# Patient Record
Sex: FEMALE | Race: WHITE | NOT HISPANIC OR LATINO | Employment: FULL TIME | URBAN - METROPOLITAN AREA
[De-identification: names, ages, dates, MRNs, and addresses within clinical notes are randomized per-mention and may not be internally consistent; named-entity substitution may affect disease eponyms.]

---

## 2017-08-07 ENCOUNTER — OFFICE VISIT (OUTPATIENT)
Dept: INTERNAL MEDICINE | Facility: CLINIC | Age: 42
End: 2017-08-07
Payer: COMMERCIAL

## 2017-08-07 ENCOUNTER — TELEPHONE (OUTPATIENT)
Dept: INTERNAL MEDICINE | Facility: CLINIC | Age: 42
End: 2017-08-07

## 2017-08-07 VITALS
WEIGHT: 128.06 LBS | HEIGHT: 63 IN | BODY MASS INDEX: 22.69 KG/M2 | SYSTOLIC BLOOD PRESSURE: 128 MMHG | DIASTOLIC BLOOD PRESSURE: 70 MMHG | HEART RATE: 106 BPM | OXYGEN SATURATION: 99 %

## 2017-08-07 DIAGNOSIS — Z82.49 FAMILY HISTORY OF CARDIAC DISORDER: Primary | ICD-10-CM

## 2017-08-07 DIAGNOSIS — F41.9 ANXIETY: ICD-10-CM

## 2017-08-07 PROCEDURE — 3008F BODY MASS INDEX DOCD: CPT | Mod: S$GLB,,, | Performed by: INTERNAL MEDICINE

## 2017-08-07 PROCEDURE — 99213 OFFICE O/P EST LOW 20 MIN: CPT | Mod: S$GLB,,, | Performed by: INTERNAL MEDICINE

## 2017-08-07 PROCEDURE — 99999 PR PBB SHADOW E&M-NEW PATIENT-LVL III: CPT | Mod: PBBFAC,,, | Performed by: INTERNAL MEDICINE

## 2017-08-07 RX ORDER — CLONAZEPAM 0.5 MG/1
0.5 TABLET ORAL
COMMUNITY
Start: 2017-08-02 | End: 2018-01-27

## 2017-08-07 RX ORDER — BUPROPION HYDROCHLORIDE 150 MG/1
TABLET ORAL
COMMUNITY
Start: 2017-08-02 | End: 2017-08-16

## 2017-08-07 RX ORDER — DOXYCYCLINE 100 MG/1
100 CAPSULE ORAL EVERY 12 HOURS
Refills: 1 | COMMUNITY
Start: 2017-06-25 | End: 2017-08-24

## 2017-08-07 RX ORDER — ERGOCALCIFEROL 1.25 MG/1
50000 CAPSULE ORAL WEEKLY
COMMUNITY
Start: 2017-08-04 | End: 2018-01-10 | Stop reason: SDUPTHER

## 2017-08-07 RX ORDER — HYDROCORTISONE 25 MG/G
CREAM TOPICAL
COMMUNITY
Start: 2017-07-20 | End: 2017-08-24

## 2017-08-16 ENCOUNTER — OFFICE VISIT (OUTPATIENT)
Dept: INTERNAL MEDICINE | Facility: CLINIC | Age: 42
End: 2017-08-16
Payer: COMMERCIAL

## 2017-08-16 VITALS
WEIGHT: 133.63 LBS | HEIGHT: 63 IN | SYSTOLIC BLOOD PRESSURE: 108 MMHG | OXYGEN SATURATION: 99 % | DIASTOLIC BLOOD PRESSURE: 70 MMHG | HEART RATE: 89 BPM | BODY MASS INDEX: 23.68 KG/M2

## 2017-08-16 DIAGNOSIS — E55.9 VITAMIN D DEFICIENCY: ICD-10-CM

## 2017-08-16 DIAGNOSIS — F17.200 SMOKER: ICD-10-CM

## 2017-08-16 DIAGNOSIS — E78.1 HYPERTRIGLYCERIDEMIA: ICD-10-CM

## 2017-08-16 DIAGNOSIS — R42 DIZZINESS: Primary | ICD-10-CM

## 2017-08-16 DIAGNOSIS — F41.9 ANXIETY: ICD-10-CM

## 2017-08-16 PROCEDURE — 3008F BODY MASS INDEX DOCD: CPT | Mod: S$GLB,,, | Performed by: NURSE PRACTITIONER

## 2017-08-16 PROCEDURE — 99204 OFFICE O/P NEW MOD 45 MIN: CPT | Mod: S$GLB,,, | Performed by: NURSE PRACTITIONER

## 2017-08-16 PROCEDURE — 99999 PR PBB SHADOW E&M-EST. PATIENT-LVL IV: CPT | Mod: PBBFAC,,, | Performed by: NURSE PRACTITIONER

## 2017-08-16 NOTE — PROGRESS NOTES
INTERNAL MEDICINE INITIAL VISIT NOTE      CHIEF COMPLAINT     Chief Complaint   Patient presents with    Annual Exam    Establish Care       HPI     Juanita Salas is a 42 y.o.  female with History reviewed. No pertinent past medical history. presents to establish care and with multiple complaints.     H/o blood in urine- Followed by Dr. Bahena at [facility]. Refer by Gyn. Recurrent UTIs with only s/s - dizziness. 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 Treating with doxycyline and will have repeat cultures at completion.   Treating boyfriend as well.     Dizziness- Still with occasional dizziness. Denies off balance and rotational dizziness. Associated with pre-syncope and sweating.   1 episode of low blood sugar.   Echo 2017 - [facility]   ECG - normal   Both done by Dr. Ulloa at [facility]     Anxiety - was attributing dizziness to anxiety. Treated with klonopin with relief. Also started on celexa which she could not tolerate 2/2 weight loss  - 6lbs in 1.5 months. Also felt zombie-like.   Then started on Wellbutrin - could not tolerate 2/2 elevated BPs.     Vitamin D def- Taking vit d 50,000 units weekly last check 29 8/10/2017.     Hyperlipidemia- trigs 161  HDL 57 8/2017        Past Medical History:  History reviewed. No pertinent past medical history.    Past Surgical History:  History reviewed. No pertinent surgical history.    Allergies:  Review of patient's allergies indicates:  No Known Allergies    Home Medications:  Prior to Admission medications    Medication Sig Start Date End Date Taking? Authorizing Provider   buPROPion (WELLBUTRIN XL) 150 MG TB24 tablet  8/2/17   Historical Provider, MD   clonazePAM (KLONOPIN) 0.5 MG tablet Take 0.5 mg by mouth as needed. 8/2/17   Historical Provider, MD   doxycycline (VIBRAMYCIN) 100 MG Cap Take 100 mg by mouth every 12 (twelve) hours. 6/25/17   Historical Provider, MD   PROCTOSOL HC 2.5 % rectal cream  7/20/17   Historical Provider, MD    TRINESSA LO 0.18/0.215/0.25 mg-25 mcg tablet TK 1 T PO QD 8/1/17   Historical Provider, MD   VITAMIN D2 50,000 unit capsule Take 50,000 Units by mouth once a week. 8/4/17   Historical Provider, MD       Family History:  Family History   Problem Relation Age of Onset    Cancer Mother 47     pancreatic cancer     Diabetes Mother     Multiple sclerosis Sister     Hypertension Maternal Aunt     Hyperlipidemia Maternal Aunt     Hyperlipidemia Maternal Uncle     Hypertension Maternal Uncle     Hyperlipidemia Paternal Aunt     Hypertension Paternal Aunt     Hyperlipidemia Paternal Uncle     Hypertension Paternal Uncle     Stroke Maternal Grandmother     Heart disease Maternal Grandfather     Hyperlipidemia Maternal Grandfather     Hypertension Maternal Grandfather        Social History:  Social History   Substance Use Topics    Smoking status: Current Every Day Smoker     Packs/day: 0.50     Years: 2.00    Smokeless tobacco: Never Used    Alcohol use No       Review of Systems:  Review of Systems   Constitutional: Positive for diaphoresis, fatigue and unexpected weight change. Negative for chills and fever.   HENT: Negative for congestion, postnasal drip, sinus pressure, sneezing and sore throat.    Eyes: Negative for visual disturbance.   Respiratory: Negative for cough, shortness of breath and wheezing.    Cardiovascular: Positive for palpitations (with anxiety ). Negative for chest pain.   Gastrointestinal: Negative for abdominal pain, constipation, diarrhea, nausea and vomiting.   Endocrine: Negative for cold intolerance, heat intolerance, polydipsia, polyphagia and polyuria.   Genitourinary: Negative for dysuria and urgency.   Musculoskeletal: Negative for arthralgias and myalgias.   Skin: Negative for rash.   Neurological: Positive for dizziness and light-headedness. Negative for headaches.   Psychiatric/Behavioral: Positive for decreased concentration. Negative for confusion, dysphoric mood,  "self-injury, sleep disturbance and suicidal ideas. The patient is nervous/anxious.        Health Maintainence:   Health Maintenance       Date Due Completion Date    TETANUS VACCINE 06/14/1993 ---    Pneumococcal PPSV23 (Medium Risk) (1) 06/14/1993 ---    Pap Smear with HPV Cotest 06/14/1996 ---    Mammogram 06/14/2015 ---    Influenza Vaccine 08/01/2017 ---            PHYSICAL EXAM     /70 (BP Location: Right arm, Patient Position: Sitting, BP Method: Large (Manual))   Pulse 89   Ht 5' 3" (1.6 m)   Wt 60.6 kg (133 lb 9.6 oz)   LMP 07/24/2017   SpO2 99%   BMI 23.67 kg/m²   Body mass index is 23.67 kg/m².    Physical Exam   Constitutional: She is oriented to person, place, and time. She appears well-developed and well-nourished.   HENT:   Head: Normocephalic.   Right Ear: External ear normal.   Left Ear: External ear normal.   Nose: Nose normal.   Mouth/Throat: Oropharynx is clear and moist. No oropharyngeal exudate.   Eyes: Pupils are equal, round, and reactive to light.   Neck: Neck supple. No JVD present. No tracheal deviation present. No thyromegaly present.   Cardiovascular: Normal rate, regular rhythm, normal heart sounds and intact distal pulses.  Exam reveals no gallop and no friction rub.    No murmur heard.  Pulmonary/Chest: Effort normal and breath sounds normal. No respiratory distress. She has no wheezes. She has no rales.   Abdominal: Soft. Bowel sounds are normal. She exhibits no distension. There is no tenderness.   Musculoskeletal: Normal range of motion. She exhibits no edema or tenderness.   Lymphadenopathy:     She has no cervical adenopathy.   Neurological: She is alert and oriented to person, place, and time.   Skin: Skin is warm and dry. No rash noted.   Psychiatric: She has a normal mood and affect. Her behavior is normal.   Vitals reviewed.        LABS     No results found for: LABA1C, HGBA1C  CMP  Sodium   Date Value Ref Range Status   02/17/2006 143 136 - 145 mMol/l Final "     Potassium   Date Value Ref Range Status   02/17/2006 3.5 3.3 - 5.3 mMol/l Final     Chloride   Date Value Ref Range Status   02/17/2006 102 95 - 110 mMol/l Final     CO2   Date Value Ref Range Status   02/17/2006 28 23.0 - 29.0 mEq/L Final     Glucose   Date Value Ref Range Status   02/17/2006 90 70 - 110 mg/dl Final     BUN, Bld   Date Value Ref Range Status   02/17/2006 9 5 - 23 mg/dl Final     Creatinine   Date Value Ref Range Status   02/17/2006 0.7 0.5 - 1.4 mg/dl Final     Calcium   Date Value Ref Range Status   02/17/2006 8.8 8.7 - 10.5 mg/dl Final     Total Protein   Date Value Ref Range Status   02/17/2006 7.1 6.0 - 8.4 gm/dl Final     Albumin   Date Value Ref Range Status   02/17/2006 4.6 3.5 - 5.2 g/dl Final     Total Bilirubin   Date Value Ref Range Status   02/17/2006 0.4 0.1 - 1.0 mg/dl Final     Alkaline Phosphatase   Date Value Ref Range Status   02/17/2006 55 45 - 130 U/L Final     AST   Date Value Ref Range Status   02/17/2006 17 0 - 31 U/L Final     ALT   Date Value Ref Range Status   02/17/2006 14 0 - 31 U/L Final     Lab Results   Component Value Date    WBC 6.80 02/17/2006    HGB 12.5 02/17/2006    HCT 38.6 02/17/2006    MCV 91.3 02/17/2006     02/17/2006     Lab Results   Component Value Date    CHOL 177 02/17/2006     Lab Results   Component Value Date    HDL 48.0 02/17/2006     Lab Results   Component Value Date    LDLCALC 103.2 02/17/2006     Lab Results   Component Value Date    TRIG 129 02/17/2006     Lab Results   Component Value Date    CHOLHDL 27.1 02/17/2006     Lab Results   Component Value Date    TSH 1.8 02/17/2006       ASSESSMENT/PLAN     Juanita Salas is a 42 y.o. female wit  History reviewed. No pertinent past medical history.    Dizziness- Will request echo and ecg from Dr. Boo. Physical exam normal. BPs normal. Will refer to cardiology for opinion on work up. Likely r/t anxiety.   -     Ambulatory Referral to Cardiology    Anxiety- did not tolerate celexa and  wellbutrin. Will cont clonazepam sparingly. If cardiology work up negative will try another SSRI for management.     Hypertriglyceridemia- discussed diet and lifestyle modifications.     Vitamin D deficiency- cont 50,000 unit supplement. will recheck vit d in 3 months     Smoker- will refer to smoking cessation.     Follow up with PCP in 3 months     Erika CARL, ARJUN, FNP-c   Department of Internal Medicine - [facility]  7:33 AM

## 2017-08-18 ENCOUNTER — TELEPHONE (OUTPATIENT)
Dept: CARDIOLOGY | Facility: CLINIC | Age: 42
End: 2017-08-18

## 2017-08-18 DIAGNOSIS — R42 DIZZINESS: Primary | ICD-10-CM

## 2017-08-24 ENCOUNTER — OFFICE VISIT (OUTPATIENT)
Dept: CARDIOLOGY | Facility: CLINIC | Age: 42
End: 2017-08-24
Payer: COMMERCIAL

## 2017-08-24 VITALS
HEIGHT: 63 IN | DIASTOLIC BLOOD PRESSURE: 77 MMHG | BODY MASS INDEX: 23.32 KG/M2 | WEIGHT: 131.63 LBS | HEART RATE: 93 BPM | SYSTOLIC BLOOD PRESSURE: 129 MMHG

## 2017-08-24 DIAGNOSIS — Z72.0 TOBACCO ABUSE: ICD-10-CM

## 2017-08-24 DIAGNOSIS — R42 DIZZY SPELLS: Primary | ICD-10-CM

## 2017-08-24 DIAGNOSIS — F17.200 TOBACCO DEPENDENCE SYNDROME: ICD-10-CM

## 2017-08-24 DIAGNOSIS — R42 DIZZINESS: ICD-10-CM

## 2017-08-24 DIAGNOSIS — F41.9 ANXIETY: ICD-10-CM

## 2017-08-24 PROCEDURE — 3008F BODY MASS INDEX DOCD: CPT | Mod: S$GLB,,, | Performed by: INTERNAL MEDICINE

## 2017-08-24 PROCEDURE — 93000 ELECTROCARDIOGRAM COMPLETE: CPT | Mod: S$GLB,,, | Performed by: INTERNAL MEDICINE

## 2017-08-24 PROCEDURE — 99204 OFFICE O/P NEW MOD 45 MIN: CPT | Mod: 25,S$GLB,, | Performed by: INTERNAL MEDICINE

## 2017-08-24 PROCEDURE — 99999 PR PBB SHADOW E&M-EST. PATIENT-LVL III: CPT | Mod: PBBFAC,,, | Performed by: INTERNAL MEDICINE

## 2017-08-24 NOTE — LETTER
August 24, 2017      Erika Nielson, NP  [address]  [address]  [facility] [address]           [facility] - Cardiology  [address]  [place] [address]  Phone: [phone]          Patient: Juanita Salas   MR Number: [id number]   YOB: 1975   Date of Visit: 8/24/2017       Dear Erika Nielson:    Thank you for referring Juanita Salas to me for evaluation. Attached you will find relevant portions of my assessment and plan of care.    If you have questions, please do not hesitate to call me. I look forward to following Juanita Salas along with you.    Sincerely,    Darrel Gray MD    Enclosure  CC:  No Recipients    If you would like to receive this communication electronically, please contact [email or web address] or [phone] to request more information on [employer] access.    For providers and/or their staff who would like to refer a patient to [facility], please contact us through our one-stop-shop provider referral line, [facility], at [phone].    If you feel you have received this communication in error or would no longer like to receive these types of communications, please e-mail [email or web address]

## 2017-08-24 NOTE — PROGRESS NOTES
Chart has been dictated using voice recognition software.  It is not been reviewed carefully for any transcriptional errors due to this technology.   Subjective:   Patient ID:  Juanita Salas is a 42 y.o. female who presents for evaluation of Dizziness and Palpitations      HPI: Patient with family history of heart disease has been having episodes of dizziness.  Usually occurs with anxiety attacks.  Has sudden episodes of lightheadedness lasting various times. Not associated with palpitations, will be accompanied by nausea. No syncope.   Patient denies any dyspnea at rest or on exertion, orthopnea, PND, or edema.  Patient denies any chest discomfort on exertion or at rest. Patient denies any palpitations or syncope.     Cardiac risk factors: hyperlipidemia, positive family history, tobacco use    No past medical history on file.    No past surgical history on file.    Social History   Substance Use Topics    Smoking status: Current Every Day Smoker     Packs/day: 0.50     Years: 2.00    Smokeless tobacco: Never Used    Alcohol use No       Outpatient Medications Prior to Visit   Medication Sig Dispense Refill    clonazePAM (KLONOPIN) 0.5 MG tablet Take 0.5 mg by mouth as needed.      TRINESSA LO 0.18/0.215/0.25 mg-25 mcg tablet TK 1 T PO QD  2    VITAMIN D2 50,000 unit capsule Take 50,000 Units by mouth once a week.      doxycycline (VIBRAMYCIN) 100 MG Cap Take 100 mg by mouth every 12 (twelve) hours.  1    PROCTOSOL HC 2.5 % rectal cream        No facility-administered medications prior to visit.        Review of patient's allergies indicates:  No Known Allergies    Review of Systems   Constitution: Positive for weight loss (8 since the beginning of the year (not on purpose) - most when she was taking Celexa). Negative for weight gain.   HENT: Negative for nosebleeds.    Eyes: Negative for vision loss in left eye and vision loss in right eye.   Cardiovascular: Negative for claudication.        As above  "  Respiratory: Negative for hemoptysis, shortness of breath, snoring, sputum production and wheezing.    Endocrine: Negative for polydipsia and polyuria.   Hematologic/Lymphatic: Does not bruise/bleed easily.   Musculoskeletal: Negative for myalgias.   Gastrointestinal: Positive for nausea (with Wellbutrin). Negative for change in bowel habit, hematemesis, hematochezia, melena and vomiting.   Genitourinary: Negative for hematuria.   Neurological: Negative for focal weakness and numbness.     Objective:   Physical Exam   Constitutional: She is oriented to person, place, and time. She appears well-developed and well-nourished.   /77   Pulse 93   Ht 5' 3" (1.6 m)   Wt 59.7 kg (131 lb 9.8 oz)   LMP 07/24/2017   BMI 23.31 kg/m²   Repeat /72 left arm sitting     Neck: Neck supple. No JVD present. Carotid bruit is not present.   Cardiovascular: Normal rate, regular rhythm, normal heart sounds and intact distal pulses.  Exam reveals no gallop and no friction rub.    No murmur heard.  Pulmonary/Chest: Effort normal and breath sounds normal. She has no wheezes. She has no rales.   Abdominal: Soft. Bowel sounds are normal. She exhibits no abdominal bruit. There is no hepatomegaly. There is no tenderness.   Musculoskeletal: She exhibits no edema.   Neurological: She is alert and oriented to person, place, and time. She has normal strength.   Skin: No cyanosis. Nails show no clubbing.       Lab Results   Component Value Date    WBC 6.80 02/17/2006    HGB 12.5 02/17/2006    HCT 38.6 02/17/2006    MCV 91.3 02/17/2006     02/17/2006       Lab Results   Component Value Date     02/17/2006    K 3.5 02/17/2006    BUN 9 02/17/2006    CREATININE 0.7 02/17/2006    GLU 90 02/17/2006    CHOL 177 02/17/2006    HDL 48.0 02/17/2006    LDLCALC 103.2 02/17/2006    TRIG 129 02/17/2006    CHOLHDL 27.1 02/17/2006    HGB 12.5 02/17/2006    HCT 38.6 02/17/2006     02/17/2006     10 year ASCVD risk 2.4%    ECG " "showed sinus rhythm and was a normal ECG.   Assessment:     1. Dizzy spells    2. Anxiety    3. Tobacco abuse    4. Tobacco dependence syndrome      The patient is having nondescript spells where she has "dizziness" which appears to be somewhat of a lightheadedness.  They're not necessarily associated with any other symptoms.  They're clearly not necessarily associated with palpitations and is not clear the patient actually has palpitations.  Patient has no other cardiac symptoms.  Her physical exam is relatively benign.  She states that she has mitral valve prolapse on echo at another institution, but no click or murmur heard on physical examination.    As likely the patient's symptoms are all due to anxiety.  It seems as if the patient is not controlling her anxiety well by her current means and, therefore, she will be referred for psychological evaluation.  At this time, further cardiac workup is unlikely to reveal any pathology and, therefore, is not recommended.  Discussed with patient.  No further testing or additional treatment at this time.   Plan:     Juanita was seen today for dizziness and palpitations.    Diagnoses and all orders for this visit:    Dizzy spells    Anxiety  -     [70599] AK TOBACCO USE CESSATION INTERMEDIATE 3-10 MIN    Tobacco abuse  -     [73885] AK TOBACCO USE CESSATION INTERMEDIATE 3-10 MIN  -     Ambulatory consult to Psychology  -     [84412] AK TOBACCO USE CESSATION INTERMEDIATE 3-10 MIN    Tobacco dependence syndrome            Tobacco Use/Cessation:  I assessed Juanita Salas and discussed smoking cessation with her for 3-10 minutes. She  History   Smoking Status    Current Every Day Smoker    Packs/day: 0.50    Years: 2.00   Smokeless Tobacco    Never Used       Tobacco Use/Cessation:  I assessed Juanita Salas and discussed smoking cessation with her for 3-10 minutes. She  History   Smoking Status    Current Every Day Smoker    Packs/day: 0.50    Years: 2.00   Smokeless " Tobacco    Never Used

## 2017-08-24 NOTE — LETTER
August 24, 2017        Erika Nielson, NP  [address]  [address]  [facility] [address]             [facility] - Cardiology  [address]  [place] [address]  Phone: [phone]   Patient: Juanita Salas   MR Number: [id number]   YOB: 1975   Date of Visit: 8/24/2017       Dear Dr. Nielson:    Thank you for referring Juanita Salas to me for evaluation. Attached you will find relevant portions of my assessment and plan of care.    If you have questions, please do not hesitate to call me. I look forward to following Juanita Salas along with you.    Sincerely,      Darrel Gray MD            CC  No Recipients    Enclosure

## 2017-08-31 ENCOUNTER — CLINICAL SUPPORT (OUTPATIENT)
Dept: SMOKING CESSATION | Facility: CLINIC | Age: 42
End: 2017-08-31
Payer: COMMERCIAL

## 2017-08-31 VITALS
HEART RATE: 88 BPM | WEIGHT: 131.63 LBS | DIASTOLIC BLOOD PRESSURE: 83 MMHG | SYSTOLIC BLOOD PRESSURE: 127 MMHG | HEIGHT: 63 IN | BODY MASS INDEX: 23.32 KG/M2

## 2017-08-31 DIAGNOSIS — F17.210 NICOTINE DEPENDENCE, CIGARETTES, UNCOMPLICATED: Primary | ICD-10-CM

## 2017-08-31 PROCEDURE — 99404 PREV MED CNSL INDIV APPRX 60: CPT | Mod: S$GLB,,,

## 2017-09-01 RX ORDER — IBUPROFEN 200 MG
1 TABLET ORAL DAILY
Qty: 7 PATCH | Refills: 0 | Status: SHIPPED | OUTPATIENT
Start: 2017-09-01 | End: 2017-09-14 | Stop reason: SDUPTHER

## 2017-09-01 RX ORDER — DIPHENHYDRAMINE HCL 25 MG
4 CAPSULE ORAL
Qty: 220 EACH | Refills: 0 | Status: SHIPPED | OUTPATIENT
Start: 2017-09-01 | End: 2017-11-21 | Stop reason: ALTCHOICE

## 2017-09-01 NOTE — PROGRESS NOTES
8/31/17     See Smoking Cessation Smart Form    Additional Interventions:  · Recommended patient participate in Smoking Cessation Group .  · Discussed triggers and planning for quit date.  · Given patient education handouts from [employer] Tool Kit #3  · Educated patient about and gave patient education handouts from  [employer] Drug Information on:   Chantix, Wellbutrin, NRT  · Provided phone number to reach [facility] CTTS (Certified Tobacco Treatment Specialist) for future assistance and numbers to 24/7 Quit lines.

## 2017-09-12 ENCOUNTER — CLINICAL SUPPORT (OUTPATIENT)
Dept: SMOKING CESSATION | Facility: CLINIC | Age: 42
End: 2017-09-12
Payer: COMMERCIAL

## 2017-09-12 DIAGNOSIS — F17.210 NICOTINE DEPENDENCE, CIGARETTES, UNCOMPLICATED: Primary | ICD-10-CM

## 2017-09-12 PROCEDURE — 90853 GROUP PSYCHOTHERAPY: CPT | Mod: S$GLB,,,

## 2017-09-13 NOTE — PROGRESS NOTES
Smoking Cessation Group Session #1    Site:  [facility]  Date:  9/12/17  Clinical Status of Patient: Outpatient   Length of Service and Code: 90 minutes - 12683   Number in Attendance: 5  Group Activities/Focus of Group:  orientation, client introductions, completion of TCRS (Tobacco Cessation Rating Scale) learned addiction model, cues/triggers, personal reasons for quitting, medications, goals, quit date preparation.  Target symptoms:  withdrawal and medication side effects             The following were rated moderate (3) to severe (4) on TCRS:       Moderate 3: none     Severe 4:   none  Patient's Response to Intervention: Active participation, self-disclosure, supportive of group peers.  Progress Toward Goals and Other Mental Status Changes:   No change in the number of cigarettes smoked per day yet.  Reported she just got the patches and gum from the pharmacy today so she hasnt started using them yet.  Shared smoking history with the group, reasons for wanting to quit, and strong motivation to achieve this goal.  Diagnosis: F17.210  Plan: Group therapy, individual support/cessation counseling and medication monitoring by CTTS. Medication management by providers.  Return to Clinic: 1 week  Planned Quit Date:  To Be Determined

## 2017-09-14 ENCOUNTER — TELEPHONE (OUTPATIENT)
Dept: SMOKING CESSATION | Facility: CLINIC | Age: 42
End: 2017-09-14

## 2017-09-14 DIAGNOSIS — F17.210 NICOTINE DEPENDENCE, CIGARETTES, UNCOMPLICATED: ICD-10-CM

## 2017-09-14 RX ORDER — IBUPROFEN 200 MG
1 TABLET ORAL DAILY
Qty: 14 PATCH | Refills: 0 | Status: SHIPPED | OUTPATIENT
Start: 2017-09-14 | End: 2017-11-21 | Stop reason: ALTCHOICE

## 2017-09-21 ENCOUNTER — NURSE TRIAGE (OUTPATIENT)
Dept: ADMINISTRATIVE | Facility: CLINIC | Age: 42
End: 2017-09-21

## 2017-09-21 NOTE — TELEPHONE ENCOUNTER
Reason for Disposition   Severe headache, states 'worst headache' of life     Patient states she is experiencing severe left sided headache for year how has been increased in severity within last two past two days which now include stiffness in her neck.    Protocols used: ST HEADACHE-A-[place]

## 2017-09-22 ENCOUNTER — OFFICE VISIT (OUTPATIENT)
Dept: NEUROLOGY | Facility: CLINIC | Age: 42
End: 2017-09-22
Payer: COMMERCIAL

## 2017-09-22 VITALS
WEIGHT: 130.75 LBS | SYSTOLIC BLOOD PRESSURE: 128 MMHG | HEART RATE: 88 BPM | DIASTOLIC BLOOD PRESSURE: 93 MMHG | BODY MASS INDEX: 23.17 KG/M2 | HEIGHT: 63 IN

## 2017-09-22 DIAGNOSIS — R51.9 GENERALIZED HEADACHES: Primary | ICD-10-CM

## 2017-09-22 DIAGNOSIS — G43.009 MIGRAINE WITHOUT AURA AND WITHOUT STATUS MIGRAINOSUS, NOT INTRACTABLE: ICD-10-CM

## 2017-09-22 PROCEDURE — 99203 OFFICE O/P NEW LOW 30 MIN: CPT | Mod: S$GLB,,, | Performed by: NEUROMUSCULOSKELETAL MEDICINE & OMM

## 2017-09-22 PROCEDURE — 99999 PR PBB SHADOW E&M-EST. PATIENT-LVL III: CPT | Mod: PBBFAC,,, | Performed by: NEUROMUSCULOSKELETAL MEDICINE & OMM

## 2017-09-22 NOTE — PROGRESS NOTES
This note was dictated with Dragon Natural Speaking a word recognition program. There are occasionally word recognition errors which are missed on review.  Juanita Salas  1975  Review of patient's allergies indicates:  No Known Allergies  [unfilled]    No past medical history on file.  Social History     Social History    Marital status:      Spouse name: N/A    Number of children: N/A    Years of education: N/A     Occupational History    Not on file.     Social History Main Topics    Smoking status: Current Every Day Smoker     Packs/day: 0.50     Years: 2.00    Smokeless tobacco: Never Used    Alcohol use No    Drug use: No    Sexual activity: Not on file     Other Topics Concern    Not on file     Social History Narrative     with son (23) and daugther (14)     Family History   Problem Relation Age of Onset    Cancer Mother 47     pancreatic cancer     Diabetes Mother     Multiple sclerosis Sister     Hypertension Maternal Aunt     Hyperlipidemia Maternal Aunt     Hyperlipidemia Maternal Uncle     Hypertension Maternal Uncle     Hyperlipidemia Paternal Aunt     Hypertension Paternal Aunt     Hyperlipidemia Paternal Uncle     Hypertension Paternal Uncle     Stroke Maternal Grandmother     Heart disease Maternal Grandfather     Hyperlipidemia Maternal Grandfather     Hypertension Maternal Grandfather        Review of systems:  Constitutional-negative  Eyes-negative  ENT, mouth-negative  Cardiovascular-negative  Respiratory-negative  GI-negative  - negative  Musculoskeletal-negative  Skin-negative  Neurologic-negative  Psychiatric-negative  Endocrine-negative  Hematology/lymph nodes-negative  Allergies/immunology-negative  Juanita Salas  1975  Review of patient's allergies indicates:  No Known Allergies  [unfilled]    No past medical history on file.  Social History     Social History    Marital status:      Spouse name: N/A    Number of  children: N/A    Years of education: N/A     Occupational History    Not on file.     Social History Main Topics    Smoking status: Current Every Day Smoker     Packs/day: 0.50     Years: 2.00    Smokeless tobacco: Never Used    Alcohol use No    Drug use: No    Sexual activity: Not on file     Other Topics Concern    Not on file     Social History Narrative     with son (23) and daugther (14)     Family History   Problem Relation Age of Onset    Cancer Mother 47     pancreatic cancer     Diabetes Mother     Multiple sclerosis Sister     Hypertension Maternal Aunt     Hyperlipidemia Maternal Aunt     Hyperlipidemia Maternal Uncle     Hypertension Maternal Uncle     Hyperlipidemia Paternal Aunt     Hypertension Paternal Aunt     Hyperlipidemia Paternal Uncle     Hypertension Paternal Uncle     Stroke Maternal Grandmother     Heart disease Maternal Grandfather     Hyperlipidemia Maternal Grandfather     Hypertension Maternal Grandfather        Review of systems:  Constitutional-negative  Eyes-negative  ENT, mouth-negative  Cardiovascular-negative  Respiratory-negative  GI-negative  - negative  Musculoskeletal-negative  Skin-negative  Neurologic-negative  Psychiatric-negative  Endocrine-negative  Hematology/lymph nodes-negative  Allergies/immunology-negative  Gen. Appearance: Well-developed with no obvious deformities  Carotid arteries symmetrical pulses  Peripheral vascular shows symmetrical pulses with no obvious edema or tenderness  Social History : Patient works in [employer].  She is quite concerned over the fact that she has a sister who is disabled with multiple sclerosis.  Present history:   This a 42-year-old white female presents with a history of headaches for several years.  Patient describes headaches occurring every few weeks sometimes lasting for several days.  She is now on her menstrual cycle with a 3 day headache.  Headaches typically come around her cycle.  She also  complains of dizziness described as a lightheaded feeling off and on over the last year.  Headache is described as a left aching pain radiating to the back of the head associated with nausea and photophobia.  Headache can last all day and can be 10 out of 10 intensity.  She has been having night sweats.  She has been on birth control pills.  She is typically taken Tylenol with no help however recently tried Goody's powder that seemed to be much more effective.  She has a prescription for Klonopin 0.5 mg of which she takes a quarter tablet for anxiety.  The previous doctors have told her that her headaches are anxiety related however says it's the other way around anxiety is due to the headaches and the dizziness and not knowing what is causing it.  She has not had an MRI of the brain and is quite concerned over the fact that her sister has MS and she could have it.  Patient was previously prescribed Celexa however she stopped this due to weight loss.  Neurological Exam:  Mental status-alert and oriented to person, place, and time; attention span and concentration is good. Fund of knowledge-patient is aware of current events and able to give detailed history of the current problem.recent and remote memory seems intact. Language function is normal with no evidence of aphasia  Cranial nerves:Visual acuity to hand chart -normal; visual fields to confrontation normal;pupils were equal and reactive to light ;no evidence of ptosis ;  funduscopic examination was normal with sharp disc margins. external ocular movements were full with no nystagmus. Facial sensation to pinprick : normal ; corneal reflexes intact; Facial muscles were symmetrical. Hearing is unimpaired symmetrical finger rub; Tongue movements - normal ; palate movements - normal ;Swallowing unimpaired. Shoulder shrug was intact with good strength Speech was normal  Motor examination: Upper : normal                                      Lower extremities -  Normal;muscle tone was normal ;                  Right-handed  Sensory examination:   Upper; normal pinprick and soft touch ;   Lower extremities - normal and symmetrical.   Vibration sense: 15-20 seconds @ toes  Deep tendon reflexes: upper extremities :1-2+ symmetrical ;     lower extremities KJ- 1-2 +; AJ - 1-2+ Both plantar responses were flexor  Cerebellar examination upper: Normal finger to nose and rapid alternating movements  Gait: Steady with no ataxia;      heel and toe walk normal  Romberg test: negative       Tandem gait: Normal    Involuntary movements: Negative  TMJ - no tenderness  Cervical examination: Full range of motion with no pain Cervical tenderness :negative  Lumbar examination: Low back tenderness-negative                  Sciatic notchtenderness-negative            Straight leg raising : negative    Impression: Headaches-probable hormonal migraines; dizziness rule out demyelinating disease    Recommendations/Plan : MRI brain with and without contrast; continue Goody's powder for now when necessary headache.  Continue Klonopin 0.5 mg quarter tablet for anxiety.  We discussed possibility that she could be starting menopause.  Follow-up one month.

## 2017-09-26 ENCOUNTER — PATIENT MESSAGE (OUTPATIENT)
Dept: SMOKING CESSATION | Facility: CLINIC | Age: 42
End: 2017-09-26

## 2017-09-27 ENCOUNTER — TELEPHONE (OUTPATIENT)
Dept: SMOKING CESSATION | Facility: CLINIC | Age: 42
End: 2017-09-27

## 2017-09-27 ENCOUNTER — OFFICE VISIT (OUTPATIENT)
Dept: INTERNAL MEDICINE | Facility: CLINIC | Age: 42
End: 2017-09-27
Payer: COMMERCIAL

## 2017-09-27 ENCOUNTER — HOSPITAL ENCOUNTER (OUTPATIENT)
Dept: RADIOLOGY | Facility: HOSPITAL | Age: 42
Discharge: HOME | End: 2017-09-27
Attending: NEUROMUSCULOSKELETAL MEDICINE & OMM
Payer: COMMERCIAL

## 2017-09-27 VITALS
WEIGHT: 134.25 LBS | BODY MASS INDEX: 23.79 KG/M2 | TEMPERATURE: 98 F | DIASTOLIC BLOOD PRESSURE: 76 MMHG | HEIGHT: 63 IN | OXYGEN SATURATION: 100 % | SYSTOLIC BLOOD PRESSURE: 128 MMHG | HEART RATE: 98 BPM

## 2017-09-27 DIAGNOSIS — R51.9 GENERALIZED HEADACHES: ICD-10-CM

## 2017-09-27 DIAGNOSIS — R53.81 MALAISE: Primary | ICD-10-CM

## 2017-09-27 LAB
BILIRUB SERPL-MCNC: ABNORMAL MG/DL
BLOOD URINE, POC: ABNORMAL
COLOR, POC UA: YELLOW
GLUCOSE UR QL STRIP: NORMAL
KETONES UR QL STRIP: NEGATIVE
LEUKOCYTE ESTERASE URINE, POC: NEGATIVE
NITRITE, POC UA: NEGATIVE
PH, POC UA: 7
PROTEIN, POC: NEGATIVE
SPECIFIC GRAVITY, POC UA: 1
UROBILINOGEN, POC UA: NORMAL

## 2017-09-27 PROCEDURE — 99213 OFFICE O/P EST LOW 20 MIN: CPT | Mod: 25,S$GLB,, | Performed by: NURSE PRACTITIONER

## 2017-09-27 PROCEDURE — 70553 MRI BRAIN STEM W/O & W/DYE: CPT | Mod: 26,,, | Performed by: RADIOLOGY

## 2017-09-27 PROCEDURE — 25500020 PHARM REV CODE 255: Performed by: NEUROMUSCULOSKELETAL MEDICINE & OMM

## 2017-09-27 PROCEDURE — 81002 URINALYSIS NONAUTO W/O SCOPE: CPT | Mod: S$GLB,,, | Performed by: NURSE PRACTITIONER

## 2017-09-27 PROCEDURE — 99999 PR PBB SHADOW E&M-EST. PATIENT-LVL IV: CPT | Mod: PBBFAC,,, | Performed by: NURSE PRACTITIONER

## 2017-09-27 PROCEDURE — 87086 URINE CULTURE/COLONY COUNT: CPT

## 2017-09-27 PROCEDURE — 3008F BODY MASS INDEX DOCD: CPT | Mod: S$GLB,,, | Performed by: NURSE PRACTITIONER

## 2017-09-27 PROCEDURE — A9585 GADOBUTROL INJECTION: HCPCS | Performed by: NEUROMUSCULOSKELETAL MEDICINE & OMM

## 2017-09-27 PROCEDURE — 70553 MRI BRAIN STEM W/O & W/DYE: CPT | Mod: TC

## 2017-09-27 RX ORDER — GADOBUTROL 604.72 MG/ML
6 INJECTION INTRAVENOUS
Status: COMPLETED | OUTPATIENT
Start: 2017-09-27 | End: 2017-09-27

## 2017-09-27 RX ADMIN — GADOBUTROL 6 ML: 604.72 INJECTION INTRAVENOUS at 08:09

## 2017-09-27 NOTE — TELEPHONE ENCOUNTER
9/27/17     8:35 a.m.    Returned patient's call.  She needed to change the days she was coming to Group from Tuesday to Wednesday and will start this on 10/4/17.  She also reported she is not having any problems with the nicotine patches.

## 2017-09-27 NOTE — PROGRESS NOTES
Subjective:       Patient ID: Juanita Salas is a 42 y.o. female.    Chief Complaint: Urinary Tract Infection    Ms Salas presents today for possible UTI.  She had her first UTI last July and since that time she has gotten them repeatedly. She was diagnosed with ureaplasma urealyticum and treated with doxycycline, which she just finished recently.  She felt well until the past few days, when she began having headaches and felt generally bad.  She has no dysuria, but she has not had dysuria with prior infections either. She reports that she took an Azo UTI test and it showed + leukocytes.      Urinary Tract Infection    This is a recurrent problem. The current episode started yesterday. The problem has been unchanged. The patient is experiencing no pain. There has been no fever. She is sexually active. There is no history of pyelonephritis. Associated symptoms include chills and flank pain. Pertinent negatives include no behavior changes, discharge, frequency, hematuria, hesitancy, nausea, possible pregnancy, sweats, urgency, vomiting, weight loss, bubble bath use, constipation, rash or withholding. She has tried nothing for the symptoms.     Review of Systems   Constitutional: Positive for chills and fatigue. Negative for weight loss.   HENT: Negative for facial swelling.    Eyes: Negative for visual disturbance.   Respiratory: Negative for shortness of breath.    Cardiovascular: Negative for chest pain.   Gastrointestinal: Negative for constipation, nausea and vomiting.   Genitourinary: Positive for flank pain. Negative for frequency, hematuria, hesitancy and urgency.   Musculoskeletal: Negative for joint swelling.   Skin: Negative for rash.   Neurological: Positive for headaches.   Psychiatric/Behavioral: Negative for confusion.       Objective:      Physical Exam   Constitutional: She appears well-developed and well-nourished.   Cardiovascular: Normal rate, regular rhythm and normal heart sounds.     Pulmonary/Chest: Effort normal and breath sounds normal. No respiratory distress.   Abdominal: Soft. Bowel sounds are normal. There is no tenderness. There is CVA tenderness. There is no rebound and no guarding.   Skin: Skin is warm and dry. She is not diaphoretic.   Psychiatric:   Possibly hypomanic.    Nursing note and vitals reviewed.      Assessment:       1. Malaise        Plan:   1. Malaise  - POCT urine dipstick without microscope  - Urine culture  - CBC auto differential; Future      Pt has been given instructions populated from [employer] database and has verbalized understanding of the after visit summary and information contained wherein.    Follow up with a primary care provider. May go to ER for acute shortness of breath, lightheadedness, fever, or any other emergent complaints or changes in condition.

## 2017-09-29 ENCOUNTER — PATIENT MESSAGE (OUTPATIENT)
Dept: INTERNAL MEDICINE | Facility: CLINIC | Age: 42
End: 2017-09-29

## 2017-09-29 DIAGNOSIS — Z13.6 SCREENING FOR HEART DISEASE: ICD-10-CM

## 2017-09-29 DIAGNOSIS — R42 DIZZY SPELLS: Primary | ICD-10-CM

## 2017-09-29 LAB
BACTERIA UR CULT: NORMAL
BACTERIA UR CULT: NORMAL

## 2017-10-03 DIAGNOSIS — R42 DIZZY SPELLS: ICD-10-CM

## 2017-10-03 DIAGNOSIS — E55.9 VITAMIN D DEFICIENCY: Primary | ICD-10-CM

## 2017-10-04 ENCOUNTER — LAB VISIT (OUTPATIENT)
Dept: LAB | Facility: HOSPITAL | Age: 42
End: 2017-10-04
Payer: COMMERCIAL

## 2017-10-04 DIAGNOSIS — Z13.6 SCREENING FOR HEART DISEASE: ICD-10-CM

## 2017-10-04 DIAGNOSIS — E55.9 VITAMIN D DEFICIENCY: ICD-10-CM

## 2017-10-04 DIAGNOSIS — R42 DIZZY SPELLS: ICD-10-CM

## 2017-10-04 LAB
25(OH)D3+25(OH)D2 SERPL-MCNC: 28 NG/ML
ALBUMIN SERPL BCP-MCNC: 3.7 G/DL
ALP SERPL-CCNC: 43 U/L
ALT SERPL W/O P-5'-P-CCNC: 29 U/L
ANION GAP SERPL CALC-SCNC: 12 MMOL/L
AST SERPL-CCNC: 19 U/L
BASOPHILS # BLD AUTO: 0.03 K/UL
BASOPHILS NFR BLD: 0.4 %
BILIRUB SERPL-MCNC: 0.4 MG/DL
BUN SERPL-MCNC: 14 MG/DL
CALCIUM SERPL-MCNC: 9 MG/DL
CHLORIDE SERPL-SCNC: 108 MMOL/L
CHOLEST SERPL-MCNC: 212 MG/DL
CHOLEST/HDLC SERPL: 4.1 {RATIO}
CO2 SERPL-SCNC: 22 MMOL/L
CREAT SERPL-MCNC: 0.7 MG/DL
DIFFERENTIAL METHOD: NORMAL
EOSINOPHIL # BLD AUTO: 0.3 K/UL
EOSINOPHIL NFR BLD: 3.1 %
ERYTHROCYTE [DISTWIDTH] IN BLOOD BY AUTOMATED COUNT: 12.6 %
EST. GFR  (AFRICAN AMERICAN): >60 ML/MIN/1.73 M^2
EST. GFR  (NON AFRICAN AMERICAN): >60 ML/MIN/1.73 M^2
FERRITIN SERPL-MCNC: 18 NG/ML
GLUCOSE SERPL-MCNC: 97 MG/DL
HCT VFR BLD AUTO: 38.6 %
HDLC SERPL-MCNC: 52 MG/DL
HDLC SERPL: 24.5 %
HGB BLD-MCNC: 12.8 G/DL
IRON SERPL-MCNC: 129 UG/DL
LDLC SERPL CALC-MCNC: 129 MG/DL
LYMPHOCYTES # BLD AUTO: 2 K/UL
LYMPHOCYTES NFR BLD: 24.3 %
MCH RBC QN AUTO: 30.7 PG
MCHC RBC AUTO-ENTMCNC: 33.2 G/DL
MCV RBC AUTO: 93 FL
MONOCYTES # BLD AUTO: 0.7 K/UL
MONOCYTES NFR BLD: 8 %
NEUTROPHILS # BLD AUTO: 5.3 K/UL
NEUTROPHILS NFR BLD: 64.1 %
NONHDLC SERPL-MCNC: 160 MG/DL
PLATELET # BLD AUTO: 262 K/UL
PMV BLD AUTO: 10.7 FL
POTASSIUM SERPL-SCNC: 4.1 MMOL/L
PROT SERPL-MCNC: 7.1 G/DL
RBC # BLD AUTO: 4.17 M/UL
SATURATED IRON: 37 %
SODIUM SERPL-SCNC: 142 MMOL/L
TOTAL IRON BINDING CAPACITY: 352 UG/DL
TRANSFERRIN SERPL-MCNC: 238 MG/DL
TRIGL SERPL-MCNC: 155 MG/DL
WBC # BLD AUTO: 8.34 K/UL

## 2017-10-04 PROCEDURE — 85025 COMPLETE CBC W/AUTO DIFF WBC: CPT

## 2017-10-04 PROCEDURE — 82607 VITAMIN B-12: CPT

## 2017-10-04 PROCEDURE — 83540 ASSAY OF IRON: CPT

## 2017-10-04 PROCEDURE — 80061 LIPID PANEL: CPT

## 2017-10-04 PROCEDURE — 83921 ORGANIC ACID SINGLE QUANT: CPT

## 2017-10-04 PROCEDURE — 82728 ASSAY OF FERRITIN: CPT

## 2017-10-04 PROCEDURE — 80053 COMPREHEN METABOLIC PANEL: CPT

## 2017-10-04 PROCEDURE — 82306 VITAMIN D 25 HYDROXY: CPT

## 2017-10-05 ENCOUNTER — PATIENT MESSAGE (OUTPATIENT)
Dept: INTERNAL MEDICINE | Facility: CLINIC | Age: 42
End: 2017-10-05

## 2017-10-05 ENCOUNTER — PATIENT MESSAGE (OUTPATIENT)
Dept: NEUROLOGY | Facility: CLINIC | Age: 42
End: 2017-10-05

## 2017-10-06 LAB — VIT B12 SERPL-MCNC: 216 NG/L

## 2017-10-09 ENCOUNTER — CLINICAL SUPPORT (OUTPATIENT)
Dept: SMOKING CESSATION | Facility: CLINIC | Age: 42
End: 2017-10-09
Payer: COMMERCIAL

## 2017-10-09 DIAGNOSIS — F17.210 NICOTINE DEPENDENCE, CIGARETTES, UNCOMPLICATED: Primary | ICD-10-CM

## 2017-10-09 PROCEDURE — 99407 BEHAV CHNG SMOKING > 10 MIN: CPT | Mod: S$GLB,,,

## 2017-10-10 ENCOUNTER — TELEPHONE (OUTPATIENT)
Dept: DERMATOLOGY | Facility: CLINIC | Age: 42
End: 2017-10-10

## 2017-10-10 LAB — METHYLMALONATE SERPL-SCNC: 0.12 NMOL/ML

## 2017-10-10 NOTE — TELEPHONE ENCOUNTER
I called the patient and left a message for her to call me back in order for me to help her to schedule an appointment.

## 2017-10-10 NOTE — TELEPHONE ENCOUNTER
----- Message from Hope Busch sent at 10/10/2017 11:26 AM CDT -----  Contact: pt at [phone]  [place],  Pt has hx of skin cancer and has a susp spot on her right side of her chest and her face/nose.  Pt is very fair and wants a skin cancer screening.  Pt has been approved for financial assistance. Through [facility] until October 27 and really needs an appt before that  me.  The sooner the better. Please call pt with an appt.  Pt will go to [facility]

## 2017-10-12 ENCOUNTER — TELEPHONE (OUTPATIENT)
Dept: INTERNAL MEDICINE | Facility: CLINIC | Age: 42
End: 2017-10-12

## 2017-10-12 ENCOUNTER — TELEPHONE (OUTPATIENT)
Dept: DERMATOLOGY | Facility: CLINIC | Age: 42
End: 2017-10-12

## 2017-10-12 NOTE — TELEPHONE ENCOUNTER
----- Message from Erika Nielson NP sent at 10/12/2017 11:40 AM CDT -----  Please let pt know her Vitamin B12 was normal

## 2017-10-12 NOTE — TELEPHONE ENCOUNTER
Spoke with pt scheduled her sk ck appt.      ----- Message from Trista Trevino MA sent at 10/11/2017  4:02 PM CDT -----  [facility] Staff  Caller: Unspecified (Today, 10:05 AM)         Patient called to cancel appt for today but needs to schedule for another appt before her  expires on the 27th. Call her at [phone].

## 2017-10-18 ENCOUNTER — CLINICAL SUPPORT (OUTPATIENT)
Dept: SMOKING CESSATION | Facility: CLINIC | Age: 42
End: 2017-10-18
Payer: COMMERCIAL

## 2017-10-18 DIAGNOSIS — F17.210 NICOTINE DEPENDENCE, CIGARETTES, UNCOMPLICATED: Primary | ICD-10-CM

## 2017-10-18 PROCEDURE — 99407 BEHAV CHNG SMOKING > 10 MIN: CPT | Mod: S$GLB,,,

## 2017-10-20 ENCOUNTER — OFFICE VISIT (OUTPATIENT)
Dept: INTERNAL MEDICINE | Facility: CLINIC | Age: 42
End: 2017-10-20
Payer: COMMERCIAL

## 2017-10-20 ENCOUNTER — OFFICE VISIT (OUTPATIENT)
Dept: DERMATOLOGY | Facility: CLINIC | Age: 42
End: 2017-10-20
Payer: COMMERCIAL

## 2017-10-20 VITALS
BODY MASS INDEX: 22.81 KG/M2 | HEIGHT: 63 IN | OXYGEN SATURATION: 98 % | SYSTOLIC BLOOD PRESSURE: 118 MMHG | DIASTOLIC BLOOD PRESSURE: 84 MMHG | WEIGHT: 128.75 LBS | HEART RATE: 93 BPM

## 2017-10-20 DIAGNOSIS — F41.9 ANXIETY: Primary | ICD-10-CM

## 2017-10-20 DIAGNOSIS — D18.00 ANGIOMA: ICD-10-CM

## 2017-10-20 DIAGNOSIS — L82.1 SEBORRHEIC KERATOSES: ICD-10-CM

## 2017-10-20 DIAGNOSIS — Z85.828 PERSONAL HISTORY OF MALIGNANT NEOPLASM OF SKIN: ICD-10-CM

## 2017-10-20 DIAGNOSIS — L81.4 LENTIGINES: ICD-10-CM

## 2017-10-20 DIAGNOSIS — R42 DIZZY SPELLS: ICD-10-CM

## 2017-10-20 DIAGNOSIS — D22.9 MULTIPLE BENIGN NEVI: Primary | ICD-10-CM

## 2017-10-20 DIAGNOSIS — L73.8 SEBACEOUS HYPERPLASIA: ICD-10-CM

## 2017-10-20 DIAGNOSIS — Z72.0 TOBACCO ABUSE: ICD-10-CM

## 2017-10-20 PROCEDURE — 99213 OFFICE O/P EST LOW 20 MIN: CPT | Mod: S$GLB,,, | Performed by: NURSE PRACTITIONER

## 2017-10-20 PROCEDURE — 99999 PR PBB SHADOW E&M-EST. PATIENT-LVL III: CPT | Mod: PBBFAC,,, | Performed by: NURSE PRACTITIONER

## 2017-10-20 PROCEDURE — 99999 PR PBB SHADOW E&M-EST. PATIENT-LVL II: CPT | Mod: PBBFAC,,, | Performed by: DERMATOLOGY

## 2017-10-20 PROCEDURE — 99203 OFFICE O/P NEW LOW 30 MIN: CPT | Mod: S$GLB,,, | Performed by: DERMATOLOGY

## 2017-10-20 NOTE — PROGRESS NOTES
"INTERNAL MEDICINE PROGRESS/URGENT CARE NOTE    CHIEF COMPLAINT     Chief Complaint   Patient presents with    Anxiety     heart palpations couple weeks        HPI     Juanita Salas is a 42 y.o. female who presents for an urgent visit today.    Pt with c/o possible panic attacks. States "I dont feel myself". episodes of heart palpations, dizziness, nausea, belching, and feels like she needs to get out (of the room or restaurant). Epsodes started 1 year ago.   Denies recent stressors.   +appetite changes, weight loss   +anhedonia  Denies hopelessness, sadness. Feels happy   +fatigue  +trouble concentrating   +hypersomnia       Palpitations/dizziness- seen by Dr. Gray. Does not think this cardiac related.      Headaches- seen by Dr. Pacheco Headaches typically come around her cycle.  She also complains of dizziness described as a lightheaded feeling off and on over the last year.  Headache is described as a left aching pain radiating to the back of the head associated with nausea and photophobia. MRI - normal 9/22/2017    The 10-year CVD risk score (D'Agostino, et al., 2008) is: 4.5%    Values used to calculate the score:      Age: 42 years      Sex: Female      Diabetic: No      Tobacco smoker: Yes      Systolic Blood Pressure: 118 mmHg      Is BP treated: No      HDL Cholesterol: 52 mg/dL      Total Cholesterol: 212 mg/dL      Past Medical History:  History reviewed. No pertinent past medical history.    Home Medications:  Prior to Admission medications    Medication Sig Start Date End Date Taking? Authorizing Provider   clonazePAM (KLONOPIN) 0.5 MG tablet Take 0.5 mg by mouth as needed. 8/2/17  Yes Historical Provider, MD   nicotine (NICODERM CQ) 21 mg/24 hr Place 1 patch onto the skin once daily. (Generic preferred. Member of [employer]) 9/14/17  Yes Teresa Ball NP   nicotine polacrilex (NICORETTE) 4 MG Gum Take 1 each (4 mg total) by mouth as needed (Maximum 15 pieces/day.). (Generic " "preferred. Member of [employer]) 9/1/17  Yes Nubia Comer MD   DYLON MUNSON 0.18/0.215/0.25 mg-25 mcg tablet TK 1 T PO QD 8/1/17  Yes Historical Provider, MD   VITAMIN D2 50,000 unit capsule Take 50,000 Units by mouth once a week. 8/4/17  Yes Historical Provider, MD       Review of Systems:  Review of Systems   Constitutional: Positive for activity change and unexpected weight change.   HENT: Negative for hearing loss, rhinorrhea and trouble swallowing.    Eyes: Negative for discharge and visual disturbance.   Respiratory: Negative for chest tightness, shortness of breath and wheezing.    Cardiovascular: Positive for palpitations. Negative for chest pain.   Gastrointestinal: Negative for abdominal pain, blood in stool, constipation, diarrhea and vomiting.        +belching    Endocrine: Negative for polydipsia and polyuria.   Genitourinary: Negative for difficulty urinating, dysuria, hematuria and menstrual problem.   Musculoskeletal: Negative for arthralgias, joint swelling and neck pain.   Neurological: Positive for headaches. Negative for weakness.   Psychiatric/Behavioral: Negative for confusion and dysphoric mood. The patient is nervous/anxious.        Health Maintainence:   Immunizations:  Health Maintenance       Date Due Completion Date    TETANUS VACCINE 06/14/1993 ---    Pneumococcal PPSV23 (Medium Risk) (1) 06/14/1993 ---    Pap Smear with HPV Cotest 06/14/1996 ---    Mammogram 06/14/2015 ---    Influenza Vaccine 08/01/2017 ---           PHYSICAL EXAM     /84 (BP Location: Left arm, Patient Position: Sitting, BP Method: Large (Manual))   Pulse 93   Ht 5' 3" (1.6 m)   Wt 58.4 kg (128 lb 12 oz)   LMP 10/16/2017 (Exact Date)   SpO2 98%   BMI 22.81 kg/m²     Physical Exam   Constitutional: She is oriented to person, place, and time. She appears well-developed and well-nourished.   HENT:   Head: Normocephalic.   Right Ear: External ear normal.   Left Ear: External ear normal. "   Nose: Nose normal.   Mouth/Throat: Oropharynx is clear and moist. No oropharyngeal exudate.   Eyes: Pupils are equal, round, and reactive to light.   Neck: Neck supple. No JVD present. No tracheal deviation present. No thyromegaly present.   Cardiovascular: Normal rate, regular rhythm, normal heart sounds and intact distal pulses.  Exam reveals no gallop and no friction rub.    No murmur heard.  Pulmonary/Chest: Effort normal and breath sounds normal. No respiratory distress. She has no wheezes. She has no rales.   Abdominal: Soft. Bowel sounds are normal. She exhibits no distension. There is no tenderness.   Musculoskeletal: Normal range of motion. She exhibits no edema or tenderness.   Lymphadenopathy:     She has no cervical adenopathy.   Neurological: She is alert and oriented to person, place, and time.   Skin: Skin is warm and dry. No rash noted.   Psychiatric: She has a normal mood and affect. Her behavior is normal.   Vitals reviewed.      LABS     No results found for: LABA1C, HGBA1C  CMP  Sodium   Date Value Ref Range Status   10/04/2017 142 136 - 145 mmol/L Final     Potassium   Date Value Ref Range Status   10/04/2017 4.1 3.5 - 5.1 mmol/L Final     Chloride   Date Value Ref Range Status   10/04/2017 108 95 - 110 mmol/L Final     CO2   Date Value Ref Range Status   10/04/2017 22 (L) 23 - 29 mmol/L Final     Glucose   Date Value Ref Range Status   10/04/2017 97 70 - 110 mg/dL Final     BUN, Bld   Date Value Ref Range Status   10/04/2017 14 6 - 20 mg/dL Final     Creatinine   Date Value Ref Range Status   10/04/2017 0.7 0.5 - 1.4 mg/dL Final     Calcium   Date Value Ref Range Status   10/04/2017 9.0 8.7 - 10.5 mg/dL Final     Total Protein   Date Value Ref Range Status   10/04/2017 7.1 6.0 - 8.4 g/dL Final     Albumin   Date Value Ref Range Status   10/04/2017 3.7 3.5 - 5.2 g/dL Final     Total Bilirubin   Date Value Ref Range Status   10/04/2017 0.4 0.1 - 1.0 mg/dL Final     Comment:     For infants and  newborns, interpretation of results should be based  on gestational age, weight and in agreement with clinical  observations.  Premature Infant recommended reference ranges:  Up to 24 hours.............<8.0 mg/dL  Up to 48 hours............<12.0 mg/dL  3-5 days..................<15.0 mg/dL  6-29 days.................<15.0 mg/dL       Alkaline Phosphatase   Date Value Ref Range Status   10/04/2017 43 (L) 55 - 135 U/L Final     AST   Date Value Ref Range Status   10/04/2017 19 10 - 40 U/L Final     ALT   Date Value Ref Range Status   10/04/2017 29 10 - 44 U/L Final     Anion Gap   Date Value Ref Range Status   10/04/2017 12 8 - 16 mmol/L Final     eGFR if    Date Value Ref Range Status   10/04/2017 >60 >60 mL/min/1.73 m^2 Final     eGFR if non    Date Value Ref Range Status   10/04/2017 >60 >60 mL/min/1.73 m^2 Final     Comment:     Calculation used to obtain the estimated glomerular filtration  rate (eGFR) is the CKD-EPI equation. Since race is unknown   in our information system, the eGFR values for   -American and Non--American patients are given   for each creatinine result.       Lab Results   Component Value Date    WBC 8.34 10/04/2017    HGB 12.8 10/04/2017    HCT 38.6 10/04/2017    MCV 93 10/04/2017     10/04/2017     Lab Results   Component Value Date    CHOL 212 (H) 10/04/2017    CHOL 177 02/17/2006     Lab Results   Component Value Date    HDL 52 10/04/2017    HDL 48.0 02/17/2006     Lab Results   Component Value Date    LDLCALC 129.0 10/04/2017    LDLCALC 103.2 02/17/2006     Lab Results   Component Value Date    TRIG 155 (H) 10/04/2017    TRIG 129 02/17/2006     Lab Results   Component Value Date    CHOLHDL 24.5 10/04/2017    CHOLHDL 27.1 02/17/2006     Lab Results   Component Value Date    TSH 1.8 02/17/2006       ASSESSMENT/PLAN     Juanita Salas is a 42 y.o. female with  History reviewed. No pertinent past medical history.    Anxiety- panic attacks  and anxiety likely cause of s/s. May use 1/2 klonopin as needed for anxiety attacks. Refuses to start SSRI. Will refer to psychology for cog or behavioral therapy. If not responding will start pharmacotherapy   -     Ambulatory Referral to Psychology    Dizzy spells- likely component of panic attacks     Tobacco abuse- cont smoking cessation         Follow up as needed     Patient education provided from [facility]. Patient was counseled on when and how to seek emergent care.       Erika CARL, ARJUN, FNP-c   Department of Internal Medicine - [facility]  4:21 PM

## 2017-10-20 NOTE — LETTER
October 21, 2017      Erika Nielson, NP  [address]  [place] [address]           [facility]  [address]  [place] [address]  Phone: [phone]  Fax: [phone]          Patient: Juanita Salas   MR Number: [id number]   YOB: 1975   Date of Visit: 10/20/2017       Dear Erika Nielson:    Thank you for referring Juanita Salas to me for evaluation. Attached you will find relevant portions of my assessment and plan of care.    If you have questions, please do not hesitate to call me. I look forward to following Juanita Salas along with you.    Sincerely,    Brielle Olivo MD    Enclosure  CC:  No Recipients    If you would like to receive this communication electronically, please contact [email or web address] or [phone] to request more information on [employer] Link access.    For providers and/or their staff who would like to refer a patient to [facility], please contact us through our one-stop-shop provider referral line, [facility], at [phone].    If you feel you have received this communication in error or would no longer like to receive these types of communications, please e-mail [email or web address]

## 2017-10-20 NOTE — PROGRESS NOTES
"  Subjective:       Patient ID:  Juanita Salas is a 42 y.o. female who presents for   Chief Complaint   Patient presents with    Skin Check     ubse     HPI  41 yo F presents for UBSE.  She noticed a rough spot on her chest x 2 months, described as sensitive, enlarging, no treatments.  She states that she had a skin cancer on her face that was treated by "burniing it".  She is unsure which type of skin cancer    + personal and family h/o skin cancer    History reviewed. No pertinent past medical history.    Review of Systems   Skin: Positive for activity-related sunscreen use. Negative for daily sunscreen use and recent sunburn.   Hematologic/Lymphatic: Does not bruise/bleed easily.        Objective:    Physical Exam   Constitutional: She appears well-developed and well-nourished. No distress.   Neurological: She is alert and oriented to person, place, and time. She is not disoriented.   Psychiatric: She has a normal mood and affect.   Skin:   Areas Examined (abnormalities noted in diagram):   Scalp / Hair Palpated and Inspected  Head / Face Inspection Performed  Neck Inspection Performed  Chest / Axilla Inspection Performed  Abdomen Inspection Performed  Back Inspection Performed  RUE Inspected  LUE Inspection Performed  Nails and Digits Inspection Performed                       Diagram Legend     Erythematous scaling macule/papule c/w actinic keratosis       Vascular papule c/w angioma      Pigmented verrucoid papule/plaque c/w seborrheic keratosis      Yellow umbilicated papule c/w sebaceous hyperplasia      Irregularly shaped tan macule c/w lentigo     1-2 mm smooth white papules consistent with Milia      Movable subcutaneous cyst with punctum c/w epidermal inclusion cyst      Subcutaneous movable cyst c/w pilar cyst      Firm pink to brown papule c/w dermatofibroma      Pedunculated fleshy papule(s) c/w skin tag(s)      Evenly pigmented macule c/w junctional nevus     Mildly variegated pigmented, slightly " irregular-bordered macule c/w mildly atypical nevus      Flesh colored to evenly pigmented papule c/w intradermal nevus       Pink pearly papule/plaque c/w basal cell carcinoma      Erythematous hyperkeratotic cursted plaque c/w SCC      Surgical scar with no sign of skin cancer recurrence      Open and closed comedones      Inflammatory papules and pustules      Verrucoid papule consistent consistent with wart     Erythematous eczematous patches and plaques     Dystrophic onycholytic nail with subungual debris c/w onychomycosis     Umbilicated papule    Erythematous-base heme-crusted tan verrucoid plaque consistent with inflamed seborrheic keratosis     Erythematous Silvery Scaling Plaque c/w Psoriasis     See annotation      Assessment / Plan:        Benign nevi  Reassurance that her nevus appears benign with regular pigment pattern on dermoscopy    Lentigines  These are benign hyperpigmented sun induced lesions. Daily sun protection will reduce the number of new lesions.     Angioma  This is a benign vascular lesion. Reassurance given. No treatment required.     Seborrheic keratoses  These are benign inherited growths without a malignant potential. Reassurance given to patient. No treatment is necessary.     Sebaceous hyperplasia  This is likely a common condition representing benign enlargement of the sebaceous lobule. It typically occurs in adulthood. Encouraged pt to notify us if it enlarges or bleeds    Personal history of malignant neoplasm of skin  Area(s) of previous NMSC evaluated with no signs of recurrence.  Upper body skin examination performed today including at least 6 points as noted in physical examination. No lesions suspicious for malignancy noted.             Return in about 1 year (around 10/20/2018).

## 2017-10-28 ENCOUNTER — OFFICE VISIT (OUTPATIENT)
Dept: INTERNAL MEDICINE | Facility: CLINIC | Age: 42
End: 2017-10-28
Payer: COMMERCIAL

## 2017-10-28 ENCOUNTER — HOSPITAL ENCOUNTER (OUTPATIENT)
Dept: RADIOLOGY | Facility: HOSPITAL | Age: 42
Discharge: HOME | End: 2017-10-28
Attending: INTERNAL MEDICINE
Payer: COMMERCIAL

## 2017-10-28 VITALS
DIASTOLIC BLOOD PRESSURE: 74 MMHG | RESPIRATION RATE: 16 BRPM | HEIGHT: 63 IN | WEIGHT: 129.44 LBS | BODY MASS INDEX: 22.93 KG/M2 | OXYGEN SATURATION: 98 % | SYSTOLIC BLOOD PRESSURE: 126 MMHG | HEART RATE: 88 BPM

## 2017-10-28 DIAGNOSIS — R31.9 HEMATURIA, UNSPECIFIED TYPE: ICD-10-CM

## 2017-10-28 DIAGNOSIS — K21.9 GASTROESOPHAGEAL REFLUX DISEASE, ESOPHAGITIS PRESENCE NOT SPECIFIED: Primary | ICD-10-CM

## 2017-10-28 LAB
BACTERIA #/AREA URNS AUTO: NORMAL /HPF
BILIRUB SERPL-MCNC: NORMAL MG/DL
BILIRUB UR QL STRIP: NEGATIVE
BLOOD URINE, POC: 250
CLARITY UR REFRACT.AUTO: CLEAR
COLOR UR AUTO: YELLOW
COLOR, POC UA: YELLOW
GLUCOSE UR QL STRIP: NEGATIVE
GLUCOSE UR QL STRIP: NORMAL
HGB UR QL STRIP: ABNORMAL
KETONES UR QL STRIP: NEGATIVE
KETONES UR QL STRIP: NORMAL
LEUKOCYTE ESTERASE UR QL STRIP: NEGATIVE
LEUKOCYTE ESTERASE URINE, POC: NORMAL
MICROSCOPIC COMMENT: NORMAL
NITRITE UR QL STRIP: NEGATIVE
NITRITE, POC UA: NORMAL
PH UR STRIP: 6 [PH] (ref 5–8)
PH, POC UA: 6
PROT UR QL STRIP: NEGATIVE
PROTEIN, POC: NORMAL
RBC #/AREA URNS AUTO: 2 /HPF (ref 0–4)
SP GR UR STRIP: 1.01 (ref 1–1.03)
SPECIFIC GRAVITY, POC UA: 1
SQUAMOUS #/AREA URNS AUTO: 0 /HPF
URN SPEC COLLECT METH UR: ABNORMAL
UROBILINOGEN UR STRIP-ACNC: NEGATIVE EU/DL
UROBILINOGEN, POC UA: 1
WBC #/AREA URNS AUTO: 0 /HPF (ref 0–5)

## 2017-10-28 PROCEDURE — 74176 CT ABD & PELVIS W/O CONTRAST: CPT | Mod: TC

## 2017-10-28 PROCEDURE — 99999 PR PBB SHADOW E&M-EST. PATIENT-LVL III: CPT | Mod: PBBFAC,,, | Performed by: INTERNAL MEDICINE

## 2017-10-28 PROCEDURE — 74176 CT ABD & PELVIS W/O CONTRAST: CPT | Mod: 26,,, | Performed by: RADIOLOGY

## 2017-10-28 PROCEDURE — 81002 URINALYSIS NONAUTO W/O SCOPE: CPT | Mod: S$GLB,,, | Performed by: INTERNAL MEDICINE

## 2017-10-28 PROCEDURE — 99214 OFFICE O/P EST MOD 30 MIN: CPT | Mod: 25,S$GLB,, | Performed by: INTERNAL MEDICINE

## 2017-10-28 PROCEDURE — 87086 URINE CULTURE/COLONY COUNT: CPT

## 2017-10-28 PROCEDURE — 81001 URINALYSIS AUTO W/SCOPE: CPT

## 2017-10-28 RX ORDER — ESOMEPRAZOLE MAGNESIUM 40 MG/1
40 CAPSULE, DELAYED RELEASE ORAL
Qty: 30 CAPSULE | Refills: 2 | Status: SHIPPED | OUTPATIENT
Start: 2017-10-28 | End: 2018-01-03

## 2017-10-28 NOTE — PROGRESS NOTES
Subjective:       Patient ID: Juanita Salas is a 42 y.o. female.    Chief Complaint: Gastroesophageal Reflux (Sat) and Urinary Tract Infection    HPI     42-year-old female here for evaluation of indigestion.  She has pain in her left upper pain.  The pain started about 1-2 weeks ago.  It will resolve for a little while.  It hurts all over her abdomen, then she belches and improves her symptoms.  Rolaids help when she takes them.  She has had blood in her urine and was referred to urology and was treated, which resolved the issue.  No regular exercise.  She is always on the go.  No stairs in her house.  Does not garden.  [place]  [place] issues with CP [place].    Review of Systems    Objective:      Physical Exam   Constitutional: She is oriented to person, place, and time. She appears well-developed and well-nourished.   HENT:   Head: Normocephalic and atraumatic.   Mouth/Throat: No oropharyngeal exudate.   Eyes: EOM are normal. Pupils are equal, round, and reactive to light. Right eye exhibits no discharge. Left eye exhibits no discharge. No scleral icterus.   Neck: Normal range of motion. Neck supple. No tracheal deviation present. No thyromegaly present.   Cardiovascular: Normal rate, regular rhythm and normal heart sounds.  Exam reveals no gallop and no friction rub.    No murmur heard.  Pulmonary/Chest: Effort normal and breath sounds normal. No respiratory distress. She has no wheezes. She has no rales. She exhibits no tenderness.   Abdominal: Soft. Bowel sounds are normal. She exhibits no distension and no mass. There is no tenderness. There is no rebound and no guarding.   Musculoskeletal: Normal range of motion. She exhibits no edema or tenderness.   Neurological: She is alert and oriented to person, place, and time.   Skin: Skin is warm and dry. No rash noted. No erythema. No pallor.   Psychiatric: She has a normal mood and affect. Her behavior is normal.    Vitals reviewed.      Assessment:       1. Gastroesophageal reflux disease, esophagitis presence not specified    2. Hematuria, unspecified type        Plan:       1.  Nexium 40 mrem daily.  2.  Check POCT urine, or analysis, urine culture.  Refer to urology.  Check CT renal stone study.

## 2017-10-29 LAB
BACTERIA UR CULT: NORMAL
BACTERIA UR CULT: NORMAL

## 2017-10-30 ENCOUNTER — PATIENT MESSAGE (OUTPATIENT)
Dept: INTERNAL MEDICINE | Facility: CLINIC | Age: 42
End: 2017-10-30

## 2017-11-01 ENCOUNTER — HOSPITAL ENCOUNTER (OUTPATIENT)
Dept: CARDIOLOGY | Facility: CLINIC | Age: 42
Discharge: HOME | End: 2017-11-01
Payer: COMMERCIAL

## 2017-11-01 ENCOUNTER — OFFICE VISIT (OUTPATIENT)
Dept: UROLOGY | Facility: CLINIC | Age: 42
End: 2017-11-01
Payer: COMMERCIAL

## 2017-11-01 ENCOUNTER — OFFICE VISIT (OUTPATIENT)
Dept: CARDIOLOGY | Facility: CLINIC | Age: 42
End: 2017-11-01
Payer: COMMERCIAL

## 2017-11-01 VITALS
HEART RATE: 102 BPM | BODY MASS INDEX: 22.54 KG/M2 | DIASTOLIC BLOOD PRESSURE: 77 MMHG | WEIGHT: 127.19 LBS | HEIGHT: 63 IN | SYSTOLIC BLOOD PRESSURE: 150 MMHG

## 2017-11-01 VITALS
BODY MASS INDEX: 22.46 KG/M2 | DIASTOLIC BLOOD PRESSURE: 85 MMHG | WEIGHT: 126.75 LBS | SYSTOLIC BLOOD PRESSURE: 132 MMHG | HEIGHT: 63 IN | HEART RATE: 111 BPM

## 2017-11-01 DIAGNOSIS — R42 DIZZINESS: ICD-10-CM

## 2017-11-01 DIAGNOSIS — R00.2 PALPITATION: Primary | ICD-10-CM

## 2017-11-01 DIAGNOSIS — F41.9 ANXIETY: ICD-10-CM

## 2017-11-01 DIAGNOSIS — R31.29 HEMATURIA, MICROSCOPIC: Primary | ICD-10-CM

## 2017-11-01 DIAGNOSIS — R00.2 PALPITATION: ICD-10-CM

## 2017-11-01 DIAGNOSIS — R00.2 PALPITATIONS: ICD-10-CM

## 2017-11-01 DIAGNOSIS — R00.2 PALPITATIONS: Primary | ICD-10-CM

## 2017-11-01 LAB
BACTERIA #/AREA URNS AUTO: ABNORMAL /HPF
MICROSCOPIC COMMENT: ABNORMAL
RBC #/AREA URNS AUTO: 1 /HPF (ref 0–4)
SQUAMOUS #/AREA URNS AUTO: 4 /HPF
WBC #/AREA URNS AUTO: 0 /HPF (ref 0–5)

## 2017-11-01 PROCEDURE — 93306 TTE W/DOPPLER COMPLETE: CPT | Mod: S$GLB,,, | Performed by: INTERNAL MEDICINE

## 2017-11-01 PROCEDURE — 99999 PR PBB SHADOW E&M-EST. PATIENT-LVL IV: CPT | Mod: PBBFAC,,,

## 2017-11-01 PROCEDURE — 81001 URINALYSIS AUTO W/SCOPE: CPT

## 2017-11-01 PROCEDURE — 93000 ELECTROCARDIOGRAM COMPLETE: CPT | Mod: S$GLB,,, | Performed by: INTERNAL MEDICINE

## 2017-11-01 PROCEDURE — 99203 OFFICE O/P NEW LOW 30 MIN: CPT | Mod: S$GLB,,, | Performed by: UROLOGY

## 2017-11-01 PROCEDURE — 88112 CYTOPATH CELL ENHANCE TECH: CPT | Performed by: PATHOLOGY

## 2017-11-01 PROCEDURE — 99999 PR PBB SHADOW E&M-EST. PATIENT-LVL IV: CPT | Mod: PBBFAC,,, | Performed by: UROLOGY

## 2017-11-01 PROCEDURE — 88112 CYTOPATH CELL ENHANCE TECH: CPT | Mod: 26,,, | Performed by: PATHOLOGY

## 2017-11-01 PROCEDURE — 99214 OFFICE O/P EST MOD 30 MIN: CPT | Mod: S$GLB,,, | Performed by: INTERNAL MEDICINE

## 2017-11-01 NOTE — PATIENT INSTRUCTIONS
Please keep blood pressure diary at home. Take daily first thing in the morning with both feet on the ground.     Please keep a log of your episodes of palpitations, including when they occur, where they occur, what you were doing, how long they last and what the feel like.

## 2017-11-01 NOTE — PROGRESS NOTES
Subjective:    Patient ID:  Juanita Salas is a 42 y.o. female who presents for evaluation of Palpitations; Gastroesophageal Reflux; Tachycardia; and Dizziness      Ms. Salas is a 42 year old with a PMHx of migraine HA, anxiety, vitamin D def, GERD who presents today as a follow up for palpitations. She was seen in August by [facility] and at the time was found to be in NSR and her dizzy spells were thought to be secondary to anxiety. She notes that ever since she had a UTI back in July 2016 she noticed that she has not felt herself since. She was treated with Cipro and then found to have recurrence with Ureaplasma. She has had a renal stone study and is following up with Dr. Duncan for hematuria with cystoscopy and CT A/P with contrast in January. She has been dealing with episodic dizziness since her first dx of UTI last year. The episodes of dizziness occur in all locations and during all activities. The episodes are inconsistent in duration, she feels as though she may faint but has never fainted. The episodes are associated with anxiety, tunnel vision and it makes her feel like her heart is racing. She denies dyspnea at rest or on exertion, no PND, no orthopnea. Denies chest discomfort at rest, exertion or during episodes. She endorses increasing fatigue and tiredness, she endorses avolition, decreased appetite, decreased concentration and anhedonia. Denies depression. She denies any family history of psychiatric disorders including bipolar, depression, anxiety, schizophrenia. She was previously on celexa which caused 8 lb weight loss, was on for 3 months and didn't note any difference in her symptoms. She was also on Wellbutrin which caused her to have severe HA, she was on it for 1 month. She has never seen a psychiatrist but was referred to see a psychologist. She has a  family history with maternal grandfather with CABG, ICD and MI in his 30's and maternal grandmother with CVA and HTN, her parents  do/did not have any cardiac issues. Siblings do not have any arrhythmias or cardiac dysfunction. She has received care from multiple institutions and has a recent TSH from [facility] which was 1.34.         Review of Systems   Constitution: Positive for night sweats (2x week for 1-2 years) and weight loss (pt states she was 148lbs 1/2016). Negative for chills and fever.   HENT: Negative for nosebleeds and sore throat.    Eyes: Negative for visual disturbance.   Cardiovascular: Positive for irregular heartbeat (feels heart skipping beat occasionally (once/wk)) and palpitations (in association with belching). Negative for chest pain, claudication, dyspnea on exertion, leg swelling, orthopnea, paroxysmal nocturnal dyspnea and syncope.   Respiratory: Negative for shortness of breath, sleep disturbances due to breathing and snoring.    Endocrine: Negative for cold intolerance and heat intolerance.   Hematologic/Lymphatic: Negative for bleeding problem.   Gastrointestinal: Positive for abdominal pain. Negative for bloating, constipation, diarrhea, heartburn, hematochezia, melena, nausea and vomiting.        Patient describes excessive belching     Genitourinary: Negative for dysuria, frequency and hematuria.   Neurological: Positive for dizziness and light-headedness. Negative for headaches and seizures.   Psychiatric/Behavioral: Negative for substance abuse. The patient is nervous/anxious. The patient does not have insomnia.         Decreased motivation   Increased fatigue          Objective:    Physical Exam   Constitutional: She is oriented to person, place, and time. She appears well-developed and well-nourished. No distress.   HENT:   Head: Normocephalic and atraumatic.   Right Ear: External ear normal.   Left Ear: External ear normal.   Eyes: Conjunctivae and EOM are normal. Pupils are equal, round, and reactive to light.   Neck: No thyromegaly present.   Cardiovascular: Normal rate, regular rhythm, S1 normal, S2  normal, normal heart sounds and intact distal pulses.    No murmur heard.  Pulses:       Radial pulses are 2+ on the right side, and 2+ on the left side.        Dorsalis pedis pulses are 2+ on the right side, and 2+ on the left side.   Loud split S2   Pulmonary/Chest: Effort normal and breath sounds normal.   Abdominal: Soft. There is tenderness in the epigastric area and left upper quadrant.   Musculoskeletal: Normal range of motion.   Neurological: She is alert and oriented to person, place, and time. She has normal reflexes.   Skin: Skin is warm and dry.   Psychiatric: Her mood appears anxious. Her affect is labile. Her speech is rapid and/or pressured.   Nursing note and vitals reviewed.        Assessment:       1. Palpitations    2. Dizziness    3. Anxiety         Plan:           Palpitations  -     Holter monitor - 48 hour; Future  -     2D Echo w/ Color Flow Doppler; Future    Dizziness  -     Holter monitor - 48 hour; Future  -     2D Echo w/ Color Flow Doppler; Future    Anxiety  -     Ambulatory Referral to Psychiatry    Patient examined and case reviewed with Dr. Ramirez and Dr. Romero PGY-6.     William Trujillo M.D. PGY-1  [facility]  11:07 AM  11/1/2017

## 2017-11-01 NOTE — LETTER
November 1, 2017      Federico Ulloa MD  [address] [place] [address]           [facility] 4th Floor  [address]  [facility] [address]  Phone: [phone]          Patient: Juanita Salas   MR Number: [id number]   YOB: 1975   Date of Visit: 11/1/2017       Dear Dr. Federico Ulloa:    Thank you for referring Juanita Salas to me for evaluation. Attached you will find relevant portions of my assessment and plan of care.    If you have questions, please do not hesitate to call me. I look forward to following Juanita Salas along with you.    Sincerely,    Geraldo Duncan Jr., MD    Enclosure  CC:  No Recipients    If you would like to receive this communication electronically, please contact [email or web address] or [phone] to request more information on [employer] Link access.    For providers and/or their staff who would like to refer a patient to [facility], please contact us through our one-stop-shop provider referral line, Hansel Aleman, at [phone].    If you feel you have received this communication in error or would no longer like to receive these types of communications, please e-mail [email or web address]

## 2017-11-01 NOTE — PROGRESS NOTES
Subjective:       Patient ID: Juanita Salas is a 42 y.o. female.    Chief Complaint: new patient. (c/o hematuria . pt state she have had blood in urine in the pass she vist with dr. lucio @ [facility].)    HPI microscopic hematuria.  She had 2 red cells per high power field which is not significant.  She does have this diffuse abdominal discomfort without nausea and vomiting.  She has CT scan without contrast demonstrating a right adnexal cyst and she has a history of a mycoplasma UTI.  Her urine pH is 5 without evidence of infection and I'm going to get a repeat formal urinalysis on her along with the urine culture and sensitivity.  She scheduled to see GYN in the next couple of weeks.  The symptoms have been going on for several months and she is somewhat anxious understandably.  No flank pain or evidence of kidney stones    History reviewed. No pertinent past medical history.    Past Surgical History:   Procedure Laterality Date     SECTION      DILATION AND CURETTAGE OF UTERUS         Family History   Problem Relation Age of Onset    Cancer Mother 47     pancreatic cancer     Diabetes Mother     Multiple sclerosis Sister     Hypertension Maternal Aunt     Hyperlipidemia Maternal Aunt     Hyperlipidemia Maternal Uncle     Hypertension Maternal Uncle     Hyperlipidemia Paternal Aunt     Hypertension Paternal Aunt     Hyperlipidemia Paternal Uncle     Hypertension Paternal Uncle     Stroke Maternal Grandmother     Psoriasis Maternal Grandmother     Heart disease Maternal Grandfather     Hyperlipidemia Maternal Grandfather     Hypertension Maternal Grandfather        Social History     Social History    Marital status:      Spouse name: N/A    Number of children: N/A    Years of education: N/A     Occupational History    Not on file.     Social History Main Topics    Smoking status: Current Every Day Smoker     Packs/day: 0.50     Years: 2.00    Smokeless tobacco: Never  Used    Alcohol use No    Drug use: No    Sexual activity: Not on file     Other Topics Concern    Not on file     Social History Narrative     with son (23) and daugther (14)       Allergies:  Patient has no known allergies.    Medications:    Current Outpatient Prescriptions:     clonazePAM (KLONOPIN) 0.5 MG tablet, Take 0.5 mg by mouth as needed., Disp: , Rfl:     esomeprazole (NEXIUM) 40 MG capsule, Take 1 capsule (40 mg total) by mouth before breakfast., Disp: 30 capsule, Rfl: 2    nicotine (NICODERM CQ) 21 mg/24 hr, Place 1 patch onto the skin once daily. (Generic preferred. Member of [facility] Smoking Cessation [employer]), Disp: 14 patch, Rfl: 0    nicotine polacrilex (NICORETTE) 4 MG Gum, Take 1 each (4 mg total) by mouth as needed (Maximum 15 pieces/day.). (Generic preferred. Member of [facility] Smoking Cessation [employer]), Disp: 220 each, Rfl: 0    TRINESSA LO 0.18/0.215/0.25 mg-25 mcg tablet, TK 1 T PO QD, Disp: , Rfl: 2    VITAMIN D2 50,000 unit capsule, Take 50,000 Units by mouth once a week., Disp: , Rfl:     Review of Systems   Constitutional: Negative.    HENT: Negative.    Eyes: Negative.    Respiratory: Negative.    Gastrointestinal: Negative.         Abdominal discomfort   Endocrine: Negative.    Genitourinary: Negative.    Musculoskeletal: Negative.    Allergic/Immunologic: Negative.    Neurological: Negative.    Hematological: Negative.    Psychiatric/Behavioral: Negative.        Objective:      Physical Exam   Constitutional: She appears well-developed.   HENT:   Head: Normocephalic.   Cardiovascular: Normal rate.    Pulmonary/Chest: Effort normal.   Abdominal: Soft.   Musculoskeletal: Normal range of motion.   Neurological: She is alert.   Skin: Skin is warm.         Assessment:       1. Hematuria, microscopic        Plan:       Juanita was seen today for new patient..    Diagnoses and all orders for this visit:    Hematuria, microscopic  -     Urinalysis Microscopic  -     Cytology, urine  -      Urine culture; Future  -     Cystoscopy; Future  -     CT Abdomen Pelvis With Contrast; Future

## 2017-11-02 ENCOUNTER — PATIENT MESSAGE (OUTPATIENT)
Dept: UROLOGY | Facility: CLINIC | Age: 42
End: 2017-11-02

## 2017-11-02 LAB
DIASTOLIC DYSFUNCTION: NO
ESTIMATED PA SYSTOLIC PRESSURE: 22.18
MITRAL VALVE MOBILITY: NORMAL
MITRAL VALVE REGURGITATION: NORMAL
RETIRED EF AND QEF - SEE NOTES: 65 (ref 55–65)
TRICUSPID VALVE REGURGITATION: NORMAL

## 2017-11-02 NOTE — PROGRESS NOTES
plz let pt. Know that echo normal. There is no evidence of mitral valve prolapse. There is trivial  leak across the mitral valve that is within the normal limits.

## 2017-11-03 ENCOUNTER — TELEPHONE (OUTPATIENT)
Dept: CARDIOLOGY | Facility: CLINIC | Age: 42
End: 2017-11-03

## 2017-11-03 NOTE — TELEPHONE ENCOUNTER
----- Message from Mary Ramirez MD sent at 11/2/2017  2:51 PM CDT -----  plz let pt. Know that echo normal. There is no evidence of mitral valve prolapse. There is trivial  leak across the mitral valve that is within the normal limits.

## 2017-11-04 ENCOUNTER — HOSPITAL ENCOUNTER (OUTPATIENT)
Dept: RADIOLOGY | Facility: HOSPITAL | Age: 42
Discharge: HOME | End: 2017-11-04
Attending: UROLOGY
Payer: COMMERCIAL

## 2017-11-04 DIAGNOSIS — R31.29 HEMATURIA, MICROSCOPIC: ICD-10-CM

## 2017-11-04 PROCEDURE — 74177 CT ABD & PELVIS W/CONTRAST: CPT | Mod: 26,,, | Performed by: RADIOLOGY

## 2017-11-04 PROCEDURE — 74177 CT ABD & PELVIS W/CONTRAST: CPT | Mod: TC

## 2017-11-04 PROCEDURE — 25500020 PHARM REV CODE 255: Performed by: UROLOGY

## 2017-11-04 RX ADMIN — IOHEXOL 15 ML: 350 INJECTION, SOLUTION INTRAVENOUS at 01:11

## 2017-11-04 RX ADMIN — IOHEXOL 75 ML: 350 INJECTION, SOLUTION INTRAVENOUS at 02:11

## 2017-11-04 RX ADMIN — IOHEXOL 15 ML: 350 INJECTION, SOLUTION INTRAVENOUS at 12:11

## 2017-11-06 ENCOUNTER — PATIENT MESSAGE (OUTPATIENT)
Dept: UROLOGY | Facility: CLINIC | Age: 42
End: 2017-11-06

## 2017-11-06 ENCOUNTER — PATIENT MESSAGE (OUTPATIENT)
Dept: INTERNAL MEDICINE | Facility: CLINIC | Age: 42
End: 2017-11-06

## 2017-11-07 ENCOUNTER — PATIENT MESSAGE (OUTPATIENT)
Dept: INTERNAL MEDICINE | Facility: CLINIC | Age: 42
End: 2017-11-07

## 2017-11-07 ENCOUNTER — PATIENT MESSAGE (OUTPATIENT)
Dept: UROLOGY | Facility: CLINIC | Age: 42
End: 2017-11-07

## 2017-11-07 DIAGNOSIS — R42 DIZZY SPELLS: Primary | ICD-10-CM

## 2017-11-07 DIAGNOSIS — R16.0 HEPATOMEGALY: ICD-10-CM

## 2017-11-08 ENCOUNTER — PATIENT MESSAGE (OUTPATIENT)
Dept: INTERNAL MEDICINE | Facility: CLINIC | Age: 42
End: 2017-11-08

## 2017-11-08 ENCOUNTER — LAB VISIT (OUTPATIENT)
Dept: LAB | Facility: HOSPITAL | Age: 42
End: 2017-11-08
Payer: COMMERCIAL

## 2017-11-08 DIAGNOSIS — R42 DIZZY SPELLS: ICD-10-CM

## 2017-11-08 LAB
BACTERIA #/AREA URNS AUTO: NORMAL /HPF
BILIRUB UR QL STRIP: NEGATIVE
CLARITY UR REFRACT.AUTO: ABNORMAL
COLOR UR AUTO: YELLOW
GLUCOSE UR QL STRIP: NEGATIVE
HGB UR QL STRIP: ABNORMAL
KETONES UR QL STRIP: NEGATIVE
LEUKOCYTE ESTERASE UR QL STRIP: NEGATIVE
MICROSCOPIC COMMENT: NORMAL
NITRITE UR QL STRIP: NEGATIVE
PH UR STRIP: 6 [PH] (ref 5–8)
PROT UR QL STRIP: NEGATIVE
RBC #/AREA URNS AUTO: 4 /HPF (ref 0–4)
SP GR UR STRIP: 1.02 (ref 1–1.03)
SQUAMOUS #/AREA URNS AUTO: 4 /HPF
URN SPEC COLLECT METH UR: ABNORMAL
UROBILINOGEN UR STRIP-ACNC: NEGATIVE EU/DL
WBC #/AREA URNS AUTO: 1 /HPF (ref 0–5)

## 2017-11-08 PROCEDURE — 81001 URINALYSIS AUTO W/SCOPE: CPT

## 2017-11-08 PROCEDURE — 87086 URINE CULTURE/COLONY COUNT: CPT

## 2017-11-09 ENCOUNTER — CLINICAL SUPPORT (OUTPATIENT)
Dept: SMOKING CESSATION | Facility: CLINIC | Age: 42
End: 2017-11-09
Payer: COMMERCIAL

## 2017-11-09 DIAGNOSIS — F17.210 NICOTINE DEPENDENCE, CIGARETTES, UNCOMPLICATED: Primary | ICD-10-CM

## 2017-11-09 LAB — BACTERIA UR CULT: NO GROWTH

## 2017-11-09 PROCEDURE — 99407 BEHAV CHNG SMOKING > 10 MIN: CPT | Mod: S$GLB,,,

## 2017-11-10 ENCOUNTER — CLINICAL SUPPORT (OUTPATIENT)
Dept: CARDIOLOGY | Facility: CLINIC | Age: 42
End: 2017-11-10
Payer: COMMERCIAL

## 2017-11-10 ENCOUNTER — LAB VISIT (OUTPATIENT)
Dept: LAB | Facility: HOSPITAL | Age: 42
End: 2017-11-10
Attending: NURSE PRACTITIONER
Payer: COMMERCIAL

## 2017-11-10 ENCOUNTER — OFFICE VISIT (OUTPATIENT)
Dept: GASTROENTEROLOGY | Facility: CLINIC | Age: 42
End: 2017-11-10
Payer: COMMERCIAL

## 2017-11-10 VITALS
DIASTOLIC BLOOD PRESSURE: 88 MMHG | HEART RATE: 120 BPM | SYSTOLIC BLOOD PRESSURE: 148 MMHG | BODY MASS INDEX: 22.66 KG/M2 | HEIGHT: 63 IN | WEIGHT: 127.88 LBS

## 2017-11-10 DIAGNOSIS — R10.12 LUQ ABDOMINAL PAIN: ICD-10-CM

## 2017-11-10 DIAGNOSIS — R14.2 BELCHING: Primary | ICD-10-CM

## 2017-11-10 DIAGNOSIS — R00.2 PALPITATIONS: ICD-10-CM

## 2017-11-10 DIAGNOSIS — R42 DIZZINESS: ICD-10-CM

## 2017-11-10 DIAGNOSIS — R14.2 BELCHING: ICD-10-CM

## 2017-11-10 PROCEDURE — 36415 COLL VENOUS BLD VENIPUNCTURE: CPT | Mod: PO

## 2017-11-10 PROCEDURE — 93224 XTRNL ECG REC UP TO 48 HRS: CPT | Mod: S$GLB,,, | Performed by: INTERNAL MEDICINE

## 2017-11-10 PROCEDURE — 86677 HELICOBACTER PYLORI ANTIBODY: CPT

## 2017-11-10 PROCEDURE — 99214 OFFICE O/P EST MOD 30 MIN: CPT | Mod: S$GLB,,, | Performed by: NURSE PRACTITIONER

## 2017-11-10 PROCEDURE — 99999 PR PBB SHADOW E&M-EST. PATIENT-LVL III: CPT | Mod: PBBFAC,,, | Performed by: NURSE PRACTITIONER
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
## 2017-11-10 NOTE — PATIENT INSTRUCTIONS
Gas and Bloating  Although the mention of intestinal gas problems, such as belching, flatulence, bloating and .gas pains. often elicits some degree of amusement, all of us have gas in our  intestinal tract and must expel it in some way. Some individuals are very sensitive to the effects of gas collections in the stomach and intestinal tract and may develop  significant discomfort. If such complaints are troublesome and persistent and do not respond to simple measures, such as change in diet, a visit to your doctor could be helpful.  Where does the gas that we belch  or burp come from?  The gas brought back by belching comes entirely from  swallowed air. We all swallow some air when eating food and  drinking liquids. Most of the gas mixes with the stomach  content and either enters into the small intestine or is  belched back. The air that enters the small intestine is either  absorbed or it may continue through to the large intestine  and is then passed rectally. Individuals may swallow more air  (and thus increase stomach gas) if they have a post-nasal  drip, chew gum, have poorly fitting dentures, suck on hard  candies or smoke tobacco. Drinking carbonated beverages  (soda or beer) or eating rapidly can also increase stomach  gas.  What causes repetitive belching or burping?  Some people have episodes of repeated belching. Since  belched gas comes from swallowed air, these individuals are  usually unaware that they caused the problem by swallowing  air into the esophagus and bringing it back rapidly. Often,  the habit can be broken if the person is made aware of the air  swallowing behavior.  What foods cause increased flatus  passage?  The food we choose to eat can influence the amount of  gas passed rectally. Although most of our food intake is  absorbed in the small intestine, some foods, such as  cauliflower, broccoli, cabbage, baked beans, and bran are  incompletely digested. They are then  broken down by bacteria in the large intestine, causing the formation of gas. A high roughage diet is important to promote bowel  regularity, but excessive roughage or fiber may lead to  bloating and increased flatulence. When increasing the  amount of fiber in your diet, do so gradually, allowing your  intestinal tract time to adjust. Milk sugar (lactose) requires an intestinal enzyme  (lactase) for digestion. When individuals lack this enzyme  the lactose in milk and other dairy products enters the large  intestine where the lactose is broken down by bacteria,  producing gas. Although milk is an excellent source of  protein and calcium, many adults experience abdominal  bloating, gas and diarrhea after consuming milk sugar.  Persons from [place] are often extremely intolerant  to the smallest quantity of dairy products.  Everyone passes some rectal gas, although the volume  of gas is different each day. Much of the flatus comes from  the nitrogen found in the air one swallows. The remainder  of the flatus volume is the result of carbohydrates which are  not absorbed in the small intestine and are broken down by  bacteria in the large intestine. Therefore, the amount of  flatus represents a combination of swallowed air and poorly  absorbed carbohydrates. The unpleasant order of flatus is  due to other gases, such as hydrogen sulfide, which is  produced by the bacteria.  How can the volume of flatus be reduced?  In addition to the gas-forming foods cited above, some  diet chewing gum and hard candies use sorbitol or fructose  as sweeteners. These sugars can lead to excess gas  production and should be avoided. Elimination of dairy  products or the use of lactase-added milk can be helpful for  those with lactase deficiency.  Where do I feel gas pains?  Individuals with irritable bowel problems (crampy pain  and/or bowel irregularity) are often sensitive to excess  intestinal gas. A common symptom is  generalized  abdominal cramping, sometimes relieved by passing flatus.  If the gas accumulates in the right upper abdomen, the pain  may radiate up intconfused with gallbladder disease. If the gas accumulates in  the left upper abdomen, the pain may radiate into the left side  of the chest and could mimic heart disease. If gas  accumulates in the stomach, the upper abdominal pressure  pain could radiate up to the lower chest and raise concern  about a heart disorder.  Is there treatment for gas pains?  Your physician may wish to obtain tests to be confident  that recurrent .gas pains. are not the result of some other  disorder. If the tests are normal, a diet designed to reduce  both air swallowing and the ingestion of gas forming foods  would be helpful. Anti-spasmodic medications may relieve  crampy discomfort, but these can have side effects on the  eyes, plus bladder and bowel function.  What causes abdominal distension (bloating)?  Many individuals complain of abdominal distension  which increases during the day and is most uncomfortable  after the evening meal. Often distension is believed to be  caused by the build-up of intestinal gas; however, there are  other considerations. The tone of the rectus muscles (the  muscles which support the abdominal wall and run along the  length of the abdomen on either side of the navel) may be  decreased due to the stretching of the abdominal wall in  women who have had one or more pregnancies. If these  muscles have become thinned, the abdomen may distend as  food (and gas) moves through the intestine. This is most  noticed after the evening meal. This explanation for  distension (bloating) is most likely if the uncomfortable  feeling is absent when the individual is lying down (you don.t  need the rectus muscle for a .flat. abdomen when lying  down) but is apparent when standing or sitting erect. There  is no effective medical therapy for this type of abdominal  bloating  but exercise directed toward strengthening the  abdominal muscles may be helpful, particularly in younger  women.  When should individuals with gaseous symptoms consult a  Physician?  Individuals with a long history of occasional  gaseousness and abdominal discomfort need not seek  medical attention. A change in the location of abdominal  pain, significant increase in the frequency or severity of  symptoms, or onset of new symptoms in individuals over the  age of 40 are some of the reasons to see your doctor.o the right lower chest and could be   What over-the-counter drugs provide relief for gaseous  symptoms?  Despite the many commercials and advertiseme  medications which reduce gas pains and bloating, very few  have any proven scientific value. Simethicone, a common  additive to antacid preparations, shows some benefits when  being tested in a lab, but many individuals feel little relief.  Several scientific studies have found some benefit from  activated charcoal preparations in gassy or flatulent  individuals, but other studies have failed to show symptom  Improvement.  10 Steps to Decrease Symptoms of Intestinal Gas  1. Develop a regular routine of diet, exercise, and rest.  2. Correct bad habits:  Chew food thoroughly  Eat slowly and leisurely in a quiet atmosphere  Avoid washing solids down with a beverage  Avoid gulping and sipping liquids  Avoid drinking out of small mouthed bottles or straws  Avoid drinking from water fountains  Avoid carbonated beverages.sodas and beer  Eliminate pipe, cigar, and cigarette smoking  Avoid gum chewing and sucking hard candy  Check dentures for proper fit  Attempt to be aware of and avoid deep sighing  3. Do not attempt to induce belching.  4. Do not overload the stomach at any one meal.  5. Avoid gaseous vegetables: navy beans, cabbage, brussel  sprouts, cauliflower, broccoli, turnips, cucumbers, radishes,  onions, melons, and excess raw fruit and vegetables.  6. Avoid  "foods with air whipped into them.souffles, sponge cake,  milk shakes.  7. Avoid long-term or frequent intermittent use of medications  intended for relief of cold symptoms. cough, nasal congestion,  post nasal discharge.  8. Avoid tight fitting garments, girdles, and belts.  9. Do not lie down or sit in a slumped position immediately after  eating.  10. Take a leisurely stroll after meals.         Probiotics      For IBS and bloating, we typically recommend Align, VSL#3, or [employer], which can typically be found without a prescription at the pharmacy. Give this at least  a month to work, but can take up to 3 months to repopulate your intestines, so be patient!     VSL#3 is a potent probiotic which can be found in pill form (try [employer] for best price, or VSL3.com). $52 for a 2 month supply. The sachets are for ulcerative colitis and require a prescription.    If you go on the internet, a reputable/powerful probiotic appears to be Super Shield from [employer] at: http://www.bluerockholistics.[employer]/product/pross.asp  You can also choose PB 8 which can be found at [employer] or online.    For diarrhea from travel or antibiotics, we typically recommend Culturelle or Florastor (especially for diarrhea from C diff infection).         General information:  Pick one that has at least seven strains, and five billion CFU (colony forming units).    Products containing probiotics have flooded the market in recent years. As more people seek natural or non-drug ways to maintain their health, manufacturers have responded by offering probiotics in everything from yogurt to chocolate and granola bars to powders and capsules.    Although probiotics have been around for generations - think of the "live active cultures"in several brands of yogurt - the sheer number of products with probiotics now available may overwhelm even the most conscientious of shoppers. In some respects, the industry has grown faster than the " research and scientists and doctors are calling for more studies to help determine which probiotics are beneficial and which might be a waste of money.    What Are Probiotics?  Probiotics are living microscopic organisms, or micro-organisms, that scientific research has shown to benefit your health. Most often they are bacteria, but they may also be other organisms such as yeasts. In some cases they are similar, or the same, as the good bacteria already in your body, particularly those in your gut.    The most common probiotic bacteria come from two groups, Lactobacillus or Bifidobacterium, although it is important to remember that there are many other types of bacteria that are also classified as probiotics. Each group of bacteria has different species and each species has different strains. This is important to remember because different strains have different benefits for different parts of your body. For example, Lactobacillus casei Shirota has been shown to support the immune system and to help food move through the gut, but Lactobacillus bulgaricus may help relieve symptoms of lactose intolerance, a condition in which people cannot digest the lactose found in most milk and dairy products. In general, not all probiotics are the same, and they dont all work the same way.    Scientists are still sorting out exactly how probiotics work. They may:       Boost your immune system by producing antibodies for certain viruses.  Produce substances that prevent infection.  Prevent harmful bacteria from attaching to the gut wall and growing there.  Send signals to your cells to strengthen the mucus in your intestine and help it act as a barrier against infection.  Inhibit or destroy toxins released by certain bad bacteria that can make you sick.  Produce B vitamins necessary for metabolizing the food you eat, warding off anemia caused by deficiencies in B-6 and B-12, and maintaining healthy skin and a healthy nervous  system.      Common Uses  Probiotics are most often used to promote digestive health. Because there are different kinds of probiotics, it is important to find the right one for the specific health benefit you seek. Researchers are still studying which probiotic should be used for which health or disease state. Nevertheless, probiotics have been shown to help regulate the movement of food through the intestine. They also may help treat digestive disease, something of much interest to gastroenterologists. Some of the most common uses for probiotics include the treatment of the following:    Irritable Bowel Syndrome    Irritable bowel syndrome (IBS) is a disorder of movement in the gut. People who have IBS may have diarrhea, constipation or alternating bouts of both. IBS is not caused by injury or illness. Often the only way doctors can diagnose it is to rule out other conditions through testing.    Probiotics, particularly Bifidobacterium infantis, Sacchromyces boulardii, Lactobacillus plantarum and combination probiotics may help regulate how often people with IBS have bowel movements. Probiotics may also help relieve bloating from gas. Bifidobacterium infantis 52157, Lactobacillus plantarum 299V or Bifidobacterium bifidum [id number] have been shown to help regulate bowel movements and relieve bloating, pain and gas.    Inflammatory Bowel Disease    Though some of the symptoms are the same, inflammatory bowel disease (IBD) is different from IBS because in IBD, the intestines become inflamed. Unlike IBS, IBD is a disorder of the immune system. Symptoms include abdominal cramps, pain, diarrhea, weight loss and blood in your stools. In Crohns disease, ulcers may develop anywhere in your intestine including both the large and small bowels. In ulcerative colitis, inflammation only involves the large intestine. Bouts of inflammation may come and go, but in some cases, prescription medication is needed to keep inflammation in  check.        Some studies suggest that probiotics may help decrease inflammation and delay the next bout of disease. Ulcerative colitis seems to respond better to probiotics than Crohns disease does. So far, E. coli Nissle, and a mixture of several strains of Lactobacillus, Bifidobacterium and Streptococcus may be most beneficial. Research is continuing to determine which probiotics are best to treat IBD.    Infectious Diarrhea    Infectious diarrhea is caused by bacteria, viruses or parasites. There is evidence that probiotics such as Lactobacillus rhamnosus and Lactobacillus casei may be particularly helpful in treating diarrhea caused by rotavirus, which often affects babies and small children. Several strains of Lactobacillus and a strain of the yeast Saccharomyces boulardii may help treat and shorten the course of infectious diarrhea.    Antibiotic-Related Diarrhea  Take Lactobacillus rhamnosus GG and/or Saccharomyces boulardii (Culturelle and/or Florastor) six hours after each dose of antibiotics. Increase the dose to 10 billion CFUs per day and continue for one to two weeks after you stop taking the antibiotic.    Sometimes taking an antibiotic can cause infectious diarrhea by reducing the number of good microorganisms in your gut. Then bacteria that normally do not give you any trouble can grow out of control. One such bacterium is Clostridium difficile, which is a major cause of diarrhea in hospitalized patients and people in long-term care facilities like nursing homes. The trouble with Clostridium difficile is that it tends to come back, but there is evidence that taking probiotics such as Saccharomyces boulardii may help prevent this. There is also evidence that taking probiotics when you first start taking an antibiotic may help prevent antibiotic-related diarrhea in the first place.    Travelers Diarrhea    Its possible to get infectious diarrhea when you travel and your body is exposed to new,  normally harmless bacteria (travelers diarrhea). Most studies show that probiotics are not very effective in preventing or treating travelers diarrhea in adults. Taking Saccharomyces boulardii weeks before your trip may help prevent travelers diarrhea, which usually comes from ingesting food or water thats been contaminated with bacteria.    Other Uses    Other potential uses for probiotics include maintaining a healthy mouth, preventing and treating certain skin conditions like eczema, promoting health in the urinary tract and vagina, and preventing allergies (especially in children). There is not as much research about these uses as there is about the benefits of probiotics for your digestive system, and studies have had mixed results. If you have eczema ?Lactobacillus rhamnosus  and Lactobacillus fermentum VRI-003 PCC have been shown to help treat those itchy, scaly skin rashes -- especially in children.If you have a cold ?Some research suggests that Bifidobacterium animalis lactis Bi-07 and Lactobacillus acidophilus NCFM can help reduce the duration and severity of the common cold and flu by enhancing the bodys production of antibodies. If you have a vaginal infection ?Lactobacillus acidophilus, Lactobacillus rhamnosus GR-1 and Lactobacillus reuteri RC-14 have been shown to help prevent and clear up bacterial vaginosis and urinary tract infections in some individuals. Researchers point to Lactobacillus reuteri RC-14 and Lactobacillus rhamnosus GR-1 as the most effective stains to protect against yeast infections as theyre especially adept at colonizing the vaginal environment and fighting off unwelcome bacteria and fungi. If you have bad breath, gingivitis or periodontitis ?A probiotic lozenge or mouthwash might be your best bet. Lactobacillus reuteri LR-1 or LR-2 promote oral health by binding to teeth and gums, preventing plaque formation in the mouth. Research has demonstrated the ability of  Weissella belenaria to freshen breath by inhibiting the production of sulfur compounds in the mouth.    Are Probiotics Safe?  It is generally thought that most probiotics are safe, although it is not yet known if they are safe for people with severely impaired immune systems. They may be taken by people without a diagnosed digestive problem. Their safety is evident since they have a long history of use in dairy foods like yogurt, cheese and milk.    However, you should talk to your doctor before adding these or any other probiotics to your diet. Probiotics might not be appropriate for seniors. Some probiotics may interfere with or interact with medications. Your doctor will be able to help you determine if probiotics are right for you based on your medical history.    Research about the use of probiotics in children has grown in recent years. Although studies have shown that probiotics may help to treat infectious diarrhea in babies and small children, researchers are unsure whether probiotics are particularly helpful for children with Crohns disease or other types of inflammatory bowel disease. Ask your childs pediatrician about probiotics before giving them to your child.    The exception here is breastfeeding. Breast milk stimulates the growth of normal gut organisms that are important for a babys digestive health and developing immune system. That is one reason why doctors strongly encourage mothers to breastfeed their babies.    Overall, scientists agree that more research is necessary before they can make blanket statements about the safety of probiotics in general or about individual groups and strains. Future studies will show whether probiotics can be used to treat diseases, are safe to use for a long time, and if it is possible to take too many probiotics or mix them in inappropriate ways. These studies will also guide us as to which probiotics to use for different disorders.    Keep in mind that  probiotics are considered dietary supplements and are not FDA-regulated like drugs. They are not standardized, meaning they are made in different ways by different companies and have different additives. How well a probiotic works may differ from brand to brand and even from batch to batch within the same brand. Probiotics also vary tremendously in their cost, and cost does not necessarily reflect higher quality.    Side effects may vary, too. The most common are gas and bloating. These are usually mild and temporary. More serious side effects include allergic reactions, either to the probiotics themselves or to other ingredients in the food or supplement.    Choosing a Probiotic  Probiotics are available in yogurt and other dairy products, chocolate and granola bars, juices, powders, and capsules. You can purchase them at your local supermarket or health food store as well as on the Internet. Here are some tips to help you choose.    Check the label. The more information there is on the label, the better. Ideally, the label will tell you the probiotics group, species and strain, and how many of the microorganisms will still be alive on the use-by date. Although some products guarantee how many organisms were present at the time it was manufactured, often it is less clear how many organisms are present when these products are actually consumed.    Call the . Unfortunately, many labels dont say exactly which strain is in the product; many list only the group and the species, such as Lactobacillus acidophilus or Bifidobacterium lactis. If youre planning to take a probiotic for a specific condition, call the company and find out exactly which strains its products contain and what research they have done to support their health claims. You may be able to find this information on their Web site, as well.    Beware of the Internet. If you order products from the Internet, make sure you know the company from  which you are ordering. There are plenty of scammers out there who are willing to send you fake products labeled as probiotics. At best, the ingredients could be harmless, like garlic powder. At worst, they could be laced with powerful herbs, prescription medications or illegal drugs. Some companies may simply take your money and disappear.    Stick to well-established companies and companies you know. The longer a company has been around, the more likely its products have been tested and studied repeatedly and the bigger the reputation the company has to protect. Some manufacturers that have been making products with probiotics for a while are [employer], [employer], [employer], [employer], [employer], [employer], [employer], and [employer].    Storage    One final note: Remember to store your probiotic according to package instructions and make sure the product has a sell-by or expiration date. Probiotics are living organisms. Even if they are dried and dormant, like in a powder or capsule, they must be stored properly or they will die. Some require refrigeration whereas others do not. They also have a shelf-life, so make sure you use them before the expiration date on the package.

## 2017-11-13 LAB — H PYLORI IGG SERPL QL IA: NEGATIVE

## 2017-11-14 ENCOUNTER — TELEPHONE (OUTPATIENT)
Dept: CARDIOLOGY | Facility: CLINIC | Age: 42
End: 2017-11-14

## 2017-11-14 RX ORDER — METOPROLOL SUCCINATE 50 MG/1
50 TABLET, EXTENDED RELEASE ORAL DAILY
Qty: 45 TABLET | Refills: 3 | Status: SHIPPED | OUTPATIENT
Start: 2017-11-14 | End: 2017-11-21 | Stop reason: SDUPTHER

## 2017-11-14 NOTE — TELEPHONE ENCOUNTER
----- Message from Carolina Russell MA sent at 11/14/2017  9:33 AM CST -----  Contact: patient called  Patient would like to speak with you. Please call.  ----- Message -----  From: Mariel Dorantes MA  Sent: 11/14/2017   8:29 AM  To: [facility] Staff    Please call the patient at [phone] she would like  To talk to you about her test results. Thank you.

## 2017-11-16 ENCOUNTER — OFFICE VISIT (OUTPATIENT)
Dept: PSYCHIATRY | Facility: CLINIC | Age: 42
End: 2017-11-16
Payer: COMMERCIAL

## 2017-11-16 DIAGNOSIS — F32.A DEPRESSION, UNSPECIFIED DEPRESSION TYPE: ICD-10-CM

## 2017-11-16 DIAGNOSIS — F41.0 PANIC DISORDER: ICD-10-CM

## 2017-11-16 DIAGNOSIS — F41.1 GAD (GENERALIZED ANXIETY DISORDER): Primary | ICD-10-CM

## 2017-11-16 PROCEDURE — 90791 PSYCH DIAGNOSTIC EVALUATION: CPT | Mod: S$GLB,,, | Performed by: SOCIAL WORKER

## 2017-11-16 PROCEDURE — 99999 PR PBB SHADOW E&M-EST. PATIENT-LVL II: CPT | Mod: PBBFAC,,, | Performed by: SOCIAL WORKER
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
## 2017-11-17 ENCOUNTER — HOSPITAL ENCOUNTER (EMERGENCY)
Facility: HOSPITAL | Age: 42
Discharge: HOME | End: 2017-11-17
Attending: EMERGENCY MEDICINE
Payer: COMMERCIAL

## 2017-11-17 ENCOUNTER — TELEPHONE (OUTPATIENT)
Dept: CARDIOLOGY | Facility: CLINIC | Age: 42
End: 2017-11-17

## 2017-11-17 VITALS
DIASTOLIC BLOOD PRESSURE: 74 MMHG | HEIGHT: 63 IN | SYSTOLIC BLOOD PRESSURE: 115 MMHG | BODY MASS INDEX: 22.15 KG/M2 | OXYGEN SATURATION: 98 % | TEMPERATURE: 98 F | HEART RATE: 82 BPM | WEIGHT: 125 LBS | RESPIRATION RATE: 16 BRPM

## 2017-11-17 DIAGNOSIS — F41.9 ANXIETY: ICD-10-CM

## 2017-11-17 DIAGNOSIS — R00.2 PALPITATIONS: Primary | ICD-10-CM

## 2017-11-17 DIAGNOSIS — F17.200 TOBACCO USE DISORDER: ICD-10-CM

## 2017-11-17 LAB
BUN SERPL-MCNC: 10 MG/DL (ref 6–30)
CHLORIDE SERPL-SCNC: 101 MMOL/L (ref 95–110)
CREAT SERPL-MCNC: 0.6 MG/DL (ref 0.5–1.4)
GLUCOSE SERPL-MCNC: 100 MG/DL (ref 70–110)
HCT VFR BLD CALC: 41 %PCV (ref 36–54)
POC IONIZED CALCIUM: 1.1 MMOL/L (ref 1.06–1.42)
POC TCO2 (MEASURED): 23 MMOL/L (ref 23–29)
POTASSIUM BLD-SCNC: 3.1 MMOL/L (ref 3.5–5.1)
SAMPLE: ABNORMAL
SODIUM BLD-SCNC: 140 MMOL/L (ref 136–145)
TSH SERPL DL<=0.005 MIU/L-ACNC: 0.68 UIU/ML

## 2017-11-17 PROCEDURE — 25000003 PHARM REV CODE 250: Performed by: EMERGENCY MEDICINE

## 2017-11-17 PROCEDURE — 93010 ELECTROCARDIOGRAM REPORT: CPT | Mod: ,,, | Performed by: INTERNAL MEDICINE

## 2017-11-17 PROCEDURE — 84443 ASSAY THYROID STIM HORMONE: CPT

## 2017-11-17 PROCEDURE — 99284 EMERGENCY DEPT VISIT MOD MDM: CPT | Mod: 25

## 2017-11-17 PROCEDURE — 93005 ELECTROCARDIOGRAM TRACING: CPT

## 2017-11-17 PROCEDURE — 99284 EMERGENCY DEPT VISIT MOD MDM: CPT | Mod: ,,, | Performed by: EMERGENCY MEDICINE

## 2017-11-17 RX ORDER — METOPROLOL TARTRATE 25 MG/1
25 TABLET, FILM COATED ORAL
Status: COMPLETED | OUTPATIENT
Start: 2017-11-17 | End: 2017-11-17

## 2017-11-17 RX ADMIN — POTASSIUM BICARBONATE 50 MEQ: 25 TABLET, EFFERVESCENT ORAL at 07:11

## 2017-11-17 RX ADMIN — METOPROLOL TARTRATE 25 MG: 25 TABLET ORAL at 07:11

## 2017-11-17 NOTE — ED TRIAGE NOTES
Juanita Salas, a 42 y.o. female presents to the ED c/o increased heart rate and palpitations x few days.       Chief Complaint   Patient presents with    Palpitations     Pt states she has felt like her heart was racing today.       Review of patient's allergies indicates:  No Known Allergies  Past Medical History:   Diagnosis Date    Adjustment disorder     Anxiety     Depression     Fatigue     Headache     Hx of psychiatric care     Hypertension     Low vitamin D level     Panic disorder     Psychiatric problem      LOC: Patient name and date of birth verified. The patient is awake, alert and aware of environment with an appropriate affect, the patient is oriented x 3 and speaking appropriately.   APPEARANCE: Patient resting comfortably, patient is clean and well groomed, patient's clothing is properly fastened.  SKIN: The skin is warm and dry, color consistent with ethnicity, patient has normal skin turgor and moist mucus membranes, skin intact, no breakdown or bruising noted.  MUSCULOSKELETAL: Patient moving all extremities well, no obvious swelling or deformities noted.   RESPIRATORY: Respirations are spontaneous, patient has a normal effort and rate, no accessory muscle use noted. No SOB reported at this time.   CARDIAC: Patient has a normal rate and rhythm, no periphreal edema noted, capillary refill < 3 seconds. No CP reported at this time.   ABDOMEN: Soft and non tender to palpation, no distention noted. Bowel sounds present in all four quadrants. No abdominal pain at this time. Denies NVD   NEUROLOGIC: Eyes open spontaneously, behavior appropriate to situation, follows commands, facial expression symmetrical, bilateral hand grasp equal and even, purposeful motor response noted, normal sensation in all extremities when touched with a finger.

## 2017-11-17 NOTE — TELEPHONE ENCOUNTER
----- Message from Mariel Dorantes MA sent at 11/17/2017  1:26 PM CST -----  Contact: patient called  Ronna please call the patient at [phone] she need to talk to you about her heart rate palpitations last visit was on 11-1-207 . She said after taking her med Celexa she started felling bad. She would like to see Dr Ramirez. Thank you.

## 2017-11-17 NOTE — PROVIDER PROGRESS NOTES - EMERGENCY DEPT.
Encounter Date: 11/17/2017    ED Physician Progress Notes         EKG - STEMI Decision  Initial Reading: No STEMI present.  Response: No Action Needed.

## 2017-11-17 NOTE — TELEPHONE ENCOUNTER
Spoke with the pt - says that her HR is 138 - advised her to go to the ED for evaluation and she verbalized understanding.

## 2017-11-18 NOTE — ED NOTES
"Pt resting on stretcher, respirations even and unlabored, no distress noted. Reports "I feel fine now", no complaints. HR regular rate and rhythm, BP WDL. Pt and family updated on POC, will continue to monitor.   "

## 2017-11-18 NOTE — ED PROVIDER NOTES
Encounter Date: 2017    SCRIBE #1 NOTE: I, Ella Saavedra, am scribing for, and in the presence of, Dr. Duarte.       History     Chief Complaint   Patient presents with    Palpitations     Pt states she has felt like her heart was racing today.       Time seen by provider: 6:11 PM    This is a 42 y.o. female with a history of HTN, anxiety, adjustment disorder, panic disorder, and psychiatric care who presents with complaint of intermittent tachycardia for 2 days but which is at baseline currently. The pt indicates that her Cardiologist indicates that she should come to the ER. She indicates that she believes the trigger might be related to anxiety. She began taking Celexa for 2 days. She indicates associated decreased appetite, dry mouth, dehydration, and fatigue. She reports smoking tobacco but notes that she has stopped drinking EtOH.      The history is provided by the patient.     Review of patient's allergies indicates:  No Known Allergies  Past Medical History:   Diagnosis Date    Adjustment disorder     Anxiety     Depression     Fatigue     Headache     Hx of psychiatric care     Hypertension     Low vitamin D level     Panic disorder     Psychiatric problem      Past Surgical History:   Procedure Laterality Date     SECTION      DILATION AND CURETTAGE OF UTERUS       Family History   Problem Relation Age of Onset    Cancer Mother 47     pancreatic cancer     Diabetes Mother     Multiple sclerosis Sister     Hypertension Maternal Aunt     Hyperlipidemia Maternal Aunt     Hyperlipidemia Maternal Uncle     Hypertension Maternal Uncle     Hyperlipidemia Paternal Aunt     Hypertension Paternal Aunt     Hyperlipidemia Paternal Uncle     Hypertension Paternal Uncle     Stroke Maternal Grandmother     Psoriasis Maternal Grandmother     Heart disease Maternal Grandfather     Hyperlipidemia Maternal Grandfather     Hypertension Maternal Grandfather     Colon cancer Neg Hx      Esophageal cancer Neg Hx     Stomach cancer Neg Hx     Rectal cancer Neg Hx     Ulcerative colitis Neg Hx     Irritable bowel syndrome Neg Hx     Crohn's disease Neg Hx     Celiac disease Neg Hx      Social History   Substance Use Topics    Smoking status: Current Every Day Smoker     Packs/day: 0.50     Years: 2.00    Smokeless tobacco: Never Used    Alcohol use No     Review of Systems   Constitutional: Positive for appetite change (Decreased) and fatigue.        Dehydration.   HENT:        Dry mouth.   Eyes: Negative for visual disturbance.   Respiratory: Negative for shortness of breath.    Cardiovascular:        Intermittent tachycardia.   Gastrointestinal: Negative for abdominal pain.   Genitourinary: Negative for dysuria.   Musculoskeletal: Negative for back pain.   Skin: Negative for pallor.   Neurological: Negative for headaches.       Physical Exam     Initial Vitals [11/17/17 1708]   BP Pulse Resp Temp SpO2   (!) 156/81 (!) 135 20 98.4 °F (36.9 °C) 98 %      MAP       106         Physical Exam    Nursing note and vitals reviewed.  Constitutional: She appears well-developed and well-nourished. She is not diaphoretic. No distress.   HENT:   Head: Normocephalic and atraumatic.   Cardiovascular: Normal rate, regular rhythm, normal heart sounds and intact distal pulses.   Pulmonary/Chest: Breath sounds normal. No respiratory distress. She has no wheezes. She has no rales.   Abdominal: Soft. She exhibits no distension. There is no tenderness. There is no rebound.   Musculoskeletal: Normal range of motion.   Neurological: She is alert and oriented to person, place, and time. She has normal strength. No cranial nerve deficit or sensory deficit.   Skin: Skin is warm and dry.         ED Course   Procedures  Labs Reviewed   ISTAT PROCEDURE - Abnormal; Notable for the following:        Result Value    POC Potassium 3.1 (*)     All other components within normal limits   TSH     EKG Readings:  (Independently Interpreted)   Sinus tachycardia at 124 BPM. Non-specific T-wave changes.           Medical Decision Making:   History:   Old Medical Records: I decided to obtain old medical records.  Initial Assessment:   42 y.o. female who presents with anxiety and palpitations. States that she has seen Cardiologist and has had an extensive workup. She was told that anxiety is likely the cause ofo her symptoms. She was placed on a beta blocker which she did not take today. She has been taking Celexa for 2 days for anxiety symptoms and Klonopin as needed long-term. Right now her HR is 88 and she feels much better. Exam is benign. No need for any major workup in the ED. Normal chemistry last month. Will send chemistry and thyroid for confirmation and refer to PCP for follow up. I recommend that she takes all medications as prescribed.    8:36 PM  Thyroid test is normal.  Independently Interpreted Test(s):   I have ordered and independently interpreted EKG Reading(s) - see prior notes  Clinical Tests:   Lab Tests: Ordered and Reviewed  Medical Tests: Ordered and Reviewed            Scribe Attestation:   Scribe #1: I performed the above scribed service and the documentation accurately describes the services I performed. I attest to the accuracy of the note.            ED Course      Clinical Impression:   The primary encounter diagnosis was Palpitations. A diagnosis of Anxiety was also pertinent to this visit.    Disposition:   Disposition: Discharged  Condition: Stable                        Agustin Duarte MD  11/18/17 0138

## 2017-11-20 ENCOUNTER — HOSPITAL ENCOUNTER (EMERGENCY)
Facility: HOSPITAL | Age: 42
Discharge: HOME | End: 2017-11-20
Attending: EMERGENCY MEDICINE
Payer: COMMERCIAL

## 2017-11-20 VITALS
DIASTOLIC BLOOD PRESSURE: 76 MMHG | RESPIRATION RATE: 16 BRPM | SYSTOLIC BLOOD PRESSURE: 144 MMHG | OXYGEN SATURATION: 100 % | TEMPERATURE: 98 F | BODY MASS INDEX: 22.15 KG/M2 | HEIGHT: 63 IN | HEART RATE: 108 BPM | WEIGHT: 125 LBS

## 2017-11-20 DIAGNOSIS — R19.7 DIARRHEA, UNSPECIFIED TYPE: Primary | ICD-10-CM

## 2017-11-20 DIAGNOSIS — E86.0 DEHYDRATION: ICD-10-CM

## 2017-11-20 LAB
ALBUMIN SERPL BCP-MCNC: 3.8 G/DL
ALP SERPL-CCNC: 44 U/L
ALT SERPL W/O P-5'-P-CCNC: 9 U/L
ANION GAP SERPL CALC-SCNC: 9 MMOL/L
AST SERPL-CCNC: 13 U/L
BASOPHILS # BLD AUTO: 0.04 K/UL
BASOPHILS NFR BLD: 0.4 %
BILIRUB SERPL-MCNC: 0.4 MG/DL
BUN SERPL-MCNC: 8 MG/DL
CALCIUM SERPL-MCNC: 8.9 MG/DL
CHLORIDE SERPL-SCNC: 105 MMOL/L
CO2 SERPL-SCNC: 25 MMOL/L
CREAT SERPL-MCNC: 0.6 MG/DL
DIFFERENTIAL METHOD: ABNORMAL
EOSINOPHIL # BLD AUTO: 0 K/UL
EOSINOPHIL NFR BLD: 0.3 %
ERYTHROCYTE [DISTWIDTH] IN BLOOD BY AUTOMATED COUNT: 11.9 %
EST. GFR  (AFRICAN AMERICAN): >60 ML/MIN/1.73 M^2
EST. GFR  (NON AFRICAN AMERICAN): >60 ML/MIN/1.73 M^2
GLUCOSE SERPL-MCNC: 112 MG/DL
HCT VFR BLD AUTO: 37.1 %
HGB BLD-MCNC: 12.6 G/DL
IMM GRANULOCYTES # BLD AUTO: 0.02 K/UL
IMM GRANULOCYTES NFR BLD AUTO: 0.2 %
LYMPHOCYTES # BLD AUTO: 1.1 K/UL
LYMPHOCYTES NFR BLD: 11.6 %
MCH RBC QN AUTO: 30.2 PG
MCHC RBC AUTO-ENTMCNC: 34 G/DL
MCV RBC AUTO: 89 FL
MONOCYTES # BLD AUTO: 0.5 K/UL
MONOCYTES NFR BLD: 5.1 %
NEUTROPHILS # BLD AUTO: 7.5 K/UL
NEUTROPHILS NFR BLD: 82.4 %
NRBC BLD-RTO: 0 /100 WBC
PLATELET # BLD AUTO: 265 K/UL
PMV BLD AUTO: 10.4 FL
POTASSIUM SERPL-SCNC: 3.5 MMOL/L
PROT SERPL-MCNC: 7.1 G/DL
RBC # BLD AUTO: 4.17 M/UL
SODIUM SERPL-SCNC: 139 MMOL/L
TSH SERPL DL<=0.005 MIU/L-ACNC: 0.84 UIU/ML
WBC # BLD AUTO: 9.06 K/UL

## 2017-11-20 PROCEDURE — 25000003 PHARM REV CODE 250: Performed by: EMERGENCY MEDICINE

## 2017-11-20 PROCEDURE — 80053 COMPREHEN METABOLIC PANEL: CPT

## 2017-11-20 PROCEDURE — 85025 COMPLETE CBC W/AUTO DIFF WBC: CPT

## 2017-11-20 PROCEDURE — 84443 ASSAY THYROID STIM HORMONE: CPT

## 2017-11-20 PROCEDURE — 99283 EMERGENCY DEPT VISIT LOW MDM: CPT | Mod: 25

## 2017-11-20 PROCEDURE — 96360 HYDRATION IV INFUSION INIT: CPT

## 2017-11-20 PROCEDURE — 99283 EMERGENCY DEPT VISIT LOW MDM: CPT | Mod: ,,, | Performed by: EMERGENCY MEDICINE

## 2017-11-20 RX ADMIN — SODIUM CHLORIDE 1000 ML: 0.9 INJECTION, SOLUTION INTRAVENOUS at 08:11

## 2017-11-20 NOTE — ED PROVIDER NOTES
"Encounter Date: 2017    SCRIBE #1 NOTE: I, Qi Maldonado, am scribing for, and in the presence of, Dr. Denis.       History     Chief Complaint   Patient presents with    Dehydration     Pt states she feels dehydrated.  States "everytime I eat I have diarrhea"  Seen on Friday for tachycardia.       Time patient was seen by the provider: 7:45 AM    The patient is a 42 y.o. female with hx of:depression, HTN, anxiety, and panic disorder that presents to the ED for evaluation of dehydration, vomiting and diarrhea. Reports appetite change and nausea. Denies abdominal pain. Patient states she was here on , was given a beta blocker and was placed on a heart monitor because she was having irregular heart beats. Patient reports she quit smoking Friday(). No further concerns or complaints at this time.      The history is provided by the patient and medical records.     Review of patient's allergies indicates:  No Known Allergies  Past Medical History:   Diagnosis Date    Adjustment disorder     Anxiety     Depression     Fatigue     Headache     Hx of psychiatric care     Hypertension     Low vitamin D level     Panic disorder     Psychiatric problem      Past Surgical History:   Procedure Laterality Date     SECTION      DILATION AND CURETTAGE OF UTERUS       Family History   Problem Relation Age of Onset    Cancer Mother 47     pancreatic cancer     Diabetes Mother     Multiple sclerosis Sister     Hypertension Maternal Aunt     Hyperlipidemia Maternal Aunt     Hyperlipidemia Maternal Uncle     Hypertension Maternal Uncle     Hyperlipidemia Paternal Aunt     Hypertension Paternal Aunt     Hyperlipidemia Paternal Uncle     Hypertension Paternal Uncle     Stroke Maternal Grandmother     Psoriasis Maternal Grandmother     Heart disease Maternal Grandfather     Hyperlipidemia Maternal Grandfather     Hypertension Maternal Grandfather     Colon cancer Neg Hx     Esophageal " cancer Neg Hx     Stomach cancer Neg Hx     Rectal cancer Neg Hx     Ulcerative colitis Neg Hx     Irritable bowel syndrome Neg Hx     Crohn's disease Neg Hx     Celiac disease Neg Hx      Social History   Substance Use Topics    Smoking status: Current Every Day Smoker     Packs/day: 0.50     Years: 2.00    Smokeless tobacco: Never Used    Alcohol use No     Review of Systems   Constitutional: Positive for appetite change. Negative for fever.        (+) Dehydration   HENT: Negative for sore throat.    Respiratory: Negative for shortness of breath.    Cardiovascular: Negative for chest pain.   Gastrointestinal: Positive for diarrhea, nausea and vomiting. Negative for abdominal pain.   Genitourinary: Negative for flank pain.   Musculoskeletal: Negative for neck pain.   Skin: Negative for rash.   Neurological: Negative for headaches.   Psychiatric/Behavioral: Negative for confusion.       Physical Exam     Initial Vitals [11/20/17 0700]   BP Pulse Resp Temp SpO2   (!) 144/76 108 16 98.2 °F (36.8 °C) 100 %      MAP       98.67         Physical Exam    Nursing note and vitals reviewed.  Constitutional: She appears well-developed and well-nourished. She is not diaphoretic.   HENT:   Head: Normocephalic and atraumatic.   Nose: Nose normal.   Neck: Normal range of motion.   Cardiovascular: Normal rate and regular rhythm. Exam reveals no gallop and no friction rub.    No murmur heard.  Pulmonary/Chest: Breath sounds normal. No respiratory distress. She has no wheezes. She has no rhonchi. She has no rales.   Abdominal: Soft. She exhibits no distension. There is no tenderness. There is no rebound and no guarding.   Musculoskeletal: Normal range of motion. She exhibits no edema or tenderness.   Neurological: She is alert and oriented to person, place, and time. She has normal strength. No cranial nerve deficit.   Skin: Skin is warm and dry. No rash noted.         ED Course   Procedures  Labs Reviewed   CBC W/ AUTO  DIFFERENTIAL - Abnormal; Notable for the following:        Result Value    Gran% 82.4 (*)     Lymph% 11.6 (*)     All other components within normal limits   COMPREHENSIVE METABOLIC PANEL - Abnormal; Notable for the following:     Glucose 112 (*)     Alkaline Phosphatase 44 (*)     ALT 9 (*)     All other components within normal limits   TSH             Medical Decision Making:   History:   Old Medical Records: I decided to obtain old medical records.  Initial Assessment:   Patient with several days of not eating well and diarrhea. Will check electrolytes and hydrate. Will likely discharge home.   Differential Diagnosis:   Anxiety, dehydration, electrolyte abnormality, diarrhea.  Clinical Tests:   Lab Tests: Ordered and Reviewed  ED Management:  Patient's workup is unremarkable. Will discharge home and have her follow-up with her PCP.             Scribe Attestation:   Scribe #1: I performed the above scribed service and the documentation accurately describes the services I performed. I attest to the accuracy of the note.    I, Dr. Salvatore Denis III, personally performed the services described in this documentation. All medical record entries made by the scribe were at my direction and in my presence.  I have reviewed the chart and agree that the record reflects my personal performance and is accurate and complete. Salvatore Denis III, MD.  2:16 PM 11/20/2017          ED Course      Clinical Impression:   The primary encounter diagnosis was Diarrhea, unspecified type. A diagnosis of Dehydration was also pertinent to this visit.    Disposition:   Disposition: Discharged  Condition: Stable                        Salvatore Denis III, MD  11/20/17 7860

## 2017-11-20 NOTE — ED TRIAGE NOTES
Presents to ER nauseated and having diarrhea.  States that she has no energy and was not able to go to work today because of feeling weak.    GENERAL: The patient is well-developed and well-nourished in no apparent distress. Alert and oriented x4.                                                HEENT: Head is normocephalic and atraumatic. Extraocular muscles are intact. Pupils are equal, round, and reactive to light and accommodation. Nares appeared normal. Mouth is well hydrated and without lesions. Mucous membranes are moist. Posterior pharynx clear of any exudate or lesions.    NECK: Supple. No carotid bruits. No lymphadenopathy or thyromegaly.    LUNGS: Clear to auscultation.    HEART: Regular rate and rhythm without murmur.     ABDOMEN: Soft, nontender, and nondistended. Positive bowel sounds. No hepatosplenomegaly was noted.     EXTREMITIES: Without any cyanosis, clubbing, rash, lesions or edema.     NEUROLOGIC: Cranial nerves II through XII are grossly intact.     PSYCHIATRIC: Flat affect, but denies suicidal or homicidal ideations.    SKIN: No ulceration or induration present.

## 2017-11-21 ENCOUNTER — OFFICE VISIT (OUTPATIENT)
Dept: CARDIOLOGY | Facility: CLINIC | Age: 42
End: 2017-11-21
Payer: COMMERCIAL

## 2017-11-21 VITALS
HEIGHT: 63 IN | OXYGEN SATURATION: 99 % | WEIGHT: 124.75 LBS | BODY MASS INDEX: 22.11 KG/M2 | HEART RATE: 118 BPM | SYSTOLIC BLOOD PRESSURE: 139 MMHG | DIASTOLIC BLOOD PRESSURE: 83 MMHG

## 2017-11-21 DIAGNOSIS — R00.2 HEART PALPITATIONS: Primary | ICD-10-CM

## 2017-11-21 DIAGNOSIS — Z72.0 TOBACCO ABUSE: ICD-10-CM

## 2017-11-21 PROCEDURE — 99213 OFFICE O/P EST LOW 20 MIN: CPT | Mod: S$GLB,,, | Performed by: NURSE PRACTITIONER

## 2017-11-21 PROCEDURE — 99999 PR PBB SHADOW E&M-EST. PATIENT-LVL IV: CPT | Mod: PBBFAC,,, | Performed by: NURSE PRACTITIONER

## 2017-11-21 RX ORDER — METOPROLOL SUCCINATE 25 MG/1
25 TABLET, EXTENDED RELEASE ORAL DAILY
Qty: 90 TABLET | Refills: 3 | Status: SHIPPED | OUTPATIENT
Start: 2017-11-21 | End: 2017-12-15

## 2017-11-21 NOTE — PROGRESS NOTES
Ms. Salas was last seen in [facility] 8/24/2017..      Subjective:   Patient ID:  Juanita Salas is a 42 y.o. female who presents for follow-up of Palpitations (ER fu 11/17/17)  .   HPI:    Ms. Salas is a 43yo with palpitations, anxiety/panic disorder, and current smoking here for hospital follow up. She presented to the ED on 11/17/2017 with palpitations. Sinus tachycardia seen on EKG. Yesterday (11/21/2017) she was seen at the ED for diarrhea and dehydration. Ms. Salas denies chest pain with exertion or at rest, SOB, KAMARA, dizziness, syncope, leg edema, claudication, PND, or orthopnea. She has been worried about starting an exercise program because of her heart rate.   She is prescribed metoprolol 25mg daily but has not taken it because she was worried about it dropping her BP. BPs are 120s at home but elevated in clinic. Recheck 123/70. Creatinine is 0.6. K is 3.5. Hepatic transaminases are within normal limits. LDL is 129. ASCVD risk is 0.7%.  Holter monitor earlier this month showed no arrhythmias.     Recent Cardiac Tests:    EKG (11/17/2017):  Sinus tachycardia  Right atrial enlargement  Nonspecific ST and T wave abnormality  Abnormal ECG  When compared with ECG of 01-NOV-2017 09:51,  Inverted T waves have replaced nonspecific T wave abnormality in Inferior  leads  Nonspecific T wave abnormality now evident in Anterior-lateral leads    2D Echo (11/1/2017):  CONCLUSIONS     1 - Normal left ventricular systolic function (EF 60-65%).     2 - Normal right ventricular systolic function .     3 - Normal left ventricular diastolic function.     48 Hour Holter Monitor (11/10/2017):  1. Sinus rhythm with heart rates varying between 48 and 147 bpm with an average of 79 bpm.   VENTRICULAR ARRHYTHMIAS  1. There were very rare PVCs recorded totalling 4 and averaging less than 1 per hour.   2. There were no episodes of ventricular tachycardia.  SUPRA VENTRICULAR ARRHYTHMIAS  1. There were very rare PACs recorded  totalling 19 and averaging less than 1 per hour.   2. There were no episodes of sustained supraventricular tachycardia.  SINUS NODE FUNCTION  1. For the 1st 24 hour period, HRs averaging 85 bpm during waking hours and 65 bpm during sleep (11:30 PM - 8:15 AM).  2. For the 2nd 24 hour period, HRs averaging 82 bpm during waking hours and 60 bpm during sleep (10:30 PM - 6:00 AM).  3. There was no evidence of high grade SA rah block.   AV CONDUCTION  1. There was no evidence of high grade AV block.   DIARY  1. The diary was properly completed and there were no symptoms reported .     Current Outpatient Prescriptions   Medication Sig    clonazePAM (KLONOPIN) 0.5 MG tablet Take 0.5 mg by mouth as needed.    esomeprazole (NEXIUM) 40 MG capsule Take 1 capsule (40 mg total) by mouth before breakfast.    metoprolol succinate (TOPROL-XL) 50 MG 24 hr tablet Take 1 tablet (50 mg total) by mouth once daily. Take 0.5 pill in the evening    TRINESSA LO 0.18/0.215/0.25 mg-25 mcg tablet TK 1 T PO QD    VITAMIN D2 50,000 unit capsule Take 50,000 Units by mouth once a week.     No current facility-administered medications for this visit.        Review of Systems   Constitution: Positive for malaise/fatigue.   Eyes: Negative for blurred vision.   Cardiovascular: Positive for palpitations. Negative for chest pain, claudication, dyspnea on exertion, irregular heartbeat, leg swelling, orthopnea, paroxysmal nocturnal dyspnea and syncope.   Respiratory: Negative for shortness of breath.    Hematologic/Lymphatic: Negative for bleeding problem.   Skin: Negative for rash.   Musculoskeletal: Negative for myalgias.   Gastrointestinal: Negative for abdominal pain, constipation, diarrhea and nausea.   Genitourinary: Negative for hematuria.   Neurological: Negative for headaches, loss of balance and numbness.   Psychiatric/Behavioral: Negative for altered mental status.   Allergic/Immunologic: Negative for persistent infections.     Objective:  "    Right Arm BP - Sittin/80 (17 1322)  Left Arm BP - Sittin/60 (17 1413)    /83 (BP Location: Left arm, Patient Position: Sitting, BP Method: Large (Automatic))   Pulse (!) 118   Ht 5' 3" (1.6 m)   Wt 56.6 kg (124 lb 12.5 oz)   LMP 2017   SpO2 99%   BMI 22.10 kg/m²     Physical Exam   Constitutional: She is oriented to person, place, and time. She appears well-developed and well-nourished.   HENT:   Head: Normocephalic.   Nose: Nose normal.   Eyes: Pupils are equal, round, and reactive to light.   Neck: No JVD present. Carotid bruit is not present.   Cardiovascular: Regular rhythm, S1 normal and S2 normal.  Tachycardia present.  Exam reveals no gallop.    No murmur heard.  Pulses:       Carotid pulses are 2+ on the right side, and 2+ on the left side.       Radial pulses are 2+ on the right side, and 2+ on the left side.        Dorsalis pedis pulses are 2+ on the right side, and 2+ on the left side.   Pulmonary/Chest: Breath sounds normal. No respiratory distress.   Abdominal: Soft. Bowel sounds are normal. She exhibits no distension. There is no tenderness.   Musculoskeletal: Normal range of motion. She exhibits no edema.   Neurological: She is alert and oriented to person, place, and time.   Skin: Skin is warm and dry. No erythema.   Psychiatric: She has a normal mood and affect. Her speech is normal and behavior is normal.   Nursing note and vitals reviewed.    Lab Results   Component Value Date     2017    K 3.5 2017     2017    CO2 25 2017    BUN 8 2017    CREATININE 0.6 2017     (H) 2017    AST 13 2017    ALT 9 (L) 2017    ALBUMIN 3.8 2017    PROT 7.1 2017    BILITOT 0.4 2017    WBC 9.06 2017    HGB 12.6 2017    HCT 37.1 2017    HCT 41 2017    MCV 89 2017     2017    TSH 0.838 2017    CHOL 212 (H) 10/04/2017    HDL 52 10/04/2017 "    LDLCALC 129.0 10/04/2017    TRIG 155 (H) 10/04/2017          Test(s) Reviewed  I have reviewed the following in detail:  [] Stress test   [] Angiography   [] Echocardiogram   [x] Labs   [] Other:         Assessment:         1. Heart palpitations. Sinus tachycardia. Patient has not been taking metoprolol. Will start patient on 25mg. Encouraged to work on anxiety treatment. Encouraged to exercise regularly.      2. Tobacco abuse. Patient quit smoking on Friday.      Plan:     Juanita was seen today for palpitations.    Diagnoses and all orders for this visit:    Heart palpitations  -     metoprolol succinate (TOPROL-XL) 25 MG 24 hr tablet; Take 1 tablet (25 mg total) by mouth once daily.    Tobacco abuse      Start taking metoprolol 25mg daily (can cut 50mg in half).  Patient has been encouraged to exercise a minimum of 30 minutes daily, 5 times per week.       Return if symptoms worsen or fail to improve.    ------------------------------------------------------------------    ARJUN To, NP-C  Consult Cardiology

## 2017-11-21 NOTE — PATIENT INSTRUCTIONS
Start taking metoprolol 25mg daily (can cut 50mg in half).  Patient has been encouraged to exercise a minimum of 30 minutes daily, 5 times per week.

## 2017-11-22 ENCOUNTER — CLINICAL SUPPORT (OUTPATIENT)
Dept: SMOKING CESSATION | Facility: CLINIC | Age: 42
End: 2017-11-22
Payer: COMMERCIAL

## 2017-11-22 DIAGNOSIS — F17.210 NICOTINE DEPENDENCE, CIGARETTES, UNCOMPLICATED: Primary | ICD-10-CM

## 2017-11-22 PROCEDURE — 99407 BEHAV CHNG SMOKING > 10 MIN: CPT | Mod: S$GLB,,,

## 2017-11-24 ENCOUNTER — OFFICE VISIT (OUTPATIENT)
Dept: PSYCHIATRY | Facility: CLINIC | Age: 42
End: 2017-11-24
Payer: COMMERCIAL

## 2017-11-24 ENCOUNTER — OFFICE VISIT (OUTPATIENT)
Dept: INTERNAL MEDICINE | Facility: CLINIC | Age: 42
End: 2017-11-24
Payer: COMMERCIAL

## 2017-11-24 VITALS
WEIGHT: 126.13 LBS | OXYGEN SATURATION: 99 % | DIASTOLIC BLOOD PRESSURE: 78 MMHG | SYSTOLIC BLOOD PRESSURE: 126 MMHG | BODY MASS INDEX: 22.35 KG/M2 | HEIGHT: 63 IN | TEMPERATURE: 98 F | HEART RATE: 87 BPM

## 2017-11-24 DIAGNOSIS — F41.0 PANIC DISORDER: Primary | ICD-10-CM

## 2017-11-24 DIAGNOSIS — K13.79 MOUTH SORES: ICD-10-CM

## 2017-11-24 DIAGNOSIS — F41.1 GAD (GENERALIZED ANXIETY DISORDER): Primary | ICD-10-CM

## 2017-11-24 DIAGNOSIS — F41.9 ANXIETY: ICD-10-CM

## 2017-11-24 DIAGNOSIS — R00.2 HEART PALPITATIONS: ICD-10-CM

## 2017-11-24 DIAGNOSIS — K11.7 XEROSTOMIA: ICD-10-CM

## 2017-11-24 DIAGNOSIS — Z72.0 TOBACCO ABUSE: ICD-10-CM

## 2017-11-24 DIAGNOSIS — F41.0 PANIC DISORDER: ICD-10-CM

## 2017-11-24 PROCEDURE — 99999 PR PBB SHADOW E&M-EST. PATIENT-LVL IV: CPT | Mod: PBBFAC,,, | Performed by: NURSE PRACTITIONER

## 2017-11-24 PROCEDURE — 99214 OFFICE O/P EST MOD 30 MIN: CPT | Mod: S$GLB,,, | Performed by: NURSE PRACTITIONER

## 2017-11-24 PROCEDURE — 90832 PSYTX W PT 30 MINUTES: CPT | Mod: S$GLB,,, | Performed by: SOCIAL WORKER

## 2017-11-24 RX ORDER — CHLORHEXIDINE GLUCONATE ORAL RINSE 1.2 MG/ML
15 SOLUTION DENTAL 2 TIMES DAILY
Qty: 118 ML | Refills: 1 | Status: SHIPPED | OUTPATIENT
Start: 2017-11-24 | End: 2017-12-08

## 2017-11-24 NOTE — PATIENT INSTRUCTIONS
Biotene Oral Balance gel    Start mouthwash twice daily, brush tongue.   May use biotene as needed     Continue metoprolol 25mg daily.     Follow up with counselor.

## 2017-11-24 NOTE — PROGRESS NOTES
Individual Psychotherapy (PhD/LCSW)    11/24/2017    Site:  [facility]         Therapeutic Intervention: Met with patient.  Outpatient - Insight oriented psychotherapy 30 min - CPT code 47692    Chief complaint/reason for encounter: depression, anxiety, sleep, appetite, behavior, cognition, somatic and interpersonal     Interval history and content of current session: discussed how to calm down, how to relax, panic, anxiety, worry, fears, and staying in the moment, and sleep concerns, and cardiac maybe concerns, eating and taking better care of herself, has stopped smoking and is more positive and working hard on her problems and being more successful at calming down, walking, family support, and being more positive in her thinking and her behavior all good and in the directions. Encouraged continued taking better care of herself and not smoking and eating and get better sleep and relax every day.    Treatment plan:  · Target symptoms: depression, recurrent depression, anxiety , mood swings, mood disorder, adjustment, work stress  · Why chosen therapy is appropriate versus another modality: relevant to diagnosis, patient responds to this modality, evidence based practice  · Outcome monitoring methods: self-report, observation  · Therapeutic intervention type: insight oriented psychotherapy, behavior modifying psychotherapy, supportive psychotherapy    Risk parameters:  Patient reports no suicidal ideation  Patient reports no homicidal ideation  Patient reports no self-injurious behavior  Patient reports no violent behavior    Verbal deficits: None    Patient's response to intervention:  The patient's response to intervention is accepting, motivated.    Progress toward goals and other mental status changes:  The patient's progress toward goals is limited.    Diagnosis:     ICD-10-CM ICD-9-CM   1. LUPE (generalized anxiety disorder) F41.1 300.02   2. Panic disorder F41.0 300.01       Plan:  individual  psychotherapy    Return to clinic: 2 weeks    Length of Service (minutes): 30

## 2017-11-24 NOTE — PROGRESS NOTES
INTERNAL MEDICINE PROGRESS NOTE    CHIEF COMPLAINT     Chief Complaint   Patient presents with    Follow-up       HPI     Juanita Salas is a 42 y.o. female anxiety, depression, fatigue, migraines and heart palpitations  who presents for a follow up visit today.    Was seen twice in the ED.   11/17/2017- with anxiety and palpitations. Sinus tach in ED. Was not taking metoprolol at directed by Cardiology   Quit smoking 11/17/2017 11/20/2017- returned to the ED with diarrhea, vomiting  and dehydration.     Sen by cardiology 11/21/2017 for f/u visit - was instructed to take metoprolol as directed and start exercise.   EKG (11/17/2017):  Sinus tachycardia  Right atrial enlargement  Nonspecific ST and T wave abnormality  Abnormal ECG  When compared with ECG of 01-NOV-2017 09:51,  Inverted T waves have replaced nonspecific T wave abnormality in Inferior  leads  Nonspecific T wave abnormality now evident in Anterior-lateral leads     2D Echo (11/1/2017):  CONCLUSIONS     1 - Normal left ventricular systolic function (EF 60-65%).     2 - Normal right ventricular systolic function .     3 - Normal left ventricular diastolic function.      48 Hour Holter Monitor (11/10/2017):  1. Sinus rhythm with heart rates varying between 48 and 147 bpm with an average of 79 bpm.   VENTRICULAR ARRHYTHMIAS  1. There were very rare PVCs recorded totalling 4 and averaging less than 1 per hour.   2. There were no episodes of ventricular tachycardia.  SUPRA VENTRICULAR ARRHYTHMIAS  1. There were very rare PACs recorded totalling 19 and averaging less than 1 per hour.   2. There were no episodes of sustained supraventricular tachycardia.  SINUS NODE FUNCTION  1. For the 1st 24 hour period, HRs averaging 85 bpm during waking hours and 65 bpm during sleep (11:30 PM - 8:15 AM).  2. For the 2nd 24 hour period, HRs averaging 82 bpm during waking hours and 60 bpm during sleep (10:30 PM - 6:00 AM).  3. There was no evidence of high grade SA rah  block.   AV CONDUCTION  1. There was no evidence of high grade AV block.   DIARY  1. The diary was properly completed and there were no symptoms reported .        Today here for ED follow up     Anxiety- not taking celexa. Took x 1 day but stopped 2/2 going to ED for tachycardia. Was concerned about that causing tachycardia. Is taking klonopin rarely. Seeing counseling.     Heart palpitations- metoprolol x 2 days. concerned about it lowering her BP and pulse too much. Stopped smoking 1 weeks ago. States it is difficult to sleep and cope but happy that she has quit.     White spots on tongue- Started 1 week ago, painful at onset but less painful now. Mouth dry.             Past Medical History:  Past Medical History:   Diagnosis Date    Adjustment disorder     Anxiety     Depression     Fatigue     Headache     Hx of psychiatric care     Hypertension     Low vitamin D level     Panic disorder     Psychiatric problem        Home Medications:  Prior to Admission medications    Medication Sig Start Date End Date Taking? Authorizing Provider   clonazePAM (KLONOPIN) 0.5 MG tablet Take 0.5 mg by mouth as needed. 8/2/17  Yes Historical Provider, MD   esomeprazole (NEXIUM) 40 MG capsule Take 1 capsule (40 mg total) by mouth before breakfast. 10/28/17 10/28/18 Yes Federico Ulloa MD   metoprolol succinate (TOPROL-XL) 25 MG 24 hr tablet Take 1 tablet (25 mg total) by mouth once daily. 11/21/17 11/21/18 Yes DIMA To 0.18/0.215/0.25 mg-25 mcg tablet TK 1 T PO QD 8/1/17  Yes Historical Provider, MD   VITAMIN D2 50,000 unit capsule Take 50,000 Units by mouth once a week. 8/4/17  Yes Historical Provider, MD       Review of Systems:  Review of Systems   Constitutional: Negative for chills, fatigue, fever and unexpected weight change.   HENT: Positive for mouth sores (see HPI ). Negative for congestion, postnasal drip, rhinorrhea, sinus pain, sneezing, sore throat, tinnitus, trouble swallowing and  "voice change.    Eyes: Negative for visual disturbance.   Respiratory: Negative for cough, shortness of breath and wheezing.    Cardiovascular: Positive for palpitations. Negative for chest pain.   Gastrointestinal: Negative for abdominal pain, constipation, diarrhea, nausea and vomiting.   Neurological: Negative for dizziness, light-headedness, numbness and headaches.   Psychiatric/Behavioral: The patient is nervous/anxious.        Health Maintainence:   Immunizations:  Health Maintenance       Date Due Completion Date    TETANUS VACCINE 06/14/1993 ---    Pap Smear with HPV Cotest 06/14/1996 ---    Mammogram 06/14/2015 ---    Influenza Vaccine 08/01/2017 ---           PHYSICAL EXAM     /78 (BP Location: Left arm, Patient Position: Sitting, BP Method: Large (Manual))   Pulse 87   Temp 97.7 °F (36.5 °C) (Oral)   Ht 5' 3" (1.6 m)   Wt 57.2 kg (126 lb 1.7 oz)   LMP 11/17/2017   SpO2 99%   BMI 22.34 kg/m²     Physical Exam   Constitutional: She is oriented to person, place, and time. She appears well-developed and well-nourished.   HENT:   Head: Normocephalic.   Right Ear: External ear normal.   Left Ear: External ear normal.   Nose: Nose normal.   Mouth/Throat: Oropharynx is clear and moist. Mucous membranes are dry. Oral lesions present. No oropharyngeal exudate, posterior oropharyngeal edema, posterior oropharyngeal erythema or tonsillar abscesses. No tonsillar exudate.       Eyes: Pupils are equal, round, and reactive to light.   Neck: Neck supple. No JVD present. No tracheal deviation present. No thyromegaly present.   Cardiovascular: Normal rate, regular rhythm, normal heart sounds and intact distal pulses.  Exam reveals no gallop and no friction rub.    No murmur heard.  Pulmonary/Chest: Effort normal and breath sounds normal. No respiratory distress. She has no wheezes. She has no rales.   Abdominal: Soft. Bowel sounds are normal. She exhibits no distension. There is no tenderness. "   Musculoskeletal: Normal range of motion. She exhibits no edema or tenderness.   Lymphadenopathy:     She has no cervical adenopathy.   Neurological: She is alert and oriented to person, place, and time.   Skin: Skin is warm and dry. No rash noted.   Psychiatric: She has a normal mood and affect. Her behavior is normal.   Vitals reviewed.      LABS     No results found for: LABA1C, HGBA1C  CMP  Sodium   Date Value Ref Range Status   11/20/2017 139 136 - 145 mmol/L Final     Potassium   Date Value Ref Range Status   11/20/2017 3.5 3.5 - 5.1 mmol/L Final     Chloride   Date Value Ref Range Status   11/20/2017 105 95 - 110 mmol/L Final     CO2   Date Value Ref Range Status   11/20/2017 25 23 - 29 mmol/L Final     Glucose   Date Value Ref Range Status   11/20/2017 112 (H) 70 - 110 mg/dL Final     BUN, Bld   Date Value Ref Range Status   11/20/2017 8 6 - 20 mg/dL Final     Creatinine   Date Value Ref Range Status   11/20/2017 0.6 0.5 - 1.4 mg/dL Final     Calcium   Date Value Ref Range Status   11/20/2017 8.9 8.7 - 10.5 mg/dL Final     Total Protein   Date Value Ref Range Status   11/20/2017 7.1 6.0 - 8.4 g/dL Final     Albumin   Date Value Ref Range Status   11/20/2017 3.8 3.5 - 5.2 g/dL Final     Total Bilirubin   Date Value Ref Range Status   11/20/2017 0.4 0.1 - 1.0 mg/dL Final     Comment:     For infants and newborns, interpretation of results should be based  on gestational age, weight and in agreement with clinical  observations.  Premature Infant recommended reference ranges:  Up to 24 hours.............<8.0 mg/dL  Up to 48 hours............<12.0 mg/dL  3-5 days..................<15.0 mg/dL  6-29 days.................<15.0 mg/dL       Alkaline Phosphatase   Date Value Ref Range Status   11/20/2017 44 (L) 55 - 135 U/L Final     AST   Date Value Ref Range Status   11/20/2017 13 10 - 40 U/L Final     ALT   Date Value Ref Range Status   11/20/2017 9 (L) 10 - 44 U/L Final     Anion Gap   Date Value Ref Range Status    11/20/2017 9 8 - 16 mmol/L Final     eGFR if    Date Value Ref Range Status   11/20/2017 >60.0 >60 mL/min/1.73 m^2 Final     eGFR if non    Date Value Ref Range Status   11/20/2017 >60.0 >60 mL/min/1.73 m^2 Final     Comment:     Calculation used to obtain the estimated glomerular filtration  rate (eGFR) is the CKD-EPI equation.        Lab Results   Component Value Date    WBC 9.06 11/20/2017    HGB 12.6 11/20/2017    HCT 37.1 11/20/2017    MCV 89 11/20/2017     11/20/2017     Lab Results   Component Value Date    CHOL 212 (H) 10/04/2017    CHOL 177 02/17/2006     Lab Results   Component Value Date    HDL 52 10/04/2017    HDL 48.0 02/17/2006     Lab Results   Component Value Date    LDLCALC 129.0 10/04/2017    LDLCALC 103.2 02/17/2006     Lab Results   Component Value Date    TRIG 155 (H) 10/04/2017    TRIG 129 02/17/2006     Lab Results   Component Value Date    CHOLHDL 24.5 10/04/2017    CHOLHDL 27.1 02/17/2006     Lab Results   Component Value Date    TSH 0.838 11/20/2017       ASSESSMENT/PLAN     Juanita Salas is a 42 y.o. female with  Past Medical History:   Diagnosis Date    Adjustment disorder     Anxiety     Depression     Fatigue     Headache     Hx of psychiatric care     Hypertension     Low vitamin D level     Panic disorder     Psychiatric problem      Panic disorder/Anxiety- will cont klonopin as needed sparingly. F/u with counseling. Has apt with psychiatry in January. Cont metoprolol to decrease tachycardia with anxiety.    Tobacco abuse- cont smoking cessation.     Heart palpitations- advised to continue metoprolol as directed. Reviewed cardiology notes and diagnostics. Thyroid studies normal.     Xerostomia- likley from smoking cessation and new meds. Will start bid chlorhexidine and biotene and tongue scrapings. F/u wit 1 month, will refer to dentist if still present   -     chlorhexidine (PERIDEX) 0.12 % solution; Use as directed 15 mLs in the  mouth or throat 2 (two) times daily.  Dispense: 118 mL; Refill: 1    Mouth sores  -     chlorhexidine (PERIDEX) 0.12 % solution; Use as directed 15 mLs in the mouth or throat 2 (two) times daily.  Dispense: 118 mL; Refill: 1          Follow up with PCP in 1 month     Patient education provided from [facility]. Patient was counseled on when and how to seek emergent care.       Erika CARL, ARJUN, FNP-c   Department of Internal Medicine - [facility]  7:31 AM

## 2017-11-27 ENCOUNTER — PATIENT MESSAGE (OUTPATIENT)
Dept: CARDIOLOGY | Facility: CLINIC | Age: 42
End: 2017-11-27

## 2017-11-27 ENCOUNTER — TELEPHONE (OUTPATIENT)
Dept: CARDIOLOGY | Facility: CLINIC | Age: 42
End: 2017-11-27

## 2017-12-05 ENCOUNTER — OFFICE VISIT (OUTPATIENT)
Dept: INTERNAL MEDICINE | Facility: CLINIC | Age: 42
End: 2017-12-05
Payer: COMMERCIAL

## 2017-12-05 ENCOUNTER — HOSPITAL ENCOUNTER (OUTPATIENT)
Dept: RADIOLOGY | Facility: HOSPITAL | Age: 42
Discharge: HOME | End: 2017-12-05
Attending: NURSE PRACTITIONER
Payer: COMMERCIAL

## 2017-12-05 VITALS
WEIGHT: 125.88 LBS | HEIGHT: 63 IN | SYSTOLIC BLOOD PRESSURE: 126 MMHG | HEART RATE: 84 BPM | OXYGEN SATURATION: 98 % | TEMPERATURE: 98 F | DIASTOLIC BLOOD PRESSURE: 80 MMHG | BODY MASS INDEX: 22.3 KG/M2

## 2017-12-05 DIAGNOSIS — R61 NIGHT SWEATS: ICD-10-CM

## 2017-12-05 DIAGNOSIS — R63.4 WEIGHT LOSS: ICD-10-CM

## 2017-12-05 DIAGNOSIS — R61 NIGHT SWEATS: Primary | ICD-10-CM

## 2017-12-05 DIAGNOSIS — R53.83 FATIGUE, UNSPECIFIED TYPE: ICD-10-CM

## 2017-12-05 PROCEDURE — 99999 PR PBB SHADOW E&M-EST. PATIENT-LVL III: CPT | Mod: PBBFAC,,, | Performed by: NURSE PRACTITIONER

## 2017-12-05 PROCEDURE — 71020 XR CHEST PA AND LATERAL: CPT | Mod: TC

## 2017-12-05 PROCEDURE — 71020 XR CHEST PA AND LATERAL: CPT | Mod: 26,,, | Performed by: RADIOLOGY

## 2017-12-05 PROCEDURE — 99213 OFFICE O/P EST LOW 20 MIN: CPT | Mod: S$GLB,,, | Performed by: NURSE PRACTITIONER

## 2017-12-06 NOTE — PROGRESS NOTES
"Subjective:       Patient ID: Juanita Salas is a 42 y.o. female.    Chief Complaint: Night Sweats and Fatigue    HPI:  41 yo female that presents to clinic with complaint of fever and night sweats x 1 year.    Reports that she has also been having fatigue and will sometimes get a little short of breathe.  States that she quit smoking recently.    States that she will constantly "sweat through her sheets every night."  States that she is being seen by psychiatry for underlying depression and anxiety disorder.  States that she feels that she has also lot weight over the past few months.    States that she was but on metoprolol for her elevated heart rate and blood pressure but she stopped taking that two weeks ago.  States that she feels since she stopped smoking her blood pressure and heart rate have gotten better that she "doesn't need to take that medicine."    Reports that she talked with her mother who, who has had similar symptoms in the past and states that she may have "pheochromocytoma or Von hippel lindau disease."    Patient reports that she still has menstrual cycles and also wonders if these symptoms are related "to menopause starting."    Review of Systems   Constitutional: Positive for fatigue and unexpected weight change. Negative for appetite change and fever.   Respiratory: Positive for shortness of breath. Negative for apnea, cough, chest tightness and wheezing.    Cardiovascular: Negative for chest pain, palpitations and leg swelling.   Gastrointestinal: Negative for abdominal distention, abdominal pain, constipation, diarrhea, nausea and vomiting.   Endocrine: Negative for cold intolerance and heat intolerance.   Genitourinary: Negative for difficulty urinating, dysuria, frequency, hematuria and urgency.   Musculoskeletal: Negative for arthralgias, back pain, joint swelling, neck pain and neck stiffness.   Neurological: Positive for dizziness and headaches. Negative for weakness, light-headedness " and numbness.   Psychiatric/Behavioral: The patient is nervous/anxious.        Objective:      Physical Exam   Constitutional: She is oriented to person, place, and time. She appears well-developed and well-nourished. No distress.   HENT:   Head: Normocephalic and atraumatic.   Neck: Normal range of motion. Neck supple. No thyromegaly present.   Cardiovascular: Normal rate, regular rhythm, normal heart sounds and intact distal pulses.    No murmur heard.  Pulmonary/Chest: Effort normal and breath sounds normal. No respiratory distress. She has no wheezes.   Abdominal: Soft. Bowel sounds are normal. She exhibits no distension and no mass. There is no tenderness.   Lymphadenopathy:     She has no cervical adenopathy.   Neurological: She is alert and oriented to person, place, and time. No sensory deficit.   Skin: Skin is warm and dry. No erythema.   Psychiatric: Her behavior is normal.       Assessment:       1. Night sweats    2. Weight loss    3. Fatigue, unspecified type        Plan:         1. Night sweats and weight loss  -Patient would like to rule out disorders of pheochromocytoma and TB.   -Patient has had 5-9lbs weight loss since September.  -Dior CT scans of abdomen and pelvis were normal.  -Given type and duration of symptoms, I am ordering a chest x-ray.  -I will also order blood work to be drawn (quantiferon gold tb, hiv 1-2ab and plasma free metanephrines).    2. Fatigue, unspecified type   -Patient has no anemia and no signs of thyroid disorder.  -Recommended continued follow up with psychiatry.

## 2017-12-12 ENCOUNTER — OFFICE VISIT (OUTPATIENT)
Dept: INTERNAL MEDICINE | Facility: CLINIC | Age: 42
End: 2017-12-12
Payer: COMMERCIAL

## 2017-12-12 ENCOUNTER — LAB VISIT (OUTPATIENT)
Dept: LAB | Facility: HOSPITAL | Age: 42
End: 2017-12-12
Attending: NURSE PRACTITIONER
Payer: COMMERCIAL

## 2017-12-12 VITALS
HEIGHT: 63 IN | HEART RATE: 90 BPM | WEIGHT: 125 LBS | BODY MASS INDEX: 22.15 KG/M2 | OXYGEN SATURATION: 98 % | SYSTOLIC BLOOD PRESSURE: 130 MMHG | DIASTOLIC BLOOD PRESSURE: 84 MMHG | TEMPERATURE: 97 F

## 2017-12-12 DIAGNOSIS — R61 NIGHT SWEATS: Primary | ICD-10-CM

## 2017-12-12 DIAGNOSIS — R61 NIGHT SWEATS: ICD-10-CM

## 2017-12-12 DIAGNOSIS — R53.82 CHRONIC FATIGUE: ICD-10-CM

## 2017-12-12 LAB
ALBUMIN SERPL BCP-MCNC: 3.8 G/DL
ALP SERPL-CCNC: 44 U/L
ALT SERPL W/O P-5'-P-CCNC: 9 U/L
ANION GAP SERPL CALC-SCNC: 8 MMOL/L
AST SERPL-CCNC: 13 U/L
BILIRUB SERPL-MCNC: 0.3 MG/DL
BUN SERPL-MCNC: 12 MG/DL
CALCIUM SERPL-MCNC: 9.1 MG/DL
CHLORIDE SERPL-SCNC: 105 MMOL/L
CO2 SERPL-SCNC: 26 MMOL/L
CORTIS SERPL-MCNC: 8.3 UG/DL
CREAT SERPL-MCNC: 0.7 MG/DL
EST. GFR  (AFRICAN AMERICAN): >60 ML/MIN/1.73 M^2
EST. GFR  (NON AFRICAN AMERICAN): >60 ML/MIN/1.73 M^2
GLUCOSE SERPL-MCNC: 90 MG/DL
POTASSIUM SERPL-SCNC: 3.7 MMOL/L
PROT SERPL-MCNC: 7.1 G/DL
SODIUM SERPL-SCNC: 139 MMOL/L

## 2017-12-12 PROCEDURE — 84244 ASSAY OF RENIN: CPT

## 2017-12-12 PROCEDURE — 80053 COMPREHEN METABOLIC PANEL: CPT

## 2017-12-12 PROCEDURE — 82088 ASSAY OF ALDOSTERONE: CPT

## 2017-12-12 PROCEDURE — 99213 OFFICE O/P EST LOW 20 MIN: CPT | Mod: S$GLB,,, | Performed by: NURSE PRACTITIONER

## 2017-12-12 PROCEDURE — 36415 COLL VENOUS BLD VENIPUNCTURE: CPT

## 2017-12-12 PROCEDURE — 82533 TOTAL CORTISOL: CPT

## 2017-12-12 PROCEDURE — 99999 PR PBB SHADOW E&M-EST. PATIENT-LVL III: CPT | Mod: PBBFAC,,, | Performed by: NURSE PRACTITIONER

## 2017-12-12 RX ORDER — CHLORHEXIDINE GLUCONATE ORAL RINSE 1.2 MG/ML
15 SOLUTION DENTAL 2 TIMES DAILY
COMMUNITY
Start: 2017-12-09 | End: 2018-01-27

## 2017-12-13 NOTE — PROGRESS NOTES
"Subjective:       Patient ID: Juanita Salas is a 42 y.o. female.    Chief Complaint: Follow-up    HPI:  41 yo female that presents to clinic for fatigue and night sweats.    Was seen last week with same symptoms but states no improvement.    Former smoker who has stopped smoking "cold turkey" x 1 month.    States that she is still sweating in her sleep and "still feels tired" through out the day.  States she feels like she gets enough sleep and denies waking up at night.  States that she is drinking mostly water through out day and is not drinking any caffeine or alcohol.    Review of Systems   Constitutional: Positive for fatigue. Negative for activity change, appetite change, fever and unexpected weight change.   Respiratory: Negative for apnea, cough, chest tightness, shortness of breath and wheezing.    Cardiovascular: Negative for chest pain, palpitations and leg swelling.   Gastrointestinal: Negative for abdominal distention, constipation, diarrhea, nausea and vomiting.   Endocrine: Negative for cold intolerance and heat intolerance.   Genitourinary: Negative for difficulty urinating, dysuria, frequency, hematuria and urgency.   Musculoskeletal: Negative for arthralgias, joint swelling, neck pain and neck stiffness.   Skin: Negative for color change, rash and wound.   Neurological: Negative for dizziness, weakness, light-headedness and headaches.   Psychiatric/Behavioral: Negative for behavioral problems. The patient is nervous/anxious.        Objective:      Physical Exam   Constitutional: She is oriented to person, place, and time. She appears well-developed and well-nourished. No distress.   HENT:   Head: Normocephalic and atraumatic.   Eyes: Conjunctivae and EOM are normal. Pupils are equal, round, and reactive to light.   Neck: Normal range of motion. Neck supple. No tracheal deviation present. No thyromegaly present.   Cardiovascular: Normal rate, regular rhythm, normal heart sounds and intact distal " pulses.    No murmur heard.  Pulmonary/Chest: Effort normal and breath sounds normal. No respiratory distress. She has no wheezes. She has no rales.   Lymphadenopathy:     She has no cervical adenopathy.   Neurological: She is alert and oriented to person, place, and time. No sensory deficit.   Skin: Skin is dry. No erythema.   Psychiatric: Her behavior is normal. Her mood appears anxious.       Assessment:       1. Night sweats    2. Chronic fatigue        Plan:     1. Night sweats   -Work up so far has been negative.  -Will recheck a cmp and also check renin, aldosterone and cortisol levels.  -Patient is scheduled to follow up with obgyn in a month.  Could be related to beginning of menopause.    2. Chronic fatigue   -Work up so far has been negative.  -Will recheck a cmp and also check renin, aldosterone and cortisol levels.  -No anemia or thyroid disorder has been detected.  -Normal sleep hygiene habits.  -Keep scheduled follow up with cardiology on 12/15/17.

## 2017-12-14 LAB — ALDOST SERPL-MCNC: 5.5 NG/DL

## 2017-12-15 ENCOUNTER — OFFICE VISIT (OUTPATIENT)
Dept: CARDIOLOGY | Facility: CLINIC | Age: 42
End: 2017-12-15
Payer: COMMERCIAL

## 2017-12-15 VITALS
OXYGEN SATURATION: 98 % | HEART RATE: 95 BPM | HEIGHT: 63 IN | WEIGHT: 123.44 LBS | SYSTOLIC BLOOD PRESSURE: 144 MMHG | BODY MASS INDEX: 21.87 KG/M2 | DIASTOLIC BLOOD PRESSURE: 82 MMHG

## 2017-12-15 DIAGNOSIS — Z72.0 TOBACCO ABUSE: ICD-10-CM

## 2017-12-15 DIAGNOSIS — R00.2 HEART PALPITATIONS: Primary | ICD-10-CM

## 2017-12-15 LAB — RENIN PLAS-CCNC: 2.2 NG/ML/H

## 2017-12-15 PROCEDURE — 99999 PR PBB SHADOW E&M-EST. PATIENT-LVL III: CPT | Mod: PBBFAC,,, | Performed by: NURSE PRACTITIONER

## 2017-12-15 PROCEDURE — 99214 OFFICE O/P EST MOD 30 MIN: CPT | Mod: S$GLB,,, | Performed by: NURSE PRACTITIONER

## 2017-12-15 RX ORDER — METOPROLOL SUCCINATE 25 MG/1
25 TABLET, EXTENDED RELEASE ORAL DAILY
Qty: 30 TABLET | Refills: 11 | Status: SHIPPED | OUTPATIENT
Start: 2017-12-15 | End: 2018-01-03

## 2017-12-15 NOTE — PATIENT INSTRUCTIONS
Try metoprolol tartrate 25mg for palpitations.  Do not take blood pressure every day. Check once a week.  Exercise daily. Patient has been encouraged to exercise a minimum of 30 minutes daily, 5 times per week.

## 2017-12-15 NOTE — PROGRESS NOTES
Ms. Salas is a patient of Dr. Olmstead and was last seen in [facility] 11/21/2017.      Subjective:   Patient ID:  Juanita Salas is a 42 y.o. female who presents for follow-up of No chief complaint on file.  .   HPI:     Ms. Salas is a 41yo with palpitations, anxiety/panic disorder, and former smoker (quit one month ago) here for follow up. She has obtained a blood pressure monitor and is concerned that her pulse has still been above 100s some days. She has been very concerned about her metoprolol lowering her blood pressure so she has not been taking it. She has not been exercising because she has been worried about her heart rate. Ms. Salas denies chest pain with exertion or at rest, SOB, KAMARA, dizziness, syncope, leg edema, claudication, PND, or orthopnea. She remains fatigued with night sweats. TSH normal. She is not anemic. Her cortisol, cortisone, renin, aldosterone, metanephrines have all been normal. ASCVD risk is 0.7%.    Recent Cardiac Tests:    EKG (11/17/2017):  Sinus tachycardia  Right atrial enlargement  Nonspecific ST and T wave abnormality  Abnormal ECG  When compared with ECG of 01-NOV-2017 09:51,  Inverted T waves have replaced nonspecific T wave abnormality in Inferior  leads  Nonspecific T wave abnormality now evident in Anterior-lateral leads    2D Echo (11/1/2017):  CONCLUSIONS     1 - Normal left ventricular systolic function (EF 60-65%).     2 - Normal right ventricular systolic function .     3 - Normal left ventricular diastolic function.     48 Hour Holter Monitor (11/10/2017):  1. Sinus rhythm with heart rates varying between 48 and 147 bpm with an average of 79 bpm.   VENTRICULAR ARRHYTHMIAS  1. There were very rare PVCs recorded totalling 4 and averaging less than 1 per hour.   2. There were no episodes of ventricular tachycardia.  SUPRA VENTRICULAR ARRHYTHMIAS  1. There were very rare PACs recorded totalling 19 and averaging less than 1 per hour.   2. There were no episodes of  sustained supraventricular tachycardia.  SINUS NODE FUNCTION  1. For the 1st 24 hour period, HRs averaging 85 bpm during waking hours and 65 bpm during sleep (11:30 PM - 8:15 AM).  2. For the 2nd 24 hour period, HRs averaging 82 bpm during waking hours and 60 bpm during sleep (10:30 PM - 6:00 AM).  3. There was no evidence of high grade SA rah block.   AV CONDUCTION  1. There was no evidence of high grade AV block.   DIARY  1. The diary was properly completed and there were no symptoms reported .     Current Outpatient Prescriptions   Medication Sig    chlorhexidine (PERIDEX) 0.12 % solution     clonazePAM (KLONOPIN) 0.5 MG tablet Take 0.5 mg by mouth as needed.    esomeprazole (NEXIUM) 40 MG capsule Take 1 capsule (40 mg total) by mouth before breakfast.    TRINESSA LO 0.18/0.215/0.25 mg-25 mcg tablet TK 1 T PO QD    VITAMIN D2 50,000 unit capsule Take 50,000 Units by mouth once a week.     No current facility-administered medications for this visit.      Review of Systems   Constitution: Negative for malaise/fatigue.   Eyes: Negative for blurred vision.   Cardiovascular: Positive for palpitations. Negative for chest pain, claudication, dyspnea on exertion, irregular heartbeat, leg swelling, orthopnea, paroxysmal nocturnal dyspnea and syncope.   Respiratory: Negative for shortness of breath.    Hematologic/Lymphatic: Negative for bleeding problem.   Skin: Negative for rash.   Musculoskeletal: Negative for myalgias.   Gastrointestinal: Negative for abdominal pain, constipation, diarrhea and nausea.   Genitourinary: Negative for hematuria.   Neurological: Negative for headaches, loss of balance and numbness.        Night sweats   Psychiatric/Behavioral: Negative for altered mental status.   Allergic/Immunologic: Negative for persistent infections.     Objective:     Right Arm BP - Sittin/77 (12/15/17 1549)  Left Arm BP - Sittin/82 (12/15/17 1549)    BP (!) 144/82 (BP Location: Left arm, Patient  "Position: Sitting, BP Method: Large (Automatic))   Pulse 95   Ht 5' 3" (1.6 m)   Wt 56 kg (123 lb 7.3 oz)   LMP 11/17/2017   SpO2 98%   BMI 21.87 kg/m²     Physical Exam   Constitutional: She is oriented to person, place, and time. She appears well-developed and well-nourished.   HENT:   Head: Normocephalic.   Nose: Nose normal.   Eyes: Pupils are equal, round, and reactive to light.   Neck: No JVD present. Carotid bruit is not present.   Cardiovascular: Normal rate, regular rhythm, S1 normal and S2 normal.  Exam reveals no gallop.    No murmur heard.  Pulses:       Carotid pulses are 2+ on the right side, and 2+ on the left side.       Radial pulses are 2+ on the right side, and 2+ on the left side.        Dorsalis pedis pulses are 2+ on the right side, and 2+ on the left side.   Pulmonary/Chest: Breath sounds normal. No respiratory distress.   Abdominal: Soft. Bowel sounds are normal. She exhibits no distension. There is no tenderness.   Musculoskeletal: Normal range of motion. She exhibits no edema.   Neurological: She is alert and oriented to person, place, and time.   Skin: Skin is warm and dry. No erythema.   Psychiatric: She has a normal mood and affect. Her speech is normal and behavior is normal.   Nursing note and vitals reviewed.    Lab Results   Component Value Date     12/12/2017    K 3.7 12/12/2017     12/12/2017    CO2 26 12/12/2017    BUN 12 12/12/2017    CREATININE 0.7 12/12/2017    GLU 90 12/12/2017    AST 13 12/12/2017    ALT 9 (L) 12/12/2017    ALBUMIN 3.8 12/12/2017    PROT 7.1 12/12/2017    BILITOT 0.3 12/12/2017    WBC 9.06 11/20/2017    HGB 12.6 11/20/2017    HCT 37.1 11/20/2017    HCT 41 11/17/2017    MCV 89 11/20/2017     11/20/2017    TSH 0.838 11/20/2017    CHOL 212 (H) 10/04/2017    HDL 52 10/04/2017    LDLCALC 129.0 10/04/2017    TRIG 155 (H) 10/04/2017          Test(s) Reviewed  I have reviewed the following in detail:  [] Stress test   [] Angiography   [] " Echocardiogram   [x] Labs   [] Other:         Assessment:         1. Heart palpitations. Patient has some sinus tachycardiac which has been concerning for her. Her cortisol, cortisone, renin, aldosterone, metanephrines have all been normal. Echo normal. Holter shows no arrhythmias. Encouraged patient to exercise regularly and take metoprolol as needed. Reassurance provided.     2. Tobacco abuse. Patient has quit smoking for one month.      Plan:     Diagnoses and all orders for this visit:    Heart palpitations  -     metoprolol succinate (TOPROL-XL) 25 MG 24 hr tablet; Take 1 tablet (25 mg total) by mouth once daily.    Tobacco abuse      Try metoprolol tartrate 25mg for palpitations.  Do not take blood pressure every day. Check once a week.  Exercise daily. Patient has been encouraged to exercise a minimum of 30 minutes daily, 5 times per week.     Return if symptoms worsen or fail to improve.    ------------------------------------------------------------------    ARJUN To, NP-C  Consult Cardiology

## 2017-12-18 ENCOUNTER — OFFICE VISIT (OUTPATIENT)
Dept: INTERNAL MEDICINE | Facility: CLINIC | Age: 42
End: 2017-12-18
Payer: COMMERCIAL

## 2017-12-18 VITALS
BODY MASS INDEX: 22.15 KG/M2 | HEIGHT: 63 IN | WEIGHT: 125 LBS | SYSTOLIC BLOOD PRESSURE: 120 MMHG | HEART RATE: 77 BPM | TEMPERATURE: 98 F | OXYGEN SATURATION: 97 % | DIASTOLIC BLOOD PRESSURE: 72 MMHG

## 2017-12-18 DIAGNOSIS — L29.9 ITCHING: Primary | ICD-10-CM

## 2017-12-18 PROCEDURE — 99999 PR PBB SHADOW E&M-EST. PATIENT-LVL IV: CPT | Mod: PBBFAC,,, | Performed by: NURSE PRACTITIONER

## 2017-12-18 PROCEDURE — 99213 OFFICE O/P EST LOW 20 MIN: CPT | Mod: S$GLB,,, | Performed by: NURSE PRACTITIONER

## 2017-12-18 RX ORDER — HYDROXYZINE PAMOATE 25 MG/1
25 CAPSULE ORAL
Qty: 30 CAPSULE | Refills: 0 | Status: SHIPPED | OUTPATIENT
Start: 2017-12-18 | End: 2018-01-03

## 2017-12-19 ENCOUNTER — TELEPHONE (OUTPATIENT)
Dept: INTERNAL MEDICINE | Facility: CLINIC | Age: 42
End: 2017-12-19

## 2017-12-19 NOTE — TELEPHONE ENCOUNTER
----- Message from Lissette Engle sent at 12/19/2017  3:47 PM CST -----  Contact: Patient [phone]  Patient states that she now has a rash and wants to know what to do.    Please call and advise.    Thank You

## 2017-12-19 NOTE — PROGRESS NOTES
Subjective:       Patient ID: Juanita Salas is a 42 y.o. female.    Chief Complaint: Itching    Ms Salas presents today for itching.  She has been itchy for the past 5 days. She has no new skin, cleaning, or beauty products, no new medications and no new foods in her diet.       Review of Systems   Constitutional: Positive for fatigue. Negative for fever.   HENT: Negative for facial swelling.    Eyes: Negative for visual disturbance.   Respiratory: Negative for shortness of breath.    Cardiovascular: Negative for chest pain.   Gastrointestinal: Negative for diarrhea, nausea and vomiting.   Genitourinary: Negative for dysuria.   Musculoskeletal: Negative for gait problem.   Skin: Negative for rash.   Neurological: Negative for headaches.   Psychiatric/Behavioral: Negative for confusion.       Objective:      Physical Exam   Constitutional: She is oriented to person, place, and time. She appears well-developed and well-nourished. No distress.   HENT:   Head: Atraumatic.   Eyes: No scleral icterus.   Neck: Normal range of motion. Neck supple.   Cardiovascular: Normal rate, regular rhythm and normal heart sounds.    Pulmonary/Chest: Effort normal and breath sounds normal. No respiratory distress. She has no wheezes. She has no rales.   Lymphadenopathy:     She has no cervical adenopathy.   Neurological: She is alert and oriented to person, place, and time.   Skin: Skin is warm and dry. No rash noted. She is not diaphoretic. No erythema. No pallor.   Psychiatric: Her mood appears anxious.   Nursing note and vitals reviewed.      Assessment:       1. Itching        Plan:   1. Itching  - hydrOXYzine pamoate (VISTARIL) 25 MG Cap; Take 1 capsule (25 mg total) by mouth every 6 to 8 hours as needed.  Dispense: 30 capsule; Refill: 0      Pt has been given instructions populated from [employer] database and has verbalized understanding of the after visit summary and information contained wherein.    Follow up with a primary care  provider. May go to ER for acute shortness of breath, lightheadedness, fever, or any other emergent complaints or changes in condition.

## 2017-12-21 ENCOUNTER — OFFICE VISIT (OUTPATIENT)
Dept: INTERNAL MEDICINE | Facility: CLINIC | Age: 42
End: 2017-12-21
Payer: COMMERCIAL

## 2017-12-21 ENCOUNTER — CLINICAL SUPPORT (OUTPATIENT)
Dept: SMOKING CESSATION | Facility: CLINIC | Age: 42
End: 2017-12-21
Payer: COMMERCIAL

## 2017-12-21 VITALS
DIASTOLIC BLOOD PRESSURE: 62 MMHG | OXYGEN SATURATION: 99 % | BODY MASS INDEX: 21.95 KG/M2 | HEART RATE: 87 BPM | TEMPERATURE: 97 F | SYSTOLIC BLOOD PRESSURE: 118 MMHG | WEIGHT: 123.88 LBS

## 2017-12-21 DIAGNOSIS — R21 RASH: Primary | ICD-10-CM

## 2017-12-21 DIAGNOSIS — F17.210 NICOTINE DEPENDENCE, CIGARETTES, UNCOMPLICATED: Primary | ICD-10-CM

## 2017-12-21 PROCEDURE — 99999 PR PBB SHADOW E&M-EST. PATIENT-LVL III: CPT | Mod: PBBFAC,,, | Performed by: NURSE PRACTITIONER

## 2017-12-21 PROCEDURE — 99406 BEHAV CHNG SMOKING 3-10 MIN: CPT | Mod: S$GLB,,,

## 2017-12-21 PROCEDURE — 99213 OFFICE O/P EST LOW 20 MIN: CPT | Mod: S$GLB,,, | Performed by: NURSE PRACTITIONER

## 2017-12-21 RX ORDER — NYSTATIN AND TRIAMCINOLONE ACETONIDE 100000; 1 [USP'U]/G; MG/G
CREAM TOPICAL 2 TIMES DAILY
Qty: 15 G | Refills: 1 | Status: SHIPPED | OUTPATIENT
Start: 2017-12-21 | End: 2018-01-27

## 2017-12-22 NOTE — PROGRESS NOTES
Subjective:       Patient ID: Juanita Salas is a 42 y.o. female.    Chief Complaint: Rash    HPI:  41 yo female that presents to clinic today for rash to buttocks x 2 days.    Was seen on Monday 12/18/17 for itching but did not have rash then.  Was prescribed vistaril for itching and gold bond body lotion.  States that she hasn't been using the lotion or the vistaril but has been using benadryl at night to help with itching.  States some relief with benadryl.    She has no new skin, cleaning, or beauty products, no and no new foods in her diet.  States that no one else in the family is having problems with itching or rash.    States that she is still having night sweats.  Is showering and using unscented soaps.      Review of Systems   Constitutional: Positive for diaphoresis and fatigue. Negative for activity change, fever and unexpected weight change.   Respiratory: Negative for apnea, cough, chest tightness, shortness of breath and wheezing.    Cardiovascular: Negative for chest pain, palpitations and leg swelling.   Gastrointestinal: Negative for abdominal pain, constipation, diarrhea, nausea and vomiting.   Musculoskeletal: Negative for arthralgias.   Skin: Positive for rash (admits rash to buttocks). Negative for wound.   Neurological: Negative for weakness, light-headedness and headaches.   Psychiatric/Behavioral: The patient is nervous/anxious.        Objective:      Physical Exam   Constitutional: She appears well-developed and well-nourished. No distress.   HENT:   Head: Normocephalic and atraumatic.   Cardiovascular: Normal rate, regular rhythm, normal heart sounds and intact distal pulses.    No murmur heard.  Pulmonary/Chest: Effort normal and breath sounds normal. No respiratory distress.   Skin: Skin is warm and dry. Rash noted. Rash is maculopapular.        Erythematous maculopapular rash to gluteal folds.  No pustules or drainage noted       Assessment:       1. Rash        Plan:         1. Rash    -Maybe some type of fungal rash vs contact dermatitis.  -Will prescribe nystatin-triamcinolone cream bid x 10 days to see if this helps resolve rash.  -Encouraged to continue bathing with unscented soaps and lotions.  -Can take benadryl as needed for itching.  -Encouraged to call office if rash spreads or gets worse.

## 2017-12-27 ENCOUNTER — PATIENT MESSAGE (OUTPATIENT)
Dept: INTERNAL MEDICINE | Facility: CLINIC | Age: 42
End: 2017-12-27

## 2017-12-27 ENCOUNTER — TELEPHONE (OUTPATIENT)
Dept: ENDOCRINOLOGY | Facility: CLINIC | Age: 42
End: 2017-12-27

## 2017-12-27 NOTE — TELEPHONE ENCOUNTER
----- Message from Cori Pozo sent at 12/4/2017 12:18 PM CST -----  Contact: self  Pt is calling in regards to scheduling an appt. Pt has been having night sweats, fast heart rate, anxiety//believe she may have pheochromocytoma..it runs in her family. The first available appt is 02/26/18. Per pt would like a sooner appt.    Pt can be reached at [phone].    Thank you

## 2017-12-28 ENCOUNTER — PATIENT MESSAGE (OUTPATIENT)
Dept: INTERNAL MEDICINE | Facility: CLINIC | Age: 42
End: 2017-12-28

## 2017-12-28 DIAGNOSIS — E78.1 HYPERTRIGLYCERIDEMIA: Primary | ICD-10-CM

## 2017-12-28 DIAGNOSIS — E55.9 VITAMIN D DEFICIENCY: Primary | ICD-10-CM

## 2018-01-03 ENCOUNTER — HOSPITAL ENCOUNTER (OUTPATIENT)
Dept: UROLOGY | Facility: HOSPITAL | Age: 43
Discharge: HOME | End: 2018-01-03
Attending: UROLOGY
Payer: COMMERCIAL

## 2018-01-03 VITALS
SYSTOLIC BLOOD PRESSURE: 140 MMHG | HEIGHT: 63 IN | HEART RATE: 82 BPM | TEMPERATURE: 98 F | DIASTOLIC BLOOD PRESSURE: 83 MMHG | BODY MASS INDEX: 22.66 KG/M2 | WEIGHT: 127.88 LBS | RESPIRATION RATE: 18 BRPM

## 2018-01-03 DIAGNOSIS — R31.29 HEMATURIA, MICROSCOPIC: ICD-10-CM

## 2018-01-03 PROCEDURE — 52000 CYSTOURETHROSCOPY: CPT | Mod: ,,, | Performed by: UROLOGY

## 2018-01-03 PROCEDURE — 52000 CYSTOURETHROSCOPY: CPT

## 2018-01-03 RX ORDER — LIDOCAINE HYDROCHLORIDE 20 MG/ML
JELLY TOPICAL
Status: COMPLETED | OUTPATIENT
Start: 2018-01-03 | End: 2018-01-03

## 2018-01-03 RX ADMIN — LIDOCAINE HYDROCHLORIDE: 20 JELLY TOPICAL at 12:01

## 2018-01-03 NOTE — PATIENT INSTRUCTIONS
What to Expect After a Cystoscopy  For the next 24-48 hours, you may feel a mild burning when you urinate. This burning is normal and expected. Drink plenty of water to dilute the urine to help relieve the burning sensation. You may also see a small amount of blood in your urine after the procedure.    Unless you are already taking antibiotics, you may be given an antibiotic after the test to prevent infection.    Signs and Symptoms to Report  Call the [facility] at [phone] if you develop any of the following:  · Fever of 101 degrees or higher  · Chills or persistent bleeding  · Inability to urinate

## 2018-01-04 NOTE — OP NOTE
DATE OF PROCEDURE:  01/03/2018.    PREOPERATIVE DIAGNOSIS:  Microscopic hematuria.    POSTOPERATIVE DIAGNOSIS:  Microscopic hematuria.    OPERATION PERFORMED:  Flexible cystoscopy.    PROCEDURE IN DETAIL:  The patient had microscopic hematuria.  Upper tracts were   normal by CT scan with and without contrast.  The patient was prepped and draped   in dorsal lithotomy position.  Xylocaine jelly was instilled per urethra.  The   urethra was normal and no diverticula were noted.  Bladder neck was normal.    Both ureteral orifices were normal in size, shape, and position with clear   efflux.  There were no stones, tumors, foreign bodies, or active infections   noted.    IMPRESSION:  Normal cystoscopy.    RECOMMENDATIONS:  Return to clinic in six months for followup for hematuria.      MADHAVI  dd: 01/03/2018 12:36:47 (CST)  td: 01/03/2018 18:00:50 (CST)  Doc ID   #[id number]  Job ID #[id number]    CC:

## 2018-01-08 ENCOUNTER — TELEPHONE (OUTPATIENT)
Dept: CARDIOLOGY | Facility: CLINIC | Age: 43
End: 2018-01-08

## 2018-01-08 ENCOUNTER — PATIENT MESSAGE (OUTPATIENT)
Dept: CARDIOLOGY | Facility: CLINIC | Age: 43
End: 2018-01-08

## 2018-01-08 NOTE — TELEPHONE ENCOUNTER
Delmy Wong NP,          LOV:12/15/17.I noticed that the pt had sent you an e-mail msg and you asked her if she was taking Metoprolol.The pt called so I did ask her and she said she was not taking the Metoprolol b/c she said most days her b/p has been 110-120's/70-80's and her heart rate has been b/w 70-80's.She was afraid to take it.She said the palpitations came on her suddenly while cleaning he house.Please advise.Thanks,Salina

## 2018-01-08 NOTE — TELEPHONE ENCOUNTER
----- Message from Kayley Prieto sent at 1/8/2018  2:59 PM CST -----  Contact: PT  Pls call pt at [phone].  She has been having Palpitations and wants to be seen by someone at a late appt, around 5pm.  She is a pt of Delmy Wong and last saw her on 12/15/17. Pls advise pt as to what she should do.    Thank you

## 2018-01-09 ENCOUNTER — OFFICE VISIT (OUTPATIENT)
Dept: INTERNAL MEDICINE | Facility: CLINIC | Age: 43
End: 2018-01-09
Payer: COMMERCIAL

## 2018-01-09 VITALS
WEIGHT: 126.13 LBS | HEART RATE: 89 BPM | DIASTOLIC BLOOD PRESSURE: 78 MMHG | TEMPERATURE: 98 F | HEIGHT: 63 IN | SYSTOLIC BLOOD PRESSURE: 130 MMHG | BODY MASS INDEX: 22.35 KG/M2 | OXYGEN SATURATION: 99 %

## 2018-01-09 DIAGNOSIS — R53.83 FATIGUE, UNSPECIFIED TYPE: ICD-10-CM

## 2018-01-09 DIAGNOSIS — R61 NIGHT SWEATS: Primary | ICD-10-CM

## 2018-01-09 DIAGNOSIS — E55.9 VITAMIN D DEFICIENCY: ICD-10-CM

## 2018-01-09 DIAGNOSIS — F41.9 ANXIETY: ICD-10-CM

## 2018-01-09 DIAGNOSIS — G43.009 MIGRAINE WITHOUT AURA AND WITHOUT STATUS MIGRAINOSUS, NOT INTRACTABLE: ICD-10-CM

## 2018-01-09 PROCEDURE — 99213 OFFICE O/P EST LOW 20 MIN: CPT | Mod: S$GLB,,, | Performed by: NURSE PRACTITIONER

## 2018-01-09 PROCEDURE — 99999 PR PBB SHADOW E&M-EST. PATIENT-LVL IV: CPT | Mod: PBBFAC,,, | Performed by: NURSE PRACTITIONER

## 2018-01-09 RX ORDER — METOPROLOL SUCCINATE 25 MG/1
TABLET, EXTENDED RELEASE ORAL
Qty: 30 TABLET | Refills: 11
Start: 2018-01-09 | End: 2018-01-27

## 2018-01-09 NOTE — PROGRESS NOTES
INTERNAL MEDICINE PROGRESS/URGENT CARE NOTE    CHIEF COMPLAINT     Chief Complaint   Patient presents with    Night Sweats     x 5months    Fatigue     off and on x1 year       HPI     Juanita Salas is a 42 y.o. female with anxiety, panic disorder, migraines, tachycardia, fatigue who presents for an urgent visit today.  Here with c/o night sweats x 5 months and fatigue one and off x 1 year. Attributes to hormones.     Night sweats- wakes up with sweats at night and is not sleeping restfully. No hot flashes.   Was seen by [facility] for this. metanephrine normal. HIV normal. Needs TB gold.   FSH 24.3 and LH 13.3 and estradiol 34 from gyn   Takes ocp.   Has apt with new gyn Dr. Flores on Monday 1/15/2018    Fatigue- trying to exercise, using fitbit.     Migraines- headache are more frequent and worse during menstrual cycle.     Palpitations- not taking regularly. Reports less palpitations. Uses metoprolol as needed.     Quit smoking.     Past Medical History:  Past Medical History:   Diagnosis Date    Adjustment disorder     Anxiety     Depression     Fatigue     Headache     Hx of psychiatric care     Hypertension     Low vitamin D level     Panic disorder     Psychiatric problem        Home Medications:  Prior to Admission medications    Medication Sig Start Date End Date Taking? Authorizing Provider   chlorhexidine (PERIDEX) 0.12 % solution  12/9/17   Historical Provider, MD   clonazePAM (KLONOPIN) 0.5 MG tablet Take 0.5 mg by mouth as needed. 8/2/17   Historical Provider, MD   metoprolol succinate (TOPROL-XL) 25 MG 24 hr tablet Take 1 tablet daily by mouth as needed for palpitations. 1/9/18   Delmy Wong NP   nystatin-triamcinolone (MYCOLOG II) cream Apply topically 2 (two) times daily. 12/21/17 12/31/17  Calderon Siddiqi NP   TRINESSA LO 0.18/0.215/0.25 mg-25 mcg tablet TK 1 T PO QD 8/1/17   Historical Provider, MD   VITAMIN D2 50,000 unit capsule Take 50,000 Units by mouth once a week. 8/4/17  "  Historical Provider, MD       Review of Systems:  Review of Systems   Constitutional: Positive for activity change and fatigue. Negative for unexpected weight change.   HENT: Negative for hearing loss, rhinorrhea and trouble swallowing.    Eyes: Negative for discharge and visual disturbance.   Respiratory: Negative for chest tightness and wheezing.    Cardiovascular: Positive for palpitations. Negative for chest pain.   Gastrointestinal: Negative for blood in stool, constipation, diarrhea and vomiting.   Endocrine: Negative for polydipsia and polyuria.   Genitourinary: Positive for menstrual problem. Negative for difficulty urinating, dysuria and hematuria.   Musculoskeletal: Negative for arthralgias, joint swelling and neck pain.   Neurological: Positive for weakness and headaches. Negative for dizziness and light-headedness.   Psychiatric/Behavioral: Negative for confusion and dysphoric mood.       Health Maintainence:   Immunizations:  Health Maintenance       Date Due Completion Date    TETANUS VACCINE 06/14/1993 ---    Pap Smear with HPV Cotest 06/14/1996 ---    Mammogram 06/14/2015 ---    Influenza Vaccine 08/01/2017 ---           PHYSICAL EXAM     /78 (BP Location: Right arm, Patient Position: Sitting, BP Method: Large (Manual))   Pulse 89   Temp 98.2 °F (36.8 °C) (Oral)   Ht 5' 3" (1.6 m)   Wt 57.2 kg (126 lb 1.7 oz)   LMP 01/09/2018 (Exact Date)   SpO2 99%   BMI 22.34 kg/m²     Physical Exam   Constitutional: She is oriented to person, place, and time. She appears well-developed and well-nourished.   HENT:   Head: Normocephalic and atraumatic.   Eyes: Pupils are equal, round, and reactive to light.   Cardiovascular: Normal rate and regular rhythm.    Pulmonary/Chest: Effort normal.   Neurological: She is alert and oriented to person, place, and time.   Psychiatric: She has a normal mood and affect.       LABS     No results found for: LABA1C, HGBA1C  CMP  Sodium   Date Value Ref Range Status "   12/12/2017 139 136 - 145 mmol/L Final     Potassium   Date Value Ref Range Status   12/12/2017 3.7 3.5 - 5.1 mmol/L Final     Chloride   Date Value Ref Range Status   12/12/2017 105 95 - 110 mmol/L Final     CO2   Date Value Ref Range Status   12/12/2017 26 23 - 29 mmol/L Final     Glucose   Date Value Ref Range Status   12/12/2017 90 70 - 110 mg/dL Final     BUN, Bld   Date Value Ref Range Status   12/12/2017 12 6 - 20 mg/dL Final     Creatinine   Date Value Ref Range Status   12/12/2017 0.7 0.5 - 1.4 mg/dL Final     Calcium   Date Value Ref Range Status   12/12/2017 9.1 8.7 - 10.5 mg/dL Final     Total Protein   Date Value Ref Range Status   12/12/2017 7.1 6.0 - 8.4 g/dL Final     Albumin   Date Value Ref Range Status   12/12/2017 3.8 3.5 - 5.2 g/dL Final     Total Bilirubin   Date Value Ref Range Status   12/12/2017 0.3 0.1 - 1.0 mg/dL Final     Comment:     For infants and newborns, interpretation of results should be based  on gestational age, weight and in agreement with clinical  observations.  Premature Infant recommended reference ranges:  Up to 24 hours.............<8.0 mg/dL  Up to 48 hours............<12.0 mg/dL  3-5 days..................<15.0 mg/dL  6-29 days.................<15.0 mg/dL       Alkaline Phosphatase   Date Value Ref Range Status   12/12/2017 44 (L) 55 - 135 U/L Final     AST   Date Value Ref Range Status   12/12/2017 13 10 - 40 U/L Final     ALT   Date Value Ref Range Status   12/12/2017 9 (L) 10 - 44 U/L Final     Anion Gap   Date Value Ref Range Status   12/12/2017 8 8 - 16 mmol/L Final     eGFR if    Date Value Ref Range Status   12/12/2017 >60.0 >60 mL/min/1.73 m^2 Final     eGFR if non    Date Value Ref Range Status   12/12/2017 >60.0 >60 mL/min/1.73 m^2 Final     Comment:     Calculation used to obtain the estimated glomerular filtration  rate (eGFR) is the CKD-EPI equation.        Lab Results   Component Value Date    WBC 9.06 11/20/2017    HGB  12.6 11/20/2017    HCT 37.1 11/20/2017    MCV 89 11/20/2017     11/20/2017     Lab Results   Component Value Date    CHOL 212 (H) 10/04/2017    CHOL 177 02/17/2006     Lab Results   Component Value Date    HDL 52 10/04/2017    HDL 48.0 02/17/2006     Lab Results   Component Value Date    LDLCALC 129.0 10/04/2017    LDLCALC 103.2 02/17/2006     Lab Results   Component Value Date    TRIG 155 (H) 10/04/2017    TRIG 129 02/17/2006     Lab Results   Component Value Date    CHOLHDL 24.5 10/04/2017    CHOLHDL 27.1 02/17/2006     Lab Results   Component Value Date    TSH 0.838 11/20/2017       ASSESSMENT/PLAN     Juanita Salas is a 42 y.o. female with  Past Medical History:   Diagnosis Date    Adjustment disorder     Anxiety     Depression     Fatigue     Headache     Hx of psychiatric care     Hypertension     Low vitamin D level     Panic disorder     Psychiatric problem      Night sweats- will repeat cbc and cmp. Tb gold ordered. Will f/u with gyn for possible menopausal correlation. Although initial hormone testing stable.   -     CBC auto differential; Future; Expected date: 01/09/2018  -     Basic metabolic panel; Future; Expected date: 01/09/2018    Fatigue, unspecified type- ?part of perimenopausal syndrome.   -     CBC auto differential; Future; Expected date: 01/09/2018  -     Basic metabolic panel; Future; Expected date: 01/09/2018    Anxiety- poorly controlled.     Migraine without aura and without status migrainosus, not intractable- poorly controlled. May use nsaids or fioricet as needed     Vitamin D deficiency- will recheck vit d level           Follow up as needed and for annaul exam     Patient education provided from [facility]. Patient was counseled on when and how to seek emergent care.       Erika CARL, APRN, FNP-c   Department of Internal Medicine - [facility]  4:05 PM

## 2018-01-09 NOTE — TELEPHONE ENCOUNTER
Pt instructed as ordered by Delmy Wong NP.She said she had gone to the Pre-op appt and told them she was taking the metoprolol so they removed it from her list.She asked that we place it back on her med card.Pt reports understanding of these instructions.

## 2018-01-10 ENCOUNTER — PATIENT MESSAGE (OUTPATIENT)
Dept: INTERNAL MEDICINE | Facility: CLINIC | Age: 43
End: 2018-01-10

## 2018-01-10 ENCOUNTER — LAB VISIT (OUTPATIENT)
Dept: LAB | Facility: HOSPITAL | Age: 43
End: 2018-01-10
Payer: COMMERCIAL

## 2018-01-10 DIAGNOSIS — R61 NIGHT SWEATS: ICD-10-CM

## 2018-01-10 PROCEDURE — 86480 TB TEST CELL IMMUN MEASURE: CPT

## 2018-01-10 PROCEDURE — 36415 COLL VENOUS BLD VENIPUNCTURE: CPT

## 2018-01-10 RX ORDER — ERGOCALCIFEROL 1.25 MG/1
50000 CAPSULE ORAL WEEKLY
Qty: 8 CAPSULE | Refills: 0 | Status: SHIPPED | OUTPATIENT
Start: 2018-01-10 | End: 2018-05-07 | Stop reason: ALTCHOICE

## 2018-01-11 ENCOUNTER — PATIENT MESSAGE (OUTPATIENT)
Dept: INTERNAL MEDICINE | Facility: CLINIC | Age: 43
End: 2018-01-11

## 2018-01-15 ENCOUNTER — OFFICE VISIT (OUTPATIENT)
Dept: OBSTETRICS AND GYNECOLOGY | Facility: CLINIC | Age: 43
End: 2018-01-15
Attending: OBSTETRICS & GYNECOLOGY
Payer: COMMERCIAL

## 2018-01-15 VITALS
DIASTOLIC BLOOD PRESSURE: 80 MMHG | WEIGHT: 124.56 LBS | SYSTOLIC BLOOD PRESSURE: 102 MMHG | BODY MASS INDEX: 22.07 KG/M2 | HEIGHT: 63 IN

## 2018-01-15 DIAGNOSIS — Z30.41 ORAL CONTRACEPTIVE USE: ICD-10-CM

## 2018-01-15 DIAGNOSIS — N95.1 PERIMENOPAUSAL: ICD-10-CM

## 2018-01-15 DIAGNOSIS — N95.1 MENOPAUSAL SYMPTOMS: Primary | ICD-10-CM

## 2018-01-15 LAB
MITOGEN NIL: 8.16 IU/ML
NIL: 0.02 IU/ML
TB ANTIGEN NIL: 0.03 IU/ML
TB ANTIGEN: 0.05 IU/ML
TB GOLD: NEGATIVE

## 2018-01-15 PROCEDURE — 99999 PR PBB SHADOW E&M-EST. PATIENT-LVL III: CPT | Mod: PBBFAC,,, | Performed by: OBSTETRICS & GYNECOLOGY

## 2018-01-15 PROCEDURE — 99203 OFFICE O/P NEW LOW 30 MIN: CPT | Mod: S$GLB,,, | Performed by: OBSTETRICS & GYNECOLOGY

## 2018-01-18 ENCOUNTER — OFFICE VISIT (OUTPATIENT)
Dept: FAMILY MEDICINE | Facility: CLINIC | Age: 43
End: 2018-01-18
Payer: COMMERCIAL

## 2018-01-18 ENCOUNTER — TELEPHONE (OUTPATIENT)
Dept: FAMILY MEDICINE | Facility: CLINIC | Age: 43
End: 2018-01-18

## 2018-01-18 VITALS
DIASTOLIC BLOOD PRESSURE: 82 MMHG | HEART RATE: 97 BPM | SYSTOLIC BLOOD PRESSURE: 114 MMHG | OXYGEN SATURATION: 99 % | TEMPERATURE: 98 F | HEIGHT: 63 IN | BODY MASS INDEX: 22.25 KG/M2 | WEIGHT: 125.56 LBS

## 2018-01-18 DIAGNOSIS — S86.811A STRAIN OF CALF MUSCLE, RIGHT, INITIAL ENCOUNTER: Primary | ICD-10-CM

## 2018-01-18 DIAGNOSIS — M79.661 RIGHT CALF PAIN: Primary | ICD-10-CM

## 2018-01-18 DIAGNOSIS — E65 FAT PAD: ICD-10-CM

## 2018-01-18 PROCEDURE — 99999 PR PBB SHADOW E&M-EST. PATIENT-LVL III: CPT | Mod: PBBFAC,,, | Performed by: FAMILY MEDICINE

## 2018-01-18 PROCEDURE — 99214 OFFICE O/P EST MOD 30 MIN: CPT | Mod: S$GLB,,, | Performed by: FAMILY MEDICINE

## 2018-01-18 RX ORDER — NAPROXEN 500 MG/1
500 TABLET ORAL 2 TIMES DAILY WITH MEALS
Qty: 30 TABLET | Refills: 0 | Status: SHIPPED | OUTPATIENT
Start: 2018-01-18 | End: 2018-01-27

## 2018-01-18 RX ORDER — ALPRAZOLAM 0.5 MG/1
0.5 TABLET ORAL 2 TIMES DAILY PRN
COMMUNITY
Start: 2018-01-15 | End: 2018-01-30 | Stop reason: ALTCHOICE

## 2018-01-18 RX ORDER — FLUOXETINE HYDROCHLORIDE 20 MG/1
CAPSULE ORAL
COMMUNITY
Start: 2018-01-16 | End: 2018-01-27

## 2018-01-19 ENCOUNTER — HOSPITAL ENCOUNTER (OUTPATIENT)
Dept: RADIOLOGY | Facility: HOSPITAL | Age: 43
Discharge: HOME | End: 2018-01-19
Attending: FAMILY MEDICINE
Payer: COMMERCIAL

## 2018-01-19 ENCOUNTER — PATIENT MESSAGE (OUTPATIENT)
Dept: FAMILY MEDICINE | Facility: CLINIC | Age: 43
End: 2018-01-19

## 2018-01-19 DIAGNOSIS — M79.661 RIGHT CALF PAIN: ICD-10-CM

## 2018-01-19 PROCEDURE — 93971 EXTREMITY STUDY: CPT | Mod: 26,,, | Performed by: RADIOLOGY

## 2018-01-19 PROCEDURE — 93971 EXTREMITY STUDY: CPT | Mod: TC

## 2018-01-19 NOTE — TELEPHONE ENCOUNTER
Given h/o smoking and OCP use, will obtain RLE US to r/o DVT - advised pt that she will get a call for scheduling.  Patient voices understanding and appreciates the call. Patient has no other questions at this time.      Right calf pain  -     US Lower Extremity Veins Right; Future; Expected date: 01/18/2018

## 2018-01-19 NOTE — PROGRESS NOTES
Office Visit    Patient Name: Juanita Salas    : 1975  MRN: [id number]      Assessment/Plan:  Juanita Salas is a 42 y.o. female who presents today for :    Strain of calf muscle, right, initial encounter  -     naproxen (NAPROSYN) 500 MG tablet; Take 1 tablet (500 mg total) by mouth 2 (two) times daily with meals.  Dispense: 30 tablet; Refill: 0    -activity modification d/w patient: avoid prolonged standing, avoid heavy lifting and provocative movements, advised icing and elevation  -advised patient to wear comfortable tennis shoes and avoid flat shoes given her high arch  -ankle and calf stretching and strengthening exercises demonstrated in clinic and handouts given to patient  -RTC in 4-6 weeks if not better, or sooner if pain worsens.    -patient advised to use OTC Tylenol (325mg tablets) once every 4-6 hours as needed for pain - avoid regular use of NSAIDs due to potential GI/BP side effects (may take a short-course and then stop).     Fat pad  -reassurance  -monitor      Follow-up for worsening Sx. Urgent care/ED precautions provided.     This note was created by combination of typed  and Dragon dictation.  Transcription errors may be present.  If there are any questions, please contact me.        ----------------------------------------------------------------------------------------------------------------------      HPI:  Juanita is a 42 y.o. female who presents today for:    foot bump        This patient has multiple medical diagnoses as noted below.    This patient is new to me   Patient is here today for foot bump  that she has had for the past week over the middle of the top of her right foot. Unsure if she had bumped her foot somewhere - slightly bluish in color. She also noticed mild right calf pain associated with walking and flex/extending her right ankle, better at rest - no calf swelling or redness. She wears a flat shoe to work daily. Denies recent trauma to area.      Additional  ROS    No F/C/wt changes/fatigue  No dysphagia/sore throat/rhinorrhea  No CP/SOB/palpitations/swelling  No cough/wheezing/SOB  No nausea/vomiting/abd pain  No rashes  No weakness/HA/tingling/numbness      Patient Active Problem List   Diagnosis    Vitamin D deficiency    Anxiety    Tobacco abuse    Dizzy spells    Migraine without aura and without status migrainosus, not intractable    Hepatomegaly    LUPE (generalized anxiety disorder)    Panic disorder    Depression    Heart palpitations       Current Medications  Medications reviewed and updated.       Current Outpatient Prescriptions:     ALPRAZolam (XANAX) 0.5 MG tablet, , Disp: , Rfl:     chlorhexidine (PERIDEX) 0.12 % solution, , Disp: , Rfl:     FLUoxetine (PROZAC) 20 MG capsule, , Disp: , Rfl:     metoprolol succinate (TOPROL-XL) 25 MG 24 hr tablet, Take 1 tablet daily by mouth as needed for palpitations., Disp: 30 tablet, Rfl: 11    TRINESSA LO 0.18/0.215/0.25 mg-25 mcg tablet, TK 1 T PO QD, Disp: , Rfl: 2    VITAMIN D2 50,000 unit capsule, Take 1 capsule (50,000 Units total) by mouth once a week., Disp: 8 capsule, Rfl: 0    clonazePAM (KLONOPIN) 0.5 MG tablet, Take 0.5 mg by mouth as needed., Disp: , Rfl:     naproxen (NAPROSYN) 500 MG tablet, Take 1 tablet (500 mg total) by mouth 2 (two) times daily with meals., Disp: 30 tablet, Rfl: 0    nystatin-triamcinolone (MYCOLOG II) cream, Apply topically 2 (two) times daily., Disp: 15 g, Rfl: 1    Past Surgical History:   Procedure Laterality Date     SECTION      DILATION AND CURETTAGE OF UTERUS         Family History   Problem Relation Age of Onset    Cancer Mother 47     pancreatic cancer     Diabetes Mother     Multiple sclerosis Sister     Hypertension Maternal Aunt     Hyperlipidemia Maternal Aunt     Hyperlipidemia Maternal Uncle     Hypertension Maternal Uncle     Hyperlipidemia Paternal Aunt     Hypertension Paternal Aunt     Hyperlipidemia Paternal Uncle      "Hypertension Paternal Uncle     Stroke Maternal Grandmother     Psoriasis Maternal Grandmother     Heart disease Maternal Grandfather     Hyperlipidemia Maternal Grandfather     Hypertension Maternal Grandfather     Colon cancer Neg Hx     Esophageal cancer Neg Hx     Stomach cancer Neg Hx     Rectal cancer Neg Hx     Ulcerative colitis Neg Hx     Irritable bowel syndrome Neg Hx     Crohn's disease Neg Hx     Celiac disease Neg Hx        Social History     Social History    Marital status:      Spouse name: N/A    Number of children: N/A    Years of education: N/A     Occupational History    Not on file.     Social History Main Topics    Smoking status: Former Smoker     Packs/day: 0.50     Years: 2.00     Quit date: 11/17/2017    Smokeless tobacco: Never Used    Alcohol use No    Drug use: No    Sexual activity: Yes     Partners: Male     Other Topics Concern    Not on file     Social History Narrative     with son (23) and daugther (14)           Allergies   Review of patient's allergies indicates:  No Known Allergies          Review of Systems  See HPI      Physical Exam  /82   Pulse 97   Temp 97.7 °F (36.5 °C) (Oral)   Ht 5' 3" (1.6 m)   Wt 56.9 kg (125 lb 8.8 oz)   LMP 01/09/2018 (Exact Date)   SpO2 99%   BMI 22.24 kg/m²     GEN: NAD, well developed, pleasant, well nourished  HEENT: NCAT, PERRLA, EOMI, sclera clear, anicteric, O/P clear, MMM with no lesions  NECK: normal, supple with midline trachea, no LAD, no thyromegaly  LUNGS: CTAB, no w/r/r, no increased work of breathing   HEART: RRR, normal S1 and S2, no m/r/g, no edema  ABD: s/nt/nd, NABS  SKIN: normal turgor, no rashes  PSYCH: AOx3, appropriate mood and affect  MSK: warm/well perfused, normal ROM in all 4 extremities, no c/c/e.  R FOOT: Ankle with no TTP/swelling, has good ROM. Has a small 1cm soft non-TTP are over the mid-dorsal aspect of foot with soft consistency - no swelling/redness/TTP.  Pt has " a high arch on plantar exam. Right calf with no palpable chord, no swelling/erythema. Increased pain with plantar flexion, NEG Sugey's sign.    .

## 2018-01-22 ENCOUNTER — TELEPHONE (OUTPATIENT)
Dept: CARDIOLOGY | Facility: CLINIC | Age: 43
End: 2018-01-22

## 2018-01-22 NOTE — TELEPHONE ENCOUNTER
----- Message from Mariel Dorantes MA sent at 1/22/2018 12:55 PM CST -----  Contact: patient called  Azul please call the patient at [phone]  she need to talk to you about her palpitations.  Last visit was on 12/15/2017 Delmy Wong. Thank you.  
Delmy Wong NP,        LOV:12/15/17.The pt said that she has not had as many palpitations but said she has felt anxious.She was started on Prozac and said that she noticed that her b/p got as high as 150/89.She took it 10 times and said that it did come down eventually.She wanted to know if you thought that the Prozac could be elevating her b/p.She also wanted to ask if she needed to have any test to check her heart.Please advise.Thanks,Salina   
Pt instructed as ordered by Delmy Wong NP.Pt reports understanding of these instructions.She will continue to monitor her b/p, max 3 times daily and record,if consistently >140 she will notify the office.  
Attending

## 2018-01-23 ENCOUNTER — HOSPITAL ENCOUNTER (EMERGENCY)
Facility: HOSPITAL | Age: 43
Discharge: HOME | End: 2018-01-23
Attending: EMERGENCY MEDICINE
Payer: COMMERCIAL

## 2018-01-23 ENCOUNTER — TELEPHONE (OUTPATIENT)
Dept: CARDIOLOGY | Facility: CLINIC | Age: 43
End: 2018-01-23

## 2018-01-23 VITALS
HEIGHT: 63 IN | SYSTOLIC BLOOD PRESSURE: 135 MMHG | HEART RATE: 88 BPM | RESPIRATION RATE: 16 BRPM | DIASTOLIC BLOOD PRESSURE: 72 MMHG | BODY MASS INDEX: 21.64 KG/M2 | OXYGEN SATURATION: 97 % | TEMPERATURE: 99 F | WEIGHT: 122.13 LBS

## 2018-01-23 DIAGNOSIS — R53.83 FATIGUE, UNSPECIFIED TYPE: ICD-10-CM

## 2018-01-23 DIAGNOSIS — R11.0 NAUSEA: Primary | ICD-10-CM

## 2018-01-23 DIAGNOSIS — R94.31 PROLONGED QT INTERVAL: Primary | ICD-10-CM

## 2018-01-23 LAB
ALBUMIN SERPL BCP-MCNC: 3.8 G/DL
ALP SERPL-CCNC: 56 U/L
ALT SERPL W/O P-5'-P-CCNC: 19 U/L
ANION GAP SERPL CALC-SCNC: 9 MMOL/L
AST SERPL-CCNC: 17 U/L
B-HCG UR QL: NEGATIVE
BACTERIA #/AREA URNS AUTO: NORMAL /HPF
BASOPHILS # BLD AUTO: 0.02 K/UL
BASOPHILS NFR BLD: 0.2 %
BILIRUB SERPL-MCNC: 0.4 MG/DL
BILIRUB UR QL STRIP: NEGATIVE
BUN SERPL-MCNC: 10 MG/DL
CALCIUM SERPL-MCNC: 9.2 MG/DL
CHLORIDE SERPL-SCNC: 104 MMOL/L
CLARITY UR REFRACT.AUTO: CLEAR
CO2 SERPL-SCNC: 27 MMOL/L
COLOR UR AUTO: ABNORMAL
CREAT SERPL-MCNC: 0.6 MG/DL
CTP QC/QA: YES
DIFFERENTIAL METHOD: ABNORMAL
EOSINOPHIL # BLD AUTO: 0 K/UL
EOSINOPHIL NFR BLD: 0.4 %
ERYTHROCYTE [DISTWIDTH] IN BLOOD BY AUTOMATED COUNT: 11.7 %
EST. GFR  (AFRICAN AMERICAN): >60 ML/MIN/1.73 M^2
EST. GFR  (NON AFRICAN AMERICAN): >60 ML/MIN/1.73 M^2
GLUCOSE SERPL-MCNC: 105 MG/DL
GLUCOSE UR QL STRIP: NEGATIVE
HCT VFR BLD AUTO: 37.5 %
HGB BLD-MCNC: 12.8 G/DL
HGB UR QL STRIP: ABNORMAL
IMM GRANULOCYTES # BLD AUTO: 0.03 K/UL
IMM GRANULOCYTES NFR BLD AUTO: 0.3 %
KETONES UR QL STRIP: ABNORMAL
LEUKOCYTE ESTERASE UR QL STRIP: NEGATIVE
LYMPHOCYTES # BLD AUTO: 1 K/UL
LYMPHOCYTES NFR BLD: 9.5 %
MCH RBC QN AUTO: 30.9 PG
MCHC RBC AUTO-ENTMCNC: 34.1 G/DL
MCV RBC AUTO: 91 FL
MICROSCOPIC COMMENT: NORMAL
MONOCYTES # BLD AUTO: 0.4 K/UL
MONOCYTES NFR BLD: 3.8 %
NEUTROPHILS # BLD AUTO: 8.9 K/UL
NEUTROPHILS NFR BLD: 85.8 %
NITRITE UR QL STRIP: NEGATIVE
NRBC BLD-RTO: 0 /100 WBC
PH UR STRIP: 7 [PH] (ref 5–8)
PLATELET # BLD AUTO: 266 K/UL
PMV BLD AUTO: 10.6 FL
POTASSIUM SERPL-SCNC: 3.5 MMOL/L
PROT SERPL-MCNC: 7.4 G/DL
PROT UR QL STRIP: NEGATIVE
RBC # BLD AUTO: 4.14 M/UL
RBC #/AREA URNS AUTO: 1 /HPF (ref 0–4)
SODIUM SERPL-SCNC: 140 MMOL/L
SP GR UR STRIP: 1 (ref 1–1.03)
SQUAMOUS #/AREA URNS AUTO: 1 /HPF
URN SPEC COLLECT METH UR: ABNORMAL
UROBILINOGEN UR STRIP-ACNC: NEGATIVE EU/DL
WBC # BLD AUTO: 10.32 K/UL
WBC #/AREA URNS AUTO: 1 /HPF (ref 0–5)

## 2018-01-23 PROCEDURE — 96374 THER/PROPH/DIAG INJ IV PUSH: CPT

## 2018-01-23 PROCEDURE — 81001 URINALYSIS AUTO W/SCOPE: CPT

## 2018-01-23 PROCEDURE — 99284 EMERGENCY DEPT VISIT MOD MDM: CPT | Mod: ,,, | Performed by: PHYSICIAN ASSISTANT

## 2018-01-23 PROCEDURE — 80053 COMPREHEN METABOLIC PANEL: CPT

## 2018-01-23 PROCEDURE — 81025 URINE PREGNANCY TEST: CPT | Performed by: PHYSICIAN ASSISTANT

## 2018-01-23 PROCEDURE — 99284 EMERGENCY DEPT VISIT MOD MDM: CPT | Mod: 25

## 2018-01-23 PROCEDURE — 63600175 PHARM REV CODE 636 W HCPCS: Performed by: PHYSICIAN ASSISTANT

## 2018-01-23 PROCEDURE — 96361 HYDRATE IV INFUSION ADD-ON: CPT

## 2018-01-23 PROCEDURE — 93005 ELECTROCARDIOGRAM TRACING: CPT

## 2018-01-23 PROCEDURE — 93010 ELECTROCARDIOGRAM REPORT: CPT | Mod: ,,, | Performed by: INTERNAL MEDICINE

## 2018-01-23 PROCEDURE — 25000003 PHARM REV CODE 250: Performed by: PHYSICIAN ASSISTANT

## 2018-01-23 PROCEDURE — 85025 COMPLETE CBC W/AUTO DIFF WBC: CPT

## 2018-01-23 RX ORDER — ONDANSETRON 2 MG/ML
4 INJECTION INTRAMUSCULAR; INTRAVENOUS
Status: COMPLETED | OUTPATIENT
Start: 2018-01-23 | End: 2018-01-23

## 2018-01-23 RX ORDER — ONDANSETRON 4 MG/1
4 TABLET, FILM COATED ORAL EVERY 8 HOURS PRN
Qty: 12 TABLET | Refills: 0 | Status: SHIPPED | OUTPATIENT
Start: 2018-01-23 | End: 2018-02-21

## 2018-01-23 RX ADMIN — ONDANSETRON 4 MG: 2 INJECTION INTRAMUSCULAR; INTRAVENOUS at 10:01

## 2018-01-23 RX ADMIN — SODIUM CHLORIDE 1000 ML: 0.9 INJECTION, SOLUTION INTRAVENOUS at 10:01

## 2018-01-23 NOTE — TELEPHONE ENCOUNTER
----- Message from Jennifer Pastrana sent at 1/23/2018  3:19 PM CST -----  Contact: pt [phone]  Pt says she spoke to you on yesterday and she would like for you to call her. She states that she went to ER this morning.  LOV 12/15/17 Tyrone Moreno

## 2018-01-23 NOTE — TELEPHONE ENCOUNTER
Pt said that she did schedule an appt after Her visit to the ER this morning.She said that they told her that she had a prolonged QT and so she schedule an appt w/Marvin [facility].Added an EKG b4 the visit b/c the ED recommended she have a repeat EKG.Pt agreed to come in at 1:30 PM.

## 2018-01-23 NOTE — ED TRIAGE NOTES
Presents to ER with nausea without vomiting for 1 year.  Reports that she has been seen at her MD office and here for the same multiple times.    Pt identifiers checked and correct  LOC: The patient is awake, alert, aware of environment with an appropriate affect. Oriented x3, speaking appropriately  APPEARANCE: Pt resting comfortably, in no acute distress, pt is clean and well groomed, clothing properly fastened  SKIN: Skin warm, dry and intact, normal skin turgor, moist mucus membranes  RESPIRATORY: Airway is open and patent, respirations are spontaneous, even and unlabored, normal effort and rate  MUSCULOSKELETAL: No obvious deformities.

## 2018-01-23 NOTE — ED PROVIDER NOTES
Encounter Date: 1/23/2018    SCRIBE #1 NOTE: I, Calderon Ocasio, am scribing for, and in the presence of,  Vaishali Wing. I have scribed the entire note.   SCRIBE #2 NOTE: I, Ester Lockett, am scribing for, and in the presence of,  Dr. Ferrer. I have scribed the following portions of the note - the APC attestation.     History     Chief Complaint   Patient presents with    Nausea     nausea, not eating due to nausea, blood pressure was high yest, seeing drs for a year with same thing     Time patient was seen by the provider: 9:36 AM    The patient is a 42 y.o. female with PMHx of HTN, heart palpitations, and anxiety who presents to the ED with a complaint of overall weakness with recent nausea of initial onset yesterday. Pt reports that she has intermittent appetite changes  for the past year and she describes these changes as eating a lot at times, then rarely at others. Associated symptoms include: night sweats, low energy, dehydration, and chronic weight loss over the past year. Denies any overall pain. She reports palpitations last night associated with blood pressure of 150/80s.  This pressure improved after taking 1/2 of her xanax.  Currently, taking xanax as needed for her anxiety and has quit smoking in November of last year. She was prescribed prozac by her PCP last week and is scheduled to see psychiatry on January 30th for a follow up appointment.    Patient reports that she has been worked up with multiple labs and imaging for these symptoms, but does not know where to go from here.   She denies chest pain, SOB, vomiting, abdominal pain, diarrhea, fever, chills, or additional complaints at this time.           The history is provided by the patient, the spouse and medical records.     Review of patient's allergies indicates:  No Known Allergies  Past Medical History:   Diagnosis Date    Adjustment disorder     Anxiety     Depression     Fatigue     Headache     Hx of psychiatric care      Hypertension     Low vitamin D level     Panic disorder     Psychiatric problem      Past Surgical History:   Procedure Laterality Date     SECTION      DILATION AND CURETTAGE OF UTERUS         Social History   Substance Use Topics    Smoking status: Former Smoker     Packs/day: 0.50     Years: 2.00     Quit date: 2017    Smokeless tobacco: Never Used    Alcohol use No     Review of Systems   Constitutional: Positive for appetite change and fatigue. Negative for fever.        Mild dehydration. Night sweats. Chronic weight loss.    HENT: Negative for sore throat.    Respiratory: Negative for shortness of breath.    Cardiovascular: Negative for chest pain.   Gastrointestinal: Positive for nausea. Negative for abdominal pain and vomiting.   Genitourinary: Negative for dysuria.   Musculoskeletal: Negative for back pain.   Skin: Negative for rash.   Neurological: Positive for weakness (generalized).   Hematological: Does not bruise/bleed easily.   Psychiatric/Behavioral: Negative for confusion, hallucinations, self-injury and suicidal ideas. The patient is nervous/anxious.        Physical Exam     Initial Vitals [18 0755]   BP Pulse Resp Temp SpO2   (!) 152/71 99 18 97.7 °F (36.5 °C) 100 %      MAP       98         Physical Exam    Nursing note and vitals reviewed.  Constitutional: She appears well-developed and well-nourished. She is not diaphoretic. No distress.   HENT:   Head: Normocephalic and atraumatic.   Mouth/Throat: Oropharynx is clear and moist.   Neck: Normal range of motion. Neck supple. No JVD present.   Cardiovascular: Normal rate, regular rhythm, normal heart sounds and intact distal pulses.   Pulmonary/Chest: Breath sounds normal. No respiratory distress. She has no wheezes. She has no rhonchi. She has no rales.   Abdominal: Soft. She exhibits no distension. There is no tenderness.   Musculoskeletal: Normal range of motion. She exhibits no edema.   Lymphadenopathy:     She has  no cervical adenopathy.   Neurological: She is alert and oriented to person, place, and time. She has normal strength. No cranial nerve deficit or sensory deficit.   Skin: Skin is warm and dry.   Psychiatric: Her speech is normal and behavior is normal. Judgment and thought content normal. Her mood appears anxious. Her affect is not inappropriate. Cognition and memory are normal. She expresses no homicidal and no suicidal ideation.         ED Course   Procedures  Labs Reviewed   CBC W/ AUTO DIFFERENTIAL - Abnormal; Notable for the following:        Result Value    Gran # 8.9 (*)     Gran% 85.8 (*)     Lymph% 9.5 (*)     Mono% 3.8 (*)     All other components within normal limits   URINALYSIS, REFLEX TO URINE CULTURE - Abnormal; Notable for the following:     Ketones, UA Trace (*)     Occult Blood UA 2+ (*)     All other components within normal limits    Narrative:     Preferred Collection Type->Urine, Clean Catch   COMPREHENSIVE METABOLIC PANEL   URINALYSIS MICROSCOPIC    Narrative:     Preferred Collection Type->Urine, Clean Catch   POCT URINE PREGNANCY             Medical Decision Making:   History:   Old Medical Records: I decided to obtain old medical records.  Clinical Tests:   Lab Tests: Ordered and Reviewed  Medical Tests: Ordered and Reviewed       APC / Resident Notes:   Patient presents to the ER with chief complaint of generalized weakness, fatigue, decreased appetite ongoing for 1 year.  She reports increased nausea for the last 2 days and has not taken anything for her symptoms.  I reviewed the patient's chart.  She has been evaluated by psychiatry, GI, internal medicine, urology (due to hematuria), neurology, and cardiology (due to SVT and cardiac family history).  She had a normal MRI of the brain, CT of the abdomen, chest x-ray, Holter monitor, stress echo, within the last year.  She also had multiple normal lab evaluations including HIV, TB, TSH.  She has had dehydration, hypokalemia, and SVT in  the past.  I will order labs, EKG,  hydrate, treat with antiemetics, and reassess.  Patient's main concern today is that she might be dehydrated and also not sure who to follow up with other than her psychiatry appointment in 1 week.  I've explained to the patient that her primary care physician has performed a very extensive workup and she should continue to follow-up with her primary care physician for this ongoing issue.  She will still see psych in one week.  If palpitations continue, she may consider repeat evaluation by cards or repeat Holter monitor.  She will continue metoprolol as needed.  I'll prescribe Zofran as needed for nausea. Patient EKG shows NSR with no evidence of acute ischemia. She has prolonged QT to 484.  She recently started prozac so I have advised that she have a repeat EKG and discuss whether to continue this medication with her PCP and psychiatrist during follow up visit in 1 week.    Patient labs are largely WNL.  She has ketones in urine consistent with mild dehydration, but has normal electrolytes and BUN/Cr.  She is stable for discharge with plan for continued outpatient evaluation.  I will prescribe zofran prn nausea.  She is given ER return precautions.   I discussed the care of this patient with my supervising MD.       I, Vaishali Wing, personally performed the services described in this documentation. All medical record entries made by the scribe were at my direction and in my presence.  I have reviewed the chart and agree that the record reflects my personal performance and is accurate and complete. Vaishali Wing, APC.  4:16 PM 01/23/2018         Scribe Attestation:   Scribe #1: I performed the above scribed service and the documentation accurately describes the services I performed. I attest to the accuracy of the note.  Scribe #2: I performed the above scribed service and the documentation accurately describes the services I performed. I attest to the accuracy of the  note.    Attending Attestation:     Physician Attestation Statement for NP/PA:   I discussed this assessment and plan of this patient with the NP/PA, but I did not personally examine the patient. The face to face encounter was performed by the NP/PA.    Other NP/PA Attestation Additions:    History of Present Illness: Palpitations                    ED Course      Clinical Impression:   The primary encounter diagnosis was Nausea. A diagnosis of Fatigue, unspecified type was also pertinent to this visit.    Disposition:   Disposition: Discharged  Condition: Stable                        NANDINI Hutchinson  01/23/18 0836

## 2018-01-24 ENCOUNTER — OFFICE VISIT (OUTPATIENT)
Dept: CARDIOLOGY | Facility: CLINIC | Age: 43
End: 2018-01-24
Payer: COMMERCIAL

## 2018-01-24 ENCOUNTER — HOSPITAL ENCOUNTER (OUTPATIENT)
Dept: CARDIOLOGY | Facility: CLINIC | Age: 43
Discharge: HOME | End: 2018-01-24
Payer: COMMERCIAL

## 2018-01-24 VITALS
SYSTOLIC BLOOD PRESSURE: 149 MMHG | BODY MASS INDEX: 21.68 KG/M2 | DIASTOLIC BLOOD PRESSURE: 86 MMHG | HEIGHT: 63 IN | HEART RATE: 97 BPM | WEIGHT: 122.38 LBS

## 2018-01-24 DIAGNOSIS — F41.1 GAD (GENERALIZED ANXIETY DISORDER): ICD-10-CM

## 2018-01-24 DIAGNOSIS — R00.2 HEART PALPITATIONS: ICD-10-CM

## 2018-01-24 DIAGNOSIS — R94.31 ABNORMAL EKG: Primary | ICD-10-CM

## 2018-01-24 DIAGNOSIS — R94.31 PROLONGED QT INTERVAL: ICD-10-CM

## 2018-01-24 PROCEDURE — 93000 ELECTROCARDIOGRAM COMPLETE: CPT | Mod: S$GLB,,, | Performed by: INTERNAL MEDICINE

## 2018-01-24 PROCEDURE — 99214 OFFICE O/P EST MOD 30 MIN: CPT | Mod: S$GLB,,, | Performed by: NURSE PRACTITIONER

## 2018-01-24 PROCEDURE — 99999 PR PBB SHADOW E&M-EST. PATIENT-LVL III: CPT | Mod: PBBFAC,,, | Performed by: NURSE PRACTITIONER

## 2018-01-24 NOTE — PROGRESS NOTES
Ms. Salas as last seen in [facility] 12/15/2017.      Subjective:   Patient ID:  Juanita Salas is a 42 y.o. female who presents for follow-up of Palpitations and Nausea (ER fu 01/23/18)  .   HPI:     Ms. Salas is a 41yo with palpitations, anxiety/panic disorder, and former smoker (quit one month ago) here for follow up. She says she started prozac last week and says she has been experiencing heart racing and elevated blood pressures. She went to the ED yesterday and was found to have a QTc of 484. Ms. Salas denies chest pain with exertion or at rest, SOB, KAMARA, dizziness, syncope, leg edema, claudication, PND, or orthopnea. She has still not been taking her metoprolol.    She has not been exercising because she has been worried about her heart rate. Ms. Salas denies chest pain with exertion or at rest, SOB, KAMARA, dizziness, syncope, leg edema, claudication, PND, or orthopnea. She remains fatigued with night sweats. TSH normal. She is not anemic. Her cortisol, cortisone, renin, aldosterone, metanephrines have all been normal. ASCVD risk is 0.7%. She has an appt with psychiatry on 1/30/.2018.    Recent Cardiac Tests:    2D Echo (11/1/2017):  CONCLUSIONS     1 - Normal left ventricular systolic function (EF 60-65%).     2 - Normal right ventricular systolic function .     3 - Normal left ventricular diastolic function.     48 Hour Holter Monitor (11/10/2017):  1. Sinus rhythm with heart rates varying between 48 and 147 bpm with an average of 79 bpm.   VENTRICULAR ARRHYTHMIAS  1. There were very rare PVCs recorded totalling 4 and averaging less than 1 per hour.   2. There were no episodes of ventricular tachycardia.  SUPRA VENTRICULAR ARRHYTHMIAS  1. There were very rare PACs recorded totalling 19 and averaging less than 1 per hour.   2. There were no episodes of sustained supraventricular tachycardia.  SINUS NODE FUNCTION  1. For the 1st 24 hour period, HRs averaging 85 bpm during waking hours and 65 bpm during  sleep (11:30 PM - 8:15 AM).  2. For the 2nd 24 hour period, HRs averaging 82 bpm during waking hours and 60 bpm during sleep (10:30 PM - 6:00 AM).  3. There was no evidence of high grade SA rah block.   AV CONDUCTION  1. There was no evidence of high grade AV block.   DIARY  1. The diary was properly completed and there were no symptoms reported .     Current Outpatient Prescriptions   Medication Sig    ALPRAZolam (XANAX) 0.5 MG tablet 0.5 mg 2 (two) times daily as needed.     chlorhexidine (PERIDEX) 0.12 % solution Use as directed 15 mLs in the mouth or throat 2 (two) times daily.     clonazePAM (KLONOPIN) 0.5 MG tablet Take 0.5 mg by mouth as needed.    FLUoxetine (PROZAC) 20 MG capsule     metoprolol succinate (TOPROL-XL) 25 MG 24 hr tablet Take 1 tablet daily by mouth as needed for palpitations.    naproxen (NAPROSYN) 500 MG tablet Take 1 tablet (500 mg total) by mouth 2 (two) times daily with meals.    nystatin-triamcinolone (MYCOLOG II) cream Apply topically 2 (two) times daily.    ondansetron (ZOFRAN) 4 MG tablet Take 1 tablet (4 mg total) by mouth every 8 (eight) hours as needed.    TRINESSA LO 0.18/0.215/0.25 mg-25 mcg tablet TK 1 T PO QD    VITAMIN D2 50,000 unit capsule Take 1 capsule (50,000 Units total) by mouth once a week.     No current facility-administered medications for this visit.      Review of Systems   Constitution: Positive for decreased appetite. Negative for malaise/fatigue.   Eyes: Negative for blurred vision.   Cardiovascular: Positive for palpitations. Negative for chest pain, claudication, dyspnea on exertion, irregular heartbeat, leg swelling, orthopnea, paroxysmal nocturnal dyspnea and syncope.   Respiratory: Negative for shortness of breath.    Hematologic/Lymphatic: Negative for bleeding problem.   Skin: Negative for rash.   Musculoskeletal: Negative for myalgias.   Gastrointestinal: Negative for abdominal pain, constipation, diarrhea and nausea.   Genitourinary:  "Negative for hematuria.   Neurological: Negative for headaches, loss of balance and numbness.   Psychiatric/Behavioral: Negative for altered mental status. The patient is nervous/anxious.    Allergic/Immunologic: Negative for persistent infections.     Objective:     Right Arm BP - Sittin/81 (18 1332)  Left Arm BP - Sittin/86 (18 1332)    BP (!) 149/86 (BP Location: Left arm, Patient Position: Sitting, BP Method: Large (Automatic))   Pulse 97   Ht 5' 3" (1.6 m)   Wt 55.5 kg (122 lb 5.7 oz)   LMP 2018 (Exact Date)   BMI 21.67 kg/m²     Physical Exam   Constitutional: She is oriented to person, place, and time. She appears well-developed and well-nourished.   HENT:   Head: Normocephalic.   Nose: Nose normal.   Eyes: Pupils are equal, round, and reactive to light.   Neck: No JVD present. Carotid bruit is not present.   Cardiovascular: Normal rate, regular rhythm, S1 normal and S2 normal.  Exam reveals no gallop.    No murmur heard.  Pulses:       Carotid pulses are 2+ on the right side, and 2+ on the left side.       Radial pulses are 2+ on the right side, and 2+ on the left side.        Dorsalis pedis pulses are 2+ on the right side, and 2+ on the left side.   Pulmonary/Chest: Breath sounds normal. No respiratory distress.   Abdominal: Soft. Bowel sounds are normal. She exhibits no distension. There is no tenderness.   Musculoskeletal: Normal range of motion. She exhibits no edema.   Neurological: She is alert and oriented to person, place, and time.   Skin: Skin is warm and dry. No erythema.   Psychiatric: Her speech is normal and behavior is normal. Her mood appears anxious.   Nursing note and vitals reviewed.    Lab Results   Component Value Date     2018    K 3.5 2018     2018    CO2 27 2018    BUN 10 2018    CREATININE 0.6 2018     2018    AST 17 2018    ALT 19 2018    ALBUMIN 3.8 2018    PROT 7.4 " 01/23/2018    BILITOT 0.4 01/23/2018    WBC 10.32 01/23/2018    HGB 12.8 01/23/2018    HCT 37.5 01/23/2018    HCT 41 11/17/2017    MCV 91 01/23/2018     01/23/2018    TSH 0.838 11/20/2017    CHOL 217 (H) 01/10/2018    HDL 52 01/10/2018    LDLCALC 136.0 01/10/2018    TRIG 145 01/10/2018          Test(s) Reviewed  I have reviewed the following in detail:  [] Stress test   [] Angiography   [] Echocardiogram   [x] Labs   [x] Other:  EKG     Assessment:         1. Abnormal EKG. QT interval normal in clinic. Patient advised that she may continue all her medications, unless prozac. Patient elects to wait until she sees psychiatry on Tuesday.     2. Heart palpitations. Patient advised to try metoprolol.      3. LUPE (generalized anxiety disorder). Patient says she has an appt with her psychiatrist coming up (Jan 30).     Plan:     Juanita was seen today for palpitations and nausea.    Diagnoses and all orders for this visit:    Abnormal EKG    Heart palpitations    LUPE (generalized anxiety disorder)      Continue current medications.  Get regular exercise and sleep.  Continue follow up with psychiatry.    Follow-up if symptoms worsen or fail to improve.    ------------------------------------------------------------------    ARJUN To, NP-C  Consult Cardiology

## 2018-01-24 NOTE — PATIENT INSTRUCTIONS
Continue current medications.  Get regular exercise and sleep.  Continue follow up with psychiatry.

## 2018-01-25 ENCOUNTER — PATIENT MESSAGE (OUTPATIENT)
Dept: INTERNAL MEDICINE | Facility: CLINIC | Age: 43
End: 2018-01-25

## 2018-01-25 ENCOUNTER — PATIENT MESSAGE (OUTPATIENT)
Dept: CARDIOLOGY | Facility: CLINIC | Age: 43
End: 2018-01-25

## 2018-01-25 NOTE — TELEPHONE ENCOUNTER
Spoke with pt, she is not taking metoprolol. Advised to take metoprolol per ED and cardiology. Reassured on cardiac status. Encouraged to increase protein and carbs.     Has apt with psych on January 30.

## 2018-01-27 ENCOUNTER — OFFICE VISIT (OUTPATIENT)
Dept: INTERNAL MEDICINE | Facility: CLINIC | Age: 43
End: 2018-01-27
Payer: COMMERCIAL

## 2018-01-27 ENCOUNTER — TELEPHONE (OUTPATIENT)
Dept: INTERNAL MEDICINE | Facility: CLINIC | Age: 43
End: 2018-01-27

## 2018-01-27 DIAGNOSIS — R00.2 HEART PALPITATIONS: ICD-10-CM

## 2018-01-27 DIAGNOSIS — E55.9 VITAMIN D DEFICIENCY: ICD-10-CM

## 2018-01-27 DIAGNOSIS — K14.1 GEOGRAPHIC TONGUE: ICD-10-CM

## 2018-01-27 DIAGNOSIS — F41.1 GAD (GENERALIZED ANXIETY DISORDER): ICD-10-CM

## 2018-01-27 DIAGNOSIS — R94.31 ABNORMAL EKG: ICD-10-CM

## 2018-01-27 DIAGNOSIS — R53.83 FATIGUE, UNSPECIFIED TYPE: Primary | ICD-10-CM

## 2018-01-27 PROBLEM — Z72.0 TOBACCO ABUSE: Status: RESOLVED | Noted: 2017-08-24 | Resolved: 2018-01-27

## 2018-01-27 PROBLEM — F41.9 ANXIETY: Status: RESOLVED | Noted: 2017-08-16 | Resolved: 2018-01-27

## 2018-01-27 PROBLEM — R42 DIZZY SPELLS: Status: RESOLVED | Noted: 2017-08-24 | Resolved: 2018-01-27

## 2018-01-27 PROCEDURE — 99215 OFFICE O/P EST HI 40 MIN: CPT | Mod: S$GLB,,, | Performed by: FAMILY MEDICINE

## 2018-01-27 PROCEDURE — 99999 PR PBB SHADOW E&M-EST. PATIENT-LVL III: CPT | Mod: PBBFAC,,, | Performed by: FAMILY MEDICINE

## 2018-01-27 NOTE — PROGRESS NOTES
Subjective:   Patient ID: Juanita Salas is a 42 y.o. female.    Chief Complaint: Follow-up (ER follow up); Dehydration; and Fatigue      HPI  43 yo female here with complaints of fatigue, anxiety, palpitations, weight loss and night sweats. She quit smoking in Nov. She denies a cough. She recently had a normal quant gold test with [facility]. She has recently had an echocardiogram, 24 hour Holter monitor, and numerous other tests. He sitting bp is 150/90 +/- on multiple checks today by me and nurse. This bp value is fairly consistent with other checks in the chart. However, she recently brought her home monitor in with her cardiologist and her home monitor was accurate. She has been prescribed metoprolol succinate 25mg daily but she is not taking it. She was prescribed prozac but had slight QT prolongation and will not take another ssri. She was prescribed 2 bzd's but doesn't want to take it.     Patient queried and denies any further complaints.      ALLERGIES AND MEDICATIONS: updated and reviewed.  Review of patient's allergies indicates:  No Known Allergies    Current Outpatient Prescriptions:     ALPRAZolam (XANAX) 0.5 MG tablet, 0.5 mg 2 (two) times daily as needed. , Disp: , Rfl:     TRINESSA LO 0.18/0.215/0.25 mg-25 mcg tablet, TK 1 T PO QD, Disp: , Rfl: 2    VITAMIN D2 50,000 unit capsule, Take 1 capsule (50,000 Units total) by mouth once a week., Disp: 8 capsule, Rfl: 0    ondansetron (ZOFRAN) 4 MG tablet, Take 1 tablet (4 mg total) by mouth every 8 (eight) hours as needed., Disp: 12 tablet, Rfl: 0    Review of Systems   Constitutional: Positive for activity change and unexpected weight change.   HENT: Negative for hearing loss, rhinorrhea and trouble swallowing.    Eyes: Negative for discharge and visual disturbance.   Respiratory: Negative for chest tightness and wheezing.    Cardiovascular: Positive for palpitations. Negative for chest pain.   Gastrointestinal: Positive for diarrhea. Negative for blood in  "stool, constipation and vomiting.   Endocrine: Negative for polydipsia and polyuria.   Genitourinary: Negative for difficulty urinating, dysuria, enuresis, flank pain, hematuria and menstrual problem.   Musculoskeletal: Negative for arthralgias, joint swelling and neck pain.   Neurological: Positive for weakness. Negative for headaches.   Hematological: Negative for adenopathy. Does not bruise/bleed easily.   Psychiatric/Behavioral: Negative for confusion and dysphoric mood.       Objective:     Vitals:    01/27/18 0947   BP: 126/86   Pulse: 84   Resp: 18   Temp: 98 °F (36.7 °C)   TempSrc: Oral   SpO2: 99%   Weight: 55.8 kg (123 lb 0.3 oz)   Height: 5' 3" (1.6 m)   PainSc: 0-No pain     Body mass index is 21.79 kg/m².    Physical Exam   Constitutional: She is oriented to person, place, and time. She appears well-developed and well-nourished. She is cooperative. She does not have a sickly appearance. No distress.   HENT:   Head: Normocephalic and atraumatic.   Right Ear: Hearing, tympanic membrane, external ear and ear canal normal. No tenderness.   Left Ear: Hearing, tympanic membrane, external ear and ear canal normal. No tenderness.   Nose: Nose normal.   Mouth/Throat: Oropharynx is clear and moist. Normal dentition. No oropharyngeal exudate, posterior oropharyngeal edema or posterior oropharyngeal erythema.   Eyes: Conjunctivae and lids are normal. Right eye exhibits no discharge. Left eye exhibits no discharge. Right conjunctiva is not injected. Left conjunctiva is not injected. No scleral icterus. Right eye exhibits normal extraocular motion. Left eye exhibits normal extraocular motion.   Neck: Normal range of motion. Neck supple. No JVD present. Carotid bruit is not present. No tracheal deviation and no edema present. No thyromegaly present.   Cardiovascular: Normal rate, regular rhythm, normal heart sounds and normal pulses.  Exam reveals no friction rub.    No murmur heard.  Pulmonary/Chest: Effort normal " and breath sounds normal. No accessory muscle usage. No respiratory distress. She has no wheezes. She has no rhonchi. She has no rales.   Musculoskeletal: She exhibits no edema.   Lymphadenopathy:        Head (right side): No submandibular adenopathy present.        Head (left side): No submandibular adenopathy present.     She has no cervical adenopathy.   Neurological: She is alert and oriented to person, place, and time.   Skin: Skin is warm and dry. She is not diaphoretic.   Psychiatric: Her speech is normal and behavior is normal. Thought content normal. Her mood appears not anxious. Her affect is not angry, not labile and not inappropriate. She does not exhibit a depressed mood.       Assessment and Plan:   Juanita was seen today for follow-up, dehydration and fatigue.    Diagnoses and all orders for this visit:    Fatigue, unspecified type  Discussed repeating tsh and adding free t4. She changed her mind about doing this today about 10 times. We decided to do it next week bc it is not urgent.     Geographic tongue  -     Ambulatory consult to ENT    Heart palpitations    Abnormal EKG    Vitamin D deficiency    LUPE (generalized anxiety disorder)    Fu with her pcp.    Time spent in the evaluation and management of this patient exceeded 60min and greater than 50% of this time was in face-to-face education regarding diagnoses, medications, plan, and follow-up.      No Follow-up on file.    THIS NOTE WILL BE SHARED WITH THE PATIENT.

## 2018-01-27 NOTE — TELEPHONE ENCOUNTER
Tried making a lab appt downstairs for pt. Called labs & they stated that they did not have any more appts for today.  Informed Karan & was told to document

## 2018-01-29 ENCOUNTER — PATIENT MESSAGE (OUTPATIENT)
Dept: INTERNAL MEDICINE | Facility: CLINIC | Age: 43
End: 2018-01-29

## 2018-01-29 ENCOUNTER — LAB VISIT (OUTPATIENT)
Dept: LAB | Facility: HOSPITAL | Age: 43
End: 2018-01-29
Attending: FAMILY MEDICINE
Payer: COMMERCIAL

## 2018-01-29 VITALS
HEIGHT: 63 IN | SYSTOLIC BLOOD PRESSURE: 126 MMHG | OXYGEN SATURATION: 99 % | DIASTOLIC BLOOD PRESSURE: 86 MMHG | BODY MASS INDEX: 21.79 KG/M2 | HEART RATE: 84 BPM | TEMPERATURE: 98 F | WEIGHT: 123 LBS | RESPIRATION RATE: 18 BRPM

## 2018-01-29 DIAGNOSIS — R00.2 HEART PALPITATIONS: ICD-10-CM

## 2018-01-29 DIAGNOSIS — F41.1 GAD (GENERALIZED ANXIETY DISORDER): ICD-10-CM

## 2018-01-29 DIAGNOSIS — R53.83 FATIGUE, UNSPECIFIED TYPE: ICD-10-CM

## 2018-01-29 LAB
T4 FREE SERPL-MCNC: 1.09 NG/DL
TSH SERPL DL<=0.005 MIU/L-ACNC: 0.86 UIU/ML

## 2018-01-29 PROCEDURE — 36415 COLL VENOUS BLD VENIPUNCTURE: CPT

## 2018-01-29 PROCEDURE — 84443 ASSAY THYROID STIM HORMONE: CPT

## 2018-01-29 PROCEDURE — 84439 ASSAY OF FREE THYROXINE: CPT

## 2018-01-30 ENCOUNTER — OFFICE VISIT (OUTPATIENT)
Dept: PSYCHIATRY | Facility: CLINIC | Age: 43
End: 2018-01-30
Payer: COMMERCIAL

## 2018-01-30 ENCOUNTER — OFFICE VISIT (OUTPATIENT)
Dept: INTERNAL MEDICINE | Facility: CLINIC | Age: 43
End: 2018-01-30
Payer: COMMERCIAL

## 2018-01-30 VITALS
BODY MASS INDEX: 21.86 KG/M2 | WEIGHT: 123.38 LBS | DIASTOLIC BLOOD PRESSURE: 83 MMHG | SYSTOLIC BLOOD PRESSURE: 130 MMHG | HEART RATE: 104 BPM

## 2018-01-30 VITALS
BODY MASS INDEX: 21.64 KG/M2 | WEIGHT: 122.13 LBS | HEART RATE: 83 BPM | DIASTOLIC BLOOD PRESSURE: 82 MMHG | SYSTOLIC BLOOD PRESSURE: 121 MMHG | TEMPERATURE: 98 F | RESPIRATION RATE: 14 BRPM

## 2018-01-30 DIAGNOSIS — F40.01 PANIC DISORDER WITH AGORAPHOBIA: Chronic | ICD-10-CM

## 2018-01-30 DIAGNOSIS — R94.31 ABNORMAL EKG: Chronic | ICD-10-CM

## 2018-01-30 DIAGNOSIS — E55.9 VITAMIN D DEFICIENCY: Chronic | ICD-10-CM

## 2018-01-30 DIAGNOSIS — F40.01 PANIC DISORDER WITH AGORAPHOBIA: Primary | ICD-10-CM

## 2018-01-30 DIAGNOSIS — R00.2 HEART PALPITATIONS: Chronic | ICD-10-CM

## 2018-01-30 DIAGNOSIS — B37.0 THRUSH: ICD-10-CM

## 2018-01-30 DIAGNOSIS — F41.1 GAD (GENERALIZED ANXIETY DISORDER): Primary | Chronic | ICD-10-CM

## 2018-01-30 DIAGNOSIS — F32.A DEPRESSION, UNSPECIFIED DEPRESSION TYPE: ICD-10-CM

## 2018-01-30 DIAGNOSIS — F41.1 GAD (GENERALIZED ANXIETY DISORDER): ICD-10-CM

## 2018-01-30 PROCEDURE — 99999 PR PBB SHADOW E&M-EST. PATIENT-LVL II: CPT | Mod: PBBFAC,,, | Performed by: PSYCHIATRY & NEUROLOGY

## 2018-01-30 PROCEDURE — 99204 OFFICE O/P NEW MOD 45 MIN: CPT | Mod: S$GLB,,, | Performed by: PSYCHIATRY & NEUROLOGY

## 2018-01-30 PROCEDURE — 3008F BODY MASS INDEX DOCD: CPT | Mod: S$GLB,,, | Performed by: FAMILY MEDICINE

## 2018-01-30 PROCEDURE — 3008F BODY MASS INDEX DOCD: CPT | Mod: CPTII,S$GLB,, | Performed by: PSYCHIATRY & NEUROLOGY

## 2018-01-30 PROCEDURE — 99214 OFFICE O/P EST MOD 30 MIN: CPT | Mod: S$GLB,,, | Performed by: FAMILY MEDICINE

## 2018-01-30 PROCEDURE — 99999 PR PBB SHADOW E&M-EST. PATIENT-LVL III: CPT | Mod: PBBFAC,,, | Performed by: FAMILY MEDICINE

## 2018-01-30 RX ORDER — SERTRALINE HYDROCHLORIDE 50 MG/1
50 TABLET, FILM COATED ORAL DAILY
Qty: 30 TABLET | Refills: 2 | Status: SHIPPED | OUTPATIENT
Start: 2018-01-30 | End: 2018-03-26 | Stop reason: SDUPTHER

## 2018-01-30 RX ORDER — FLUCONAZOLE 150 MG/1
150 TABLET ORAL DAILY
Qty: 2 TABLET | Refills: 0 | Status: SHIPPED | OUTPATIENT
Start: 2018-01-30 | End: 2018-01-31

## 2018-01-30 RX ORDER — METOPROLOL SUCCINATE 25 MG/1
12.5 TABLET, EXTENDED RELEASE ORAL DAILY
COMMUNITY
End: 2018-01-30

## 2018-01-30 RX ORDER — NYSTATIN 100000 [USP'U]/ML
4 SUSPENSION ORAL 4 TIMES DAILY
Qty: 160 ML | Refills: 0 | Status: SHIPPED | OUTPATIENT
Start: 2018-01-30 | End: 2018-02-09

## 2018-01-30 RX ORDER — CLONAZEPAM 0.5 MG/1
0.5 TABLET ORAL DAILY
Qty: 30 TABLET | Refills: 1 | Status: SHIPPED | OUTPATIENT
Start: 2018-01-30 | End: 2018-12-04
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
## 2018-01-30 NOTE — PROGRESS NOTES
Subjective:   Patient ID: Juanita Salas is a 42 y.o. female.    Chief Complaint: Establish Care      HPI  43 yo female here to [facility]. Has louis and palpitations. She just saw psychiatrist for first time today and is very pleased but wants my opinion of meds and plan. She is asymptomatic today other than her baseline anxiety level.     Patient queried and denies any further complaints.    ALLERGIES AND MEDICATIONS: updated and reviewed.  Review of patient's allergies indicates:  No Known Allergies    Current Outpatient Prescriptions:     clonazePAM (KLONOPIN) 0.5 MG tablet, Take 1 tablet (0.5 mg total) by mouth once daily., Disp: 30 tablet, Rfl: 1    fluconazole (DIFLUCAN) 150 MG Tab, Take 1 tablet (150 mg total) by mouth once daily., Disp: 2 tablet, Rfl: 0    nystatin (MYCOSTATIN) 100,000 unit/mL suspension, Take 4 mLs (400,000 Units total) by mouth 4 (four) times daily., Disp: 160 mL, Rfl: 0    ondansetron (ZOFRAN) 4 MG tablet, Take 1 tablet (4 mg total) by mouth every 8 (eight) hours as needed., Disp: 12 tablet, Rfl: 0    sertraline (ZOLOFT) 50 MG tablet, Take 1 tablet (50 mg total) by mouth once daily., Disp: 30 tablet, Rfl: 2    TRINESSA LO 0.18/0.215/0.25 mg-25 mcg tablet, TK 1 T PO QD, Disp: , Rfl: 2    VITAMIN D2 50,000 unit capsule, Take 1 capsule (50,000 Units total) by mouth once a week., Disp: 8 capsule, Rfl: 0    Review of Systems   Constitutional: Negative for activity change, appetite change, chills, diaphoresis, fatigue, fever and unexpected weight change.   HENT: Negative for congestion, ear discharge, ear pain, facial swelling, hearing loss, nosebleeds, postnasal drip, rhinorrhea, sinus pressure, sneezing, sore throat, tinnitus, trouble swallowing and voice change.    Eyes: Negative for photophobia, pain, discharge, redness, itching and visual disturbance.   Respiratory: Negative for cough, chest tightness, shortness of breath and wheezing.    Cardiovascular: Negative for chest pain,  palpitations and leg swelling.   Gastrointestinal: Negative for abdominal distention, abdominal pain, anal bleeding, blood in stool, constipation, diarrhea, nausea, rectal pain and vomiting.   Endocrine: Negative for cold intolerance, heat intolerance, polydipsia, polyphagia and polyuria.   Genitourinary: Negative for difficulty urinating, dysuria and flank pain.   Musculoskeletal: Negative for arthralgias, back pain, joint swelling, myalgias and neck pain.   Skin: Negative for rash.   Neurological: Negative for dizziness, tremors, seizures, syncope, speech difficulty, weakness, light-headedness, numbness and headaches.   Psychiatric/Behavioral: Negative for behavioral problems, confusion, decreased concentration, dysphoric mood, sleep disturbance and suicidal ideas. The patient is nervous/anxious. The patient is not hyperactive.        Objective:     Vitals:    01/30/18 1105   BP: 121/82   Pulse: 83   Resp: 14   Temp: 98.2 °F (36.8 °C)   TempSrc: Oral   Weight: 55.4 kg (122 lb 2.2 oz)   PainSc: 0-No pain     Body mass index is 21.64 kg/m².    Physical Exam   Constitutional: She is oriented to person, place, and time. She appears well-developed and well-nourished. She is cooperative. She does not have a sickly appearance. No distress.   HENT:   Head: Normocephalic and atraumatic.   Right Ear: Hearing, tympanic membrane, external ear and ear canal normal. No tenderness.   Left Ear: Hearing, tympanic membrane, external ear and ear canal normal. No tenderness.   Nose: Nose normal.   Mouth/Throat: Oropharynx is clear and moist. Normal dentition. No oropharyngeal exudate, posterior oropharyngeal edema or posterior oropharyngeal erythema.   Eyes: Conjunctivae and lids are normal. Right eye exhibits no discharge. Left eye exhibits no discharge. Right conjunctiva is not injected. Left conjunctiva is not injected. No scleral icterus. Right eye exhibits normal extraocular motion. Left eye exhibits normal extraocular motion.    Neck: Normal range of motion. Neck supple. No JVD present. Carotid bruit is not present. No tracheal deviation and no edema present. No thyromegaly present.   Cardiovascular: Normal rate, regular rhythm, normal heart sounds and normal pulses.  Exam reveals no friction rub.    No murmur heard.  Pulmonary/Chest: Effort normal and breath sounds normal. No accessory muscle usage. No respiratory distress. She has no wheezes. She has no rhonchi. She has no rales.   Musculoskeletal: She exhibits no edema.   Lymphadenopathy:        Head (right side): No submandibular adenopathy present.        Head (left side): No submandibular adenopathy present.     She has no cervical adenopathy.   Neurological: She is alert and oriented to person, place, and time.   Skin: Skin is warm and dry. She is not diaphoretic.   Psychiatric: Her speech is normal and behavior is normal. Thought content normal. Her mood appears not anxious. Her affect is not angry, not labile and not inappropriate. She does not exhibit a depressed mood.       Assessment and Plan:   Juanita was seen today for establish care.    Diagnoses and all orders for this visit:    LUPE (generalized anxiety disorder)    Panic disorder with agoraphobia    Vitamin D deficiency    Abnormal EKG    Heart palpitations    Thrush    Other orders  -     nystatin (MYCOSTATIN) 100,000 unit/mL suspension; Take 4 mLs (400,000 Units total) by mouth 4 (four) times daily.  -     fluconazole (DIFLUCAN) 150 MG Tab; Take 1 tablet (150 mg total) by mouth once daily.    rtc 2-3 wks to repeat bp.     Follow-up in about 2 weeks (around 2/13/2018).    THIS NOTE WILL BE SHARED WITH THE PATIENT.

## 2018-01-31 ENCOUNTER — OFFICE VISIT (OUTPATIENT)
Dept: CARDIOLOGY | Facility: CLINIC | Age: 43
End: 2018-01-31
Payer: COMMERCIAL

## 2018-01-31 VITALS
HEIGHT: 63 IN | OXYGEN SATURATION: 97 % | DIASTOLIC BLOOD PRESSURE: 77 MMHG | HEART RATE: 83 BPM | BODY MASS INDEX: 21.71 KG/M2 | WEIGHT: 122.56 LBS | SYSTOLIC BLOOD PRESSURE: 133 MMHG

## 2018-01-31 DIAGNOSIS — F41.1 GAD (GENERALIZED ANXIETY DISORDER): Chronic | ICD-10-CM

## 2018-01-31 DIAGNOSIS — R00.2 HEART PALPITATIONS: Primary | Chronic | ICD-10-CM

## 2018-01-31 PROBLEM — R94.31 ABNORMAL EKG: Chronic | Status: RESOLVED | Noted: 2018-01-24 | Resolved: 2018-01-31

## 2018-01-31 PROCEDURE — 99999 PR PBB SHADOW E&M-EST. PATIENT-LVL IV: CPT | Mod: PBBFAC,,, | Performed by: NURSE PRACTITIONER

## 2018-01-31 PROCEDURE — 99213 OFFICE O/P EST LOW 20 MIN: CPT | Mod: S$GLB,,, | Performed by: NURSE PRACTITIONER

## 2018-01-31 PROCEDURE — 3008F BODY MASS INDEX DOCD: CPT | Mod: S$GLB,,, | Performed by: NURSE PRACTITIONER

## 2018-01-31 NOTE — PROGRESS NOTES
Ms. Salas was last seen in [facility] 1/24/2018.      Subjective:   Patient ID:  Juanita Salas is a 42 y.o. female who presents for follow-up of Hypertension and Atrial Flutter  .   HPI:    Ms. Salas is a 43yo with palpitations, anxiety/panic disorder, and former smoker here for follow up. She saw her psychiatrist yesterday and was prescribed zoloft and she is worried about how the medication will affect her heart. Ms. Salas denies chest pain with exertion or at rest, SOB, KAMARA, dizziness, syncope, leg edema, claudication, PND, or orthopnea. She has still not been taking her metoprolol. She is worried about starting exercise because her HR increases and she experiences some SOB with exercise.  TSH normal. She is not anemic. Her cortisol, cortisone, renin, aldosterone, metanephrines have all been normal. ASCVD risk is 0.7%.    Recent Cardiac Tests:    2D Echo (11/1/2017):  CONCLUSIONS     1 - Normal left ventricular systolic function (EF 60-65%).     2 - Normal right ventricular systolic function .     3 - Normal left ventricular diastolic function.     48 Hour Holter Monitor (11/10/2017):  1. Sinus rhythm with heart rates varying between 48 and 147 bpm with an average of 79 bpm.   VENTRICULAR ARRHYTHMIAS  1. There were very rare PVCs recorded totalling 4 and averaging less than 1 per hour.   2. There were no episodes of ventricular tachycardia.  SUPRA VENTRICULAR ARRHYTHMIAS  1. There were very rare PACs recorded totalling 19 and averaging less than 1 per hour.   2. There were no episodes of sustained supraventricular tachycardia.  SINUS NODE FUNCTION  1. For the 1st 24 hour period, HRs averaging 85 bpm during waking hours and 65 bpm during sleep (11:30 PM - 8:15 AM).  2. For the 2nd 24 hour period, HRs averaging 82 bpm during waking hours and 60 bpm during sleep (10:30 PM - 6:00 AM).  3. There was no evidence of high grade SA rah block.   AV CONDUCTION  1. There was no evidence of high grade AV block.  "  DIARY  1. The diary was properly completed and there were no symptoms reported .     Current Outpatient Prescriptions   Medication Sig    clonazePAM (KLONOPIN) 0.5 MG tablet Take 1 tablet (0.5 mg total) by mouth once daily.    fluconazole (DIFLUCAN) 150 MG Tab Take 1 tablet (150 mg total) by mouth once daily.    nystatin (MYCOSTATIN) 100,000 unit/mL suspension Take 4 mLs (400,000 Units total) by mouth 4 (four) times daily.    ondansetron (ZOFRAN) 4 MG tablet Take 1 tablet (4 mg total) by mouth every 8 (eight) hours as needed.    sertraline (ZOLOFT) 50 MG tablet Take 1 tablet (50 mg total) by mouth once daily.    TRINESSA LO 0.18/0.215/0.25 mg-25 mcg tablet TK 1 T PO QD    VITAMIN D2 50,000 unit capsule Take 1 capsule (50,000 Units total) by mouth once a week.     No current facility-administered medications for this visit.      Review of Systems   Constitution: Negative for malaise/fatigue.   Eyes: Negative for blurred vision.   Cardiovascular: Negative for chest pain, claudication, dyspnea on exertion, irregular heartbeat, leg swelling, orthopnea, palpitations, paroxysmal nocturnal dyspnea and syncope.   Respiratory: Negative for shortness of breath.    Hematologic/Lymphatic: Negative for bleeding problem.   Skin: Negative for rash.   Musculoskeletal: Negative for myalgias.   Gastrointestinal: Negative for abdominal pain, constipation, diarrhea and nausea.   Genitourinary: Negative for hematuria.   Neurological: Negative for headaches, loss of balance and numbness.   Psychiatric/Behavioral: Negative for altered mental status. The patient is nervous/anxious.    Allergic/Immunologic: Negative for persistent infections.     Objective:     Right Arm BP - Sittin/73 (18 1536)  Left Arm BP - Sittin/77 (18 1536)    /77 (BP Location: Left arm, Patient Position: Sitting, BP Method: Medium (Automatic))   Pulse 83   Ht 5' 3" (1.6 m)   Wt 55.6 kg (122 lb 9.2 oz)   LMP 2018 (Exact " Date)   SpO2 97%   BMI 21.71 kg/m²     Physical Exam   Constitutional: She is oriented to person, place, and time. She appears well-developed and well-nourished.   HENT:   Head: Normocephalic.   Nose: Nose normal.   Eyes: Pupils are equal, round, and reactive to light.   Neck: No JVD present. Carotid bruit is not present.   Cardiovascular: Normal rate, regular rhythm, S1 normal and S2 normal.  Exam reveals no gallop.    No murmur heard.  Pulses:       Carotid pulses are 2+ on the right side, and 2+ on the left side.       Radial pulses are 2+ on the right side, and 2+ on the left side.        Dorsalis pedis pulses are 2+ on the right side, and 2+ on the left side.   Pulmonary/Chest: Breath sounds normal. No respiratory distress.   Abdominal: Soft. Bowel sounds are normal. She exhibits no distension. There is no tenderness.   Musculoskeletal: Normal range of motion. She exhibits no edema.   Neurological: She is alert and oriented to person, place, and time.   Skin: Skin is warm and dry. No erythema.   Psychiatric: She has a normal mood and affect. Her speech is normal and behavior is normal.   Nursing note and vitals reviewed.        Lab Results   Component Value Date     01/23/2018    K 3.5 01/23/2018     01/23/2018    CO2 27 01/23/2018    BUN 10 01/23/2018    CREATININE 0.6 01/23/2018     01/23/2018    AST 17 01/23/2018    ALT 19 01/23/2018    ALBUMIN 3.8 01/23/2018    PROT 7.4 01/23/2018    BILITOT 0.4 01/23/2018    WBC 10.32 01/23/2018    HGB 12.8 01/23/2018    HCT 37.5 01/23/2018    HCT 41 11/17/2017    MCV 91 01/23/2018     01/23/2018    TSH 0.858 01/29/2018    CHOL 217 (H) 01/10/2018    HDL 52 01/10/2018    LDLCALC 136.0 01/10/2018    TRIG 145 01/10/2018              Test(s) Reviewed  I have reviewed the following in detail:  [] Stress test   [] Angiography   [] Echocardiogram   [x] Labs   [x] Other:  EKG       Assessment:         1. Heart palpitations. Holter negative. Likely  anxiety related. Patient encouraged to continue zoloft. Patient has been encouraged to exercise a minimum of 30 minutes daily, 5 times per week.          2. LUPE (generalized anxiety disorder). Reassurance provided.      Plan:     Juanita was seen today for hypertension and atrial flutter.    Diagnoses and all orders for this visit:    Heart palpitations    LUPE (generalized anxiety disorder)      Continue zoloft.   Patient has been encouraged to exercise a minimum of 30 minutes daily, 5 times per week.     Follow-up if symptoms worsen or fail to improve.    ------------------------------------------------------------------    ARJUN To, NP-C  Consult Cardiology

## 2018-01-31 NOTE — PATIENT INSTRUCTIONS
Continue zoloft.   Patient has been encouraged to exercise a minimum of 30 minutes daily, 5 times per week.

## 2018-02-05 ENCOUNTER — PATIENT MESSAGE (OUTPATIENT)
Dept: PSYCHIATRY | Facility: CLINIC | Age: 43
End: 2018-02-05

## 2018-02-06 ENCOUNTER — PATIENT MESSAGE (OUTPATIENT)
Dept: PSYCHIATRY | Facility: CLINIC | Age: 43
End: 2018-02-06

## 2018-02-07 ENCOUNTER — PATIENT MESSAGE (OUTPATIENT)
Dept: PSYCHIATRY | Facility: CLINIC | Age: 43
End: 2018-02-07

## 2018-02-16 ENCOUNTER — OFFICE VISIT (OUTPATIENT)
Dept: INTERNAL MEDICINE | Facility: CLINIC | Age: 43
End: 2018-02-16
Payer: COMMERCIAL

## 2018-02-16 VITALS
SYSTOLIC BLOOD PRESSURE: 163 MMHG | HEART RATE: 123 BPM | OXYGEN SATURATION: 97 % | WEIGHT: 120.56 LBS | TEMPERATURE: 98 F | BODY MASS INDEX: 21.36 KG/M2 | DIASTOLIC BLOOD PRESSURE: 94 MMHG | HEIGHT: 63 IN

## 2018-02-16 DIAGNOSIS — R00.0 TACHYCARDIA: Primary | ICD-10-CM

## 2018-02-16 PROCEDURE — 93005 ELECTROCARDIOGRAM TRACING: CPT | Mod: S$GLB,,, | Performed by: FAMILY MEDICINE

## 2018-02-16 PROCEDURE — 93010 ELECTROCARDIOGRAM REPORT: CPT | Mod: S$GLB,,, | Performed by: INTERNAL MEDICINE

## 2018-02-16 PROCEDURE — 3008F BODY MASS INDEX DOCD: CPT | Mod: S$GLB,,, | Performed by: FAMILY MEDICINE

## 2018-02-16 PROCEDURE — 99214 OFFICE O/P EST MOD 30 MIN: CPT | Mod: S$GLB,,, | Performed by: FAMILY MEDICINE

## 2018-02-16 PROCEDURE — 99999 PR PBB SHADOW E&M-EST. PATIENT-LVL III: CPT | Mod: PBBFAC,,, | Performed by: FAMILY MEDICINE

## 2018-02-17 NOTE — PROGRESS NOTES
Subjective:   Patient ID: Juanita Salas is a 42 y.o. female.    Chief Complaint: Night Sweats and Blood Pressure Check (elevated BP)      HPI  41 yo w reported ho svt here with elev bp, palpitations and tachycardia. Denies dizziness, lightheadedness, fevers, chills, chest pain, dyspnea, headache, cough or nausea. She has louis. She had a smoothie w green tea this afternoon. Otherwise, she usually has little caffeine.      Patient queried and denies any further complaints.      ALLERGIES AND MEDICATIONS: updated and reviewed.  Review of patient's allergies indicates:  No Known Allergies    Current Outpatient Prescriptions:     clonazePAM (KLONOPIN) 0.5 MG tablet, Take 1 tablet (0.5 mg total) by mouth once daily., Disp: 30 tablet, Rfl: 1    sertraline (ZOLOFT) 50 MG tablet, Take 1 tablet (50 mg total) by mouth once daily., Disp: 30 tablet, Rfl: 2    TRINESSA LO 0.18/0.215/0.25 mg-25 mcg tablet, TK 1 T PO QD, Disp: , Rfl: 2    VITAMIN D2 50,000 unit capsule, Take 1 capsule (50,000 Units total) by mouth once a week., Disp: 8 capsule, Rfl: 0    ondansetron (ZOFRAN) 4 MG tablet, Take 1 tablet (4 mg total) by mouth every 8 (eight) hours as needed., Disp: 12 tablet, Rfl: 0    Review of Systems   Constitutional: Positive for activity change. Negative for unexpected weight change.   HENT: Negative for hearing loss, rhinorrhea and trouble swallowing.    Eyes: Negative for discharge and visual disturbance.   Respiratory: Negative for chest tightness and wheezing.    Cardiovascular: Negative for chest pain and palpitations.   Gastrointestinal: Negative for blood in stool, constipation, diarrhea and vomiting.   Endocrine: Negative for polydipsia and polyuria.   Genitourinary: Negative for difficulty urinating, dysuria, hematuria and menstrual problem.   Musculoskeletal: Negative for arthralgias, joint swelling and neck pain.   Neurological: Negative for weakness and headaches.   Psychiatric/Behavioral: Negative for confusion  "and dysphoric mood.       Objective:     Vitals:    02/16/18 1613   BP: (!) 163/94   Pulse: (!) 123   Temp: 98.3 °F (36.8 °C)   TempSrc: Oral   SpO2: 97%   Weight: 54.7 kg (120 lb 9.5 oz)   Height: 5' 3" (1.6 m)   PainSc: 0-No pain     Body mass index is 21.36 kg/m².    Physical Exam   Constitutional: She is oriented to person, place, and time. She appears well-developed and well-nourished. She is cooperative. She does not have a sickly appearance. No distress.   HENT:   Head: Normocephalic and atraumatic.   Right Ear: Hearing, tympanic membrane, external ear and ear canal normal. No tenderness.   Left Ear: Hearing, tympanic membrane, external ear and ear canal normal. No tenderness.   Nose: Nose normal.   Mouth/Throat: Oropharynx is clear and moist. Normal dentition. No oropharyngeal exudate, posterior oropharyngeal edema or posterior oropharyngeal erythema.   Eyes: Conjunctivae and lids are normal. Right eye exhibits no discharge. Left eye exhibits no discharge. Right conjunctiva is not injected. Left conjunctiva is not injected. No scleral icterus. Right eye exhibits normal extraocular motion. Left eye exhibits normal extraocular motion.   Neck: Normal range of motion. Neck supple. No JVD present. Carotid bruit is not present. No tracheal deviation and no edema present. No thyromegaly present.   Cardiovascular: Normal rate, regular rhythm, normal heart sounds and normal pulses.  Exam reveals no friction rub.    No murmur heard.  Pulmonary/Chest: Effort normal and breath sounds normal. No accessory muscle usage. No respiratory distress. She has no wheezes. She has no rhonchi. She has no rales.   Musculoskeletal: She exhibits no edema.   Lymphadenopathy:        Head (right side): No submandibular adenopathy present.        Head (left side): No submandibular adenopathy present.     She has no cervical adenopathy.   Neurological: She is alert and oriented to person, place, and time.   Skin: Skin is warm and dry. She " is not diaphoretic.   Psychiatric: Her speech is normal and behavior is normal. Thought content normal. Her mood appears not anxious. Her affect is not angry, not labile and not inappropriate. She does not exhibit a depressed mood.       Assessment and Plan:   Juanita was seen today for night sweats and blood pressure check.    Diagnoses and all orders for this visit:    Tachycardia  -     SCHEDULED EKG 12-LEAD (to [place]); Future      Sinus tachycardia. Not symptomatic now. Declines ER referral. Declines beta blocker. Encouraged no caffeine and if symptoms worsen to go to ED. Ok to stop zoloft since she wants to for fear of prol qt.    Time spent in the evaluation and management of this patient exceeded 45min and greater than 50% of this time was in face-to-face education regarding diagnoses, medications, plan, and follow-up.    Follow-up in about 1 week (around 2/23/2018).    THIS NOTE WILL BE SHARED WITH THE PATIENT.

## 2018-02-20 PROBLEM — Z87.891 FORMER SMOKER: Status: ACTIVE | Noted: 2018-02-20

## 2018-02-21 ENCOUNTER — OFFICE VISIT (OUTPATIENT)
Dept: CARDIOLOGY | Facility: CLINIC | Age: 43
End: 2018-02-21
Payer: COMMERCIAL

## 2018-02-21 ENCOUNTER — PATIENT MESSAGE (OUTPATIENT)
Dept: INTERNAL MEDICINE | Facility: CLINIC | Age: 43
End: 2018-02-21

## 2018-02-21 VITALS
HEIGHT: 63 IN | WEIGHT: 121.25 LBS | DIASTOLIC BLOOD PRESSURE: 71 MMHG | SYSTOLIC BLOOD PRESSURE: 131 MMHG | BODY MASS INDEX: 21.48 KG/M2 | HEART RATE: 80 BPM

## 2018-02-21 DIAGNOSIS — F32.89 OTHER DEPRESSION: ICD-10-CM

## 2018-02-21 DIAGNOSIS — E78.00 HYPERCHOLESTEROLEMIA: ICD-10-CM

## 2018-02-21 DIAGNOSIS — E87.6 HYPOKALEMIA: ICD-10-CM

## 2018-02-21 DIAGNOSIS — R00.2 HEART PALPITATIONS: Primary | Chronic | ICD-10-CM

## 2018-02-21 DIAGNOSIS — F41.1 GAD (GENERALIZED ANXIETY DISORDER): Chronic | ICD-10-CM

## 2018-02-21 DIAGNOSIS — Z87.891 FORMER SMOKER: ICD-10-CM

## 2018-02-21 DIAGNOSIS — R00.0 SINUS TACHYCARDIA: ICD-10-CM

## 2018-02-21 DIAGNOSIS — F40.01 PANIC DISORDER WITH AGORAPHOBIA: Chronic | ICD-10-CM

## 2018-02-21 DIAGNOSIS — E55.9 VITAMIN D DEFICIENCY: Chronic | ICD-10-CM

## 2018-02-21 PROCEDURE — 3008F BODY MASS INDEX DOCD: CPT | Mod: S$GLB,,, | Performed by: INTERNAL MEDICINE

## 2018-02-21 PROCEDURE — 99214 OFFICE O/P EST MOD 30 MIN: CPT | Mod: S$GLB,,, | Performed by: INTERNAL MEDICINE

## 2018-02-21 PROCEDURE — 99999 PR PBB SHADOW E&M-EST. PATIENT-LVL III: CPT | Mod: PBBFAC,,, | Performed by: INTERNAL MEDICINE

## 2018-02-21 RX ORDER — POTASSIUM CHLORIDE 750 MG/1
10 TABLET, EXTENDED RELEASE ORAL 2 TIMES DAILY
Qty: 180 TABLET | Refills: 3 | Status: SHIPPED | OUTPATIENT
Start: 2018-02-21 | End: 2018-04-18

## 2018-02-21 NOTE — PROGRESS NOTES
Subjective:   Patient ID:  Juanita Salas is a 42 y.o. female who presents for evaluation of palpitations    HPI:She has seen several of my partners and NP recently and is coming for elevated HR and BP.  The patient has no chest pain,  TIA,  syncope or pre-syncope.Patient does not exercise.Some SOB with exercise. Recently she drank smoothie that she thinks may have had caffeine ir Ginseng and heart rate increased. She is concerned about reading of her ECG, although I explained that ECG is slightly abnormal in November too when echo is normal        Review of Systems   Constitution: Negative for chills, decreased appetite, diaphoresis, fever, weakness, malaise/fatigue, night sweats, weight gain and weight loss.   HENT: Negative for congestion, hoarse voice, nosebleeds, sore throat and tinnitus.    Eyes: Negative for blurred vision, double vision, vision loss in left eye, vision loss in right eye, visual disturbance and visual halos.   Cardiovascular: Positive for dyspnea on exertion, irregular heartbeat and palpitations. Negative for chest pain, claudication, cyanosis, leg swelling, near-syncope, orthopnea, paroxysmal nocturnal dyspnea and syncope.   Respiratory: Positive for shortness of breath. Negative for cough, hemoptysis, sleep disturbances due to breathing, snoring, sputum production and wheezing.    Endocrine: Negative for cold intolerance, heat intolerance, polydipsia, polyphagia and polyuria.   Hematologic/Lymphatic: Negative for adenopathy and bleeding problem. Does not bruise/bleed easily.   Skin: Negative for color change, dry skin, flushing, itching, nail changes, poor wound healing, rash, skin cancer, suspicious lesions and unusual hair distribution.   Musculoskeletal: Negative for arthritis, back pain, falls, gout, joint pain, joint swelling, muscle cramps, muscle weakness, myalgias and stiffness.   Gastrointestinal: Negative for abdominal pain, anorexia, change in bowel habit, constipation, diarrhea,  "dysphagia, heartburn, hematemesis, hematochezia, melena and vomiting.   Genitourinary: Negative for decreased libido, dysuria, hematuria, hesitancy and urgency.   Neurological: Negative for excessive daytime sleepiness, dizziness, focal weakness, headaches, light-headedness, loss of balance, numbness, paresthesias, seizures, sensory change, tremors and vertigo.   Psychiatric/Behavioral: Negative for altered mental status, depression, hallucinations, memory loss, substance abuse and suicidal ideas. The patient does not have insomnia and is not nervous/anxious.    Allergic/Immunologic: Negative for environmental allergies and hives.       Objective: /71   Pulse 80   Ht 5' 3" (1.6 m)   Wt 55 kg (121 lb 4.1 oz)   LMP 02/02/2018   BMI 21.48 kg/m²      Physical Exam   Constitutional: She is oriented to person, place, and time. She appears well-developed and well-nourished.   HENT:   Head: Normocephalic.   Eyes: EOM are normal. Pupils are equal, round, and reactive to light.   Neck: Normal range of motion. Normal carotid pulses, no hepatojugular reflux and no JVD present. Carotid bruit is not present. No thyromegaly present.   Cardiovascular: Normal rate, regular rhythm, normal heart sounds and intact distal pulses.  Exam reveals no gallop and no friction rub.    No murmur heard.  Pulmonary/Chest: Effort normal and breath sounds normal. No tachypnea. No respiratory distress. She has no wheezes. She has no rales. She exhibits no tenderness.   Abdominal: Soft. Bowel sounds are normal. She exhibits no distension and no mass. There is no tenderness. There is no rebound and no guarding.   Musculoskeletal: Normal range of motion. She exhibits no edema or tenderness.   Lymphadenopathy:     She has no cervical adenopathy.   Neurological: She is alert and oriented to person, place, and time. No cranial nerve deficit. Coordination normal.   Skin: Skin is warm. No rash noted. No erythema.   Psychiatric: She has a normal " mood and affect. Her behavior is normal. Judgment and thought content normal.       Assessment:     1. Heart palpitations    2. LUPE (generalized anxiety disorder)    3. Other depression    4. Panic disorder with agoraphobia    5. Vitamin D deficiency    6. Former smoker    7. Hypercholesterolemia    8. Sinus tachycardia    9. Hypokalemia        Plan:   Discussed diet , achieving and maintaining ideal body weight, and exercise.   We reviewed meds in detail.  Reassured-discussed goals , options and plan.  Discussed low dose beta blockers as possibility for future if elevated heart rates bother her  Continued smoking cessation, regular exercise, keep caffeine/ginseng low, and hydrate; 10 meq of KCL 2 per day and eat bananas and OJ    Juanita was seen today for palpitations.    Diagnoses and all orders for this visit:    Heart palpitations  -     potassium chloride (KLOR-CON) 10 MEQ TbSR; Take 1 tablet (10 mEq total) by mouth 2 (two) times daily.  -     Cardiac event monitor; Future; Expected date: 02/22/2018    LUPE (generalized anxiety disorder)  -     Cardiac event monitor; Future; Expected date: 02/22/2018    Other depression    Panic disorder with agoraphobia  -     Cardiac event monitor; Future; Expected date: 02/22/2018    Vitamin D deficiency    Former smoker    Hypercholesterolemia    Sinus tachycardia  -     potassium chloride (KLOR-CON) 10 MEQ TbSR; Take 1 tablet (10 mEq total) by mouth 2 (two) times daily.  -     Cardiac event monitor; Future; Expected date: 02/22/2018    Hypokalemia  -     potassium chloride (KLOR-CON) 10 MEQ TbSR; Take 1 tablet (10 mEq total) by mouth 2 (two) times daily.  -     Cardiac event monitor; Future; Expected date: 02/22/2018            Follow-up if symptoms worsen or fail to improve, for [facility].

## 2018-02-22 ENCOUNTER — TELEPHONE (OUTPATIENT)
Dept: INTERNAL MEDICINE | Facility: CLINIC | Age: 43
End: 2018-02-22

## 2018-02-22 ENCOUNTER — TELEPHONE (OUTPATIENT)
Dept: ELECTROPHYSIOLOGY | Facility: CLINIC | Age: 43
End: 2018-02-22

## 2018-02-22 NOTE — PATIENT INSTRUCTIONS
Discussed diet , achieving and maintaining ideal body weight, and exercise.   We reviewed meds in detail.  Reassured-discussed goals , options and plan.  Discussed low dose beta blockers as possibility for future if elevated heart rates bother her  Continued smoking cessation, regular exercise, keep caffeine/ginseng low, and hydrate; 10 meq of KCL 2 per day and eat bananas and OJ

## 2018-02-22 NOTE — TELEPHONE ENCOUNTER
----- Message from Lety Mccormick sent at 2/22/2018 10:11 AM CST -----  Contact: Pt  Pt is requesting a NP visit with Dr Mcrae pt really wants to see this provider     Pt says Dr Morgan in cardiology referred her over     Pt can be reached at [phone]    Thanks

## 2018-02-22 NOTE — TELEPHONE ENCOUNTER
Pt currently has two upcoming appointments to establish care with two different providers. Dr Gaston and Dr Way.

## 2018-02-22 NOTE — TELEPHONE ENCOUNTER
Returned the patient's call on this afternoon and informed her she can come in on Tuesday morning (2/27/18) after her 8:00 appointment.  Patient stated she would be able to be here before 9:00 am.  Patient verbalized understanding.

## 2018-02-22 NOTE — TELEPHONE ENCOUNTER
----- Message from Jamaica Small sent at 2/22/2018 12:42 PM CST -----  Contact: pt   Pt called to see if there was earlier time in the day around 9:00am to receive her monitor.  She said if there is none she is ok with it being mailed.  She can be reached @ [phone].  Thanks!

## 2018-02-26 ENCOUNTER — TELEPHONE (OUTPATIENT)
Dept: OBSTETRICS AND GYNECOLOGY | Facility: CLINIC | Age: 43
End: 2018-02-26

## 2018-02-26 ENCOUNTER — TELEPHONE (OUTPATIENT)
Dept: INTERNAL MEDICINE | Facility: CLINIC | Age: 43
End: 2018-02-26

## 2018-02-26 NOTE — TELEPHONE ENCOUNTER
Called and spoke to pt. She informed me that the end of her placebo pills was going to be around 3/9/18, so she would like to schedule her lab appt for then. Made her lab appt for 3/9 at [facility]. Pt verbalized understanding and no further questions.

## 2018-02-26 NOTE — TELEPHONE ENCOUNTER
----- Message from Delmy Novak sent at 2/26/2018 11:20 AM CST -----  Contact: pt  X_  1st Request  _  2nd Request  _  3rd Request    Who:LINDEN CAPPS [[id number]]    Why: Patient states she would like to speak with the staff in regards to getting lab orders to check her hormone levels.... Please contact to further discuss and advise     What Number to Call Back: [phone]    When to Expect a call back: (Before the end of the day)   -- if call after 3:00 call back will be tomorrow.

## 2018-02-26 NOTE — TELEPHONE ENCOUNTER
----- Message from Nga Rosa sent at 2/26/2018 10:31 AM CST -----  Contact: patient [phone]  Pt said that she was referred to you by  and she wants to speak with you. She left another message last week.

## 2018-02-27 ENCOUNTER — OFFICE VISIT (OUTPATIENT)
Dept: PSYCHIATRY | Facility: CLINIC | Age: 43
End: 2018-02-27
Payer: COMMERCIAL

## 2018-02-27 ENCOUNTER — CLINICAL SUPPORT (OUTPATIENT)
Dept: ELECTROPHYSIOLOGY | Facility: CLINIC | Age: 43
End: 2018-02-27
Attending: INTERNAL MEDICINE
Payer: COMMERCIAL

## 2018-02-27 VITALS
HEART RATE: 77 BPM | SYSTOLIC BLOOD PRESSURE: 117 MMHG | DIASTOLIC BLOOD PRESSURE: 68 MMHG | BODY MASS INDEX: 21.62 KG/M2 | WEIGHT: 122 LBS

## 2018-02-27 DIAGNOSIS — F41.1 GAD (GENERALIZED ANXIETY DISORDER): Chronic | ICD-10-CM

## 2018-02-27 DIAGNOSIS — F40.01 PANIC DISORDER WITH AGORAPHOBIA: Chronic | ICD-10-CM

## 2018-02-27 DIAGNOSIS — E87.6 HYPOKALEMIA: ICD-10-CM

## 2018-02-27 DIAGNOSIS — R00.2 HEART PALPITATIONS: Chronic | ICD-10-CM

## 2018-02-27 DIAGNOSIS — R00.0 SINUS TACHYCARDIA: ICD-10-CM

## 2018-02-27 DIAGNOSIS — F41.1 GAD (GENERALIZED ANXIETY DISORDER): Primary | Chronic | ICD-10-CM

## 2018-02-27 PROCEDURE — 3008F BODY MASS INDEX DOCD: CPT | Mod: CPTII,S$GLB,, | Performed by: PSYCHIATRY & NEUROLOGY

## 2018-02-27 PROCEDURE — 99999 PR PBB SHADOW E&M-EST. PATIENT-LVL II: CPT | Mod: PBBFAC,,, | Performed by: PSYCHIATRY & NEUROLOGY

## 2018-02-27 PROCEDURE — 99214 OFFICE O/P EST MOD 30 MIN: CPT | Mod: S$GLB,,, | Performed by: PSYCHIATRY & NEUROLOGY

## 2018-02-27 PROCEDURE — 93268 ECG RECORD/REVIEW: CPT | Mod: S$GLB,,, | Performed by: INTERNAL MEDICINE

## 2018-02-27 NOTE — PROGRESS NOTES
"Outpatient Psychiatry Follow-Up Visit (MD/NP)    2/27/2018    Clinical Status of Patient:  Outpatient (Ambulatory)    Chief Complaint:  Juanita Salas is a 42 y.o. female who presents today for follow-up of anxiety.  Met with patient.      Interval History and Content of Current Session:  Interim Events/Subjective Report/Content of Current Session: Ms. Salas states that she has noticed some improvement in her anxiety over the last month after starting zoloft.  Specifically, she has started driving again, has been eating out at restaurants more, functioning better at work, and her significant other has commented that she doesn't go from "1 to 10,000" like she used to.  She has only been taking 25mg of the zoloft since our first visit.  She still has significant anxiety in the office today.  She worries about increasing to 50mg and the potential for side effects.  She continues to worry about her pulse and BP although she reports that she has been limiting herself to checking her BP to once a day and that she has started to take off her watch at points during the day so that she is not constantly monitoring her pulse rate.  She also expresses concern over wanting to gain weight although her current BMI is within the normal range at 21.6.  She has stopped herself from buying a scale because she knows that she would obsess over trying to gain weight.  She did have an incident where she had increased pulse rate into the 120s and 130s after drinking a green tea smoothie but reports pulse has been in the normal range besides that.  She is picking up a cardiac monitor today ordered by cardiology after this event which was a few weeks ago.  She reports her mood is stable, sleeping and eating well.  Denies depression, denies SI/HI/AVH.  Denies any side effects from her medication currently.  States that she normally will take a fourth of a klonopin if she gets anxious but is not using it every day.  States that one tablet " will last her an entire week.    Psychotherapy:  · Target symptoms: anxiety   · Why chosen therapy is appropriate versus another modality: relevant to diagnosis  · Outcome monitoring methods: self-report, observation  · Therapeutic intervention type: insight oriented psychotherapy, behavior modifying psychotherapy, supportive psychotherapy  · Topics discussed/themes: building skills sets for symptom management, symptom recognition  · The patient's response to the intervention is accepting. The patient's progress toward treatment goals is fair.   · Duration of intervention: 12 minutes.    Review of Systems   · PSYCHIATRIC: Pertinant items are noted in the narrative.  · RESPIRATORY: No shortness of breath.  · CARDIOVASCULAR: No tachycardia or chest pain.  · GASTROINTESTINAL: No nausea, vomiting, pain, constipation or diarrhea.    Past Medical, Family and Social History: The patient's past medical, family and social history have been reviewed and updated as appropriate within the electronic medical record - see encounter notes.    Compliance: yes    Side effects: None    Risk Parameters:  Patient reports no suicidal ideation  Patient reports no homicidal ideation  Patient reports no self-injurious behavior  Patient reports no violent behavior    Exam (detailed: at least 9 elements; comprehensive: all 15 elements)   Constitutional  Vitals:  Most recent vital signs, dated less than 90 days prior to this appointment, were reviewed.   Vitals:    02/27/18 0755   BP: 117/68   Pulse: 77   Weight: 55.3 kg (122 lb 0.4 oz)        General:  unremarkable, age appropriate, normal weight, casually dressed     Musculoskeletal  Muscle Strength/Tone:  not examined   Gait & Station:  non-ataxic     Psychiatric  Speech:  no latency; no press   Mood & Affect:  anxious  mood-congruent, anxious   Thought Process:  normal and logical   Associations:  intact   Thought Content:  normal, no suicidality, no homicidality, delusions, or paranoia    Insight:  intact, has awareness of illness   Judgement: behavior is adequate to circumstances   Orientation:  grossly intact   Memory: intact for content of interview   Language: grossly intact, able to name, able to repeat   Attention Span & Concentration:  able to focus   Fund of Knowledge:  intact and appropriate to age and level of education     Assessment and Diagnosis   Status/Progress: Based on the examination today, the patient's problem(s) is/are improving.  New problems have not been presented today.   Co-morbidities, Diagnostic uncertainty and Lack of compliance are not complicating management of the primary condition.  There are no active rule-out diagnoses for this patient at this time.     General Impression:       ICD-10-CM ICD-9-CM   1. ULPE (generalized anxiety disorder) F41.1 300.02   2. Panic disorder with agoraphobia F40.01 300.21       Intervention/Counseling/Treatment Plan   · Increase zoloft dose to 50mg daily.  Discussed with her the risks/benefits of this as she voiced significant anxiety over increasing from 25mg.   · Continue Klonopin 0.5mg daily PRN for increased anxiety/panic attacks.  Patient currently taking a fourth of a tablet at a time.  · Continue care per cardiology.  Patient is picking up her cardiac monitor today but states that she only had the one episode of increased heart rate after drinking a green tea smoothie.      Return to Clinic: 1 month

## 2018-03-05 ENCOUNTER — OFFICE VISIT (OUTPATIENT)
Dept: PSYCHIATRY | Facility: CLINIC | Age: 43
End: 2018-03-05
Payer: COMMERCIAL

## 2018-03-05 ENCOUNTER — PATIENT MESSAGE (OUTPATIENT)
Dept: PSYCHIATRY | Facility: CLINIC | Age: 43
End: 2018-03-05

## 2018-03-05 DIAGNOSIS — F41.1 GAD (GENERALIZED ANXIETY DISORDER): Primary | ICD-10-CM

## 2018-03-05 PROCEDURE — 90832 PSYTX W PT 30 MINUTES: CPT | Mod: S$GLB,,, | Performed by: SOCIAL WORKER

## 2018-03-05 NOTE — PROGRESS NOTES
Individual Psychotherapy (PhD/LCSW)    3/5/2018    Site:  [facility]         Therapeutic Intervention: Met with patient.  Outpatient - Insight oriented psychotherapy 30 min - CPT code 78959    Chief complaint/reason for encounter: anxiety, sleep, behavior and cognition     Interval history and content of current session: went over some improvement and need to stay in the moment, and use distraction and reassurance to be in the present and be less worried or cautious in the world and take care of herself all focused on, and meds, sleep, job and parenting and relationships, and medical health, be aware of excessive asking why when there is not an answer, so calm down more, breath, and be in the present.    Treatment plan:  · Target symptoms: depression, recurrent depression, anxiety , mood swings, mood disorder, adjustment, grief, work stress  · Why chosen therapy is appropriate versus another modality: relevant to diagnosis, patient responds to this modality, evidence based practice  · Outcome monitoring methods: self-report, observation  · Therapeutic intervention type: insight oriented psychotherapy, behavior modifying psychotherapy, supportive psychotherapy    Risk parameters:  Patient reports no suicidal ideation  Patient reports no homicidal ideation  Patient reports no self-injurious behavior  Patient reports no violent behavior    Verbal deficits: None    Patient's response to intervention:  The patient's response to intervention is accepting.    Progress toward goals and other mental status changes:  The patient's progress toward goals is fair .    Diagnosis:     ICD-10-CM ICD-9-CM   1. LUPE (generalized anxiety disorder) F41.1 300.02       Plan:  individual psychotherapy and consult psychiatrist for medication evaluation    Return to clinic: 2 weeks    Length of Service (minutes): 30

## 2018-03-06 ENCOUNTER — PATIENT MESSAGE (OUTPATIENT)
Dept: INTERNAL MEDICINE | Facility: CLINIC | Age: 43
End: 2018-03-06

## 2018-03-06 ENCOUNTER — OFFICE VISIT (OUTPATIENT)
Dept: INTERNAL MEDICINE | Facility: CLINIC | Age: 43
End: 2018-03-06
Payer: COMMERCIAL

## 2018-03-06 VITALS
HEART RATE: 80 BPM | OXYGEN SATURATION: 99 % | DIASTOLIC BLOOD PRESSURE: 80 MMHG | WEIGHT: 123.25 LBS | BODY MASS INDEX: 21.84 KG/M2 | SYSTOLIC BLOOD PRESSURE: 124 MMHG | HEIGHT: 63 IN

## 2018-03-06 DIAGNOSIS — N83.209 CYST OF OVARY, UNSPECIFIED LATERALITY: ICD-10-CM

## 2018-03-06 DIAGNOSIS — F40.01 PANIC DISORDER WITH AGORAPHOBIA: Chronic | ICD-10-CM

## 2018-03-06 DIAGNOSIS — R00.0 SINUS TACHYCARDIA: ICD-10-CM

## 2018-03-06 DIAGNOSIS — F41.1 GAD (GENERALIZED ANXIETY DISORDER): Chronic | ICD-10-CM

## 2018-03-06 DIAGNOSIS — R63.4 WEIGHT LOSS: ICD-10-CM

## 2018-03-06 DIAGNOSIS — R61 NIGHT SWEATS: ICD-10-CM

## 2018-03-06 DIAGNOSIS — E55.9 VITAMIN D DEFICIENCY: Chronic | ICD-10-CM

## 2018-03-06 DIAGNOSIS — R93.89 ABNORMAL CT SCAN: ICD-10-CM

## 2018-03-06 DIAGNOSIS — R63.4 UNINTENTIONAL WEIGHT LOSS: ICD-10-CM

## 2018-03-06 DIAGNOSIS — Z00.00 VISIT FOR ANNUAL HEALTH EXAMINATION: Primary | ICD-10-CM

## 2018-03-06 DIAGNOSIS — E53.8 VITAMIN B12 DEFICIENCY: ICD-10-CM

## 2018-03-06 DIAGNOSIS — E78.00 HYPERCHOLESTEROLEMIA: ICD-10-CM

## 2018-03-06 DIAGNOSIS — E87.6 HYPOKALEMIA: ICD-10-CM

## 2018-03-06 DIAGNOSIS — R00.2 HEART PALPITATIONS: Chronic | ICD-10-CM

## 2018-03-06 PROBLEM — R94.31 ABNORMAL ECG: Chronic | Status: RESOLVED | Noted: 2018-01-24 | Resolved: 2018-03-06

## 2018-03-06 PROBLEM — R16.0 HEPATOMEGALY: Status: RESOLVED | Noted: 2017-11-07 | Resolved: 2018-03-06

## 2018-03-06 PROCEDURE — 99999 PR PBB SHADOW E&M-EST. PATIENT-LVL IV: CPT | Mod: PBBFAC,,, | Performed by: INTERNAL MEDICINE

## 2018-03-06 PROCEDURE — 99214 OFFICE O/P EST MOD 30 MIN: CPT | Mod: S$GLB,,, | Performed by: INTERNAL MEDICINE

## 2018-03-06 RX ORDER — UBIDECARENONE 75 MG
500 CAPSULE ORAL DAILY
COMMUNITY
Start: 2018-03-06 | End: 2021-03-29

## 2018-03-06 NOTE — PROGRESS NOTES
INTERNAL MEDICINE INITIAL VISIT NOTE      CHIEF COMPLAINT     Chief Complaint   Patient presents with    [facility]    Night Sweats       HPI     Juanita Salas is a 42 y.o.  female who presents with anxiety and depression c panic d/o, sinus tachycardia, Vit D def, rebeca hematuria (see by Uro, had cysto which pt reports was normal), former smoker, here to [facility].    Today c/o night sweats x approx 8 mos or so.  Says wakes up drenched despite leaving the fan on.  Has appt pending c gyn to get hormone levels checked.  Thinks she has lost wt.    Review of chart shows wt has been fluctuating.  Was 128 last summer then up to 134 in Sept and over the past 6 mos has come down to 123.  No true fevers.  No cough.  Occ sob during the day which she attributes to anxiety.    Still c menses q month but currently on OCP and getting off now to get testing per gyn.  Also c generalized itching but no rash.  Occ loose stools but nothing frequent per pt.    W/u thus far:  Normal TFTs  Normal WBC on cbc  Normal CMP  Neg Quant Gold  Normal renin, aldosterone  Normal cortisol  Normal plasma free metanephrines  Neg HIV  Neg Hep panel  B12 216  Ferritin 18 but normal h/h  CXR 11/2017 wnl  CT abd/pelvis c contrast 11/2017 unremarkable.  Interpolar area around R kidney c low attenuating lesion too small to characterized  CT renal stone 10/2017 c R adnexal 2.8 cm probable cyst, indeterminate.  Holter 48 hr: rare PACs, rare PVCs, no SVT or AVB  Echo wnl 11/2017    Lab Results   Component Value Date    LDLCALC 136.0 01/10/2018     Reports sx began even before starting SSRIs.  No regular use of tylenol or antiinflammatories.    Past Medical History:  Past Medical History:   Diagnosis Date    Adjustment disorder     Anxiety     Depression     Fatigue     Headache     Hx of psychiatric care     Hypertension     Low vitamin D level     Panic disorder     Psychiatric problem        Past Surgical History:  Past Surgical History:    Procedure Laterality Date     SECTION      DILATION AND CURETTAGE OF UTERUS         Home Medications:  Prior to Admission medications    Medication Sig Start Date End Date Taking? Authorizing Provider   clonazePAM (KLONOPIN) 0.5 MG tablet Take 1 tablet (0.5 mg total) by mouth once daily. 18 Yes Nixon Lizarraga MD   potassium chloride (KLOR-CON) 10 MEQ TbSR Take 1 tablet (10 mEq total) by mouth 2 (two) times daily. 18  Yes Kumar Lopez MD   sertraline (ZOLOFT) 50 MG tablet Take 1 tablet (50 mg total) by mouth once daily. 18 Yes Nixon Lizarraga MD   TRINESSA LO 0.18/0.215/0.25 mg-25 mcg tablet TK 1 T PO QD 17  Yes Historical Provider, MD   VITAMIN D2 50,000 unit capsule Take 1 capsule (50,000 Units total) by mouth once a week. 1/10/18  Yes Erika Nielson NP       Allergies:  Review of patient's allergies indicates:  No Known Allergies    Family History:  Family History   Problem Relation Age of Onset    Cancer Mother 47     pancreatic cancer     Diabetes Mother     Multiple sclerosis Sister     Hypertension Maternal Aunt     Hyperlipidemia Maternal Aunt     Hyperlipidemia Maternal Uncle     Hypertension Maternal Uncle     Hyperlipidemia Paternal Aunt     Hypertension Paternal Aunt     Hyperlipidemia Paternal Uncle     Hypertension Paternal Uncle     Stroke Maternal Grandmother     Psoriasis Maternal Grandmother     Heart disease Maternal Grandfather     Hyperlipidemia Maternal Grandfather     Hypertension Maternal Grandfather     Colon cancer Neg Hx     Esophageal cancer Neg Hx     Stomach cancer Neg Hx     Rectal cancer Neg Hx     Ulcerative colitis Neg Hx     Irritable bowel syndrome Neg Hx     Crohn's disease Neg Hx     Celiac disease Neg Hx        Social History:  Social History   Substance Use Topics    Smoking status: Former Smoker     Packs/day: 0.50     Years: 2.00     Quit date: 2017    Smokeless tobacco: Never  "Used    Alcohol use No       Review of Systems:  Review of Systems   Constitutional: Positive for activity change, diaphoresis and unexpected weight change. Negative for chills and fever.   HENT: Negative for hearing loss, rhinorrhea and trouble swallowing.    Eyes: Negative for discharge and visual disturbance.   Respiratory: Negative for chest tightness and wheezing.    Cardiovascular: Positive for palpitations. Negative for chest pain.   Gastrointestinal: Negative for blood in stool, constipation, diarrhea and vomiting.   Endocrine: Negative for polydipsia and polyuria.   Genitourinary: Negative for difficulty urinating, dysuria, hematuria and menstrual problem.   Musculoskeletal: Negative for arthralgias, joint swelling and neck pain.   Neurological: Positive for weakness. Negative for headaches.   Psychiatric/Behavioral: Positive for dysphoric mood. Negative for confusion and suicidal ideas. The patient is nervous/anxious.        Health Maintenance:   Immunizations:   Influenza is not up to date, declined, Risks and benefits were discussed with patient.  TDap is not up to date, declined, pt agrees to readdress at f/u    Cancer Screening:  PAP: is up to date. 5/2017 Dr. Maloney at [facility]; was "abnormal", will request records as pt to come back in one year  Mammogram: is up to date. 5/2017 benign per pt via gyn  Colonoscopy: rec at 50      PHYSICAL EXAM     /80   Pulse 80   Ht 5' 3" (1.6 m)   Wt 55.9 kg (123 lb 3.8 oz)   LMP 02/06/2018 (Approximate)   SpO2 99%   BMI 21.83 kg/m²     GEN - A+OX4, NAD, appears anxious  HEENT - PERRL, EOMI, OP clear  Neck - No thyromegaly or cervical LAD. No thyroid masses felt.  CV - RRR, no m/r/g  Chest - CTAB, no wheezing, crackles, or rhonchi  Abd - S/NT/ND/+BS.   Ext - 2+BDP and radial pulses. No C/C/E.  Neuro - 5/5 BUE and BLE strength.  LN - No LAD appreciated.  Skin - Normal color and texture, no rash, no skin lesions.      LABS     Lab Results   Component Value " Date    WBC 10.32 01/23/2018    HGB 12.8 01/23/2018    HCT 37.5 01/23/2018    MCV 91 01/23/2018     01/23/2018     CMP  Sodium   Date Value Ref Range Status   01/23/2018 140 136 - 145 mmol/L Final     Potassium   Date Value Ref Range Status   01/23/2018 3.5 3.5 - 5.1 mmol/L Final     Chloride   Date Value Ref Range Status   01/23/2018 104 95 - 110 mmol/L Final     CO2   Date Value Ref Range Status   01/23/2018 27 23 - 29 mmol/L Final     Glucose   Date Value Ref Range Status   01/23/2018 105 70 - 110 mg/dL Final     BUN, Bld   Date Value Ref Range Status   01/23/2018 10 6 - 20 mg/dL Final     Creatinine   Date Value Ref Range Status   01/23/2018 0.6 0.5 - 1.4 mg/dL Final     Calcium   Date Value Ref Range Status   01/23/2018 9.2 8.7 - 10.5 mg/dL Final     Total Protein   Date Value Ref Range Status   01/23/2018 7.4 6.0 - 8.4 g/dL Final     Albumin   Date Value Ref Range Status   01/23/2018 3.8 3.5 - 5.2 g/dL Final     Total Bilirubin   Date Value Ref Range Status   01/23/2018 0.4 0.1 - 1.0 mg/dL Final     Comment:     For infants and newborns, interpretation of results should be based  on gestational age, weight and in agreement with clinical  observations.  Premature Infant recommended reference ranges:  Up to 24 hours.............<8.0 mg/dL  Up to 48 hours............<12.0 mg/dL  3-5 days..................<15.0 mg/dL  6-29 days.................<15.0 mg/dL       Alkaline Phosphatase   Date Value Ref Range Status   01/23/2018 56 55 - 135 U/L Final     AST   Date Value Ref Range Status   01/23/2018 17 10 - 40 U/L Final     ALT   Date Value Ref Range Status   01/23/2018 19 10 - 44 U/L Final     Anion Gap   Date Value Ref Range Status   01/23/2018 9 8 - 16 mmol/L Final     eGFR if    Date Value Ref Range Status   01/23/2018 >60.0 >60 mL/min/1.73 m^2 Final     eGFR if non    Date Value Ref Range Status   01/23/2018 >60.0 >60 mL/min/1.73 m^2 Final     Comment:     Calculation used  "to obtain the estimated glomerular filtration  rate (eGFR) is the CKD-EPI equation.        Lab Results   Component Value Date    TSH 0.858 01/29/2018    FREET4 1.09 01/29/2018     No results found for: LABA1C, HGBA1C  Lab Results   Component Value Date    LDLCALC 136.0 01/10/2018         ASSESSMENT/PLAN     Jaunita Salas is a 42 y.o. female with  Juanita was seen today for establish care and night sweats.    Diagnoses and all orders for this visit:    Visit for annual health examination  History and physical exam completed.  Health maintenance reviewed as above.     Night sweats  Extensive w/u as above  Sx could be 2/2 SSRIs but pt reports sx started prior to starting meds  Denies regular use of tylenol or nsaids or PPI  Will check additional studies, also awaiting hormone check by gyn  -     Metanephrines, urine 24 Hours; Future  -     5-HIAA Plasma (Neuroendocrine); Future; Expected date: 03/06/2018  -     CT Chest Without Contrast; Future; Expected date: 03/06/2018    Weight loss  Fluctuating as per HPI c 11 lb loss over past 6 mos.  W/u thus far neg as per HPI, additional testing as above, CT chest since hx tob  W/u of additional "abnormalities" on imaging, see below:    Cyst of ovary, unspecified laterality  -     US Pelvis Complete Non OB; Future; Expected date: 03/06/2018    Abnormal CT scan  ?area around R kidney too small to characterize on CT  -     US Retroperitoneal Complete; Future; Expected date: 03/06/2018  -     US Pelvis Complete Non OB; Future; Expected date: 03/06/2018    Vitamin B12 deficiency  Mild, can take 500 daily  -     cyanocobalamin (VITAMIN B-12) 500 MCG tablet; Take 1 tablet (500 mcg total) by mouth once daily.    Unintentional weight loss  -     CT Chest Without Contrast; Future; Expected date: 03/06/2018    Vitamin D deficiency  As per HPI, rec completing ergocalciferol before repeating levels.    LUPE (generalized anxiety disorder)  Panic disorder with agoraphobia  Heart " palpitations  Suspect many sx related to anxiety, rec close f/u c psychiatry, meds and refills as per Psych    Sinus tachycardia  Hypokalemia  Normal today, awaiting 30 day event monitor.  48 hr holter was unremarkable.  On K as per Cards.    Hypercholesterolemia  Mild, rec lifestyle mod for now.    HM as above    RTC in 2 mos for f/u, sooner if needed and depending on labs.    Ema Gaston MD  Department of Internal Medicine - [facility]  03/06/2018

## 2018-03-08 ENCOUNTER — TELEPHONE (OUTPATIENT)
Dept: OBSTETRICS AND GYNECOLOGY | Facility: CLINIC | Age: 43
End: 2018-03-08

## 2018-03-08 ENCOUNTER — HOSPITAL ENCOUNTER (OUTPATIENT)
Dept: RADIOLOGY | Facility: HOSPITAL | Age: 43
Discharge: HOME | End: 2018-03-08
Attending: INTERNAL MEDICINE
Payer: COMMERCIAL

## 2018-03-08 DIAGNOSIS — R63.4 UNINTENTIONAL WEIGHT LOSS: ICD-10-CM

## 2018-03-08 DIAGNOSIS — R61 NIGHT SWEATS: ICD-10-CM

## 2018-03-08 PROCEDURE — 71250 CT THORAX DX C-: CPT | Mod: 26,,, | Performed by: RADIOLOGY

## 2018-03-08 PROCEDURE — 71250 CT THORAX DX C-: CPT | Mod: TC

## 2018-03-09 ENCOUNTER — HOSPITAL ENCOUNTER (OUTPATIENT)
Dept: RADIOLOGY | Facility: HOSPITAL | Age: 43
Discharge: HOME | End: 2018-03-09
Attending: INTERNAL MEDICINE
Payer: COMMERCIAL

## 2018-03-09 DIAGNOSIS — R93.89 ABNORMAL CT SCAN: ICD-10-CM

## 2018-03-09 DIAGNOSIS — N83.209 CYST OF OVARY, UNSPECIFIED LATERALITY: ICD-10-CM

## 2018-03-09 PROCEDURE — 76770 US EXAM ABDO BACK WALL COMP: CPT | Mod: TC

## 2018-03-09 PROCEDURE — 76770 US EXAM ABDO BACK WALL COMP: CPT | Mod: 26,,, | Performed by: RADIOLOGY

## 2018-03-09 PROCEDURE — 76830 TRANSVAGINAL US NON-OB: CPT | Mod: 26,,, | Performed by: RADIOLOGY

## 2018-03-09 PROCEDURE — 76830 TRANSVAGINAL US NON-OB: CPT | Mod: TC

## 2018-03-09 PROCEDURE — 76856 US EXAM PELVIC COMPLETE: CPT | Mod: 26,,, | Performed by: RADIOLOGY

## 2018-03-09 NOTE — TELEPHONE ENCOUNTER
Called patient:    Discussed results of hormone levels:    E2 452,  FSH 7.5,  LH 5.7    With E2 452, estrogen deficiency is not the cause for her hot flashes / sweats.    REC:  To follow-up with PCP to evaluate for other etiologies.

## 2018-03-12 ENCOUNTER — PATIENT MESSAGE (OUTPATIENT)
Dept: INTERNAL MEDICINE | Facility: CLINIC | Age: 43
End: 2018-03-12

## 2018-03-14 ENCOUNTER — TELEPHONE (OUTPATIENT)
Dept: ELECTROPHYSIOLOGY | Facility: CLINIC | Age: 43
End: 2018-03-14

## 2018-03-14 ENCOUNTER — OFFICE VISIT (OUTPATIENT)
Dept: ENDOCRINOLOGY | Facility: CLINIC | Age: 43
End: 2018-03-14
Payer: COMMERCIAL

## 2018-03-14 ENCOUNTER — LAB VISIT (OUTPATIENT)
Dept: LAB | Facility: HOSPITAL | Age: 43
End: 2018-03-14
Attending: INTERNAL MEDICINE
Payer: COMMERCIAL

## 2018-03-14 VITALS
HEIGHT: 63 IN | WEIGHT: 124.75 LBS | DIASTOLIC BLOOD PRESSURE: 80 MMHG | BODY MASS INDEX: 22.11 KG/M2 | HEART RATE: 74 BPM | SYSTOLIC BLOOD PRESSURE: 118 MMHG

## 2018-03-14 DIAGNOSIS — L74.9 SWEATING ABNORMALITY: Primary | ICD-10-CM

## 2018-03-14 DIAGNOSIS — R61 NIGHT SWEATS: ICD-10-CM

## 2018-03-14 PROCEDURE — 99204 OFFICE O/P NEW MOD 45 MIN: CPT | Mod: S$GLB,,, | Performed by: INTERNAL MEDICINE

## 2018-03-14 PROCEDURE — 99999 PR PBB SHADOW E&M-EST. PATIENT-LVL III: CPT | Mod: PBBFAC,,, | Performed by: INTERNAL MEDICINE

## 2018-03-14 PROCEDURE — 83835 ASSAY OF METANEPHRINES: CPT

## 2018-03-14 NOTE — TELEPHONE ENCOUNTER
----- Message from Amelia Milian sent at 3/14/2018 10:40 AM CDT -----  Contact: patient  Please call pt at [phone]. Patient has questions regarding her 30 day event monitor    Thank you      I called patient back. She was given new electrodes today for her 30 day event monitor. She stated that the new electrodes look like cloth. She is not sure if they are okay for her to use. I informed her that this is fine for her to use as long as she can tolerate them. Patient stated understanding.

## 2018-03-14 NOTE — PROGRESS NOTES
"Ms. Salas is a 42 y.o. F with history of depression, anxiety, HTN, here for initial visit for night sweats.    Night sweats starts 1 year ago, only at night, accompanied sometimes by racing heart rate (although sometimes this occurs by itself), sweating under arms, no flushing. About the same, not progressive, but not improving. No significant diarrhea. Intermittent dizziness, but no syncope.     Her aunt and her son on pt's father's side had pheochromocytoma, and that her aunt's son also had Diana Gerichs. No family history of thyroid cancer.  Mother had pancreatic cancer.  No hypercalcemia/parathyroid issues.     No previous endo visit - but here btw late 2017 and early 2018 pt had been tested per pt for Tb, HIV, Chest scan which did not show concerning abnormalities:  Nov CT abdomen 2017 "There is nonspecific thickening of the left adrenal gland, similar in configuration to the previous exam, nonspecific."  Dec 2017 CXR wnl  March 2018 unimpressive CT chest  Nov 2017 had event monitor without significant rhythm abnormalities  Brain MRI without significant abnormalities Sept 2017    5HIAA pending, 24hr metanephrines pending. LH and FSH in Dec 2017 and TFTS at that time unimpressive (see below)    Lost weight recently. Review showed ec from 134 to 128lbs. Although pt states it fluctuates     Periods regular on birth control OCP, with withdrawal bleeding, she does not know if her periods are regular because "I have been on them forever."  She is sexually active and uses them for birth control.     Quit smoking in Nov 2017.    Grandmother and aunt may have had early menopause in their early 40s.      Component      Latest Ref Rng & Units 12/5/2017             Metanephrine, Free      < OR = 57 pg/mL 32   Metanephrine, Total, Plasma      < OR = 205 pg/mL 91   Normetanephrine, Free      < OR = 148 pg/mL 59       Component      Latest Ref Rng & Units 3/8/2018 1/29/2018   TSH      0.400 - 4.000 uIU/mL  0.858   Free T4      " 0.71 - 1.51 ng/dL  1.09   FSH      See Text mIU/mL 7.50    LH      See Text mIU/mL 5.7    Estradiol      See Text pg/mL 452      Component      Latest Ref Rng & Units 2017             Cortisol      ug/dL 8.3   Aldosterone      ng/dL 5.5   Renin Activity      ng/mL/h 2.2       Past Medical History:   Diagnosis Date    Adjustment disorder     Anxiety     Depression     Fatigue     Headache     Hx of psychiatric care     Hypertension     Low vitamin D level     Panic disorder     Psychiatric problem         reports that she quit smoking about 3 months ago. She has a 1.00 pack-year smoking history. She has never used smokeless tobacco. She reports that she does not drink alcohol or use drugs.    Family History   Problem Relation Age of Onset    Cancer Mother 47     pancreatic cancer     Diabetes Mother     Multiple sclerosis Sister     Hypertension Maternal Aunt     Hyperlipidemia Maternal Aunt     Hyperlipidemia Maternal Uncle     Hypertension Maternal Uncle     Hyperlipidemia Paternal Aunt     Hypertension Paternal Aunt     Hyperlipidemia Paternal Uncle     Hypertension Paternal Uncle     Stroke Maternal Grandmother     Psoriasis Maternal Grandmother     Heart disease Maternal Grandfather     Hyperlipidemia Maternal Grandfather     Hypertension Maternal Grandfather     Colon cancer Neg Hx     Esophageal cancer Neg Hx     Stomach cancer Neg Hx     Rectal cancer Neg Hx     Ulcerative colitis Neg Hx     Irritable bowel syndrome Neg Hx     Crohn's disease Neg Hx     Celiac disease Neg Hx        Past Surgical History:   Procedure Laterality Date     SECTION      DILATION AND CURETTAGE OF UTERUS         Patient's Medications   New Prescriptions    No medications on file   Previous Medications    CLONAZEPAM (KLONOPIN) 0.5 MG TABLET    Take 1 tablet (0.5 mg total) by mouth once daily.    CYANOCOBALAMIN (VITAMIN B-12) 500 MCG TABLET    Take 1 tablet (500 mcg total) by mouth  "once daily.    POTASSIUM CHLORIDE (KLOR-CON) 10 MEQ TBSR    Take 1 tablet (10 mEq total) by mouth 2 (two) times daily.    SERTRALINE (ZOLOFT) 50 MG TABLET    Take 1 tablet (50 mg total) by mouth once daily.    TRINESSA LO 0.18/0.215/0.25 MG-25 MCG TABLET    TK 1 T PO QD    VITAMIN D2 50,000 UNIT CAPSULE    Take 1 capsule (50,000 Units total) by mouth once a week.   Modified Medications    No medications on file   Discontinued Medications    No medications on file         Review of Systems:  General: no fevers  Eyes: no vision changes  ENT: no sore throat  Lung: no sob  CV: + palpitations  GI: no nausea   : no dysuria   Endo: no heat intolerance  Heme: no easy bruising   MSK: no joint swelling        /80   Pulse 74   Ht 5' 3" (1.6 m)   Wt 56.6 kg (124 lb 12.5 oz)   BMI 22.10 kg/m²   Physical Exam:  General: normal body habitus, very anxious, talks rapidly  Eyes: anicteric, no proptosis   ENT: no facial lesions, no oral lesions   Neck: no masses, no LAD   Lung; ctabl, no wheezing or stridor   GI: normal bowel sounds, nondistended   CV: RRR, no rubs or murmurs   Skin: exposed skin without bruising or bleeding   Ext: no peripheral edema or erythema  Neuro: AOx3, moving all extremities, normal gait     Labs:    Chemistry        Component Value Date/Time     01/23/2018 0957    K 3.5 01/23/2018 0957     01/23/2018 0957    CO2 27 01/23/2018 0957    BUN 10 01/23/2018 0957    CREATININE 0.6 01/23/2018 0957     01/23/2018 0957        Component Value Date/Time    CALCIUM 9.2 01/23/2018 0957    ALKPHOS 56 01/23/2018 0957    AST 17 01/23/2018 0957    ALT 19 01/23/2018 0957    BILITOT 0.4 01/23/2018 0957    ESTGFRAFRICA >60.0 01/23/2018 0957    EGFRNONAA >60.0 01/23/2018 0957          Lab Results   Component Value Date    WBC 10.32 01/23/2018    HGB 12.8 01/23/2018    HCT 37.5 01/23/2018    MCV 91 01/23/2018     01/23/2018        Lab Results   Component Value Date    HDL 52 01/10/2018    HDL " 52 10/04/2017    HDL 48.0 02/17/2006     Lab Results   Component Value Date    LDLCALC 136.0 01/10/2018    LDLCALC 129.0 10/04/2017    LDLCALC 103.2 02/17/2006     Lab Results   Component Value Date    TRIG 145 01/10/2018    TRIG 155 (H) 10/04/2017    TRIG 129 02/17/2006     Lab Results   Component Value Date    CHOLHDL 24.0 01/10/2018    CHOLHDL 24.5 10/04/2017    CHOLHDL 27.1 02/17/2006       No results found for: HGBA1C    Lab Results   Component Value Date    TSH 0.858 01/29/2018       Assessment and Plan:  Ms. Salas is a 42 y.o. F with history of depression, anxiety, HTN, here for initial visit for night sweats.    Review of labs and imaging fairly comprehensive  Will follouwp 5HIAA and urine metanephrines  Suspect this is perimenopausal hot flashes - however FSH difficult to assess while on OCPS  Recommend stop OCPS for 3 weeks - then retest FSH and Lh, also obtain tryptase (although mastocytosis unlikely) - if labs suggestive of perimenopausal state would consider different hormonal regimen for HRT and/or nonhormonal treatments (eg. Gabapentin, michelle as sweating occurs at night) and consideration of workup for premature menopause    RTC 2 months    Barby Colon M.D.  Endocrinology

## 2018-03-18 LAB
COLLECT DURATION TIME SPEC: 24 HR
CREAT 24H UR-MRATE: 651 MG/D (ref 700–1600)
CREAT UR-MCNC: 62 MG/DL
METANEPH 24H UR-MCNC: 83 UG/L
METANEPH 24H UR-MRATE: 87 UG/D (ref 39–143)
METANEPH+NORMETANEPH UR-IMP: ABNORMAL
METANEPH/CREAT 24H UR: 134 UG/G CRT (ref 0–300)
NORMETANEPHRINE 24H UR-MCNC: 139 UG/L
NORMETANEPHRINE 24H UR-MRATE: 146 UG/D (ref 109–393)
NORMETANEPHRINE/CREAT 24H UR: 224 UG/G CRT (ref 0–400)
SPECIMEN VOL ?TM UR: 1050 ML

## 2018-03-21 ENCOUNTER — OFFICE VISIT (OUTPATIENT)
Dept: CARDIOLOGY | Facility: CLINIC | Age: 43
End: 2018-03-21
Payer: COMMERCIAL

## 2018-03-21 VITALS
SYSTOLIC BLOOD PRESSURE: 126 MMHG | BODY MASS INDEX: 22.15 KG/M2 | DIASTOLIC BLOOD PRESSURE: 70 MMHG | OXYGEN SATURATION: 98 % | HEART RATE: 85 BPM | HEIGHT: 63 IN | WEIGHT: 125 LBS

## 2018-03-21 DIAGNOSIS — E55.9 VITAMIN D DEFICIENCY: Chronic | ICD-10-CM

## 2018-03-21 DIAGNOSIS — R00.2 HEART PALPITATIONS: Primary | Chronic | ICD-10-CM

## 2018-03-21 DIAGNOSIS — E78.00 HYPERCHOLESTEROLEMIA: ICD-10-CM

## 2018-03-21 DIAGNOSIS — Z87.891 FORMER SMOKER: ICD-10-CM

## 2018-03-21 DIAGNOSIS — E87.6 HYPOKALEMIA: ICD-10-CM

## 2018-03-21 DIAGNOSIS — F40.01 PANIC DISORDER WITH AGORAPHOBIA: Chronic | ICD-10-CM

## 2018-03-21 DIAGNOSIS — F32.89 OTHER DEPRESSION: ICD-10-CM

## 2018-03-21 DIAGNOSIS — R00.0 SINUS TACHYCARDIA: ICD-10-CM

## 2018-03-21 PROCEDURE — 99215 OFFICE O/P EST HI 40 MIN: CPT | Mod: S$GLB,,, | Performed by: INTERNAL MEDICINE

## 2018-03-21 PROCEDURE — 99999 PR PBB SHADOW E&M-EST. PATIENT-LVL IV: CPT | Mod: PBBFAC,,, | Performed by: INTERNAL MEDICINE

## 2018-03-21 RX ORDER — METOPROLOL SUCCINATE 25 MG/1
25 TABLET, EXTENDED RELEASE ORAL DAILY
Qty: 90 TABLET | Refills: 3 | Status: SHIPPED | OUTPATIENT
Start: 2018-03-21 | End: 2018-04-18

## 2018-03-21 NOTE — PROGRESS NOTES
Subjective:   Patient ID:  Juanita Salas is a 42 y.o. female who presents for follow-up of heart palpitations    HPI: She just saw me last month for heart palpitations. The patient has no chest pain, SOB, TIA, syncope or pre-syncope.Patient does not exercise a lot.Many BPs 90ish so afraid to do the metoprolol.Tremendous anxiety, sweats etc.        Review of Systems   Constitution: Negative for chills, decreased appetite, diaphoresis, fever, weakness, malaise/fatigue, night sweats, weight gain and weight loss.   HENT: Negative for congestion, hoarse voice, nosebleeds, sore throat and tinnitus.    Eyes: Negative for blurred vision, double vision, vision loss in left eye, vision loss in right eye, visual disturbance and visual halos.   Cardiovascular: Positive for irregular heartbeat and palpitations. Negative for chest pain, claudication, cyanosis, dyspnea on exertion, leg swelling, near-syncope, orthopnea, paroxysmal nocturnal dyspnea and syncope.   Respiratory: Negative for cough, hemoptysis, shortness of breath, sleep disturbances due to breathing, snoring, sputum production and wheezing.    Endocrine: Negative for cold intolerance, heat intolerance, polydipsia, polyphagia and polyuria.   Hematologic/Lymphatic: Negative for adenopathy and bleeding problem. Does not bruise/bleed easily.   Skin: Negative for color change, dry skin, flushing, itching, nail changes, poor wound healing, rash, skin cancer, suspicious lesions and unusual hair distribution.   Musculoskeletal: Negative for arthritis, back pain, falls, gout, joint pain, joint swelling, muscle cramps, muscle weakness, myalgias and stiffness.   Gastrointestinal: Negative for abdominal pain, anorexia, change in bowel habit, constipation, diarrhea, dysphagia, heartburn, hematemesis, hematochezia, melena and vomiting.   Genitourinary: Negative for decreased libido, dysuria, hematuria, hesitancy and urgency.   Neurological: Negative for excessive daytime  "sleepiness, dizziness, focal weakness, headaches, light-headedness, loss of balance, numbness, paresthesias, seizures, sensory change, tremors and vertigo.   Psychiatric/Behavioral: Negative for altered mental status, depression, hallucinations, memory loss, substance abuse and suicidal ideas. The patient is nervous/anxious. The patient does not have insomnia.    Allergic/Immunologic: Negative for environmental allergies and hives.       Objective: /70 (BP Location: Left arm, Patient Position: Sitting, BP Method: Large (Automatic))   Pulse 85   Ht 5' 3" (1.6 m)   Wt 56.7 kg (125 lb)   SpO2 98%   BMI 22.14 kg/m²      Physical Exam   Constitutional: She is oriented to person, place, and time. She appears well-developed and well-nourished.   HENT:   Head: Normocephalic.   Eyes: EOM are normal. Pupils are equal, round, and reactive to light.   Neck: Normal range of motion. Normal carotid pulses, no hepatojugular reflux and no JVD present. Carotid bruit is not present. No thyromegaly present.   Cardiovascular: Normal rate, regular rhythm, normal heart sounds and intact distal pulses.  Exam reveals no gallop and no friction rub.    No murmur heard.  Pulmonary/Chest: Effort normal and breath sounds normal. No tachypnea. No respiratory distress. She has no wheezes. She has no rales. She exhibits no tenderness.   Abdominal: Soft. Bowel sounds are normal. She exhibits no distension and no mass. There is no tenderness. There is no rebound and no guarding.   Musculoskeletal: Normal range of motion. She exhibits no edema or tenderness.   Lymphadenopathy:     She has no cervical adenopathy.   Neurological: She is alert and oriented to person, place, and time. No cranial nerve deficit. Coordination normal.   Skin: Skin is warm. No rash noted. No erythema.   Psychiatric: She has a normal mood and affect. Her behavior is normal. Judgment and thought content normal.       Assessment:     1. Heart palpitations    2. Former " smoker    3. Other depression    4. Sinus tachycardia    5. Vitamin D deficiency    6. Hypokalemia    7. Hypercholesterolemia    8. Panic disorder with agoraphobia        Plan:   Discussed diet , achieving and maintaining ideal body weight, and exercise.   We reviewed meds in detail.  Reassured-discussed goals, options , plan  Discussed possibility of beta blocker and visit to EP  Increase salt significantly and gain a few pounds to give room to tale a half metoprolol daily-later we could increase to whole      Juanita was seen today for palpitations.    Diagnoses and all orders for this visit:    Heart palpitations  -     metoprolol succinate (TOPROL-XL) 25 MG 24 hr tablet; Take 1 tablet (25 mg total) by mouth once daily. .5-1 once or twice daily or as directed    Former smoker    Other depression    Sinus tachycardia  -     metoprolol succinate (TOPROL-XL) 25 MG 24 hr tablet; Take 1 tablet (25 mg total) by mouth once daily. .5-1 once or twice daily or as directed    Vitamin D deficiency    Hypokalemia  -     Basic metabolic panel; Future; Expected date: 05/02/2018    Hypercholesterolemia    Panic disorder with agoraphobia  -     metoprolol succinate (TOPROL-XL) 25 MG 24 hr tablet; Take 1 tablet (25 mg total) by mouth once daily. .5-1 once or twice daily or as directed            Follow-up if symptoms worsen or fail to improve, for chem 7 6 weeks.

## 2018-03-21 NOTE — PATIENT INSTRUCTIONS
Discussed diet , achieving and maintaining ideal body weight, and exercise.   We reviewed meds in detail.  Reassured-discussed goals, options , plan  Discussed possibility of beta blocker and visit to EP  Increase salt significantly and gain a few pounds to give room to tale a half metoprolol daily-later we could increase to whole

## 2018-03-22 ENCOUNTER — PATIENT MESSAGE (OUTPATIENT)
Dept: PSYCHIATRY | Facility: CLINIC | Age: 43
End: 2018-03-22

## 2018-03-23 ENCOUNTER — PATIENT MESSAGE (OUTPATIENT)
Dept: INTERNAL MEDICINE | Facility: CLINIC | Age: 43
End: 2018-03-23

## 2018-03-26 ENCOUNTER — OFFICE VISIT (OUTPATIENT)
Dept: PSYCHIATRY | Facility: CLINIC | Age: 43
End: 2018-03-26
Payer: COMMERCIAL

## 2018-03-26 ENCOUNTER — TELEPHONE (OUTPATIENT)
Dept: ELECTROPHYSIOLOGY | Facility: CLINIC | Age: 43
End: 2018-03-26

## 2018-03-26 ENCOUNTER — PATIENT MESSAGE (OUTPATIENT)
Dept: PSYCHIATRY | Facility: CLINIC | Age: 43
End: 2018-03-26

## 2018-03-26 VITALS
WEIGHT: 124.75 LBS | SYSTOLIC BLOOD PRESSURE: 118 MMHG | HEART RATE: 83 BPM | DIASTOLIC BLOOD PRESSURE: 70 MMHG | HEIGHT: 63 IN | BODY MASS INDEX: 22.11 KG/M2

## 2018-03-26 DIAGNOSIS — F41.0 PANIC DISORDER: ICD-10-CM

## 2018-03-26 DIAGNOSIS — F41.1 GAD (GENERALIZED ANXIETY DISORDER): Primary | Chronic | ICD-10-CM

## 2018-03-26 DIAGNOSIS — F41.1 GAD (GENERALIZED ANXIETY DISORDER): Primary | ICD-10-CM

## 2018-03-26 DIAGNOSIS — F32.A DEPRESSION, UNSPECIFIED DEPRESSION TYPE: ICD-10-CM

## 2018-03-26 PROCEDURE — 99999 PR PBB SHADOW E&M-EST. PATIENT-LVL II: CPT | Mod: PBBFAC,,, | Performed by: PSYCHIATRY & NEUROLOGY

## 2018-03-26 PROCEDURE — 3008F BODY MASS INDEX DOCD: CPT | Mod: CPTII,S$GLB,, | Performed by: PSYCHIATRY & NEUROLOGY

## 2018-03-26 PROCEDURE — 99213 OFFICE O/P EST LOW 20 MIN: CPT | Mod: S$GLB,,, | Performed by: PSYCHIATRY & NEUROLOGY

## 2018-03-26 PROCEDURE — 90832 PSYTX W PT 30 MINUTES: CPT | Mod: S$GLB,,, | Performed by: SOCIAL WORKER

## 2018-03-26 RX ORDER — SERTRALINE HYDROCHLORIDE 50 MG/1
50 TABLET, FILM COATED ORAL DAILY
Qty: 30 TABLET | Refills: 2 | Status: SHIPPED | OUTPATIENT
Start: 2018-03-26 | End: 2018-06-12 | Stop reason: SDUPTHER

## 2018-03-26 NOTE — PROGRESS NOTES
Individual Psychotherapy (PhD/LCSW)    3/26/2018    Site:  [facility]         Therapeutic Intervention: Met with patient.  Outpatient - Insight oriented psychotherapy 30 min - CPT code 68268    Chief complaint/reason for encounter: depression, anxiety and behavior     Interval history and content of current session: she is more stable, doing better, family better, sleep is good, discussed stress management skills, coping skills, time for her, and taking better care of herself, and doing a lot better, and is bobo a heart monitor device to check heart concerns, and will know later the results. Emphasized taking excellent care of herself and getting MD advice also.    Treatment plan:  · Target symptoms: depression, anxiety , adjustment, work stress  · Why chosen therapy is appropriate versus another modality: relevant to diagnosis, patient responds to this modality, evidence based practice  · Outcome monitoring methods: self-report, observation  · Therapeutic intervention type: insight oriented psychotherapy, behavior modifying psychotherapy, supportive psychotherapy    Risk parameters:  Patient reports no suicidal ideation  Patient reports no homicidal ideation  Patient reports no self-injurious behavior  Patient reports no violent behavior    Verbal deficits: None    Patient's response to intervention:  The patient's response to intervention is accepting, motivated.    Progress toward goals and other mental status changes:  The patient's progress toward goals is fair .    Diagnosis:     ICD-10-CM ICD-9-CM   1. LUPE (generalized anxiety disorder) F41.1 300.02   2. Panic disorder F41.0 300.01   3. Depression, unspecified depression type F32.9 311       Plan:  individual psychotherapy and consult psychiatrist for medication evaluation    Return to clinic: 1 month    Length of Service (minutes): 30

## 2018-03-26 NOTE — PROGRESS NOTES
"Outpatient Psychiatry Follow-Up Visit (MD/NP)    3/26/2018    Clinical Status of Patient:  Outpatient (Ambulatory)    Chief Complaint:  Juanita Salas is a 42 y.o. female who presents today for follow-up of anxiety.  Met with patient.      Interval History and Content of Current Session:  Interim Events/Subjective Report/Content of Current Session: Ms. Salas reports that she increased her zoloft to 50mg two weeks ago.  She denies any side effects from the medication.  She notes that she continues to do more in public and has needed less klonopin (only took a fourth of a pill this week before going to a [employer] boil).  Notes she is "a lot better" at work, specifically, has not missed a day since she has started coming to treatment.  She still has periods of time where she is experiencing increased heart rate with associated dizziness.  Does note that they are less severe, a little less often since starting the zoloft.  Still with fear that it is a cardiac concern but she has accepted that this could all be anxiety.  She has an appointment with an EP Wednesday to review results of 30 day monitor.  She did not start the beta blocker from her cardiology appointment last week.  She repeatedly states that she wants to stay at 50mg of zoloft for "a while".  Notes boyfriend has commented that her mood has improved some, she continues to drive more.  She continues to report low BP readings on her home monitor.  All readings for the last month in our system have been within normal limits.    Psychotherapy:  · Target symptoms: anxiety   · Why chosen therapy is appropriate versus another modality: relevant to diagnosis  · Outcome monitoring methods: self-report, observation  · Therapeutic intervention type: insight oriented psychotherapy, behavior modifying psychotherapy, supportive psychotherapy  · Topics discussed/themes: building skills sets for symptom management, symptom recognition  · The patient's response to the " "intervention is accepting. The patient's progress toward treatment goals is fair.   · Duration of intervention: 8 minutes.    Review of Systems   · PSYCHIATRIC: Pertinant items are noted in the narrative.  · NEUROLOGIC: No weakness, sensory changes, seizures, confusion, memory loss, tremor or other abnormal movements. Positive for dizziness.  · RESPIRATORY: No shortness of breath.  · CARDIOVASCULAR: No tachycardia or chest pain.  · GASTROINTESTINAL: No nausea, vomiting, pain, constipation or diarrhea.    Past Medical, Family and Social History: The patient's past medical, family and social history have been reviewed and updated as appropriate within the electronic medical record - see encounter notes.    Compliance: yes    Side effects: None    Risk Parameters:  Patient reports no suicidal ideation  Patient reports no homicidal ideation  Patient reports no self-injurious behavior  Patient reports no violent behavior    Exam (detailed: at least 9 elements; comprehensive: all 15 elements)   Constitutional  Vitals:  Most recent vital signs, dated less than 90 days prior to this appointment, were reviewed.   Vitals:    03/26/18 0803   BP: 118/70   Pulse: 83   Weight: 56.6 kg (124 lb 12.5 oz)   Height: 5' 3" (1.6 m)        General:  unremarkable, age appropriate, normal weight, casually dressed     Musculoskeletal  Muscle Strength/Tone:  not examined   Gait & Station:  non-ataxic     Psychiatric  Speech:  no latency; no press   Mood & Affect:  anxious  mood-congruent, anxious   Thought Process:  normal and logical   Associations:  intact   Thought Content:  normal, no suicidality, no homicidality, delusions, or paranoia   Insight:  intact, has awareness of illness   Judgement: behavior is adequate to circumstances   Orientation:  grossly intact   Memory: intact for content of interview   Language: grossly intact, able to name, able to repeat   Attention Span & Concentration:  able to focus   Fund of Knowledge:  intact " and appropriate to age and level of education     Assessment and Diagnosis   Status/Progress: Based on the examination today, the patient's problem(s) is/are improving but still present.  New problems have not been presented today.   Co-morbidities, Diagnostic uncertainty and Lack of compliance are not complicating management of the primary condition.  There are no active rule-out diagnoses for this patient at this time.     General Impression:       ICD-10-CM ICD-9-CM   1. LUPE (generalized anxiety disorder) F41.1 300.02   2. Panic disorder F41.0 300.01       Intervention/Counseling/Treatment Plan   · Continue zoloft dose to 50mg daily (she increased dose 2 weeks ago).   · Continue Klonopin 0.5mg daily PRN for increased anxiety/panic attacks.  Patient currently taking a fourth of a tablet at a time.  Has only required one dose over the last week.  · Continue care per cardiology.  EP appointment on Wednesday.  · Continue individual psychotherapy.      Return to Clinic: 1 month

## 2018-03-27 ENCOUNTER — TELEPHONE (OUTPATIENT)
Dept: ELECTROPHYSIOLOGY | Facility: CLINIC | Age: 43
End: 2018-03-27

## 2018-03-27 NOTE — TELEPHONE ENCOUNTER
Called pt to review past medical history prior to apt in AM. States she does not have any outside records. Currently wearing event monitor. Will obtain rhythm strips prior to visit. Confirmed 730 AM EKG with clinic visit following. Pt denies any questions/concerns at this time.

## 2018-03-28 ENCOUNTER — INITIAL CONSULT (OUTPATIENT)
Dept: ELECTROPHYSIOLOGY | Facility: CLINIC | Age: 43
End: 2018-03-28
Payer: COMMERCIAL

## 2018-03-28 ENCOUNTER — HOSPITAL ENCOUNTER (OUTPATIENT)
Dept: CARDIOLOGY | Facility: CLINIC | Age: 43
Discharge: HOME | End: 2018-03-28
Payer: COMMERCIAL

## 2018-03-28 VITALS
DIASTOLIC BLOOD PRESSURE: 74 MMHG | SYSTOLIC BLOOD PRESSURE: 118 MMHG | HEIGHT: 63 IN | BODY MASS INDEX: 22.11 KG/M2 | WEIGHT: 124.75 LBS | HEART RATE: 77 BPM

## 2018-03-28 DIAGNOSIS — R00.0 TACHYCARDIA: ICD-10-CM

## 2018-03-28 DIAGNOSIS — G43.009 MIGRAINE WITHOUT AURA AND WITHOUT STATUS MIGRAINOSUS, NOT INTRACTABLE: ICD-10-CM

## 2018-03-28 DIAGNOSIS — R00.0 TACHYCARDIA: Primary | ICD-10-CM

## 2018-03-28 DIAGNOSIS — R00.2 HEART PALPITATIONS: Primary | Chronic | ICD-10-CM

## 2018-03-28 DIAGNOSIS — F41.1 GAD (GENERALIZED ANXIETY DISORDER): Chronic | ICD-10-CM

## 2018-03-28 PROCEDURE — 93000 ELECTROCARDIOGRAM COMPLETE: CPT | Mod: S$GLB,,, | Performed by: INTERNAL MEDICINE

## 2018-03-28 PROCEDURE — 99214 OFFICE O/P EST MOD 30 MIN: CPT | Mod: S$GLB,,, | Performed by: INTERNAL MEDICINE

## 2018-03-28 PROCEDURE — 99999 PR PBB SHADOW E&M-EST. PATIENT-LVL III: CPT | Mod: PBBFAC,,, | Performed by: INTERNAL MEDICINE

## 2018-03-28 NOTE — PROGRESS NOTES
Subjective:    Patient ID:  Juanita Salas is a 42 y.o. female who presents for evaluation of Palpitations      HPI 43 yo female with palpitations, anxiety, migraine headaches.  Has seen Dr. Lopez.  Presents for second opinion.  Has noted palpitations since last fall.  Notes dizziness, anxious, palpitations.  Intermittent in nature.  No apparent triggers.  Can occur while sitting at her desk.  11/10/17 48 Holter nsr with 4 PVC's, 19 PAC's.  Had typical symptoms while wearing the monitor.  Echo 11/1/17 EF 60-65% no significant valvular disease.  Currently wearing an event monitor.  There are numerous transmissions, all revealing normal sinus rhythm and sinus tachycardia.  BP was noted to be elevated >> placed on Toprol >> became hypotensive.  Placed on Prozac >> presented to ED for feeling poorly.  QTc 484 msec.  Switched to Zoloft >> bp improved, stopped taking beta blocker.  Feeling better overall.  More active.  No problems with sleep.  No caffeine.  Quit smoking 11/17/17.  Adequately hydrated.  Recently started walking (couple of weeks ago).  Doing Ok with this.    Review of Systems   Constitution: Negative. Negative for weakness and malaise/fatigue.   Cardiovascular: Positive for palpitations. Negative for chest pain, dyspnea on exertion, irregular heartbeat, leg swelling, near-syncope, orthopnea, paroxysmal nocturnal dyspnea and syncope.   Respiratory: Negative for cough and shortness of breath.    Neurological: Positive for dizziness. Negative for light-headedness.   All other systems reviewed and are negative.       Objective:    Physical Exam   Constitutional: She is oriented to person, place, and time. She appears well-developed and well-nourished.   Eyes: Conjunctivae are normal. No scleral icterus.   Neck: No JVD present. No tracheal deviation present.   Cardiovascular: Normal rate and regular rhythm.  PMI is not displaced.    Pulmonary/Chest: Effort normal and breath sounds normal. No respiratory  distress.   Abdominal: Soft. There is no hepatosplenomegaly. There is no tenderness.   Musculoskeletal: She exhibits no edema or tenderness.   Neurological: She is alert and oriented to person, place, and time.   Skin: Skin is warm and dry. No rash noted.   Psychiatric: She has a normal mood and affect. Her behavior is normal.         Assessment:       1. Heart palpitations    2. LUPE (generalized anxiety disorder)    3. Migraine without aura and without status migrainosus, not intractable         Plan:           Suspect her symptoms/episodes are related to elevated sympathetic tone.  Anxiety is likely the primary .  Reassurance provided.  Discontinue beta blocker.  Increase exercise.  Can f/u PRN with EP.

## 2018-03-28 NOTE — LETTER
March 28, 2018      Kumar Lopez MD  [address]  [facility] [address]           [facility]  [address]  [facility] [address]  Phone: [phone]  Fax: [phone]          Patient: Juanita Salas   MR Number: [id number]   YOB: 1975   Date of Visit: 3/28/2018       Dear Dr. Kumar Lopez:    Thank you for referring Juanita Salas to me for evaluation. Attached you will find relevant portions of my assessment and plan of care.    If you have questions, please do not hesitate to call me. I look forward to following Juanita Salas along with you.    Sincerely,    Nemesio Green MD    Enclosure  CC:  No Recipients    If you would like to receive this communication electronically, please contact [email or web address] or [phone] to request more information on [employer] access.    For providers and/or their staff who would like to refer a patient to [facility], please contact us through our one-stop-shop provider referral line, [facility], at [phone].    If you feel you have received this communication in error or would no longer like to receive these types of communications, please e-mail [email or web address]

## 2018-03-30 ENCOUNTER — PATIENT MESSAGE (OUTPATIENT)
Dept: INTERNAL MEDICINE | Facility: CLINIC | Age: 43
End: 2018-03-30

## 2018-04-02 ENCOUNTER — TELEPHONE (OUTPATIENT)
Dept: ENDOCRINOLOGY | Facility: CLINIC | Age: 43
End: 2018-04-02

## 2018-04-02 ENCOUNTER — TELEPHONE (OUTPATIENT)
Dept: INTERNAL MEDICINE | Facility: CLINIC | Age: 43
End: 2018-04-02

## 2018-04-02 ENCOUNTER — PATIENT MESSAGE (OUTPATIENT)
Dept: INTERNAL MEDICINE | Facility: CLINIC | Age: 43
End: 2018-04-02

## 2018-04-02 DIAGNOSIS — R23.2 FLUSHING: Primary | ICD-10-CM

## 2018-04-02 NOTE — TELEPHONE ENCOUNTER
Pt wants to hold off on estradiol, FSH, and LH as she doesn't want to come off of OCPs yet.     Agreeable with tryptase. Would also have her do urine 5HIAA 24hr as her plasma 5HIAA is mildly elevated but unclear significance.  Her urine metanephrines wnl.     Component      Latest Ref Rng & Units 3/14/2018 3/8/2018   Hours Collected      hr 24    Urine Total Volume      mL 1050    Creatinine, urine - per volume      mg/dL 62    Creatinine, urine - per 24 hours      700 - 1600 mg/d 651 (L)    Metanephrines, 24H Ur      39 - 143 ug/d 87    Metanephrines, urine - ratio to CRT      0 - 300 ug/g     Metanephrines, urine - per volume      ug/L 83    Normetanephrine, 24H Ur      109 - 393 ug/d 146    Normetanephrines, urine - ratio to CRT      0 - 400 ug/g     Normetanephrines, urine - per volume      ug/L 139    Metanephrines 24 Hr Interp.       See Note    5-HIAA, Plasma (Neuroend)      0 - 22 ng/mL  25 (H)

## 2018-04-02 NOTE — TELEPHONE ENCOUNTER
5 hiaa borderline elevated.  rec repeat labs but avoid tryptophan rich foods/meds 3 days prior.  Will message MA to set up repeat studies later this week, pt sent list of food/meds to avoid this week.

## 2018-04-02 NOTE — TELEPHONE ENCOUNTER
----- Message from Lary Yan sent at 3/27/2018  3:22 PM CDT -----  Contact: Juanita   [phone]  Pt.says she only wants to do one lab, wants to go to [facility] lab.   Name of lab: tryptase .    Pls call to discuss this further, as per caller.

## 2018-04-10 ENCOUNTER — PATIENT MESSAGE (OUTPATIENT)
Dept: INTERNAL MEDICINE | Facility: CLINIC | Age: 43
End: 2018-04-10

## 2018-04-10 DIAGNOSIS — R53.83 FATIGUE, UNSPECIFIED TYPE: ICD-10-CM

## 2018-04-10 DIAGNOSIS — N92.4 EXCESSIVE BLEEDING IN PREMENOPAUSAL PERIOD: Primary | ICD-10-CM

## 2018-04-10 DIAGNOSIS — E55.9 VITAMIN D DEFICIENCY: ICD-10-CM

## 2018-04-11 ENCOUNTER — PATIENT MESSAGE (OUTPATIENT)
Dept: INTERNAL MEDICINE | Facility: CLINIC | Age: 43
End: 2018-04-11

## 2018-04-17 PROCEDURE — 99284 EMERGENCY DEPT VISIT MOD MDM: CPT | Mod: 25

## 2018-04-18 ENCOUNTER — HOSPITAL ENCOUNTER (EMERGENCY)
Facility: HOSPITAL | Age: 43
Discharge: HOME | End: 2018-04-18
Attending: EMERGENCY MEDICINE
Payer: COMMERCIAL

## 2018-04-18 ENCOUNTER — PATIENT MESSAGE (OUTPATIENT)
Dept: INTERNAL MEDICINE | Facility: CLINIC | Age: 43
End: 2018-04-18

## 2018-04-18 VITALS
OXYGEN SATURATION: 98 % | HEART RATE: 84 BPM | TEMPERATURE: 98 F | BODY MASS INDEX: 21.97 KG/M2 | HEIGHT: 63 IN | WEIGHT: 124 LBS | SYSTOLIC BLOOD PRESSURE: 107 MMHG | RESPIRATION RATE: 16 BRPM | DIASTOLIC BLOOD PRESSURE: 66 MMHG

## 2018-04-18 DIAGNOSIS — A08.4 VIRAL GASTROENTERITIS: ICD-10-CM

## 2018-04-18 DIAGNOSIS — E87.6 HYPOKALEMIA: ICD-10-CM

## 2018-04-18 DIAGNOSIS — R10.9 ABDOMINAL PAIN: Primary | ICD-10-CM

## 2018-04-18 DIAGNOSIS — R00.2 HEART PALPITATIONS: Chronic | ICD-10-CM

## 2018-04-18 DIAGNOSIS — R19.7 DIARRHEA, UNSPECIFIED TYPE: ICD-10-CM

## 2018-04-18 DIAGNOSIS — R00.0 SINUS TACHYCARDIA: ICD-10-CM

## 2018-04-18 LAB
ALBUMIN SERPL BCP-MCNC: 3.9 G/DL
ALP SERPL-CCNC: 38 U/L
ALT SERPL W/O P-5'-P-CCNC: 9 U/L
AMPHET+METHAMPHET UR QL: NEGATIVE
ANION GAP SERPL CALC-SCNC: 8 MMOL/L
AST SERPL-CCNC: 11 U/L
B-HCG UR QL: NEGATIVE
BACTERIA #/AREA URNS HPF: ABNORMAL /HPF
BARBITURATES UR QL SCN>200 NG/ML: NEGATIVE
BASOPHILS # BLD AUTO: 0.02 K/UL
BASOPHILS NFR BLD: 0.2 %
BENZODIAZ UR QL SCN>200 NG/ML: NEGATIVE
BILIRUB SERPL-MCNC: 0.4 MG/DL
BILIRUB UR QL STRIP: NEGATIVE
BNP SERPL-MCNC: 17 PG/ML
BUN SERPL-MCNC: 9 MG/DL
BZE UR QL SCN: NEGATIVE
CALCIUM SERPL-MCNC: 9 MG/DL
CANNABINOIDS UR QL SCN: NEGATIVE
CHLORIDE SERPL-SCNC: 104 MMOL/L
CK SERPL-CCNC: 24 U/L
CLARITY UR: CLEAR
CO2 SERPL-SCNC: 27 MMOL/L
COLOR UR: YELLOW
CREAT SERPL-MCNC: 0.7 MG/DL
CREAT UR-MCNC: 105.1 MG/DL
CTP QC/QA: YES
D DIMER PPP IA.FEU-MCNC: <0.19 MG/L FEU
DIFFERENTIAL METHOD: ABNORMAL
EOSINOPHIL # BLD AUTO: 0.2 K/UL
EOSINOPHIL NFR BLD: 1.4 %
ERYTHROCYTE [DISTWIDTH] IN BLOOD BY AUTOMATED COUNT: 12.2 %
EST. GFR  (AFRICAN AMERICAN): >60 ML/MIN/1.73 M^2
EST. GFR  (NON AFRICAN AMERICAN): >60 ML/MIN/1.73 M^2
ETHANOL SERPL-MCNC: <10 MG/DL
GLUCOSE SERPL-MCNC: 108 MG/DL
GLUCOSE UR QL STRIP: NEGATIVE
HCT VFR BLD AUTO: 36.7 %
HGB BLD-MCNC: 12.4 G/DL
HGB UR QL STRIP: ABNORMAL
KETONES UR QL STRIP: NEGATIVE
LEUKOCYTE ESTERASE UR QL STRIP: NEGATIVE
LIPASE SERPL-CCNC: 15 U/L
LYMPHOCYTES # BLD AUTO: 2.1 K/UL
LYMPHOCYTES NFR BLD: 18.5 %
MCH RBC QN AUTO: 30.8 PG
MCHC RBC AUTO-ENTMCNC: 33.8 G/DL
MCV RBC AUTO: 91 FL
METHADONE UR QL SCN>300 NG/ML: NEGATIVE
MICROSCOPIC COMMENT: ABNORMAL
MONOCYTES # BLD AUTO: 0.8 K/UL
MONOCYTES NFR BLD: 6.7 %
NEUTROPHILS # BLD AUTO: 8.4 K/UL
NEUTROPHILS NFR BLD: 73 %
NITRITE UR QL STRIP: NEGATIVE
OPIATES UR QL SCN: NEGATIVE
PCP UR QL SCN>25 NG/ML: NEGATIVE
PH UR STRIP: 6 [PH] (ref 5–8)
PLATELET # BLD AUTO: 255 K/UL
PMV BLD AUTO: 10.1 FL
POTASSIUM SERPL-SCNC: 3.1 MMOL/L
PROT SERPL-MCNC: 6.7 G/DL
PROT UR QL STRIP: NEGATIVE
RBC # BLD AUTO: 4.03 M/UL
RBC #/AREA URNS HPF: 2 /HPF (ref 0–4)
SODIUM SERPL-SCNC: 139 MMOL/L
SP GR UR STRIP: 1.01 (ref 1–1.03)
TOXICOLOGY INFORMATION: NORMAL
TROPONIN I SERPL DL<=0.01 NG/ML-MCNC: <0.006 NG/ML
URN SPEC COLLECT METH UR: ABNORMAL
UROBILINOGEN UR STRIP-ACNC: NEGATIVE EU/DL
WBC # BLD AUTO: 11.52 K/UL
WBC #/AREA URNS HPF: 2 /HPF (ref 0–5)
YEAST URNS QL MICRO: ABNORMAL

## 2018-04-18 PROCEDURE — 93005 ELECTROCARDIOGRAM TRACING: CPT

## 2018-04-18 PROCEDURE — 93010 ELECTROCARDIOGRAM REPORT: CPT | Mod: ,,, | Performed by: INTERNAL MEDICINE

## 2018-04-18 PROCEDURE — 80307 DRUG TEST PRSMV CHEM ANLYZR: CPT

## 2018-04-18 PROCEDURE — 85379 FIBRIN DEGRADATION QUANT: CPT

## 2018-04-18 PROCEDURE — 80320 DRUG SCREEN QUANTALCOHOLS: CPT

## 2018-04-18 PROCEDURE — 83880 ASSAY OF NATRIURETIC PEPTIDE: CPT

## 2018-04-18 PROCEDURE — 83690 ASSAY OF LIPASE: CPT

## 2018-04-18 PROCEDURE — 25000003 PHARM REV CODE 250: Performed by: NURSE PRACTITIONER

## 2018-04-18 PROCEDURE — 84484 ASSAY OF TROPONIN QUANT: CPT

## 2018-04-18 PROCEDURE — 81000 URINALYSIS NONAUTO W/SCOPE: CPT | Mod: 59

## 2018-04-18 PROCEDURE — 25000003 PHARM REV CODE 250: Performed by: EMERGENCY MEDICINE

## 2018-04-18 PROCEDURE — 80053 COMPREHEN METABOLIC PANEL: CPT

## 2018-04-18 PROCEDURE — 82550 ASSAY OF CK (CPK): CPT

## 2018-04-18 PROCEDURE — 85025 COMPLETE CBC W/AUTO DIFF WBC: CPT

## 2018-04-18 PROCEDURE — 81025 URINE PREGNANCY TEST: CPT | Performed by: NURSE PRACTITIONER

## 2018-04-18 PROCEDURE — 96360 HYDRATION IV INFUSION INIT: CPT

## 2018-04-18 RX ORDER — LOPERAMIDE HYDROCHLORIDE 2 MG/1
2 CAPSULE ORAL 4 TIMES DAILY PRN
Qty: 12 CAPSULE | Refills: 0 | Status: SHIPPED | OUTPATIENT
Start: 2018-04-18 | End: 2018-04-28

## 2018-04-18 RX ORDER — POTASSIUM CHLORIDE 750 MG/1
10 TABLET, EXTENDED RELEASE ORAL DAILY
Qty: 5 TABLET | Refills: 0 | Status: SHIPPED | OUTPATIENT
Start: 2018-04-18 | End: 2019-06-12 | Stop reason: SDUPTHER

## 2018-04-18 RX ORDER — POTASSIUM CHLORIDE 20 MEQ/1
40 TABLET, EXTENDED RELEASE ORAL
Status: COMPLETED | OUTPATIENT
Start: 2018-04-18 | End: 2018-04-18

## 2018-04-18 RX ADMIN — POTASSIUM CHLORIDE 40 MEQ: 1500 TABLET, EXTENDED RELEASE ORAL at 02:04

## 2018-04-18 RX ADMIN — SODIUM CHLORIDE 1000 ML: 0.9 INJECTION, SOLUTION INTRAVENOUS at 01:04

## 2018-04-18 NOTE — ED TRIAGE NOTES
Pt c/o diarrhea with rectal bleeding and lower abd pain in which patient thinks it is hemorrhoids off and on x1wk

## 2018-04-18 NOTE — ED PROVIDER NOTES
Encounter Date: 4/17/2018    SCRIBE #1 NOTE: I, Mitul Cazares, am scribing for, and in the presence of,  Donita Alexander MD. I have scribed the following portions of the note - Other sections scribed: HPI, ROS, PE.       History     Chief Complaint   Patient presents with    General Illness     Pt reports onset 1wk ago lower abd cramping with diarrhea. B/l legs cramping & dizzy.     CC: General Illness    HPI: This 42 y.o. Female presents to the ED with fatigue, dizziness, and intermittent diarrhea acute onset last week. Patient reports she started having diarrhea around last Wednesday 4/11/18, about 6 days ago. This lasted for 3 days, with frequent loose stools, but improved over the weekend, Saturday 4/14/18 and Mohamud 4/15/18. However, patient had recurrent loose stool today, Tuesday 4/17/18, and started feeling fatigued. She reports feeling bloated in her abdomen. She has not vomited or had fevers though her appetite is decreased. She saw blood on the toilet paper after wiping today, but states she has had this intermittently in the past, and was told she had hemorrhoids.    Patient also notes recurrent night sweats which have been ongoing for some time and which no doctor has been able to figure out. She does not have these at present.    Compliant with home meds.    Quit smoking 11/2017.    PMH: anxiety/depression, panic disorder, HTN, recurrent blood in urine  PSH: cystoscopy, csection, D&C     Hepatitis panel negative 11/2017.  HIV negative 12/2017.    2D Echo (11/1/2017):  CONCLUSIONS     1 - Normal left ventricular systolic function (EF 60-65%).     2 - Normal right ventricular systolic function .     3 - Normal left ventricular diastolic function.      48 Hour Holter Monitor (11/10/2017):  1. Sinus rhythm with heart rates varying between 48 and 147 bpm with an average of 79 bpm.   VENTRICULAR ARRHYTHMIAS  1. There were very rare PVCs recorded totalling 4 and averaging less than 1 per hour.   2. There  were no episodes of ventricular tachycardia.  SUPRA VENTRICULAR ARRHYTHMIAS  1. There were very rare PACs recorded totalling 19 and averaging less than 1 per hour.   2. There were no episodes of sustained supraventricular tachycardia.  SINUS NODE FUNCTION  1. For the 1st 24 hour period, HRs averaging 85 bpm during waking hours and 65 bpm during sleep (11:30 PM - 8:15 AM).  2. For the 2nd 24 hour period, HRs averaging 82 bpm during waking hours and 60 bpm during sleep (10:30 PM - 6:00 AM).  3. There was no evidence of high grade SA rah block.   AV CONDUCTION  1. There was no evidence of high grade AV block.   DIARY  1. The diary was properly completed and there were no symptoms reported .       The history is provided by the patient. No  was used.     Review of patient's allergies indicates:  No Known Allergies  Past Medical History:   Diagnosis Date    Adjustment disorder     Anxiety     Depression     Fatigue     Headache     Hx of psychiatric care     Hypertension     Low vitamin D level     Panic disorder     Psychiatric problem      Past Surgical History:   Procedure Laterality Date     SECTION      DILATION AND CURETTAGE OF UTERUS       Family History   Problem Relation Age of Onset    Cancer Mother 47     pancreatic cancer     Diabetes Mother     Multiple sclerosis Sister     Hypertension Maternal Aunt     Hyperlipidemia Maternal Aunt     Hyperlipidemia Maternal Uncle     Hypertension Maternal Uncle     Hyperlipidemia Paternal Aunt     Hypertension Paternal Aunt     Hyperlipidemia Paternal Uncle     Hypertension Paternal Uncle     Stroke Maternal Grandmother     Psoriasis Maternal Grandmother     Heart disease Maternal Grandfather     Hyperlipidemia Maternal Grandfather     Hypertension Maternal Grandfather     Colon cancer Neg Hx     Esophageal cancer Neg Hx     Stomach cancer Neg Hx     Rectal cancer Neg Hx     Ulcerative colitis Neg Hx      Irritable bowel syndrome Neg Hx     Crohn's disease Neg Hx     Celiac disease Neg Hx      Social History   Substance Use Topics    Smoking status: Former Smoker     Packs/day: 0.50     Years: 2.00     Quit date: 11/17/2017    Smokeless tobacco: Never Used    Alcohol use No     Review of Systems   Constitutional: Positive for appetite change and fatigue. Negative for chills and fever.   HENT: Negative for rhinorrhea.    Eyes: Negative for redness.   Respiratory: Negative for shortness of breath.    Cardiovascular: Negative for chest pain.   Gastrointestinal: Positive for diarrhea (intermittent). Negative for abdominal pain and vomiting.   Genitourinary: Negative for dysuria.   Musculoskeletal: Negative for back pain and neck pain.   Skin: Negative for rash.   Neurological: Positive for dizziness and weakness. Negative for numbness and headaches.   Psychiatric/Behavioral: The patient is not nervous/anxious.        Physical Exam     Initial Vitals [04/18/18 0000]   BP Pulse Resp Temp SpO2   (!) 144/76 89 16 97.8 °F (36.6 °C) 98 %      MAP       98.67         Physical Exam    Nursing note and vitals reviewed.  Constitutional: She appears well-developed and well-nourished. She is cooperative.  Non-toxic appearance. No distress.   Nontoxic, awake, alert. Speaking in complete sentences. No evident distress.   HENT:   Head: Normocephalic and atraumatic.   Minimally dry lips and oropharynx.   Eyes: Conjunctivae and EOM are normal. Pupils are equal, round, and reactive to light.   Neck: Normal range of motion and full passive range of motion without pain. Neck supple.   Cardiovascular: Normal rate, regular rhythm, normal heart sounds, intact distal pulses and normal pulses.   No murmur heard.  Pulmonary/Chest: Effort normal and breath sounds normal. No respiratory distress. She has no wheezes. She has no rales.   Abdominal: Soft. Normal appearance and bowel sounds are normal. She exhibits no distension and no mass. There  is no tenderness. There is no rebound and no guarding.   Musculoskeletal: Normal range of motion. She exhibits no edema or tenderness.   Neurological: She is alert and oriented to person, place, and time. She has normal strength.   Skin: Skin is warm, dry and intact. No rash noted.   Psychiatric: She has a normal mood and affect. Her speech is normal and behavior is normal.         ED Course   Procedures  Labs Reviewed   CBC W/ AUTO DIFFERENTIAL - Abnormal; Notable for the following:        Result Value    Hematocrit 36.7 (*)     Gran # (ANC) 8.4 (*)     All other components within normal limits   COMPREHENSIVE METABOLIC PANEL - Abnormal; Notable for the following:     Potassium 3.1 (*)     Alkaline Phosphatase 38 (*)     ALT 9 (*)     All other components within normal limits   URINALYSIS - Abnormal; Notable for the following:     Occult Blood UA 2+ (*)     All other components within normal limits   URINALYSIS MICROSCOPIC - Abnormal; Notable for the following:     Yeast, UA Rare (*)     All other components within normal limits   LIPASE   CK   DRUG SCREEN PANEL, URINE EMERGENCY   ALCOHOL,MEDICAL (ETHANOL)   D DIMER, QUANTITATIVE   B-TYPE NATRIURETIC PEPTIDE   TROPONIN I   POCT URINE PREGNANCY     EKG Readings: (Independently Interpreted)   00:04: NSR, HR 95. Normal axis. Q waves in II, III, aVF, V4-V6. TWI in II, III, aVF. No STEMI. Morphology c/w 2/16/18.          Medical Decision Making:   History:   Old Medical Records: I decided to obtain old medical records.  Old Records Summarized: records from previous admission(s) and records from clinic visits.  Initial Assessment:   42 y.o. Female with generalized fatigue, recent diarrhea, decreased appetite. Also complains of recurrent palpitations.  Differential Diagnosis:   Ddx includes electrolyte derangement, viral or bacterial gastroenteritis/colitis, dehydration, CELINE, dysrhythmia, ACS equivalent, PE, other.  Independently Interpreted Test(s):   I have ordered and  independently interpreted EKG Reading(s) - see prior notes  Clinical Tests:   Lab Tests: Ordered and Reviewed  Medical Tests: Ordered and Reviewed  ED Management:  Patient overall appears well. Vitals are stable. Abdomen is soft and nontender. Low suspicion for acute surgical emergency given abdominal exam.    EKG unchanged from prior.    CBC, CMP, CPK, lipase, dimer, troponin, BNP, UPT, UA reassuring. Patient has 2+ occult blood on UA but this is recurrent and she has been worked up by cystoscopy recently. She has a potassium of 3.1. I have repleted this orally here and by rx on d/c. With negative troponin, unlikely ACS. With normal dimer, unlikely PE.    Recent holter monitoring (November 2017) showed PACs/PVCs.    Given well appearance, stable vitals, and patient tolerating PO, this patient is stable for d/c with outpatient f/u. Suspect viral gastroenteritis/colitis. Rx'ed immodium PRN. F/u to primary care. Patient wishes to transition care to  (was being followed at [facility]) so I have referred her here.            Scribe Attestation:   Scribe #1: I performed the above scribed service and the documentation accurately describes the services I performed. I attest to the accuracy of the note.    Attending Attestation:           Physician Attestation for Scribe:  Physician Attestation Statement for Scribe #1: I, Donita Alexander MD, reviewed documentation, as scribed by Mitul Cazares in my presence, and it is both accurate and complete.                    Clinical Impression:   The primary encounter diagnosis was Abdominal pain. Diagnoses of Diarrhea, unspecified type, Hypokalemia, Viral gastroenteritis, Heart palpitations, and Sinus tachycardia were also pertinent to this visit.                           Donita Alexander MD  04/18/18 [id number]

## 2018-04-18 NOTE — ED NOTES
Report received from Ky CAMPOS. Pt resting comfortably in stretcher, in no acute distress. Family at bedside. VSS. Will continue to monitor.

## 2018-04-27 ENCOUNTER — LAB VISIT (OUTPATIENT)
Dept: LAB | Facility: HOSPITAL | Age: 43
End: 2018-04-27
Attending: INTERNAL MEDICINE
Payer: COMMERCIAL

## 2018-04-27 DIAGNOSIS — L74.9 SWEATING ABNORMALITY: ICD-10-CM

## 2018-04-27 DIAGNOSIS — R53.83 FATIGUE, UNSPECIFIED TYPE: ICD-10-CM

## 2018-04-27 DIAGNOSIS — E87.6 HYPOKALEMIA: ICD-10-CM

## 2018-04-27 DIAGNOSIS — N92.4 EXCESSIVE BLEEDING IN PREMENOPAUSAL PERIOD: ICD-10-CM

## 2018-04-27 DIAGNOSIS — R23.2 FLUSHING: ICD-10-CM

## 2018-04-27 DIAGNOSIS — E55.9 VITAMIN D DEFICIENCY: ICD-10-CM

## 2018-04-27 LAB
25(OH)D3+25(OH)D2 SERPL-MCNC: 31 NG/ML
ALBUMIN SERPL BCP-MCNC: 3.7 G/DL
ALP SERPL-CCNC: 36 U/L
ALT SERPL W/O P-5'-P-CCNC: 13 U/L
ANION GAP SERPL CALC-SCNC: 8 MMOL/L
ANION GAP SERPL CALC-SCNC: 8 MMOL/L
AST SERPL-CCNC: 12 U/L
BASOPHILS # BLD AUTO: 0.03 K/UL
BASOPHILS NFR BLD: 0.4 %
BILIRUB SERPL-MCNC: 0.4 MG/DL
BUN SERPL-MCNC: 13 MG/DL
BUN SERPL-MCNC: 13 MG/DL
CALCIUM SERPL-MCNC: 9.3 MG/DL
CALCIUM SERPL-MCNC: 9.3 MG/DL
CHLORIDE SERPL-SCNC: 105 MMOL/L
CHLORIDE SERPL-SCNC: 105 MMOL/L
CO2 SERPL-SCNC: 28 MMOL/L
CO2 SERPL-SCNC: 28 MMOL/L
CREAT SERPL-MCNC: 0.6 MG/DL
CREAT SERPL-MCNC: 0.6 MG/DL
DIFFERENTIAL METHOD: NORMAL
EOSINOPHIL # BLD AUTO: 0.2 K/UL
EOSINOPHIL NFR BLD: 2.1 %
ERYTHROCYTE [DISTWIDTH] IN BLOOD BY AUTOMATED COUNT: 12.4 %
EST. GFR  (AFRICAN AMERICAN): >60 ML/MIN/1.73 M^2
EST. GFR  (AFRICAN AMERICAN): >60 ML/MIN/1.73 M^2
EST. GFR  (NON AFRICAN AMERICAN): >60 ML/MIN/1.73 M^2
EST. GFR  (NON AFRICAN AMERICAN): >60 ML/MIN/1.73 M^2
ESTRADIOL SERPL-MCNC: 11 PG/ML
FSH SERPL-ACNC: 5.7 MIU/ML
GLUCOSE SERPL-MCNC: 95 MG/DL
GLUCOSE SERPL-MCNC: 95 MG/DL
HCT VFR BLD AUTO: 37.6 %
HGB BLD-MCNC: 12.4 G/DL
LH SERPL-ACNC: 6.5 MIU/ML
LYMPHOCYTES # BLD AUTO: 1.6 K/UL
LYMPHOCYTES NFR BLD: 20.3 %
MCH RBC QN AUTO: 30.2 PG
MCHC RBC AUTO-ENTMCNC: 33 G/DL
MCV RBC AUTO: 92 FL
MONOCYTES # BLD AUTO: 0.5 K/UL
MONOCYTES NFR BLD: 5.9 %
NEUTROPHILS # BLD AUTO: 5.6 K/UL
NEUTROPHILS NFR BLD: 71.2 %
PLATELET # BLD AUTO: 264 K/UL
PMV BLD AUTO: 10.5 FL
POTASSIUM SERPL-SCNC: 3.7 MMOL/L
POTASSIUM SERPL-SCNC: 3.7 MMOL/L
PROT SERPL-MCNC: 6.7 G/DL
RBC # BLD AUTO: 4.1 M/UL
SODIUM SERPL-SCNC: 141 MMOL/L
SODIUM SERPL-SCNC: 141 MMOL/L
WBC # BLD AUTO: 7.79 K/UL

## 2018-04-27 PROCEDURE — 82306 VITAMIN D 25 HYDROXY: CPT

## 2018-04-27 PROCEDURE — 83520 IMMUNOASSAY QUANT NOS NONAB: CPT

## 2018-04-27 PROCEDURE — 36415 COLL VENOUS BLD VENIPUNCTURE: CPT

## 2018-04-27 PROCEDURE — 83001 ASSAY OF GONADOTROPIN (FSH): CPT

## 2018-04-27 PROCEDURE — 82542 COL CHROMOTOGRAPHY QUAL/QUAN: CPT

## 2018-04-27 PROCEDURE — 80053 COMPREHEN METABOLIC PANEL: CPT

## 2018-04-27 PROCEDURE — 82670 ASSAY OF TOTAL ESTRADIOL: CPT

## 2018-04-27 PROCEDURE — 83002 ASSAY OF GONADOTROPIN (LH): CPT

## 2018-04-27 PROCEDURE — 85025 COMPLETE CBC W/AUTO DIFF WBC: CPT

## 2018-04-28 ENCOUNTER — LAB VISIT (OUTPATIENT)
Dept: LAB | Facility: HOSPITAL | Age: 43
End: 2018-04-28
Attending: INTERNAL MEDICINE
Payer: COMMERCIAL

## 2018-04-28 DIAGNOSIS — R23.2 FLUSHING: ICD-10-CM

## 2018-04-28 LAB — TRYPTASE LEVEL: 3.8 NG/ML

## 2018-04-28 PROCEDURE — 83497 ASSAY OF 5-HIAA: CPT

## 2018-04-30 ENCOUNTER — OFFICE VISIT (OUTPATIENT)
Dept: PSYCHIATRY | Facility: CLINIC | Age: 43
End: 2018-04-30
Payer: COMMERCIAL

## 2018-04-30 VITALS
DIASTOLIC BLOOD PRESSURE: 68 MMHG | SYSTOLIC BLOOD PRESSURE: 123 MMHG | HEART RATE: 94 BPM | WEIGHT: 125.56 LBS | BODY MASS INDEX: 22.24 KG/M2

## 2018-04-30 DIAGNOSIS — F41.1 GAD (GENERALIZED ANXIETY DISORDER): Primary | Chronic | ICD-10-CM

## 2018-04-30 PROCEDURE — 99499 UNLISTED E&M SERVICE: CPT | Mod: S$GLB,,, | Performed by: PSYCHIATRY & NEUROLOGY

## 2018-04-30 PROCEDURE — 99999 PR PBB SHADOW E&M-EST. PATIENT-LVL I: CPT | Mod: PBBFAC,,, | Performed by: PSYCHIATRY & NEUROLOGY

## 2018-05-02 LAB
5HIAA & CREATININE UR-IMP: NORMAL
5OH-INDOLEACETATE 24H UR-MCNC: 2.2 MG/L
5OH-INDOLEACETATE 24H UR-MRATE: NORMAL MG/D (ref 0–15)
5OH-INDOLEACETATE/CREAT 24H UR: 4 MG/GCR (ref 0–14)
COLLECT DURATION TIME SPEC: 24 HR
CREAT 24H UR-MRATE: NORMAL MG/D (ref 700–1600)
CREAT UR-MCNC: 60 MG/DL
SPECIMEN VOL ?TM UR: NORMAL ML

## 2018-05-03 ENCOUNTER — PATIENT MESSAGE (OUTPATIENT)
Dept: ENDOCRINOLOGY | Facility: CLINIC | Age: 43
End: 2018-05-03

## 2018-05-07 ENCOUNTER — OFFICE VISIT (OUTPATIENT)
Dept: FAMILY MEDICINE | Facility: CLINIC | Age: 43
End: 2018-05-07
Payer: COMMERCIAL

## 2018-05-07 VITALS
DIASTOLIC BLOOD PRESSURE: 60 MMHG | SYSTOLIC BLOOD PRESSURE: 100 MMHG | OXYGEN SATURATION: 97 % | BODY MASS INDEX: 22.64 KG/M2 | TEMPERATURE: 98 F | WEIGHT: 127.81 LBS | HEART RATE: 90 BPM | HEIGHT: 63 IN

## 2018-05-07 DIAGNOSIS — K60.2 ANAL FISSURE: Primary | ICD-10-CM

## 2018-05-07 PROCEDURE — 99214 OFFICE O/P EST MOD 30 MIN: CPT | Mod: S$GLB,,, | Performed by: NURSE PRACTITIONER

## 2018-05-07 PROCEDURE — 99999 PR PBB SHADOW E&M-EST. PATIENT-LVL IV: CPT | Mod: PBBFAC,,, | Performed by: NURSE PRACTITIONER

## 2018-05-07 PROCEDURE — 3008F BODY MASS INDEX DOCD: CPT | Mod: CPTII,S$GLB,, | Performed by: NURSE PRACTITIONER

## 2018-05-07 RX ORDER — LIDOCAINE HYDROCHLORIDE 20 MG/ML
JELLY TOPICAL DAILY PRN
Qty: 30 ML | Refills: 0 | Status: SHIPPED | OUTPATIENT
Start: 2018-05-07 | End: 2018-06-12

## 2018-05-07 RX ORDER — HYDROCORTISONE 25 MG/G
CREAM TOPICAL
COMMUNITY
Start: 2018-05-07 | End: 2020-03-03 | Stop reason: SDUPTHER

## 2018-05-07 RX ORDER — VIT C/E/ZN/COPPR/LUTEIN/ZEAXAN 250MG-90MG
1000 CAPSULE ORAL 2 TIMES DAILY
COMMUNITY

## 2018-05-07 NOTE — PATIENT INSTRUCTIONS
Hold protoform    Anal Fissure   The anal canal is the end portion of the intestinal tract. It includes the rectum and anus. Stool is passed through the anus. Sometimes a crack or tear develops in the lining of the anal canal. This condition is called an anal fissure.  Anal fissures are caused by trauma or stretching of the anal canal. This is most often due to constipation (having hard, [id number] stools). Severe diarrhea or insertion of an object into the anal canal may also cause a fissure.  Symptoms include pain and bleeding, especially during a bowel movement. Sometimes there is swelling, itching, and skin irritation. The area may spasm, causing more pain and skin separation. The most common complication is infection, which may lead to an abscess (pocket of pus). When this happens, there may also be discharge from the fissure.  An anal fissure usually heals on its own with no special treatment. Home care instructions to help prevent constipation and relieve symptoms may be given. Once the area has healed, follow-up tests may be needed.  In some cases, a fissure does not heal on its own. Surgery may then be needed to close the tear.  Home care  Medicines  Your child may be prescribed medicines, such as pain relievers, stool softeners, or laxatives. Make sure to follow all instructions when giving any of these medicines to your child. Note: Do not give your child over-the-counter medicines without talking to the provider first.  General care  · To help ease constipation, you may be told to:  ¨ Increase the fiber in your childs diet. This includes foods such as whole grains, fresh fruits, and vegetables. Fiber adds bulk to stool and absorbs water to soften stool. This helps stool pass through the colon more easily. If needed, a fiber supplement may also be prescribed.  ¨ Encourage your child to drink lots of water. This can also help soften stool.  · To help relieve pain and relax the muscles in the anus,  have your child soak in a bath with a few inches of warm water. This is a called a sitz bath. Your child should do this for 10 to 15 minutes at time, a few times a day, or as advised.  · Keep a careful record of when your child has a bowel movement and the type of stool that was passed. This may help the provider determine future care for your child. Older children should be encouraged to keep their own record.  · Check your childs anus for bleeding or signs of infection (see below). Older children may be asked to help monitor their own symptoms.  Follow-up care  Follow up with your childs healthcare provider as advised. If testing was done, youll be told the results and any new findings that may affect your childs care.  When to seek medical advice  Unless your childs healthcare provider advises otherwise, call the provider right away if:  · Your child is 3 months old or younger and has a fever of 100.4°F (38°C) or higher. (Seek treatment right away. Fever in a young baby can be a sign of a serious infection.)  · Your child is younger than 2 years of age and has a fever of 100.4°F (38°C) that lasts for more than 1 day.  · Your child is 2 years old or older and has a fever of 100.4°F (38°C) that lasts for more than 3 days.  · Your child has repeated fevers above 104°F (40°C).  Also call the provider right away if:  · Your child symptoms worsen, or they dont improve with home care measures.  · Your child has signs of infection such as increased redness, swelling, or foul-smelling drainage in the area around the fissure.  · Your child has ongoing constipation or explosive diarrhea.  Call 911  Call 911 right away if:  · Your child has trouble breathing.  · Your child has trouble swallowing.  · Your child faints.  · Your child has an unusually fast heart rate.  · Your child has large amounts of bleeding from the anus or blood in the stool.  Date Last Reviewed: 6/15/2015  © 2765-1070 [employer] [address]  [place] [address]. All rights reserved. This information is not intended as a substitute for professional medical care. Always follow your healthcare professional's instructions.

## 2018-05-07 NOTE — TELEPHONE ENCOUNTER
Component      Latest Ref Rng & Units 4/28/2018   Hours Collected      hr 24   Urine Total Volume      mL Not Provided   Creatinine, urine - per volume      mg/dL 60   Creatinine, urine - per 24 hours      700 - 1600 mg/d Not Applicable   5-HIAA, Ur      0 - 14 mg/gCR 4   5-HIAA, URINE MG/L      mg/L 2.2   5-HIAA, 24H Ur      0 - 15 mg/d Not Applicable   5-HIAA, urine interpretation       See Note   No volume reported for 24hr urine 5HIAA however given the 5HIAA concentration, would be unlikely to be carcinoid.     Component      Latest Ref Rng & Units 4/27/2018   FSH      See Text mIU/mL 5.70   LH      See Text mIU/mL 6.5   Estradiol      See Text pg/mL 11   Tryptase      <11.5 ng/mL 3.8

## 2018-05-07 NOTE — PROGRESS NOTES
Patient Name: Juanita Salas    : 1975  MRN: [id number]    Subjective:  Juanita is a 42 y.o. female who presents today for     1. Blood on wipe. This has been intermittent for a few months. Reports alternating  loose stool occasionally  then normal, no constipation, on potassium for recurrent low level not sure why. Reports daily  BM,  no abd pain. Has  protosol as needed for hemorrhoids which she has been suspected in the past, improves with protosol but then recurs. Hemorrhoids  Associated  with spicy foods. Tried protosol yesterday and today which has not significantly helped. Reports rectal pain.     Answers for HPI/ROS submitted by the patient on 2018   activity change: Yes  unexpected weight change: No  rhinorrhea: No  trouble swallowing: No  visual disturbance: No  chest tightness: No  polyuria: No  difficulty urinating: No  menstrual problem: No  joint swelling: No  arthralgias: No  confusion: No  dysphoric mood: No    Past Medical History  Past Medical History:   Diagnosis Date    Adjustment disorder     Anxiety     Depression     Fatigue     Headache     Hx of psychiatric care     Hypertension     Low vitamin D level     Panic disorder     Psychiatric problem        Past Surgical History  Past Surgical History:   Procedure Laterality Date     SECTION      DILATION AND CURETTAGE OF UTERUS         Family History  Family History   Problem Relation Age of Onset    Cancer Mother 47        pancreatic cancer     Diabetes Mother     Multiple sclerosis Sister     Hypertension Maternal Aunt     Hyperlipidemia Maternal Aunt     Hyperlipidemia Maternal Uncle     Hypertension Maternal Uncle     Hyperlipidemia Paternal Aunt     Hypertension Paternal Aunt     Hyperlipidemia Paternal Uncle     Hypertension Paternal Uncle     Stroke Maternal Grandmother     Psoriasis Maternal Grandmother     Heart disease Maternal Grandfather     Hyperlipidemia Maternal Grandfather      Hypertension Maternal Grandfather     Colon cancer Neg Hx     Esophageal cancer Neg Hx     Stomach cancer Neg Hx     Rectal cancer Neg Hx     Ulcerative colitis Neg Hx     Irritable bowel syndrome Neg Hx     Crohn's disease Neg Hx     Celiac disease Neg Hx        Social History  Social History     Social History    Marital status:      Spouse name: N/A    Number of children: N/A    Years of education: N/A     Occupational History    Not on file.     Social History Main Topics    Smoking status: Former Smoker     Packs/day: 0.50     Years: 2.00     Quit date: 11/17/2017    Smokeless tobacco: Never Used    Alcohol use No    Drug use: No    Sexual activity: Yes     Partners: Male     Other Topics Concern    Not on file     Social History Narrative     with son (23) and daugther (14)       Allergies  Review of patient's allergies indicates:  No Known Allergies -reviewed and updated      Medications  Reviewed and updated.   Current Outpatient Prescriptions   Medication Sig Dispense Refill    cholecalciferol, vitamin D3, (VITAMIN D3) 1,000 unit capsule Take 1,000 Units by mouth 2 (two) times daily.      cyanocobalamin (VITAMIN B-12) 500 MCG tablet Take 1 tablet (500 mcg total) by mouth once daily.      potassium chloride (KLOR-CON) 10 MEQ TbSR Take 1 tablet (10 mEq total) by mouth once daily. 5 tablet 0    PROCTO-MED HC 2.5 % rectal cream       sertraline (ZOLOFT) 50 MG tablet Take 1 tablet (50 mg total) by mouth once daily. 30 tablet 2    TRINESSA LO 0.18/0.215/0.25 mg-25 mcg tablet TK 1 T PO QD  2    clonazePAM (KLONOPIN) 0.5 MG tablet Take 1 tablet (0.5 mg total) by mouth once daily. 30 tablet 1    lidocaine HCL 2% (XYLOCAINE) 2 % jelly Apply topically daily as needed. For pain 30 mL 0     No current facility-administered medications for this visit.          Review of Systems   Constitutional: Positive for diaphoresis (reports unknown cause night sweats past few months).  "Negative for chills and fever.   HENT: Negative for hearing loss.    Eyes: Negative for discharge.   Respiratory: Negative for wheezing.    Cardiovascular: Positive for palpitations. Negative for chest pain.   Gastrointestinal: Negative for blood in stool, constipation, diarrhea and vomiting.   Genitourinary: Negative for dysuria and hematuria.   Musculoskeletal: Negative for neck pain.   Neurological: Negative for weakness and headaches.   Endo/Heme/Allergies: Negative for polydipsia.         Physical Exam  /60 (BP Location: Right arm, Patient Position: Sitting, BP Method: Medium (Manual))   Pulse 90   Temp 97.9 °F (36.6 °C) (Oral)   Ht 5' 3" (1.6 m)   Wt 58 kg (127 lb 12.8 oz)   LMP 04/30/2018   SpO2 97%   BMI 22.64 kg/m²   Physical Exam   Constitutional: She is oriented to person, place, and time. She appears well-developed. No distress.   HENT:   Head: Normocephalic.   Cardiovascular: Normal rate, regular rhythm and normal heart sounds.    Pulmonary/Chest: Effort normal and breath sounds normal.   Abdominal: Soft. There is no tenderness.   Genitourinary:   Genitourinary Comments: 12 oclock tender  fissure at the peroneal below the vagina    Neurological: She is alert and oriented to person, place, and time. Coordination normal.   Skin: She is not diaphoretic.         Assessment/Plan:  Juanita Salas is a 42 y.o. female who presents today for :    Anal fissure  Pt advised to avoid straining or rubbing area, topical analgesic with lidocaine until heals, will consult GI as well given chronic intermittent rectal bleeding, night sweats and family history early cancer (mother pancreatic cancer age 47)   -     Ambulatory referral to Gastroenterology  -     lidocaine HCL 2% (XYLOCAINE) 2 % jelly; Apply topically daily as needed. For pain  Dispense: 30 mL; Refill: 0        Follow-up if symptoms worsen or fail to improve.      "

## 2018-05-14 ENCOUNTER — CLINICAL SUPPORT (OUTPATIENT)
Dept: SMOKING CESSATION | Facility: CLINIC | Age: 43
End: 2018-05-14
Payer: COMMERCIAL

## 2018-05-14 DIAGNOSIS — F17.200 NICOTINE DEPENDENCE: Primary | ICD-10-CM

## 2018-05-14 PROCEDURE — 99407 BEHAV CHNG SMOKING > 10 MIN: CPT | Mod: S$GLB,,, | Performed by: INTERNAL MEDICINE

## 2018-05-14 NOTE — PROGRESS NOTES
Spoke with patient today in regard to smoking cessation progress for 3/6 month follow up, she states tobacco free for 6 months. Commended patient on the accomplishment. Informed patient of benefit period, a future follow up for 1 year, and contact information if any further help or support is needed. Will complete smart form for 3/6 month follow up on Quit attempt #1.

## 2018-05-15 LAB — 5-HIAA, PLASMA (NEUROEND): 9 NG/ML

## 2018-05-24 ENCOUNTER — OFFICE VISIT (OUTPATIENT)
Dept: INTERNAL MEDICINE | Facility: CLINIC | Age: 43
End: 2018-05-24
Payer: COMMERCIAL

## 2018-05-24 ENCOUNTER — HOSPITAL ENCOUNTER (OUTPATIENT)
Dept: RADIOLOGY | Facility: HOSPITAL | Age: 43
Discharge: HOME | End: 2018-05-24
Attending: STUDENT IN AN ORGANIZED HEALTH CARE EDUCATION/TRAINING PROGRAM
Payer: COMMERCIAL

## 2018-05-24 VITALS
DIASTOLIC BLOOD PRESSURE: 78 MMHG | HEART RATE: 90 BPM | OXYGEN SATURATION: 99 % | BODY MASS INDEX: 22.89 KG/M2 | HEIGHT: 63 IN | WEIGHT: 129.19 LBS | SYSTOLIC BLOOD PRESSURE: 116 MMHG

## 2018-05-24 DIAGNOSIS — K64.8 INTERNAL HEMORRHOID: Primary | ICD-10-CM

## 2018-05-24 DIAGNOSIS — M79.604 RIGHT LEG PAIN: ICD-10-CM

## 2018-05-24 PROCEDURE — 99213 OFFICE O/P EST LOW 20 MIN: CPT | Mod: S$GLB,,, | Performed by: STUDENT IN AN ORGANIZED HEALTH CARE EDUCATION/TRAINING PROGRAM

## 2018-05-24 PROCEDURE — 93922 UPR/L XTREMITY ART 2 LEVELS: CPT | Mod: TC

## 2018-05-24 PROCEDURE — 99999 PR PBB SHADOW E&M-EST. PATIENT-LVL IV: CPT | Mod: PBBFAC,,, | Performed by: STUDENT IN AN ORGANIZED HEALTH CARE EDUCATION/TRAINING PROGRAM

## 2018-05-24 PROCEDURE — 3008F BODY MASS INDEX DOCD: CPT | Mod: CPTII,S$GLB,, | Performed by: STUDENT IN AN ORGANIZED HEALTH CARE EDUCATION/TRAINING PROGRAM

## 2018-05-24 PROCEDURE — 93922 UPR/L XTREMITY ART 2 LEVELS: CPT | Mod: 26,,, | Performed by: RADIOLOGY

## 2018-05-24 NOTE — PROGRESS NOTES
Subjective:       Patient ID: Juanita Salas is a 42 y.o. female.    Chief Complaint: Rectal Bleeding (bleeding hemroids) and Leg Pain (right leg)    HPI     42 years old female present to the resident clinic for bleed per rectum and right leg pain.     The bleed noticed while wiping 4 weeks ago, asso with discomfort. Seen in urgent care and per exam she had fissure at that time. Bleed was sporadic since. Today she had large amount after her bowel movements and she had to use multiple wipes. She denied any pain, dark stool, constipation, hard stool, abdominal pain, abdominal distention or jaundice. She is concern about the bleed.     Right leg pain: started around 5 months ago, constat, dull all over the major muscle groups. Described as burning,. No clear claudication. She also denied any back pain or Hx of trauma. She quit smoking 6 months ago.        Review of Systems   Constitutional: Negative for activity change, chills, fever and unexpected weight change.   HENT: Negative for ear discharge, ear pain, mouth sores and trouble swallowing.    Eyes: Negative for pain and redness.   Respiratory: Negative for cough and shortness of breath.    Cardiovascular: Negative for chest pain and palpitations.   Gastrointestinal: Negative for abdominal distention, abdominal pain, constipation and nausea.   Endocrine: Negative for polydipsia and polyphagia.   Genitourinary: Negative for dysuria and hematuria.   Musculoskeletal: Negative for gait problem and neck stiffness.   Skin: Negative for rash.   Neurological: Negative for dizziness, weakness and numbness.   Hematological: Negative for adenopathy. Does not bruise/bleed easily.   Psychiatric/Behavioral: Negative for decreased concentration and sleep disturbance.       Objective:      Physical Exam   Constitutional: She is oriented to person, place, and time. She appears well-developed and well-nourished. No distress.   Eyes: No scleral icterus.   Cardiovascular: Regular  rhythm and normal heart sounds.  Tachycardia present.    No murmur heard.  Pulses:       Popliteal pulses are 0 on the right side, and 1+ on the left side.        Dorsalis pedis pulses are 0 on the right side, and 1+ on the left side.        Posterior tibial pulses are 0 on the right side, and 1+ on the left side.   Pulmonary/Chest: Effort normal and breath sounds normal.   Abdominal: Soft. Bowel sounds are normal. She exhibits no distension. There is no tenderness. No hernia.   Genitourinary:   Genitourinary Comments: No external hemorrhoids.   At 7o, felt buggy warm, internal extension. no tenderness. And no blood in withdrawal.   Also no noticeable fissure.    Musculoskeletal: Normal range of motion. She exhibits no edema or tenderness.   Neurological: She is alert and oriented to person, place, and time. She exhibits normal muscle tone. Coordination normal.   Skin: No rash noted. No erythema.       Assessment:       1. Internal hemorrhoid    2. Right leg pain        Plan:           Internal hemorrhoid:   Advise patient to continue high fiber diet, avoid constipation and hard stool.    Advise lots of fluids    Advise behavior changes ( avoid strain and long time in the seat)   referral to colorectal surgery   -     Ambulatory referral to Colorectal Surgery    Right leg pain:   Advise patient to follow PCP to complete the work up    I will order PHYLLIS to rule out PVD   -     [facility] ([facility]) Segmental Pressure Upper Ext Resting; Future        Chintan Barraza   PGY 3  Internal medicine   [phone]

## 2018-05-24 NOTE — PATIENT INSTRUCTIONS
Hydrocortisone suppositories  What is this medicine?  HYDROCORTISONE (arabella droe KOR ti sone) is a corticosteroid. It is used to decrease swelling, itching, and pain that is caused by minor skin irritations or by hemorrhoids.  How should I use this medicine?  This medicine is for rectal use only. Do not take by mouth. Wash your hands before and after use. Take off the foil wrapping. Wet the tip of the suppository with cold tap water to make it easier to use. Lie on your side with your lower leg straightened out and your upper leg bent forward toward your stomach. Lift upper buttock to expose the rectal area. Apply gentle pressure to insert the suppository completely into the rectum, pointed end first. Hold buttocks together for a few seconds. Remain lying down for about 15 minutes to avoid having the suppository come out. Do not use more often than directed.  Talk to your pediatrician regarding the use of this medicine in children. Special care may be needed.  What side effects may I notice from receiving this medicine?  Side effects that you should report to your doctor or health care professional as soon as possible:  · bloody or black, tarry stools  · painful, red, pus filled blisters in hair follicles  · rectal pain, burning or bleeding after use of medicine  Side effects that usually do not require medical attention (report to your doctor or health care professional if they continue or are bothersome):  · changes in skin color  · dry skin  · itching or irritation  What may interact with this medicine?  Interactions are not expected. Do not use any other rectal products on the affected area without telling your doctor or health care professional.  What if I miss a dose?  If you miss a dose, use it as soon as you can. If it is almost time for your next dose, use only that dose. Do not use double or extra doses.  Where should I keep my medicine?  Keep out of the reach of children.  Store at room temperature  between 20 and 25 degrees C (68 and 77 degrees F). Protect from heat and freezing. Throw away any unused medicine after the expiration date.  What should I tell my health care provider before I take this medicine?  They need to know if you have any of these conditions:  · an unusual or allergic reaction to hydrocortisone, corticosteroids, other medicines, foods, dyes, or preservatives  · pregnant or trying to get pregnant  · breast-feeding  What should I watch for while using this medicine?  Visit your doctor or health care professional for regular checks on your progress. Tell your doctor or health care professional if your symptoms do not improve after a few days of use. Do not use if there is blood in your stools.  If you get any type of infection while using this medicine, you may need to stop using this medicine until our infections clears up. Ask your doctor or health care professional for advice.  NOTE:This sheet is a summary. It may not cover all possible information. If you have questions about this medicine, talk to your doctor, pharmacist, or health care provider. Copyright© 2017 [employer]

## 2018-05-25 ENCOUNTER — TELEPHONE (OUTPATIENT)
Dept: INTERNAL MEDICINE | Facility: CLINIC | Age: 43
End: 2018-05-25

## 2018-05-28 NOTE — PROGRESS NOTES
I have reviewed the notes, assessments, and/or procedures performed this visit, and I concur with the documentation.  William Randolph MD  Internal Medicine

## 2018-05-30 ENCOUNTER — TELEPHONE (OUTPATIENT)
Dept: CARDIOLOGY | Facility: CLINIC | Age: 43
End: 2018-05-30

## 2018-05-30 ENCOUNTER — PATIENT MESSAGE (OUTPATIENT)
Dept: CARDIOLOGY | Facility: CLINIC | Age: 43
End: 2018-05-30

## 2018-05-30 DIAGNOSIS — E87.5 HYPERKALEMIA: Primary | ICD-10-CM

## 2018-06-01 ENCOUNTER — PATIENT MESSAGE (OUTPATIENT)
Dept: CARDIOLOGY | Facility: CLINIC | Age: 43
End: 2018-06-01

## 2018-06-01 ENCOUNTER — LAB VISIT (OUTPATIENT)
Dept: LAB | Facility: HOSPITAL | Age: 43
End: 2018-06-01
Attending: INTERNAL MEDICINE
Payer: COMMERCIAL

## 2018-06-01 ENCOUNTER — TELEPHONE (OUTPATIENT)
Dept: CARDIOLOGY | Facility: CLINIC | Age: 43
End: 2018-06-01

## 2018-06-01 DIAGNOSIS — E87.5 HYPERKALEMIA: ICD-10-CM

## 2018-06-01 LAB
ANION GAP SERPL CALC-SCNC: 9 MMOL/L
BUN SERPL-MCNC: 16 MG/DL
CALCIUM SERPL-MCNC: 8.8 MG/DL
CHLORIDE SERPL-SCNC: 106 MMOL/L
CO2 SERPL-SCNC: 24 MMOL/L
CREAT SERPL-MCNC: 0.6 MG/DL
EST. GFR  (AFRICAN AMERICAN): >60 ML/MIN/1.73 M^2
EST. GFR  (NON AFRICAN AMERICAN): >60 ML/MIN/1.73 M^2
GLUCOSE SERPL-MCNC: 93 MG/DL
POTASSIUM SERPL-SCNC: 4.2 MMOL/L
SODIUM SERPL-SCNC: 139 MMOL/L

## 2018-06-01 PROCEDURE — 36415 COLL VENOUS BLD VENIPUNCTURE: CPT

## 2018-06-01 PROCEDURE — 80048 BASIC METABOLIC PNL TOTAL CA: CPT

## 2018-06-02 NOTE — TELEPHONE ENCOUNTER
Results of labs done today were given to patient.    Notes recorded by Kuamr Lopez MD on 6/1/2018 at 3:01 PM CDT  Labs perfect continue exactly what you are doing

## 2018-06-12 ENCOUNTER — OFFICE VISIT (OUTPATIENT)
Dept: PSYCHIATRY | Facility: CLINIC | Age: 43
End: 2018-06-12
Payer: COMMERCIAL

## 2018-06-12 ENCOUNTER — OFFICE VISIT (OUTPATIENT)
Dept: SURGERY | Facility: CLINIC | Age: 43
End: 2018-06-12
Payer: COMMERCIAL

## 2018-06-12 VITALS
HEIGHT: 63 IN | HEART RATE: 71 BPM | WEIGHT: 130.31 LBS | BODY MASS INDEX: 23.09 KG/M2 | DIASTOLIC BLOOD PRESSURE: 72 MMHG | SYSTOLIC BLOOD PRESSURE: 117 MMHG

## 2018-06-12 VITALS
WEIGHT: 130.94 LBS | HEART RATE: 77 BPM | BODY MASS INDEX: 23.2 KG/M2 | DIASTOLIC BLOOD PRESSURE: 69 MMHG | SYSTOLIC BLOOD PRESSURE: 113 MMHG

## 2018-06-12 DIAGNOSIS — K64.8 INTERNAL HEMORRHOIDS WITH COMPLICATION: ICD-10-CM

## 2018-06-12 DIAGNOSIS — F41.1 GAD (GENERALIZED ANXIETY DISORDER): Primary | Chronic | ICD-10-CM

## 2018-06-12 DIAGNOSIS — F41.0 PANIC DISORDER: ICD-10-CM

## 2018-06-12 DIAGNOSIS — K62.5 RECTAL BLEEDING: Primary | ICD-10-CM

## 2018-06-12 PROBLEM — F32.A DEPRESSION: Status: RESOLVED | Noted: 2017-11-16 | Resolved: 2018-06-12

## 2018-06-12 PROCEDURE — 3008F BODY MASS INDEX DOCD: CPT | Mod: CPTII,S$GLB,, | Performed by: COLON & RECTAL SURGERY

## 2018-06-12 PROCEDURE — 3008F BODY MASS INDEX DOCD: CPT | Mod: CPTII,S$GLB,, | Performed by: PSYCHIATRY & NEUROLOGY

## 2018-06-12 PROCEDURE — 99999 PR PBB SHADOW E&M-EST. PATIENT-LVL III: CPT | Mod: PBBFAC,,, | Performed by: COLON & RECTAL SURGERY

## 2018-06-12 PROCEDURE — 99213 OFFICE O/P EST LOW 20 MIN: CPT | Mod: S$GLB,,, | Performed by: PSYCHIATRY & NEUROLOGY

## 2018-06-12 PROCEDURE — 99203 OFFICE O/P NEW LOW 30 MIN: CPT | Mod: 25,S$GLB,, | Performed by: COLON & RECTAL SURGERY

## 2018-06-12 PROCEDURE — 46600 DIAGNOSTIC ANOSCOPY SPX: CPT | Mod: S$GLB,,, | Performed by: COLON & RECTAL SURGERY

## 2018-06-12 PROCEDURE — 99999 PR PBB SHADOW E&M-EST. PATIENT-LVL II: CPT | Mod: PBBFAC,,, | Performed by: PSYCHIATRY & NEUROLOGY

## 2018-06-12 RX ORDER — SERTRALINE HYDROCHLORIDE 50 MG/1
50 TABLET, FILM COATED ORAL DAILY
Qty: 90 TABLET | Refills: 0 | Status: SHIPPED | OUTPATIENT
Start: 2018-06-12 | End: 2018-09-18

## 2018-06-12 NOTE — PROGRESS NOTES
"Subjective:       Patient ID: Juanita Salas is a 42 y.o. female.    Chief Complaint: Rectal Bleeding (bleeding hemroids)     HPI     42 years old female presentfor bleed per rectum     The bleed noticed while wiping 4 weeks ago, asso with discomfort. Seen in urgent care and per exam she had fissure at that time. Bleed was sporadic since.she stated had large amount after her bowel movements and she had to use multiple wipes.  Since then she has had no further bleeding is moving her bowels regularly She denied any pain, dark stool, constipation, hard stool, abdominal pain, abdominal distention or jaundice. She is concern about the bleed.       of trauma. She quit smoking 6 months ago.        Review of Systems   Constitutional: Negative for activity change, chills, fever and unexpected weight change.   HENT: Negative for ear discharge, ear pain, mouth sores and trouble swallowing.    Eyes: Negative for pain and redness.   Respiratory: Negative for cough and shortness of breath.    Cardiovascular: Negative for chest pain and palpitations.   Gastrointestinal: Negative for abdominal distention, abdominal pain, constipation and nausea.   Endocrine: Negative for polydipsia and polyphagia.   Genitourinary: Negative for dysuria and hematuria.   Musculoskeletal: Negative for gait problem and neck stiffness.   Skin: Negative for rash.   Neurological: Negative for dizziness, weakness and numbness.   Hematological: Negative for adenopathy. Does not bruise/bleed easily.   Psychiatric/Behavioral: Negative for decreased concentration and sleep disturbance.       Objective:    /72 (BP Location: Right arm, Patient Position: Sitting, BP Method: Large (Automatic))   Pulse 71   Ht 5' 3" (1.6 m)   Wt 59.1 kg (130 lb 4.7 oz)   BMI 23.08 kg/m²     Physical Exam   Constitutional: She is oriented to person, place, and time. She appears well-developed and well-nourished. No distress.   Eyes: No scleral icterus.   Cardiovascular: " Regular rhythm and normal heart sounds.     Pulmonary/Chest: Effort normal and breath sounds normal.   Abdominal: Soft. Bowel sounds are normal. She exhibits no distension. There is no tenderness. No hernia.    Musculoskeletal: Normal range of motion. She exhibits no edema or tenderness.   Neurological: She is alert and oriented to person, place, and time. She exhibits normal muscle tone. Coordination normal.   Anorectal Exam:    Anal Skin: Normal    Digital Rectal Exam:  Resting Tone normal  Squeeze normal  Relaxation with bear down present  Mass none  Rectocele  absent  Tenderness  absent    Anoscopy:  Hemorrhoids  present  Stigmata of bleeding  present  Stigmata of prolapsed  absent  Distal rectal mucosa normal        Assessment:       1. Internal hemorrhoid             Plan:   Hemorrhoids  Instructions Hemorrhoids  We Discussed:  Take fiber supplements such as Metamucil, Citrucel, Konsyl, etc. (Benefiber is less effective so avoid)  The goal is 1-2 nonstraining nonloose bowel movements per day.  In general we recommend about 25-30 grams of fiber daily or reaching the above bowel movement goal.  Sitz baths or tub soaks three times a day  This will cause resolution of hemorrhoids in the majority of cases.  RTC prn

## 2018-06-12 NOTE — PROGRESS NOTES
Outpatient Psychiatry Follow-Up Visit (MD/NP)    6/12/2018    Clinical Status of Patient:  Outpatient (Ambulatory)    Chief Complaint:  Juanita Salas is a 42 y.o. female who presents today for follow-up of anxiety.  Met with patient.      Interval History and Content of Current Session:  Interim Events/Subjective Report/Content of Current Session: Ms. Salas reports that she has been doing much better over the last few months.  No longer checking her BP multiple times a day, no longer constantly checking her pulse on her fitbit.  She has been getting out more, more involved with her kids, no longer isolating.  Reports that she is sleeping well and eating well.  She is relieved that her labs have normalized.  Has been cutting back on doctor's appointments.  Denies depression, no SI/HI/AVH.  She would like to continue zoloft at 50mg daily.  She has not taken klonopin in over a month.    Psychotherapy:  · Target symptoms: anxiety   · Why chosen therapy is appropriate versus another modality: relevant to diagnosis  · Outcome monitoring methods: self-report, observation  · Therapeutic intervention type: behavior modifying psychotherapy, supportive psychotherapy  · Topics discussed/themes: building skills sets for symptom management, symptom recognition  · The patient's response to the intervention is accepting. The patient's progress toward treatment goals is fair.   · Duration of intervention: 8 minutes.    Review of Systems   · PSYCHIATRIC: Pertinant items are noted in the narrative.  · NEUROLOGIC: No weakness, sensory changes, seizures, confusion, memory loss, tremor or other abnormal movements.  · RESPIRATORY: No shortness of breath.  · CARDIOVASCULAR: No tachycardia or chest pain.  · GASTROINTESTINAL: No nausea, vomiting, pain, constipation or diarrhea.    Past Medical, Family and Social History: The patient's past medical, family and social history have been reviewed and updated as appropriate within the electronic  medical record - see encounter notes.    Compliance: yes    Side effects: None    Risk Parameters:  Patient reports no suicidal ideation  Patient reports no homicidal ideation  Patient reports no self-injurious behavior  Patient reports no violent behavior    Exam (detailed: at least 9 elements; comprehensive: all 15 elements)   Constitutional  Vitals:  Most recent vital signs, dated less than 90 days prior to this appointment, were reviewed.   Vitals:    06/12/18 0832   BP: 113/69   Pulse: 77   Weight: 59.4 kg (130 lb 15.3 oz)        General:  unremarkable, age appropriate, normal weight, casually dressed     Musculoskeletal  Muscle Strength/Tone:  not examined   Gait & Station:  non-ataxic     Psychiatric  Speech:  no latency; no press   Mood & Affect:  euthymic  mood-congruent, anxious   Thought Process:  normal and logical   Associations:  intact   Thought Content:  normal, no suicidality, no homicidality, delusions, or paranoia   Insight:  intact, has awareness of illness   Judgement: behavior is adequate to circumstances   Orientation:  grossly intact   Memory: intact for content of interview   Language: grossly intact, able to name, able to repeat   Attention Span & Concentration:  able to focus   Fund of Knowledge:  intact and appropriate to age and level of education     Assessment and Diagnosis   Status/Progress: Based on the examination today, the patient's problem(s) is/are improved.  New problems have not been presented today.   Co-morbidities, Diagnostic uncertainty and Lack of compliance are not complicating management of the primary condition.  There are no active rule-out diagnoses for this patient at this time.     General Impression:       ICD-10-CM ICD-9-CM   1. LUPE (generalized anxiety disorder) F41.1 300.02   2. Panic disorder F41.0 300.01       Intervention/Counseling/Treatment Plan   · Continue zoloft dose to 50mg daily (she increased dose 2 weeks ago).   · Continue Klonopin 0.5mg daily PRN  for increased anxiety/panic attacks.  Has not required any doses in over one month.  · Discussed resident transition in July      Return to Clinic: 3 months

## 2018-06-12 NOTE — LETTER
June 12, 2018      [facility]  [address]  [facility] [address]           [facility]  [address]  [facility] [address]  Phone: [phone]          Patient: Juanita Salas   MR Number: [id number]   YOB: 1975   Date of Visit: 6/12/2018       Dear Dr. Chintan Barraza:    Thank you for referring Juanita Salas to me for evaluation. Attached you will find relevant portions of my assessment and plan of care.    If you have questions, please do not hesitate to call me. I look forward to following Juanita Salas along with you.    Sincerely,    Agustin Garner MD    Enclosure  CC:  No Recipients    If you would like to receive this communication electronically, please contact [email or web address] or [phone] to request more information on [employer] access.    For providers and/or their staff who would like to refer a patient to [facility], please contact us through our one-stop-shop provider referral line, [facility], at [phone].    If you feel you have received this communication in error or would no longer like to receive these types of communications, please e-mail [email or web address]

## 2018-06-22 ENCOUNTER — PATIENT MESSAGE (OUTPATIENT)
Dept: ENDOCRINOLOGY | Facility: CLINIC | Age: 43
End: 2018-06-22

## 2018-06-27 ENCOUNTER — OFFICE VISIT (OUTPATIENT)
Dept: CARDIOLOGY | Facility: CLINIC | Age: 43
End: 2018-06-27
Payer: COMMERCIAL

## 2018-06-27 VITALS
HEART RATE: 76 BPM | DIASTOLIC BLOOD PRESSURE: 64 MMHG | BODY MASS INDEX: 23.83 KG/M2 | WEIGHT: 134.5 LBS | SYSTOLIC BLOOD PRESSURE: 108 MMHG | HEIGHT: 63 IN

## 2018-06-27 DIAGNOSIS — R00.2 PALPITATIONS: Primary | ICD-10-CM

## 2018-06-27 DIAGNOSIS — Z00.00 WELL ADULT EXAM: ICD-10-CM

## 2018-06-27 DIAGNOSIS — I34.0 NON-RHEUMATIC MITRAL REGURGITATION: ICD-10-CM

## 2018-06-27 DIAGNOSIS — R00.2 PALPITATIONS: ICD-10-CM

## 2018-06-27 PROCEDURE — 99214 OFFICE O/P EST MOD 30 MIN: CPT | Mod: S$GLB,,, | Performed by: INTERNAL MEDICINE

## 2018-06-27 PROCEDURE — 93000 ELECTROCARDIOGRAM COMPLETE: CPT | Mod: S$GLB,,, | Performed by: INTERNAL MEDICINE

## 2018-06-27 PROCEDURE — 99999 PR PBB SHADOW E&M-EST. PATIENT-LVL III: CPT | Mod: PBBFAC,,, | Performed by: INTERNAL MEDICINE

## 2018-06-27 PROCEDURE — 3008F BODY MASS INDEX DOCD: CPT | Mod: CPTII,S$GLB,, | Performed by: INTERNAL MEDICINE

## 2018-06-27 NOTE — PROGRESS NOTES
"Cardiology Progress Note:    Patient ID:  Juanita Salas is a 43 y.o. female who presents for evaluation of blood pressure    History of Present Illness:  Pt still complains about occasional palpitations and about her BP being either too high or too low. She is very anxious and has no symptoms nor any BP measurement to support that BP has been either high or low    Patient has no hx of nor symptoms suggestive of myocardial ischemia, heart failure and arrhythmia.     Relevant information regarding her prior cardiovascular hx:  Pt of Dr Lopez; he last saw her on 3/21/2018 and at the time her main problem was palpitations. He wrote: "The patient has no chest pain, SOB, TIA, syncope or pre-syncope." He startad her on metoprolol.   She was subsequently seen by ALBARO Green for there same problem (3/28/2018). He wrote:  "Suspect her symptoms/episodes are related to elevated sympathetic tone.  Anxiety is likely the primary . Reassurance provided.  Discontinue beta blocker.  Increase exercise. Can f/u PRN with EP."  Her evaluation, in addition to ECGs, included Holter 48 hour (11/10/2017) and Cardiac event Monitor (from 2/27/2018 to 3/29/2018) that showed no significant arrhythmias.    Past Medical History Includes:  Hx of palpitation and sinus tachycardia; hypercholesterolemia; general anxiety disorder    Full Medication List Includes:  Outpatient Encounter Prescriptions as of 6/27/2018   Medication Sig Dispense Refill    cholecalciferol, vitamin D3, (VITAMIN D3) 1,000 unit capsule Take 1,000 Units by mouth 2 (two) times daily.      cyanocobalamin (VITAMIN B-12) 500 MCG tablet Take 1 tablet (500 mcg total) by mouth once daily.      potassium chloride (KLOR-CON) 10 MEQ TbSR Take 1 tablet (10 mEq total) by mouth once daily. 5 tablet 0    PROCTO-MED HC 2.5 % rectal cream       sertraline (ZOLOFT) 50 MG tablet Take 1 tablet (50 mg total) by mouth once daily. 90 tablet 0    TRINESSA LO 0.18/0.215/0.25 mg-25 mcg tablet TK " "1 T PO QD  2    clonazePAM (KLONOPIN) 0.5 MG tablet Take 1 tablet (0.5 mg total) by mouth once daily. 30 tablet 1     No facility-administered encounter medications on file as of 6/27/2018.        Review of Systems:  Review of Systems   Constitution: Negative for decreased appetite, diaphoresis, fever, weakness, malaise/fatigue, weight gain and weight loss.   HENT: Negative for congestion, ear discharge, ear pain and nosebleeds.    Eyes: Negative for blurred vision, double vision and visual disturbance.   Cardiovascular: Negative for chest pain, claudication, cyanosis, dyspnea on exertion, irregular heartbeat, leg swelling, near-syncope, orthopnea, palpitations, paroxysmal nocturnal dyspnea and syncope.   Respiratory: Negative for cough, hemoptysis, shortness of breath, sleep disturbances due to breathing, snoring, sputum production and wheezing.    Endocrine: Negative for polydipsia, polyphagia and polyuria.   Hematologic/Lymphatic: Negative for adenopathy and bleeding problem. Does not bruise/bleed easily.   Skin: Negative for color change, nail changes, poor wound healing and rash.   Musculoskeletal: Negative for muscle cramps and muscle weakness.   Gastrointestinal: Negative for abdominal pain, anorexia, change in bowel habit, hematochezia, nausea and vomiting.   Genitourinary: Negative for dysuria, frequency, hematuria, menorrhagia and missed menses.   Neurological: Negative for brief paralysis, difficulty with concentration, excessive daytime sleepiness, dizziness, focal weakness, headaches, light-headedness, loss of balance, seizures and vertigo.   Psychiatric/Behavioral: Negative for altered mental status and depression. The patient is nervous/anxious.    Allergic/Immunologic: Negative for persistent infections.       Physical Exam:  /64 (BP Location: Left arm, Patient Position: Sitting)   Pulse 76   Ht 5' 3" (1.6 m)   Wt 61 kg (134 lb 7.7 oz)   BMI 23.82 kg/m²   Physical Exam   Constitutional: " She is oriented to person, place, and time. She appears well-developed and well-nourished.   HENT:   Head: Normocephalic.   Right Ear: External ear normal.   Left Ear: External ear normal.   Nose: Nose normal.   Inspection of lips, teeth and gums normal   Eyes: Conjunctivae and EOM are normal. Pupils are equal, round, and reactive to light. No scleral icterus.   Neck: Normal range of motion. No JVD present. No tracheal deviation present. No thyromegaly present.   Cardiovascular: Normal rate, regular rhythm, S1 normal, S2 normal and intact distal pulses.  Exam reveals no gallop and no friction rub.    Murmur heard.  High-pitched blowing holosystolic murmur is present with a grade of 3/6  at the lower left sternal border, apex  Pulses:       Carotid pulses are 2+ on the right side, and 2+ on the left side.       Radial pulses are 2+ on the right side, and 2+ on the left side.        Dorsalis pedis pulses are 2+ on the right side, and 2+ on the left side.        Posterior tibial pulses are 2+ on the right side, and 2+ on the left side.   Pulmonary/Chest: Effort normal and breath sounds normal. No respiratory distress. She has no wheezes. She has no rales. She exhibits no tenderness.   Abdominal: Soft. Bowel sounds are normal. She exhibits no distension. There is no hepatosplenomegaly. There is no tenderness. There is no guarding.   Musculoskeletal: Normal range of motion. She exhibits no edema or tenderness.   Lymphadenopathy:   Palpation of lymph nodes of neck and groin normal   Neurological: She is oriented to person, place, and time. No cranial nerve deficit. She exhibits normal muscle tone. Coordination normal.   Skin: Skin is warm and dry. No rash noted. No erythema. No pallor.   No ankle nor pretibial edema   Psychiatric: She has a normal mood and affect. Her behavior is normal. Judgment and thought content normal.        Blood Tests:  Lab Results   Component Value Date    BNP 17 04/18/2018     06/01/2018     K 4.2 06/01/2018     06/01/2018    CO2 24 06/01/2018    BUN 16 06/01/2018    CREATININE 0.6 06/01/2018    GLU 93 06/01/2018    AST 12 04/27/2018    ALT 13 04/27/2018    ALBUMIN 3.7 04/27/2018    PROT 6.7 04/27/2018    BILITOT 0.4 04/27/2018    WBC 7.79 04/27/2018    HGB 12.4 04/27/2018    HCT 37.6 04/27/2018    HCT 41 11/17/2017    MCV 92 04/27/2018     04/27/2018    TSH 0.858 01/29/2018       Lab Results   Component Value Date    CHOL 217 (H) 01/10/2018    HDL 52 01/10/2018    TRIG 145 01/10/2018       Lab Results   Component Value Date    LDLCALC 136.0 01/10/2018       Urine Tests:  Lab Results   Component Value Date    COLORU Yellow 04/18/2018    APPEARANCEUA Clear 04/18/2018    PHUR 6.0 04/18/2018    SPECGRAV 1.015 04/18/2018    PROTEINUA Negative 04/18/2018    GLUCUA Negative 04/18/2018    KETONESU Negative 04/18/2018    BILIRUBINUA Negative 04/18/2018    OCCULTUA 2+ (A) 04/18/2018    NITRITE Negative 04/18/2018    UROBILINOGEN Negative 04/18/2018    LEUKOCYTESUR Negative 04/18/2018    CREATRANDUR 105.1 04/18/2018         Echocardiogram (11/01/2017)    TEST DESCRIPTION   Technical Quality: This is a technically adequate study.     Aorta: The aortic root is normal in size, measuring 2.4 cm at sinotubular junction and 2.6 cm at Sinuses of Valsalva. The proximal ascending aorta is normal in size, measuring 2.5 cm across.     Left Atrium: The left atrial volume index is normal, measuring 31.55 cc/m2.     Left Ventricle: The left ventricle is normal in size, with an end-diastolic diameter of 4.0 cm, and an end-systolic diameter of 2.8 cm. LV wall thickness is normal, with the septum measuring 0.6 cm and the posterior wall measuring 0.8 cm across. Relative wall thickness was normal at 0.40, and the LV mass index was 52.6 g/m2 consistent with normal left ventricular mass. There are no regional wall motion abnormalities. Left ventricular systolic function appears normal. Visually estimated ejection  fraction   is 60-65%. The LV Doppler derived stroke volume equals 59.0 ccs.     Diastolic indices: E wave velocity 0.8 m/s, E/A ratio 0.9,  msec., E/e' ratio(avg) 6. Diastolic function is normal.     Right Atrium: The right atrium is normal in size, measuring 4.4 cm in length and 3.5 cm in width in the apical view.     Right Ventricle: The right ventricle is normal in size measuring 3.0 cm at the base in the apical right ventricle-focused view. Global right ventricular systolic function appears normal. Tricuspid annular plane systolic excursion (TAPSE) is 2.5 cm.   Tissue Doppler-derived tricuspid annular peak systolic velocity (S prime) is 16.7 cm/s. The estimated PA systolic pressure is 22 mmHg.     Aortic Valve:  The aortic valve is normal in structure with normal leaflet mobility. The aortic valve is tri-leaflet in structure.     Mitral Valve:  The mitral valve is normal in structure with normal leaflet mobility. There is trivial mitral regurgitation.     Tricuspid Valve:  The tricuspid valve is normal in structure with normal leaflet mobility. There is trivial tricuspid regurgitation.     Pulmonary Valve:  The pulmonic valve is normal in structure with normal leaflet mobility.     IVC: IVC is normal in size and collapses > 50% with a sniff, suggesting normal right atrial pressure of 3 mmHg.     Intracavitary: There is no evidence of pericardial effusion, intracavity mass, thrombi, or vegetation.     CONCLUSIONS     1 - Normal left ventricular systolic function (EF 60-65%).     2 - Normal right ventricular systolic function .     3 - Normal left ventricular diastolic function.     This document has been electronically    SIGNED BY: Erick Snyder MD       I have reviewed the following:     Details / Date    []   Labs     []   Imaging     []   Cardiology Procedures     []   Other      Assessment and Plan:     1. Well adult exam    2. Non-rheumatic mitral regurgitation         Well adult exam  BP today was  normal. I have instructed the pt to measure it, keep a log and contact me if it is consistently above 130/80 mmHg    Mitral regurgitation  Mild mitral regurgitation. No further work-up at this time.    Follow-up in about 1 year (around 6/27/2019).        Cachorro Ellis MD

## 2018-06-27 NOTE — ASSESSMENT & PLAN NOTE
BP today was normal. I have instructed the pt to measure it, keep a log and contact me if it is consistently above 130/80 mmHg

## 2018-07-14 NOTE — PROGRESS NOTES
STAFF NOTE:  Patient's case was formally presented to me by Dr. Nixon Lizarraga and patient was seen by me in supervision.  I agree with his assessment and plan as specified to try Zoloft and PRN Klonopin for treatment of anxiety/panic and depressive Sx.

## 2018-08-01 ENCOUNTER — OFFICE VISIT (OUTPATIENT)
Dept: CARDIOLOGY | Facility: CLINIC | Age: 43
End: 2018-08-01
Payer: COMMERCIAL

## 2018-08-01 VITALS
SYSTOLIC BLOOD PRESSURE: 128 MMHG | WEIGHT: 136.88 LBS | HEART RATE: 91 BPM | HEIGHT: 63 IN | BODY MASS INDEX: 24.25 KG/M2 | DIASTOLIC BLOOD PRESSURE: 73 MMHG

## 2018-08-01 DIAGNOSIS — R00.2 PALPITATIONS: Primary | ICD-10-CM

## 2018-08-01 DIAGNOSIS — E78.00 HYPERCHOLESTEROLEMIA: ICD-10-CM

## 2018-08-01 DIAGNOSIS — Z87.891 FORMER SMOKER: ICD-10-CM

## 2018-08-01 PROCEDURE — 3008F BODY MASS INDEX DOCD: CPT | Mod: CPTII,S$GLB,, | Performed by: INTERNAL MEDICINE

## 2018-08-01 PROCEDURE — 99999 PR PBB SHADOW E&M-EST. PATIENT-LVL III: CPT | Mod: PBBFAC,,, | Performed by: INTERNAL MEDICINE

## 2018-08-01 PROCEDURE — 99214 OFFICE O/P EST MOD 30 MIN: CPT | Mod: S$GLB,,, | Performed by: INTERNAL MEDICINE

## 2018-08-01 NOTE — PROGRESS NOTES
Chart has been dictated using voice recognition software.  It is not been reviewed carefully for any transcriptional errors due to this technology.   Subjective:   Patient ID:  Juanita Salas is a 43 y.o. female who presents for follow-up of Palpitations; Hypertension; Dizziness; and Fatigue      HPI: Patient seen in the past by Rhonda Hudson, and me for hypertension and palpitations.  Prior Holter monitor showed no significant arrhythmias, but patient had no symptoms.  Additionally, patient underwent 30 day event monitoring during the month of March 2018.  During this time the patient had multiple episodes of palpitations with the underlying rhythm essentially being sinus.    Patient notes occasional dizziness.  Has noted increase in palpitations and fitbit noted HR in 140 (but looks like noise). Patient denies any chest discomfort on exertion or at rest.  Patient denies any dyspnea at rest or on exertion, orthopnea, PND, or edema.  No syncope.     Past Medical History:   Diagnosis Date    Adjustment disorder     Anxiety     Depression     Fatigue     Headache     Hx of psychiatric care     Hypertension     Low vitamin D level     Panic disorder     Psychiatric problem        Outpatient Medications Prior to Visit   Medication Sig Dispense Refill    cholecalciferol, vitamin D3, (VITAMIN D3) 1,000 unit capsule Take 1,000 Units by mouth 2 (two) times daily.      clonazePAM (KLONOPIN) 0.5 MG tablet Take 1 tablet (0.5 mg total) by mouth once daily. 30 tablet 1    cyanocobalamin (VITAMIN B-12) 500 MCG tablet Take 1 tablet (500 mcg total) by mouth once daily.      potassium chloride (KLOR-CON) 10 MEQ TbSR Take 1 tablet (10 mEq total) by mouth once daily. 5 tablet 0    sertraline (ZOLOFT) 50 MG tablet Take 1 tablet (50 mg total) by mouth once daily. 90 tablet 0    TRINESSA LO 0.18/0.215/0.25 mg-25 mcg tablet TK 1 T PO QD  2    PROCTO-MED HC 2.5 % rectal cream        No facility-administered  "medications prior to visit.        Review of Systems   Constitution: Positive for weight gain (15 pounds over 2 months). Negative for weight loss.   HENT: Negative for nosebleeds.    Eyes: Negative for vision loss in left eye and vision loss in right eye.   Cardiovascular: Negative for claudication.        As above   Respiratory: Negative for hemoptysis, shortness of breath, snoring, sputum production and wheezing.    Endocrine: Negative for polydipsia and polyuria.   Hematologic/Lymphatic: Does not bruise/bleed easily.   Musculoskeletal: Positive for neck pain. Negative for myalgias.   Gastrointestinal: Positive for nausea (rare). Negative for change in bowel habit, hematemesis, hematochezia, melena and vomiting.   Genitourinary: Positive for hematuria (micro).   Neurological: Negative for focal weakness and numbness.     Objective:   Physical Exam   Constitutional: She is oriented to person, place, and time. She appears well-developed and well-nourished.   /73   Pulse 91   Ht 5' 3" (1.6 m)   Wt 62.1 kg (136 lb 14.5 oz)   BMI 24.25 kg/m²      Neck: Neck supple. No JVD present. Carotid bruit is not present.   Cardiovascular: Normal rate, regular rhythm and intact distal pulses.  Exam reveals no gallop and no friction rub.    Murmur heard.   Systolic murmur is present  1-2/6 mid systolic soft murmur at the apex without radiation  Pulmonary/Chest: Effort normal and breath sounds normal. She has no wheezes. She has no rales.   Abdominal: Soft. Bowel sounds are normal. She exhibits no abdominal bruit. There is no hepatomegaly. There is no tenderness.   Musculoskeletal: She exhibits no edema.   Neurological: She is alert and oriented to person, place, and time. She has normal strength.   Skin: No cyanosis. Nails show no clubbing.         Lab Results   Component Value Date     06/01/2018    K 4.2 06/01/2018    BUN 16 06/01/2018    CREATININE 0.6 06/01/2018    GLU 93 06/01/2018    CHOL 217 (H) 01/10/2018    " HDL 52 01/10/2018    LDLCALC 136.0 01/10/2018    TRIG 145 01/10/2018    CHOLHDL 24.0 01/10/2018    HGB 12.4 04/27/2018    HCT 37.6 04/27/2018    HCT 41 11/17/2017     04/27/2018     ECG (27 June 2018) showed normal sinus rhythm and was a normal EKG.    ASCVD +10 year cardiovascular risk - 0.9%  Assessment:     1. Palpitations    2. Hypercholesterolemia    3. Former smoker      The patient's palpitations are not of cardiovascular origin.  During the time she had a 30 day event monitor on she had multiple episodes of palpitations without any underlying cardiac etiology.  Currently, her blood pressure is controlled off medications and her cholesterol is well controlled.  She has a very low ASCVD 10 year cardiovascular risk.  Patient was reassured that whenever she is feeling it is not due to a cardiac problem.  At this time no further cardiac evaluation is needed nor does the patient need to seek cardiac follow-up.  The patient is concerned about continuing to take potassium chloride and she need to do that.  The decision for continuing potassium supplementation will be made by the patient's primary care physician.  Patient should return as needed.  Plan:     Juanita was seen today for palpitations, hypertension, dizziness and fatigue.    Diagnoses and all orders for this visit:    Palpitations    Hypercholesterolemia    Former smoker          Darrel Gray MD  Consultative Cardiology

## 2018-08-01 NOTE — Clinical Note
Thank you for referring Juanita Salas for follow-up of palpitations. Please see my note for details of this encounter. If you have any questions, please contact me.  Thank you again for the referral.

## 2018-08-06 DIAGNOSIS — Z12.39 BREAST CANCER SCREENING: ICD-10-CM

## 2018-08-08 NOTE — PROGRESS NOTES
Subjective:       Patient ID: Juanita Salas is a 42 y.o. female.    Chief Complaint: Dizziness; Anxiety; and Headache    Dizziness:   Chronicity:  New  Onset:  1 to 4 weeks ago  Progression since onset:  Unchanged  Frequency:  Every few hours  Pain Scale:  0/10  Severity:  Mild  Duration:  Very brief  Dizziness characteristics:  Sensation of movement   Associated symptoms: light-headedness.no ear pain, no fever, no headaches, no nausea, no vomiting, no diaphoresis, no aural fullness, no weakness, no visual disturbances, no syncope, no facial weakness, no slurred speech, no numbness in extremities and no chest pain.  Aggravated by:  Nothing  Treatments tried:  Nothing  Improvements on treatment:  No relief    Review of Systems   Constitutional: Negative for activity change, chills, diaphoresis, fatigue and fever.   HENT: Negative for congestion, ear pain, nosebleeds, postnasal drip, sinus pressure and sore throat.    Eyes: Negative.  Negative for visual disturbance.   Respiratory: Negative for cough, chest tightness, shortness of breath and wheezing.    Cardiovascular: Negative for chest pain and syncope.   Gastrointestinal: Negative for abdominal pain, diarrhea, nausea and vomiting.   Genitourinary: Negative for difficulty urinating, dysuria, frequency and urgency.   Musculoskeletal: Negative for arthralgias and neck stiffness.   Skin: Negative for rash.   Neurological: Positive for light-headedness. Negative for dizziness, weakness and headaches.   Psychiatric/Behavioral: Negative for sleep disturbance. The patient is not nervous/anxious.        Objective:      Physical Exam   Constitutional: She is oriented to person, place, and time. She appears well-developed and well-nourished.  Non-toxic appearance. No distress.   HENT:   Head: Normocephalic and atraumatic.   Right Ear: Tympanic membrane, external ear and ear canal normal.   Left Ear: Tympanic membrane, external ear and ear canal normal.   Eyes: EOM are  normal. Pupils are equal, round, and reactive to light. No scleral icterus.   Neck: Normal range of motion. Neck supple. No thyromegaly present.   Cardiovascular: Normal rate, regular rhythm and normal heart sounds.    Pulmonary/Chest: Effort normal and breath sounds normal.   Abdominal: Soft. Bowel sounds are normal. She exhibits no mass. There is no tenderness. There is no rebound.   Musculoskeletal: Normal range of motion.   Lymphadenopathy:     She has no cervical adenopathy.   Neurological: She is alert and oriented to person, place, and time. She has normal reflexes. She displays normal reflexes. No cranial nerve deficit. She exhibits normal muscle tone. Coordination normal.   Skin: Skin is warm and dry.   Psychiatric: Her behavior is normal. Thought content normal. Her mood appears anxious. Her speech is rapid and/or pressured.       Assessment:       1. Family history of cardiac disorder    2. Anxiety        Plan:   Juanita was seen today for dizziness, anxiety and headache.    Diagnoses and all orders for this visit:    Family history of cardiac disorder    Anxiety       sink

## 2018-08-23 ENCOUNTER — PATIENT MESSAGE (OUTPATIENT)
Dept: INTERNAL MEDICINE | Facility: CLINIC | Age: 43
End: 2018-08-23

## 2018-08-23 DIAGNOSIS — E78.2 MIXED HYPERLIPIDEMIA: ICD-10-CM

## 2018-08-23 DIAGNOSIS — R42 DIZZINESS: Primary | ICD-10-CM

## 2018-08-23 DIAGNOSIS — E55.9 VITAMIN D DEFICIENCY: Chronic | ICD-10-CM

## 2018-08-24 ENCOUNTER — CLINICAL SUPPORT (OUTPATIENT)
Dept: SMOKING CESSATION | Facility: CLINIC | Age: 43
End: 2018-08-24
Payer: COMMERCIAL

## 2018-08-24 ENCOUNTER — PATIENT MESSAGE (OUTPATIENT)
Dept: INTERNAL MEDICINE | Facility: CLINIC | Age: 43
End: 2018-08-24

## 2018-08-24 DIAGNOSIS — E87.6 HYPOKALEMIA: Primary | ICD-10-CM

## 2018-08-24 DIAGNOSIS — F17.200 NICOTINE DEPENDENCE: Primary | ICD-10-CM

## 2018-08-24 PROCEDURE — 99407 BEHAV CHNG SMOKING > 10 MIN: CPT | Mod: S$GLB,,, | Performed by: INTERNAL MEDICINE

## 2018-08-24 NOTE — PROGRESS NOTES
Spoke with patient today in regard to smoking cessation progress for 1 year telephone follow up, she states tobacco free for 9 months. Commended patient on the accomplishment. Informed patient of benefit period and contact information if any further help or support is needed. Will resolve episode and complete smart form for Quit attempt #1.

## 2018-08-27 ENCOUNTER — NURSE TRIAGE (OUTPATIENT)
Dept: ADMINISTRATIVE | Facility: CLINIC | Age: 43
End: 2018-08-27

## 2018-08-27 NOTE — TELEPHONE ENCOUNTER
"Warm transferred call back to scheduling department.    Reason for Disposition   [1] Follow-up call to recent contact AND [2] information only call, no triage required    Answer Assessment - Initial Assessment Questions  1. REASON FOR CALL or QUESTION: "What is your reason for calling today?" or "How can I best help you?" or "What question do you have that I can help answer?"      Dizzy, recurrent problem, has already discussed with md, labs ordered, she just wants to reschedule her labs, does not want triage.    Protocols used: ST INFORMATION ONLY CALL-A-AH      "

## 2018-08-28 ENCOUNTER — LAB VISIT (OUTPATIENT)
Dept: LAB | Facility: HOSPITAL | Age: 43
End: 2018-08-28
Attending: INTERNAL MEDICINE
Payer: COMMERCIAL

## 2018-08-28 DIAGNOSIS — E87.6 HYPOKALEMIA: ICD-10-CM

## 2018-08-28 DIAGNOSIS — E78.2 MIXED HYPERLIPIDEMIA: ICD-10-CM

## 2018-08-28 DIAGNOSIS — R42 DIZZINESS: ICD-10-CM

## 2018-08-28 DIAGNOSIS — E55.9 VITAMIN D DEFICIENCY: Chronic | ICD-10-CM

## 2018-08-28 LAB
25(OH)D3+25(OH)D2 SERPL-MCNC: 30 NG/ML
BASOPHILS # BLD AUTO: 0.03 K/UL
BASOPHILS NFR BLD: 0.4 %
CHOLEST SERPL-MCNC: 223 MG/DL
CHOLEST/HDLC SERPL: 3.9 {RATIO}
DIFFERENTIAL METHOD: NORMAL
EOSINOPHIL # BLD AUTO: 0.3 K/UL
EOSINOPHIL NFR BLD: 3.7 %
ERYTHROCYTE [DISTWIDTH] IN BLOOD BY AUTOMATED COUNT: 12.2 %
HCT VFR BLD AUTO: 37.6 %
HDLC SERPL-MCNC: 57 MG/DL
HDLC SERPL: 25.6 %
HGB BLD-MCNC: 12.3 G/DL
LDLC SERPL CALC-MCNC: 138.4 MG/DL
LYMPHOCYTES # BLD AUTO: 1.9 K/UL
LYMPHOCYTES NFR BLD: 26.8 %
MCH RBC QN AUTO: 30.5 PG
MCHC RBC AUTO-ENTMCNC: 32.7 G/DL
MCV RBC AUTO: 93 FL
MONOCYTES # BLD AUTO: 0.5 K/UL
MONOCYTES NFR BLD: 7.7 %
NEUTROPHILS # BLD AUTO: 4.3 K/UL
NEUTROPHILS NFR BLD: 61.4 %
NONHDLC SERPL-MCNC: 166 MG/DL
PLATELET # BLD AUTO: 237 K/UL
PMV BLD AUTO: 10.3 FL
POTASSIUM SERPL-SCNC: 4 MMOL/L
RBC # BLD AUTO: 4.03 M/UL
TRIGL SERPL-MCNC: 138 MG/DL
VIT B12 SERPL-MCNC: 551 PG/ML
WBC # BLD AUTO: 7.05 K/UL

## 2018-08-28 PROCEDURE — 80061 LIPID PANEL: CPT

## 2018-08-28 PROCEDURE — 84132 ASSAY OF SERUM POTASSIUM: CPT

## 2018-08-28 PROCEDURE — 82306 VITAMIN D 25 HYDROXY: CPT

## 2018-08-28 PROCEDURE — 82607 VITAMIN B-12: CPT

## 2018-08-28 PROCEDURE — 36415 COLL VENOUS BLD VENIPUNCTURE: CPT

## 2018-08-28 PROCEDURE — 85025 COMPLETE CBC W/AUTO DIFF WBC: CPT

## 2018-09-16 ENCOUNTER — NURSE TRIAGE (OUTPATIENT)
Dept: ADMINISTRATIVE | Facility: CLINIC | Age: 43
End: 2018-09-16

## 2018-09-16 NOTE — TELEPHONE ENCOUNTER
Patient called to report the following:     -migraine off and on since Friday   -takes Fioricet   -denies fever, weakness, numbness     Education completed per [facility] On Call Care Advice including follow up wit provider, home care, and when to call back. Patient verbalized understating. Appointment made with provider.       Reason for Disposition   [1] MODERATE headache (e.g., interferes with normal activities) AND [2] present > 24 hours AND [3] unexplained  (Exceptions: analgesics not tried, typical migraine, or headache part of viral illness)    Protocols used: ST HEADACHE-A-AH

## 2018-09-17 ENCOUNTER — OFFICE VISIT (OUTPATIENT)
Dept: INTERNAL MEDICINE | Facility: CLINIC | Age: 43
End: 2018-09-17
Payer: COMMERCIAL

## 2018-09-17 VITALS
HEIGHT: 63 IN | WEIGHT: 138.44 LBS | BODY MASS INDEX: 24.53 KG/M2 | DIASTOLIC BLOOD PRESSURE: 90 MMHG | OXYGEN SATURATION: 99 % | HEART RATE: 82 BPM | SYSTOLIC BLOOD PRESSURE: 122 MMHG

## 2018-09-17 DIAGNOSIS — G43.009 MIGRAINE WITHOUT AURA AND WITHOUT STATUS MIGRAINOSUS, NOT INTRACTABLE: Primary | ICD-10-CM

## 2018-09-17 DIAGNOSIS — F41.1 GAD (GENERALIZED ANXIETY DISORDER): Chronic | ICD-10-CM

## 2018-09-17 DIAGNOSIS — E55.9 VITAMIN D DEFICIENCY: Chronic | ICD-10-CM

## 2018-09-17 DIAGNOSIS — R03.0 ELEVATED BLOOD PRESSURE READING WITHOUT DIAGNOSIS OF HYPERTENSION: ICD-10-CM

## 2018-09-17 DIAGNOSIS — R00.0 SINUS TACHYCARDIA: ICD-10-CM

## 2018-09-17 DIAGNOSIS — E87.6 HYPOKALEMIA: ICD-10-CM

## 2018-09-17 DIAGNOSIS — E78.2 MIXED HYPERLIPIDEMIA: ICD-10-CM

## 2018-09-17 PROCEDURE — 99214 OFFICE O/P EST MOD 30 MIN: CPT | Mod: S$GLB,,, | Performed by: INTERNAL MEDICINE

## 2018-09-17 PROCEDURE — 99999 PR PBB SHADOW E&M-EST. PATIENT-LVL IV: CPT | Mod: PBBFAC,,, | Performed by: INTERNAL MEDICINE

## 2018-09-17 PROCEDURE — 3008F BODY MASS INDEX DOCD: CPT | Mod: CPTII,S$GLB,, | Performed by: INTERNAL MEDICINE

## 2018-09-17 RX ORDER — SUMATRIPTAN SUCCINATE 25 MG/1
25 TABLET ORAL DAILY PRN
Qty: 9 TABLET | Refills: 0 | Status: SHIPPED | OUTPATIENT
Start: 2018-09-17 | End: 2020-08-12

## 2018-09-17 NOTE — PROGRESS NOTES
"INTERNAL MEDICINE ESTABLISHED PATIENT VISIT NOTE    Subjective:     Chief Complaint: Migraine  headache x several days     Patient ID: Juanita Salas is a 43 y.o. female with anxiety and depression c panic d/o, sinus tachycardia, Vit D def, rebeca hematuria (see by Uro, had cysto which pt reports was normal), former smoker, last seen by me in March, here today for f/u.    At last appt, reported "night sweats" which had been occurring for the past year.  Has had basic labs which have been negative (see last note).  W/u thus far:  Normal TFTs  Normal WBC on cbc  Normal CMP  Neg Quant Gold  Normal renin, aldosterone  Normal cortisol  Normal plasma free metanephrines  Neg HIV  Neg Hep panel  B12 216  Ferritin 18 but normal h/h  CXR 11/2017 wnl  CT abd/pelvis c contrast 11/2017 unremarkable.  Interpolar area around R kidney c low attenuating lesion too small to characterized  CT renal stone 10/2017 c R adnexal 2.8 cm probable cyst, indeterminate.  Holter 48 hr: rare PACs, rare PVCs, no SVT or AVB  Echo wnl 11/2017    Reports previously noted wt loss has now resolved and is back at her baseline weight.    Today c/o h/a since Thursday evening, initially on the R frontal aspect of her head but now on the L.  Tried Fioricet which helped but when h/a came back, took Tylenol which only helped a little.  Also c some associated nausea and loose stools.  Has appt c Neuro pending.  Of note, MRI brain last year unremarkable.    Past Medical History:  Past Medical History:   Diagnosis Date    Adjustment disorder     Anxiety     Depression     Fatigue     Headache     Hx of psychiatric care     Hypertension     Low vitamin D level     Panic disorder     Psychiatric problem        Home Medications:  Prior to Admission medications    Medication Sig Start Date End Date Taking? Authorizing Provider   cholecalciferol, vitamin D3, (VITAMIN D3) 1,000 unit capsule Take 1,000 Units by mouth 2 (two) times daily.    Historical Provider, " "MD   clonazePAM (KLONOPIN) 0.5 MG tablet Take 1 tablet (0.5 mg total) by mouth once daily. 18  Nixon Lizarraga MD   cyanocobalamin (VITAMIN B-12) 500 MCG tablet Take 1 tablet (500 mcg total) by mouth once daily. 3/6/18   Ema Gatson MD   potassium chloride (KLOR-CON) 10 MEQ TbSR Take 1 tablet (10 mEq total) by mouth once daily. 18   Donita Alexander MD   PROCTO-MED HC 2.5 % rectal cream  18   Historical Provider, MD   sertraline (ZOLOFT) 50 MG tablet Take 1 tablet (50 mg total) by mouth once daily. 18  Nixon Lizarraga MD   TRINESSA LO 0.18/0.215/0.25 mg-25 mcg tablet TK 1 T PO QD 17   Historical Provider, MD       Allergies:  Review of patient's allergies indicates:  No Known Allergies    Social History:  Social History     Tobacco Use    Smoking status: Former Smoker     Packs/day: 0.50     Years: 2.00     Pack years: 1.00     Last attempt to quit: 2017     Years since quittin.8    Smokeless tobacco: Never Used   Substance Use Topics    Alcohol use: No    Drug use: No        Review of Systems   Constitutional: Negative for chills, fatigue and fever.   Respiratory: Negative for cough, chest tightness and shortness of breath.    Cardiovascular: Negative for chest pain.   Gastrointestinal: Negative for abdominal pain and blood in stool.   Genitourinary: Negative for dysuria and frequency.   Neurological: Positive for headaches.   Psychiatric/Behavioral: The patient is nervous/anxious.          Health Maintenance:     Immunizations:   Influenza is not up to date, declined, Risks and benefits were discussed with patient.  TDap is not up to date, declined, Risks and benefits were discussed with patient.     Cancer Screening:  PAP: is up to date. 2017 [facility]; was "abnormal", will request records as pt to come back in one year  Mammogram: is up to date. 2017 benign per pt via gyn  Colonoscopy: rec at 50      Objective:   BP (!) 122/90   Pulse " "82   Ht 5' 3" (1.6 m)   Wt 62.8 kg (138 lb 7.2 oz)   LMP 09/17/2018   SpO2 99%   BMI 24.53 kg/m²        General: AAO x3, no apparent distress  HEENT: PERRL, OP clear  CV: RRR, no m/r/g  Pulm: Lungs CTAB, no crackles, no wheezes  Abd: s/NT/ND +BS  Extremities: no c/c/e    Labs:     Lab Results   Component Value Date    WBC 7.05 08/28/2018    HGB 12.3 08/28/2018    HCT 37.6 08/28/2018    MCV 93 08/28/2018     08/28/2018     CMP  Sodium   Date Value Ref Range Status   06/01/2018 139 136 - 145 mmol/L Final     Potassium   Date Value Ref Range Status   08/28/2018 4.0 3.5 - 5.1 mmol/L Final     Chloride   Date Value Ref Range Status   06/01/2018 106 95 - 110 mmol/L Final     CO2   Date Value Ref Range Status   06/01/2018 24 23 - 29 mmol/L Final     Glucose   Date Value Ref Range Status   06/01/2018 93 70 - 110 mg/dL Final     BUN, Bld   Date Value Ref Range Status   06/01/2018 16 6 - 20 mg/dL Final     Creatinine   Date Value Ref Range Status   06/01/2018 0.6 0.5 - 1.4 mg/dL Final     Calcium   Date Value Ref Range Status   06/01/2018 8.8 8.7 - 10.5 mg/dL Final     Total Protein   Date Value Ref Range Status   04/27/2018 6.7 6.0 - 8.4 g/dL Final     Albumin   Date Value Ref Range Status   04/27/2018 3.7 3.5 - 5.2 g/dL Final     Total Bilirubin   Date Value Ref Range Status   04/27/2018 0.4 0.1 - 1.0 mg/dL Final     Comment:     For infants and newborns, interpretation of results should be based  on gestational age, weight and in agreement with clinical  observations.  Premature Infant recommended reference ranges:  Up to 24 hours.............<8.0 mg/dL  Up to 48 hours............<12.0 mg/dL  3-5 days..................<15.0 mg/dL  6-29 days.................<15.0 mg/dL       Alkaline Phosphatase   Date Value Ref Range Status   04/27/2018 36 (L) 55 - 135 U/L Final     AST   Date Value Ref Range Status   04/27/2018 12 10 - 40 U/L Final     ALT   Date Value Ref Range Status   04/27/2018 13 10 - 44 U/L Final "     Anion Gap   Date Value Ref Range Status   06/01/2018 9 8 - 16 mmol/L Final     eGFR if    Date Value Ref Range Status   06/01/2018 >60 >60 mL/min/1.73 m^2 Final     eGFR if non    Date Value Ref Range Status   06/01/2018 >60 >60 mL/min/1.73 m^2 Final     Comment:     Calculation used to obtain the estimated glomerular filtration  rate (eGFR) is the CKD-EPI equation.      No results found for: LABA1C, HGBA1C  Lab Results   Component Value Date    LDLCALC 138.4 08/28/2018     Lab Results   Component Value Date    TSH 0.858 01/29/2018    FREET4 1.09 01/29/2018         Assessment/Plan     Juanita was seen today for migraine.    Diagnoses and all orders for this visit:    Migraine without aura and without status migrainosus, not intractable  As per HPI, suspect migraine, menstrual, will tx c imitrex  Has appt pending c Neuro who she has seen in the past  Previous MRI unremarkable  -     sumatriptan (IMITREX) 25 MG Tab; Take 1 tablet (25 mg total) by mouth daily as needed.    LUPE (generalized anxiety disorder)  As per HPI  Cont f/u and meds as per psych    Elevated blood pressure s dx of HTN  Diastolic  Unchanged on repeat  Could be due to h/a pain but unsure which came first, will tx migraine and reassess bp in 2 weeks, bp prev wnl.    Hypokalemia  Mild, resolved on recent labs.    Vitamin D deficiency  Normalized on recent labs, no issues    Sinus tachycardia  Borderline, suspect related to anxiety,  No acute issues    Mixed hyperlipidemia  Lab Results   Component Value Date    LDLCALC 138.4 08/28/2018     Mild, not req statin at this time.  Patient counseled on need for a low fat diet.  Avoid red meats and fried foods as well as cream-based foods and processed foods.  Recommend weight loss with diet and exercise.    HM as above    RTC in 2 weeks for bp check to make sure diastolic bp improved c tx of migraine    Ema Gaston MD  Department of Internal Medicine - [facility]  [address]  09/17/2018

## 2018-09-18 ENCOUNTER — OFFICE VISIT (OUTPATIENT)
Dept: PSYCHIATRY | Facility: CLINIC | Age: 43
End: 2018-09-18
Payer: COMMERCIAL

## 2018-09-18 VITALS
WEIGHT: 139 LBS | SYSTOLIC BLOOD PRESSURE: 125 MMHG | HEIGHT: 63 IN | DIASTOLIC BLOOD PRESSURE: 75 MMHG | BODY MASS INDEX: 24.63 KG/M2 | HEART RATE: 91 BPM

## 2018-09-18 DIAGNOSIS — F41.0 PANIC DISORDER: ICD-10-CM

## 2018-09-18 DIAGNOSIS — F41.1 GAD (GENERALIZED ANXIETY DISORDER): Primary | ICD-10-CM

## 2018-09-18 PROCEDURE — 3008F BODY MASS INDEX DOCD: CPT | Mod: CPTII,S$GLB,, | Performed by: STUDENT IN AN ORGANIZED HEALTH CARE EDUCATION/TRAINING PROGRAM

## 2018-09-18 PROCEDURE — 99213 OFFICE O/P EST LOW 20 MIN: CPT | Mod: S$GLB,,, | Performed by: STUDENT IN AN ORGANIZED HEALTH CARE EDUCATION/TRAINING PROGRAM

## 2018-09-18 PROCEDURE — 99999 PR PBB SHADOW E&M-EST. PATIENT-LVL II: CPT | Mod: PBBFAC,,, | Performed by: STUDENT IN AN ORGANIZED HEALTH CARE EDUCATION/TRAINING PROGRAM

## 2018-09-18 RX ORDER — SERTRALINE HYDROCHLORIDE 50 MG/1
50 TABLET, FILM COATED ORAL DAILY
Qty: 90 TABLET | Refills: 0 | Status: SHIPPED | OUTPATIENT
Start: 2018-09-18 | End: 2018-12-04
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
## 2018-09-25 ENCOUNTER — OFFICE VISIT (OUTPATIENT)
Dept: CARDIOLOGY | Facility: CLINIC | Age: 43
End: 2018-09-25
Payer: COMMERCIAL

## 2018-09-25 ENCOUNTER — OFFICE VISIT (OUTPATIENT)
Dept: NEUROLOGY | Facility: CLINIC | Age: 43
End: 2018-09-25
Payer: COMMERCIAL

## 2018-09-25 VITALS
SYSTOLIC BLOOD PRESSURE: 123 MMHG | HEIGHT: 63 IN | DIASTOLIC BLOOD PRESSURE: 74 MMHG | WEIGHT: 138.88 LBS | HEART RATE: 80 BPM | BODY MASS INDEX: 24.61 KG/M2

## 2018-09-25 VITALS
DIASTOLIC BLOOD PRESSURE: 85 MMHG | HEART RATE: 72 BPM | BODY MASS INDEX: 24.65 KG/M2 | OXYGEN SATURATION: 98 % | SYSTOLIC BLOOD PRESSURE: 137 MMHG | HEIGHT: 63 IN | WEIGHT: 139.13 LBS

## 2018-09-25 DIAGNOSIS — R00.0 SINUS TACHYCARDIA: Primary | ICD-10-CM

## 2018-09-25 DIAGNOSIS — I10 HYPERTENSION: ICD-10-CM

## 2018-09-25 DIAGNOSIS — G43.009 MIGRAINE WITHOUT AURA AND WITHOUT STATUS MIGRAINOSUS, NOT INTRACTABLE: Primary | ICD-10-CM

## 2018-09-25 DIAGNOSIS — R00.2 PALPITATIONS: ICD-10-CM

## 2018-09-25 DIAGNOSIS — E78.2 MIXED HYPERLIPIDEMIA: ICD-10-CM

## 2018-09-25 DIAGNOSIS — I34.0 NON-RHEUMATIC MITRAL REGURGITATION: ICD-10-CM

## 2018-09-25 PROCEDURE — 3008F BODY MASS INDEX DOCD: CPT | Mod: CPTII,S$GLB,, | Performed by: INTERNAL MEDICINE

## 2018-09-25 PROCEDURE — 99215 OFFICE O/P EST HI 40 MIN: CPT | Mod: S$GLB,,, | Performed by: NEUROLOGICAL SURGERY

## 2018-09-25 PROCEDURE — 93000 ELECTROCARDIOGRAM COMPLETE: CPT | Mod: S$GLB,,, | Performed by: INTERNAL MEDICINE

## 2018-09-25 PROCEDURE — 3008F BODY MASS INDEX DOCD: CPT | Mod: CPTII,S$GLB,, | Performed by: NEUROLOGICAL SURGERY

## 2018-09-25 PROCEDURE — 99999 PR PBB SHADOW E&M-EST. PATIENT-LVL III: CPT | Mod: PBBFAC,,, | Performed by: NEUROLOGICAL SURGERY

## 2018-09-25 PROCEDURE — 99213 OFFICE O/P EST LOW 20 MIN: CPT | Mod: S$GLB,,, | Performed by: INTERNAL MEDICINE

## 2018-09-25 PROCEDURE — 99999 PR PBB SHADOW E&M-EST. PATIENT-LVL IV: CPT | Mod: PBBFAC,,, | Performed by: INTERNAL MEDICINE

## 2018-09-25 NOTE — PROGRESS NOTES
Subjective:    Patient ID:  Juanita Salas is a 43 y.o. female who presents for follow-up of Heart Murmur      HPI     Previously followed by Dr Grya - last seen 8/2/18  HPI: Patient seen in the past by Noah Lopez, Rhonda, and me for hypertension and palpitations.  Prior Holter monitor showed no significant arrhythmias, but patient had no symptoms.  Additionally, patient underwent 30 day event monitoring during the month of March 2018.  During this time the patient had multiple episodes of palpitations with the underlying rhythm essentially being sinus.     Patient notes occasional dizziness.  Has noted increase in palpitations and fitbit noted HR in 140 (but looks like noise). Patient denies any chest discomfort on exertion or at rest.  Patient denies any dyspnea at rest or on exertion, orthopnea, PND, or edema.  No syncope.     The patient's palpitations are not of cardiovascular origin.  During the time she had a 30 day event monitor on she had multiple episodes of palpitations without any underlying cardiac etiology.  Currently, her blood pressure is controlled off medications and her cholesterol is well controlled.  She has a very low ASCVD 10 year cardiovascular risk.  Patient was reassured that whenever she is feeling it is not due to a cardiac problem.  At this time no further cardiac evaluation is needed nor does the patient need to seek cardiac follow-up.  The patient is concerned about continuing to take potassium chloride and she need to do that.  The decision for continuing potassium supplementation will be made by the patient's primary care physician.  Patient should return as needed.    Echo 11/1/17    1 - Normal left ventricular systolic function (EF 60-65%).     2 - Normal right ventricular systolic function .     3 - Normal left ventricular diastolic function.   Trace MR/TR    Event monitor 2/27/18  There were 24 transmissions sent.  The first was a baseline test.  This revealed normal sinus  rhythm.  The subsequent transmissions were for complaints of heart racing/fluttering, palpitations, and no symptoms entered.  These revealed normal sinus rhythm, sinus tachycardia and rarely sinus tachycardia with a PVC.    There were no significant arrhythmias noted.    Having upcoming GYN surgery for tubal ligation  Denies CP or SOB  Gets occasional dizziness  BP has been slightly labile  EKG NSR - normal        Review of Systems   Constitution: Negative for decreased appetite.   HENT: Negative for ear discharge.    Eyes: Negative for blurred vision.   Respiratory: Negative for hemoptysis.    Endocrine: Negative for polyphagia.   Hematologic/Lymphatic: Negative for adenopathy.   Skin: Negative for color change.   Musculoskeletal: Negative for joint swelling.   Neurological: Negative for brief paralysis.   Psychiatric/Behavioral: Negative for hallucinations.        Objective:    Physical Exam   Constitutional: She is oriented to person, place, and time. She appears well-developed and well-nourished.   HENT:   Head: Normocephalic and atraumatic.   Eyes: Conjunctivae are normal. Pupils are equal, round, and reactive to light.   Neck: Normal range of motion. Neck supple.   Cardiovascular: Normal rate and intact distal pulses.   Murmur heard.  High-pitched blowing holosystolic murmur is present at the apex.  Pulmonary/Chest: Effort normal and breath sounds normal.   Abdominal: Soft. Bowel sounds are normal.   Musculoskeletal: Normal range of motion.   Neurological: She is alert and oriented to person, place, and time.   Skin: Skin is warm and dry.         Assessment:       1. Sinus tachycardia    2. Non-rheumatic mitral regurgitation    3. Mixed hyperlipidemia    4. Palpitations         Plan:       Cleared for GYN surgery at low cardiac risk  OV 1 year

## 2018-09-25 NOTE — LETTER
September 25, 2018    Juanita Salas  [address] [facility] [address]             [facility] [place] [facility]  [address]  [facility] [address]  Phone: [phone]   Juanita Salas was seen by me on 9/25/2018 and is cleared for gyn surgery with low risk.    If you have any questions or concerns, please don't hesitate to call.    Sincerely,      Chaitanya Riggins MD, FACC

## 2018-09-30 NOTE — PROGRESS NOTES
Chief Complaint   Patient presents with    Dizziness    Migraine    Pain     neck         Juanita Salas is a 43 y.o. female with a history of multiple medical diagnoses as listed below that presents for evaluation of dizziness and headaches. She says that in the past she has been seen and evaluated by multiple specialties including Cardiology. She feels that for the last few weeks the symptoms have seemed to be stable and have not been as bothersome. The symptoms have been associated with headaches that have seemed to have also subsided recently. She can not point to anything in particular that has been in control of her symptoms.      PAST MEDICAL HISTORY:  Past Medical History:   Diagnosis Date    Adjustment disorder     Anxiety     Depression     Fatigue     Headache     Hx of psychiatric care     Hypertension     Low vitamin D level     Panic disorder     Psychiatric problem        PAST SURGICAL HISTORY:  Past Surgical History:   Procedure Laterality Date     SECTION      DILATION AND CURETTAGE OF UTERUS         SOCIAL HISTORY:  Social History     Socioeconomic History    Marital status:      Spouse name: Not on file    Number of children: Not on file    Years of education: Not on file    Highest education level: Not on file   Social Needs    Financial resource strain: Not on file    Food insecurity - worry: Not on file    Food insecurity - inability: Not on file    Transportation needs - medical: Not on file    Transportation needs - non-medical: Not on file   Occupational History    Not on file   Tobacco Use    Smoking status: Former Smoker     Packs/day: 0.50     Years: 2.00     Pack years: 1.00     Last attempt to quit: 2017     Years since quittin.8    Smokeless tobacco: Never Used   Substance and Sexual Activity    Alcohol use: No    Drug use: No    Sexual activity: Yes     Partners: Male   Other Topics Concern    Are you pregnant or think you may  be? Not Asked    Breast-feeding Not Asked    Patient feels they ought to cut down on drinking/drug use Not Asked    Patient annoyed by others criticizing their drinking/drug use Not Asked    Patient has felt bad or guilty about drinking/drug use Not Asked    Patient has had a drink/used drugs as an eye opener in the AM Not Asked   Social History Narrative     with son (23) and daugther (14)       FAMILY HISTORY:  Family History   Problem Relation Age of Onset    Cancer Mother 47        pancreatic cancer     Diabetes Mother     Multiple sclerosis Sister     Hypertension Maternal Aunt     Hyperlipidemia Maternal Aunt     Hyperlipidemia Maternal Uncle     Hypertension Maternal Uncle     Hyperlipidemia Paternal Aunt     Hypertension Paternal Aunt     Hyperlipidemia Paternal Uncle     Hypertension Paternal Uncle     Stroke Maternal Grandmother     Psoriasis Maternal Grandmother     Heart disease Maternal Grandfather     Hyperlipidemia Maternal Grandfather     Hypertension Maternal Grandfather     Colon cancer Neg Hx     Esophageal cancer Neg Hx     Stomach cancer Neg Hx     Rectal cancer Neg Hx     Ulcerative colitis Neg Hx     Irritable bowel syndrome Neg Hx     Crohn's disease Neg Hx     Celiac disease Neg Hx        ALLERGIES AND MEDICATIONS: updated and reviewed.  Review of patient's allergies indicates:  No Known Allergies  Current Outpatient Medications   Medication Sig Dispense Refill    cholecalciferol, vitamin D3, (VITAMIN D3) 1,000 unit capsule Take 1,000 Units by mouth 2 (two) times daily.      clonazePAM (KLONOPIN) 0.5 MG tablet Take 1 tablet (0.5 mg total) by mouth once daily. 30 tablet 1    cyanocobalamin (VITAMIN B-12) 500 MCG tablet Take 1 tablet (500 mcg total) by mouth once daily.      potassium chloride (KLOR-CON) 10 MEQ TbSR Take 1 tablet (10 mEq total) by mouth once daily. 5 tablet 0    PROCTO-MED HC 2.5 % rectal cream       sertraline (ZOLOFT) 50 MG tablet  Take 1 tablet (50 mg total) by mouth once daily. 90 tablet 0    sumatriptan (IMITREX) 25 MG Tab Take 1 tablet (25 mg total) by mouth daily as needed. 9 tablet 0    TRINESSA LO 0.18/0.215/0.25 mg-25 mcg tablet TK 1 T PO QD  2     No current facility-administered medications for this visit.        Review of Systems   Constitutional: Negative for activity change, appetite change, fever and unexpected weight change.   HENT: Negative for trouble swallowing and voice change.    Eyes: Negative for photophobia and visual disturbance.   Respiratory: Negative for apnea and shortness of breath.    Cardiovascular: Negative for chest pain and leg swelling.   Gastrointestinal: Negative for constipation and nausea.   Genitourinary: Negative for difficulty urinating.   Musculoskeletal: Negative for back pain, gait problem and neck pain.   Skin: Negative for color change and pallor.   Neurological: Positive for dizziness. Negative for seizures, syncope, weakness and numbness.   Hematological: Negative for adenopathy.   Psychiatric/Behavioral: Negative for agitation, confusion and decreased concentration.       Neurologic Exam     Mental Status   Oriented to person, place, and time.   Registration: recalls 3 of 3 objects.   Attention: normal. Concentration: normal.   Speech: speech is normal   Level of consciousness: alert  Knowledge: good.     Cranial Nerves     CN II   Visual fields full to confrontation.   Right visual field deficit: none  Left visual field deficit: none     CN III, IV, VI   Pupils are equal, round, and reactive to light.  Extraocular motions are normal.   Right pupil: Size: 3 mm. Shape: regular. Accommodation: intact.   Left pupil: Size: 3 mm. Shape: regular. Accommodation: intact.   CN III: no CN III palsy  CN VI: no CN VI palsy  Nystagmus: none   Diplopia: none  Ophthalmoparesis: none  Upgaze: normal  Downgaze: normal  Conjugate gaze: present    CN V   Facial sensation intact.   Right facial sensation  deficit: none  Left facial sensation deficit: none    CN VII   Facial expression full, symmetric.   Right facial weakness: none  Left facial weakness: none    CN VIII   CN VIII normal.     CN IX, X   CN IX normal.   CN X normal.   Palate: symmetric    CN XI   CN XI normal.   Right sternocleidomastoid strength: normal  Left sternocleidomastoid strength: normal  Right trapezius strength: normal  Left trapezius strength: normal    CN XII   CN XII normal.   Tongue deviation: none    Motor Exam   Muscle bulk: normal  Overall muscle tone: normal  Right arm tone: normal  Left arm tone: normal  Right leg tone: normal  Left leg tone: normal    Strength   Strength 5/5 throughout.     Sensory Exam   Right arm light touch: normal  Left arm light touch: normal  Right leg light touch: normal  Left leg light touch: normal  Right arm vibration: normal  Left arm vibration: normal  Right leg vibration: normal  Left leg vibration: normal  Right arm proprioception: normal  Left arm proprioception: normal  Right leg proprioception: normal  Left leg proprioception: normal  Right arm pinprick: normal  Left arm pinprick: normal  Right leg pinprick: normal  Left leg pinprick: normal    Gait, Coordination, and Reflexes     Gait  Gait: normal    Coordination   Romberg: negative  Finger to nose coordination: normal  Heel to shin coordination: normal  Tandem walking coordination: normal    Tremor   Resting tremor: absent    Reflexes   Right brachioradialis: 2+  Left brachioradialis: 2+  Right biceps: 2+  Left biceps: 2+  Right triceps: 2+  Left triceps: 2+  Right patellar: 2+  Left patellar: 2+  Right achilles: 2+  Left achilles: 2+  Right plantar: normal  Left plantar: normal      Physical Exam   Constitutional: She is oriented to person, place, and time. She appears well-developed and well-nourished.   HENT:   Head: Normocephalic and atraumatic.   Eyes: EOM are normal. Pupils are equal, round, and reactive to light.   Neck: Normal range of  "motion.   Cardiovascular: Normal rate and intact distal pulses.   Pulmonary/Chest: Effort normal. No apnea. No respiratory distress.   Musculoskeletal: Normal range of motion.   Neurological: She is alert and oriented to person, place, and time. She has normal strength. She has a normal Finger-Nose-Finger Test, a normal Heel to Shin Test, a normal Romberg Test and a normal Tandem Gait Test. Gait normal.   Reflex Scores:       Tricep reflexes are 2+ on the right side and 2+ on the left side.       Bicep reflexes are 2+ on the right side and 2+ on the left side.       Brachioradialis reflexes are 2+ on the right side and 2+ on the left side.       Patellar reflexes are 2+ on the right side and 2+ on the left side.       Achilles reflexes are 2+ on the right side and 2+ on the left side.  Skin: Skin is warm and dry.   Psychiatric: She has a normal mood and affect. Her speech is normal and behavior is normal. Thought content normal.   Vitals reviewed.      Vitals:    09/25/18 0836   BP: 123/74   BP Location: Right arm   Patient Position: Sitting   BP Method: Medium (Automatic)   Pulse: 80   Weight: 63 kg (138 lb 14.2 oz)   Height: 5' 3" (1.6 m)       Assessment & Plan:    Problem List Items Addressed This Visit     Migraine without aura and without status migrainosus, not intractable - Primary          Follow-up: Follow-up if symptoms worsen or fail to improve.  More than 50% of this 45 minute encounter was spent in counseling and coordinating care.      "

## 2018-10-01 PROBLEM — Z00.00 WELL ADULT EXAM: Status: RESOLVED | Noted: 2018-06-27 | Resolved: 2018-10-01

## 2018-10-10 ENCOUNTER — OFFICE VISIT (OUTPATIENT)
Dept: INTERNAL MEDICINE | Facility: CLINIC | Age: 43
End: 2018-10-10
Payer: COMMERCIAL

## 2018-10-10 VITALS
DIASTOLIC BLOOD PRESSURE: 78 MMHG | TEMPERATURE: 98 F | SYSTOLIC BLOOD PRESSURE: 128 MMHG | BODY MASS INDEX: 25.31 KG/M2 | WEIGHT: 142.88 LBS | HEART RATE: 91 BPM | OXYGEN SATURATION: 99 %

## 2018-10-10 DIAGNOSIS — R30.0 DYSURIA: Primary | ICD-10-CM

## 2018-10-10 LAB
BILIRUB SERPL-MCNC: ABNORMAL MG/DL
BLOOD URINE, POC: ABNORMAL
COLOR, POC UA: ABNORMAL
GLUCOSE UR QL STRIP: NORMAL
KETONES UR QL STRIP: ABNORMAL
LEUKOCYTE ESTERASE URINE, POC: ABNORMAL
NITRITE, POC UA: ABNORMAL
PH, POC UA: 6
PROTEIN, POC: ABNORMAL
SPECIFIC GRAVITY, POC UA: 1
UROBILINOGEN, POC UA: NORMAL

## 2018-10-10 PROCEDURE — 81002 URINALYSIS NONAUTO W/O SCOPE: CPT | Mod: S$GLB,,, | Performed by: INTERNAL MEDICINE

## 2018-10-10 PROCEDURE — 3008F BODY MASS INDEX DOCD: CPT | Mod: CPTII,S$GLB,, | Performed by: INTERNAL MEDICINE

## 2018-10-10 PROCEDURE — 87086 URINE CULTURE/COLONY COUNT: CPT

## 2018-10-10 PROCEDURE — 99999 PR PBB SHADOW E&M-EST. PATIENT-LVL III: CPT | Mod: PBBFAC,,, | Performed by: INTERNAL MEDICINE

## 2018-10-10 PROCEDURE — 99213 OFFICE O/P EST LOW 20 MIN: CPT | Mod: 25,S$GLB,, | Performed by: INTERNAL MEDICINE

## 2018-10-10 NOTE — PROGRESS NOTES
Subjective:       Patient ID: Juanita Salas is a 43 y.o. female.    Chief Complaint: Urinary Tract Infection    Complains of dysuria today since large constipated stool passage.  No frequency or urgency.  Constipation is a  Chronic problem      Review of Systems   Constitutional: Negative for activity change, chills, fatigue and fever.   HENT: Negative for congestion, ear pain, nosebleeds, postnasal drip, sinus pressure and sore throat.    Eyes: Negative.  Negative for visual disturbance.   Respiratory: Negative for cough, chest tightness, shortness of breath and wheezing.    Cardiovascular: Negative for chest pain.   Gastrointestinal: Negative for abdominal pain, diarrhea, nausea and vomiting.   Genitourinary: Negative for difficulty urinating, dysuria, frequency and urgency.   Musculoskeletal: Negative for arthralgias and neck stiffness.   Skin: Negative for rash.   Neurological: Negative for dizziness, weakness and headaches.   Psychiatric/Behavioral: Negative for sleep disturbance. The patient is not nervous/anxious.        Objective:      Physical Exam   Constitutional: She is oriented to person, place, and time. She appears well-developed and well-nourished.  Non-toxic appearance. No distress.   HENT:   Head: Normocephalic and atraumatic.   Right Ear: Tympanic membrane, external ear and ear canal normal.   Left Ear: Tympanic membrane, external ear and ear canal normal.   Eyes: EOM are normal. Pupils are equal, round, and reactive to light. No scleral icterus.   Neck: Normal range of motion. Neck supple. No thyromegaly present.   Cardiovascular: Normal rate, regular rhythm and normal heart sounds.   Pulmonary/Chest: Effort normal and breath sounds normal.   Abdominal: Soft. Bowel sounds are normal. She exhibits no mass. There is no tenderness. There is no rebound.   Musculoskeletal: Normal range of motion.   Lymphadenopathy:     She has no cervical adenopathy.   Neurological: She is alert and oriented to  person, place, and time. She has normal reflexes. She displays normal reflexes. No cranial nerve deficit. She exhibits normal muscle tone. Coordination normal.   Skin: Skin is warm and dry.   Psychiatric: She has a normal mood and affect. Her behavior is normal.       Assessment:       1. Dysuria        Plan:   Juanita was seen today for urinary tract infection.    Diagnoses and all orders for this visit:    Dysuria  -     POCT urine dipstick without microscope  -     CULTURE, URINE

## 2018-10-11 LAB — BACTERIA UR CULT: NORMAL

## 2018-11-15 ENCOUNTER — PATIENT MESSAGE (OUTPATIENT)
Dept: PSYCHIATRY | Facility: CLINIC | Age: 43
End: 2018-11-15

## 2018-12-04 ENCOUNTER — OFFICE VISIT (OUTPATIENT)
Dept: PSYCHIATRY | Facility: CLINIC | Age: 43
End: 2018-12-04
Payer: COMMERCIAL

## 2018-12-04 VITALS
BODY MASS INDEX: 26.73 KG/M2 | WEIGHT: 150.88 LBS | HEART RATE: 91 BPM | DIASTOLIC BLOOD PRESSURE: 79 MMHG | SYSTOLIC BLOOD PRESSURE: 124 MMHG

## 2018-12-04 DIAGNOSIS — F41.1 GAD (GENERALIZED ANXIETY DISORDER): Primary | ICD-10-CM

## 2018-12-04 DIAGNOSIS — F41.0 PANIC DISORDER: ICD-10-CM

## 2018-12-04 PROCEDURE — 3008F BODY MASS INDEX DOCD: CPT | Mod: CPTII,S$GLB,, | Performed by: STUDENT IN AN ORGANIZED HEALTH CARE EDUCATION/TRAINING PROGRAM

## 2018-12-04 PROCEDURE — 99213 OFFICE O/P EST LOW 20 MIN: CPT | Mod: S$GLB,,, | Performed by: STUDENT IN AN ORGANIZED HEALTH CARE EDUCATION/TRAINING PROGRAM

## 2018-12-04 PROCEDURE — 99999 PR PBB SHADOW E&M-EST. PATIENT-LVL II: CPT | Mod: PBBFAC,,, | Performed by: STUDENT IN AN ORGANIZED HEALTH CARE EDUCATION/TRAINING PROGRAM

## 2018-12-04 RX ORDER — SERTRALINE HYDROCHLORIDE 25 MG/1
TABLET, FILM COATED ORAL
Qty: 20 TABLET | Refills: 0 | Status: SHIPPED | OUTPATIENT
Start: 2018-12-04 | End: 2019-06-27

## 2018-12-04 NOTE — PROGRESS NOTES
"Outpatient Psychiatry Follow-Up Visit (MD/NP)    12/4/2018    Clinical Status of Patient:  Outpatient (Ambulatory)    Chief Complaint:  Juanita Salas is a 43 y.o. female who presents today for follow-up of anxiety. Last seen 9/18/18. Chart reviewed. Met with patient.      Current medications:  Zoloft 25mg daily since 11/15, down from 50mg daily  Klonopin 0.5mg daily PRN - again, has not taken at all since last visit    Interval History and Content of Current Session:  After last visit, patient messaged via online portal to say that after starting her new job her anxiety has decreased significantly. She requested to decrease her medication. After discussion, decided to decrease Zoloft to 25mg daily until this visit.    TODAY, patient reports she is doing better. She has started a new job, still in [employer], but her co-workers are "more positive." She does not feel pressured or anxious. She actually likes it. Mood overall is "good," without depression, tearfulness, or any suicidal thoughts/plans/intent. She has not noticed any change or problems since decreasing Zoloft. Sleep is good, getting 6-8 hours a night and falls asleep easily. Energy during the day is fair to good. No panic attacks. Denies constant worry or muscle tension. Appetite is good, but she notes she has gained ~30lbs since February of 2018 (note: had lost 15 lbs in preceeding 5 months related to loss of appetite with anxiety). She is not doing any dedicated exercise right now, but she was not doing that before either and now is actually more active at work. She attributes this to the medicine and would like to continue tapering off. Currently enjoying hanging out with her 3 month old grandson. Has not had any notable headaches since last visit. Denies HI, AVH. Has not used Klonpin since last visit, and not before that visit either.     Pt's main concern right now is wt loss and stopping medication. Discussed portion control and exercise plan. Pt " came up with plan [place] total of one hour a week to start. Discussed plan to taper off of Zoloft and follow up after, as well as future contingency plans in case of return of anxiety.             Psychotherapy:  · Target symptoms: anxiety   · Why chosen therapy is appropriate versus another modality: relevant to diagnosis  · Outcome monitoring methods: self-report, observation  · Therapeutic intervention type: behavior modifying psychotherapy, supportive psychotherapy  · Topics discussed/themes: building skills sets for symptom management, symptom recognition  · The patient's response to the intervention is accepting. The patient's progress toward treatment goals is good.   · Duration of intervention: 15 minutes.    Review of Systems   · PSYCHIATRIC: Pertinant items are noted in the narrative.  · NEUROLOGIC: No weakness, sensory changes, seizures, confusion, memory loss, tremor or other abnormal movements.  · RESPIRATORY: No shortness of breath.  · CARDIOVASCULAR: No tachycardia or chest pain.  · GASTROINTESTINAL: No nausea, vomiting, pain, constipation or diarrhea.    Past Medical, Family and Social History: The patient's past medical, family and social history have been reviewed and updated as appropriate within the electronic medical record - see encounter notes.  Past psychiatric: took Lexapro for a few months when her mother passed away. Stopped because she didn't need it anymore (rx'd by OB/Gyn). Dr. Lizarraga was only outpatient psychiatrist. Other med trials just Zoloft and Klonopin. Never hospitalized, no history of SI/SA. No history of NSSIB. No gun access.  Social: works in [employer]. Lives with BF of 2 years and 17 yo daughter. Also has a son, 23, who just had first baby. From [place]. Grew up with her mom.  from dad when pt was 9, saw him on weekends. No history of physical or sexual abuse. Childhood was supportive and pleasant. HS grad, 1 year of college.   Substance: Quit  "smoking in November after 15 years or so of smoking. Occasional alcohol (used to drink a bit more, but currently 3 drinks in the past year). No rehab, no drugs, no WD  Legal: none  Medical: none, HTN ruled out, has been seen by cardiology  Neuro: no seizures or head trauma  Family psych: none known  Family medical: maternal grandfather had heart attacks starting in 30s, maternal grandmother had a stroke in her 50s.   NKDA    Compliance: yes    Side effects: None    Risk Parameters:  Patient reports no suicidal ideation  Patient reports no homicidal ideation  Patient reports no self-injurious behavior  Patient reports no violent behavior    Exam (detailed: at least 9 elements; comprehensive: all 15 elements)   Constitutional  Vitals:  Most recent vital signs, dated less than 90 days prior to this appointment, were reviewed.   Vitals:    12/04/18 0802   BP: 124/79   Pulse: 91   Weight: 68.5 kg (150 lb 14.5 oz)          General:  unremarkable, age appropriate, normal weight, casually dressed     Musculoskeletal  Muscle Strength/Tone:  not examined   Gait & Station:  non-ataxic     Psychiatric  Speech:  no latency; no press, somewhat talkative   Mood & Affect:  euthymic "good!"  mood-congruent, relaxed, not worried   Thought Process:  normal and logical   Associations:  intact   Thought Content:  normal, no suicidality, no homicidality, delusions, or paranoia   Insight:  intact, has awareness of illness   Judgement: behavior is adequate to circumstances   Orientation:  grossly intact   Memory: intact for content of interview   Language: grossly intact, able to name, able to repeat   Attention Span & Concentration:  able to focus   Fund of Knowledge:  intact and appropriate to age and level of education     Assessment and Diagnosis   Status/Progress: Based on the examination today, the patient's problem(s) is/are improved and well controlled.  New problems have not been presented today.   Co-morbidities, Diagnostic " uncertainty and Lack of compliance are not complicating management of the primary condition.  There are no active rule-out diagnoses for this patient at this time.     General Impression:       ICD-10-CM ICD-9-CM   1. LUPE (generalized anxiety disorder) F41.1 300.02   2. Panic disorder F41.0 300.01   Patient's symptoms currently in full remission by her report. See HPI.     Intervention/Counseling/Treatment Plan   · Decrease Zoloft from 25mg daily to 12.5mg daily x 2 weeks then stop. Message me with any problems or concerns.   · Stop Klonopin, has not needed it in several months. Doing well    Not currently in psychotherapy but symptoms have resolved. Discussed option of doing that in the future in the event of return of anxiety      Return to Clinic: 5-6 weeks, or sooner if needed. After that can return PRN if doing well, but will plan visits as appropriate

## 2018-12-10 ENCOUNTER — PATIENT MESSAGE (OUTPATIENT)
Dept: INTERNAL MEDICINE | Facility: CLINIC | Age: 43
End: 2018-12-10

## 2018-12-10 NOTE — TELEPHONE ENCOUNTER
1) she just had a kidney and pelvic ultrasound a few months ago, so I do not think those need to be repeated but I will defer to Dr. Gaston  2) cholesterol and potassium were done in August, I do not think they need to be repeated this soon  3) yes, can send a picture of her rash

## 2018-12-11 ENCOUNTER — PATIENT MESSAGE (OUTPATIENT)
Dept: INTERNAL MEDICINE | Facility: CLINIC | Age: 43
End: 2018-12-11

## 2018-12-11 DIAGNOSIS — B35.6 TINEA CRURIS: Primary | ICD-10-CM

## 2018-12-11 RX ORDER — NYSTATIN 100000 [USP'U]/G
POWDER TOPICAL 2 TIMES DAILY
Qty: 30 G | Refills: 1 | Status: SHIPPED | OUTPATIENT
Start: 2018-12-11 | End: 2020-03-03

## 2019-01-20 NOTE — PROGRESS NOTES
STAFF NOTE:  Patient's case was discussed with Dr. Lizarraga in supervision.  I agree with his assessment and plan as stated.

## 2019-04-12 NOTE — PROGRESS NOTES
STAFF NOTE:  Patient's case was formally presented to me by Dr. Lizarraga in supervision.  I agree with the assessment and plan as specified.

## 2019-05-24 ENCOUNTER — PATIENT MESSAGE (OUTPATIENT)
Dept: INTERNAL MEDICINE | Facility: CLINIC | Age: 44
End: 2019-05-24

## 2019-06-12 ENCOUNTER — OFFICE VISIT (OUTPATIENT)
Dept: CARDIOLOGY | Facility: CLINIC | Age: 44
End: 2019-06-12
Payer: COMMERCIAL

## 2019-06-12 VITALS
HEART RATE: 90 BPM | SYSTOLIC BLOOD PRESSURE: 143 MMHG | BODY MASS INDEX: 29.41 KG/M2 | WEIGHT: 159.81 LBS | HEIGHT: 62 IN | OXYGEN SATURATION: 100 % | DIASTOLIC BLOOD PRESSURE: 74 MMHG

## 2019-06-12 DIAGNOSIS — R00.2 HEART PALPITATIONS: Chronic | ICD-10-CM

## 2019-06-12 DIAGNOSIS — E87.6 HYPOKALEMIA: ICD-10-CM

## 2019-06-12 DIAGNOSIS — R00.0 SINUS TACHYCARDIA: ICD-10-CM

## 2019-06-12 PROCEDURE — 99999 PR PBB SHADOW E&M-EST. PATIENT-LVL IV: CPT | Mod: PBBFAC,,, | Performed by: INTERNAL MEDICINE

## 2019-06-12 PROCEDURE — 3078F PR MOST RECENT DIASTOLIC BLOOD PRESSURE < 80 MM HG: ICD-10-PCS | Mod: CPTII,S$GLB,, | Performed by: INTERNAL MEDICINE

## 2019-06-12 PROCEDURE — 3008F BODY MASS INDEX DOCD: CPT | Mod: CPTII,S$GLB,, | Performed by: INTERNAL MEDICINE

## 2019-06-12 PROCEDURE — 99214 OFFICE O/P EST MOD 30 MIN: CPT | Mod: S$GLB,,, | Performed by: INTERNAL MEDICINE

## 2019-06-12 PROCEDURE — 99214 PR OFFICE/OUTPT VISIT, EST, LEVL IV, 30-39 MIN: ICD-10-PCS | Mod: S$GLB,,, | Performed by: INTERNAL MEDICINE

## 2019-06-12 PROCEDURE — 3078F DIAST BP <80 MM HG: CPT | Mod: CPTII,S$GLB,, | Performed by: INTERNAL MEDICINE

## 2019-06-12 PROCEDURE — 3008F PR BODY MASS INDEX (BMI) DOCUMENTED: ICD-10-PCS | Mod: CPTII,S$GLB,, | Performed by: INTERNAL MEDICINE

## 2019-06-12 PROCEDURE — 99999 PR PBB SHADOW E&M-EST. PATIENT-LVL IV: ICD-10-PCS | Mod: PBBFAC,,, | Performed by: INTERNAL MEDICINE

## 2019-06-12 PROCEDURE — 3077F PR MOST RECENT SYSTOLIC BLOOD PRESSURE >= 140 MM HG: ICD-10-PCS | Mod: CPTII,S$GLB,, | Performed by: INTERNAL MEDICINE

## 2019-06-12 PROCEDURE — 3077F SYST BP >= 140 MM HG: CPT | Mod: CPTII,S$GLB,, | Performed by: INTERNAL MEDICINE

## 2019-06-12 RX ORDER — NORGESTIMATE AND ETHINYL ESTRADIOL 7DAYSX3 LO
KIT ORAL
COMMUNITY
Start: 2019-02-08 | End: 2019-08-27

## 2019-06-12 RX ORDER — METOPROLOL TARTRATE 25 MG/1
25 TABLET, FILM COATED ORAL 2 TIMES DAILY
Qty: 60 TABLET | Refills: 11 | Status: SHIPPED | OUTPATIENT
Start: 2019-06-12 | End: 2019-10-17

## 2019-06-12 RX ORDER — POTASSIUM CHLORIDE 750 MG/1
10 TABLET, EXTENDED RELEASE ORAL DAILY
Qty: 90 TABLET | Refills: 3 | Status: SHIPPED | OUTPATIENT
Start: 2019-06-12 | End: 2020-08-12

## 2019-06-12 NOTE — PROGRESS NOTES
Subjective:   Chief Complaint: Fluttering  Last Clinic Visit:  One year prior with Dr. Gray    History of Present Illness: Juanita Salas is a 43 y.o. lady with palpitations, hyperlipidemia, sinus tachycardia, anxiety, and history of tobacco use who presents for a one year follow-up with ongoing palpitations.  She reports the palpitations are present throughout the day, last for couple minutes, happen once or twice a week, and then resolved spontaneously.  Denies any change in the palpitations in the past year, no caffeine, no alcohol, no illicits, and has stopped smoking, and the palpitations have persisted.  She has had extensive workup for pheochromocytoma in the past which was negative.  She was also diagnosed with some mild hypokalemia and was prescribed low-dose potassium which has possibly helped, possibly not.  She was recently changed birth control, and feels that this may have contributed to worsening palpitations.  Palpitations have been worked up in the past with event monitor showing sinus palpitations.  Echocardiogram showing normal ejection fraction, no significant valvular disease, and she has seen Dr. Lopez, , Dr. Riggins among others for these symptoms.  She denies any orthopnea, no lower extremity edema, no fatigue, no chest pain, or other symptoms concerning for ischemia or heart failure.    PMHx:  Palpitations  Hyperlipidemia  Sinus tachycardia  Anxiety  History of tobacco use  Review of Systems   Constitution: Negative.   HENT: Negative.    Eyes: Negative.    Cardiovascular: Positive for palpitations.   Respiratory: Negative.    Hematologic/Lymphatic: Negative.    Skin: Negative.    Musculoskeletal: Negative.    Gastrointestinal: Negative.    Genitourinary: Negative.      Medications:  Current Outpatient Medications on File Prior to Visit   Medication Sig    cholecalciferol, vitamin D3, (VITAMIN D3) 1,000 unit capsule Take 1,000 Units by mouth 2 (two) times daily.    cyanocobalamin  "(VITAMIN B-12) 500 MCG tablet Take 1 tablet (500 mcg total) by mouth once daily.    norgestimate-ethinyl estradiol (TRI-LO-ESTARYLLA) 0.18/0.215/0.25 mg-25 mcg tablet     PROCTO-MED HC 2.5 % rectal cream as needed.     [DISCONTINUED] potassium chloride (KLOR-CON) 10 MEQ TbSR Take 1 tablet (10 mEq total) by mouth once daily.    nystatin (MYCOSTATIN) powder Apply topically 2 (two) times daily.    sertraline (ZOLOFT) 25 MG tablet Take 1/2 tablet (12.5mg) daily for two weeks then stop if tolerated (Patient taking differently: Pt states she does not take any more)    sumatriptan (IMITREX) 25 MG Tab Take 1 tablet (25 mg total) by mouth daily as needed.    TRINESSA LO 0.18/0.215/0.25 mg-25 mcg tablet TK 1 T PO QD     No current facility-administered medications on file prior to visit.      Family History:  Juanita's family history includes Cancer (age of onset: 47) in her mother; Diabetes in her mother; Heart disease in her maternal grandfather; Hyperlipidemia in her maternal aunt, maternal grandfather, maternal uncle, paternal aunt, and paternal uncle; Hypertension in her maternal aunt, maternal grandfather, maternal uncle, paternal aunt, and paternal uncle; Multiple sclerosis in her sister; Psoriasis in her maternal grandmother; Stroke in her maternal grandmother.    Social History:  Juanita reports that she quit smoking about 18 months ago. She has a 1.00 pack-year smoking history. She has never used smokeless tobacco. She reports that she does not drink alcohol or use drugs.    Objective:   BP (!) 143/74   Pulse 90   Ht 5' 2" (1.575 m)   Wt 72.5 kg (159 lb 13.3 oz)   SpO2 100%   BMI 29.23 kg/m²     Physical Exam   Constitutional: She is oriented to person, place, and time and well-developed, well-nourished, and in no distress. No distress.   HENT:   Head: Normocephalic and atraumatic.   Mouth/Throat: No oropharyngeal exudate.   Eyes: EOM are normal. No scleral icterus.   Neck: No JVD present. No tracheal " deviation present. No thyromegaly present.   Cardiovascular: Normal rate and regular rhythm. Exam reveals no gallop and no friction rub.   No murmur heard.  Pulmonary/Chest: Effort normal and breath sounds normal. No respiratory distress. She has no wheezes. She has no rales. She exhibits no tenderness.   Abdominal: Soft. She exhibits no distension. There is no tenderness. There is no rebound and no guarding.   Musculoskeletal: Normal range of motion. She exhibits no edema.   Neurological: She is alert and oriented to person, place, and time.   Skin: Skin is warm and dry. She is not diaphoretic. No erythema.   Psychiatric: Affect normal.     EKG:  My independent visualization of most recent EKG is normal sinus rhythm    TTE:  11/01/2017  CONCLUSIONS     1 - Normal left ventricular systolic function (EF 60-65%).     2 - Normal right ventricular systolic function .     3 - Normal left ventricular diastolic function.    Lipids:  Recent Labs   Lab 08/28/18  0711   LDL CHOLESTEROL 138.4   HDL 57   CHOLESTEROL 223 H      Renal:  Recent Labs   Lab 06/01/18  0711 08/28/18  0711   POTASSIUM 4.2 4.0   CO2 24  --    BUN BLD 16  --    CREATININE 0.6  --      Liver:  Recent Labs   Lab 04/27/18  0717   AST 12   ALT 13       Assessment:     1. Heart palpitations    2. Sinus tachycardia    3. Hypokalemia        Plan:   1. Heart palpitations  Appear to be sinus palpitations, will prescribe low-dose metoprolol as above to treat symptoms, explained that medication was only for symptoms, and not for any underlying cardiovascular disease  - potassium chloride (KLOR-CON) 10 MEQ TbSR; Take 1 tablet (10 mEq total) by mouth once daily.  Dispense: 90 tablet; Refill: 3    2. Sinus tachycardia  Etiology of palpitations, hopefully symptoms will be improved with low-dose beta-blocker  - potassium chloride (KLOR-CON) 10 MEQ TbSR; Take 1 tablet (10 mEq total) by mouth once daily.  Dispense: 90 tablet; Refill: 3  - metoprolol tartrate (LOPRESSOR)  25 MG tablet; Take 1 tablet (25 mg total) by mouth 2 (two) times daily.  Dispense: 60 tablet; Refill: 11    3. Hypokalemia  Will supplement low-dose potassium, she is to follow-up with labs ordered by primary care doctor later this month.  - potassium chloride (KLOR-CON) 10 MEQ TbSR; Take 1 tablet (10 mEq total) by mouth once daily.  Dispense: 90 tablet; Refill: 3    No follow-ups on file.

## 2019-06-19 ENCOUNTER — PATIENT OUTREACH (OUTPATIENT)
Dept: ADMINISTRATIVE | Facility: HOSPITAL | Age: 44
End: 2019-06-19

## 2019-06-19 NOTE — PROGRESS NOTES
Pt sees outside GYN- Dr. Maloney. Record request faxed for pap & mammo reports. Chart review completed. Immunizations reconciled, HM modifiers updated, HM duplicate entries deleted, care team updated, old orders deleted. Pt due for tdap & pneumo vaccines.

## 2019-06-19 NOTE — LETTER
June 19, 2019    Dr. Erick Maloney  [facility]  [address] [place] [address] June 19, 2019     Patient: Juanita Salas    YOB: 1975   Date of Visit: 6/19/2019     To Whom It May Concern:    The patient listed above was seen recently by her primary care provider, Dr. Seth, at [facility]. Please release the following information specified below for the patient:    Most recent pap & mammogram results    Please fax the requested record to our secure fax number [phone], Attention: ABDELRAHMAN Mccann.    Thank you for your help! If I can be of any assistance please contact me at the number listed below.      Regards,    Ramy Noguera LPN  Clinical Care Coordinator  [facility]  [facility]  [phone] phone  [phone] fax  [email or web address]

## 2019-06-27 ENCOUNTER — PATIENT MESSAGE (OUTPATIENT)
Dept: INTERNAL MEDICINE | Facility: CLINIC | Age: 44
End: 2019-06-27

## 2019-06-27 ENCOUNTER — OFFICE VISIT (OUTPATIENT)
Dept: INTERNAL MEDICINE | Facility: CLINIC | Age: 44
End: 2019-06-27
Payer: COMMERCIAL

## 2019-06-27 VITALS
HEIGHT: 62 IN | HEART RATE: 77 BPM | BODY MASS INDEX: 29.13 KG/M2 | DIASTOLIC BLOOD PRESSURE: 80 MMHG | SYSTOLIC BLOOD PRESSURE: 116 MMHG | WEIGHT: 158.31 LBS | OXYGEN SATURATION: 99 %

## 2019-06-27 DIAGNOSIS — E87.6 HYPOKALEMIA: ICD-10-CM

## 2019-06-27 DIAGNOSIS — E53.8 B12 DEFICIENCY: ICD-10-CM

## 2019-06-27 DIAGNOSIS — Z87.891 FORMER SMOKER: ICD-10-CM

## 2019-06-27 DIAGNOSIS — G43.009 MIGRAINE WITHOUT AURA AND WITHOUT STATUS MIGRAINOSUS, NOT INTRACTABLE: ICD-10-CM

## 2019-06-27 DIAGNOSIS — Z13.1 SCREENING FOR DIABETES MELLITUS: ICD-10-CM

## 2019-06-27 DIAGNOSIS — K62.5 BRBPR (BRIGHT RED BLOOD PER RECTUM): ICD-10-CM

## 2019-06-27 DIAGNOSIS — Z00.00 VISIT FOR ANNUAL HEALTH EXAMINATION: Primary | ICD-10-CM

## 2019-06-27 DIAGNOSIS — F41.1 GAD (GENERALIZED ANXIETY DISORDER): Chronic | ICD-10-CM

## 2019-06-27 DIAGNOSIS — Z13.29 SCREENING FOR THYROID DISORDER: ICD-10-CM

## 2019-06-27 DIAGNOSIS — E55.9 VITAMIN D DEFICIENCY: Chronic | ICD-10-CM

## 2019-06-27 DIAGNOSIS — E78.2 MIXED HYPERLIPIDEMIA: ICD-10-CM

## 2019-06-27 PROCEDURE — 99999 PR PBB SHADOW E&M-EST. PATIENT-LVL III: ICD-10-PCS | Mod: PBBFAC,,, | Performed by: INTERNAL MEDICINE

## 2019-06-27 PROCEDURE — 99396 PREV VISIT EST AGE 40-64: CPT | Mod: S$GLB,,, | Performed by: INTERNAL MEDICINE

## 2019-06-27 PROCEDURE — 99213 OFFICE O/P EST LOW 20 MIN: CPT | Mod: PBBFAC | Performed by: INTERNAL MEDICINE

## 2019-06-27 PROCEDURE — 99999 PR PBB SHADOW E&M-EST. PATIENT-LVL III: CPT | Mod: PBBFAC,,, | Performed by: INTERNAL MEDICINE

## 2019-06-27 PROCEDURE — 99396 PR PREVENTIVE VISIT,EST,40-64: ICD-10-PCS | Mod: S$GLB,,, | Performed by: INTERNAL MEDICINE

## 2019-06-27 RX ORDER — BUTALBITAL, ACETAMINOPHEN AND CAFFEINE 50; 325; 40 MG/1; MG/1; MG/1
1 TABLET ORAL EVERY 4 HOURS PRN
Qty: 1 TABLET | Refills: 0
Start: 2019-06-27 | End: 2019-07-27

## 2019-06-27 NOTE — PROGRESS NOTES
INTERNAL MEDICINE ESTABLISHED PATIENT VISIT NOTE    Subjective:     Chief Complaint: Annual Exam       Patient ID: Juanita Salas is a 44 y.o. female with anxiety and depression c panic d/o, sinus tachycardia, migraines, Vit D def, rebeca hematuria (see by Uro, had cysto which pt reports was normal), former smoker, hx night sweats over the past year with neg w/u (see last note for details), hemorrhoids, last seen by me in Sept, here today for f/u and annual exam.    At last appt, diastolic BP a little high at 90 but systolic wnl and here to f/u.    Was rx'ed Metoprolol for tachycardia and palpitations by Cards but states she is not taking it and feeling fine.  Does have meds to take as needed.  Reports sx of flushing have now improved.    Reports she has had some weight gain since her last appt.  Was previously on Ortho tricyclen Lo and switched TriLoSprintec and now on TriLoEstarylla but has had wt gain with it and is now considering Kerline.  Says she is discussing c her gyn.    Still has migraines intermittently and has been rx'ed fioricet by gyn for prn use.  No aura.    Today also requesting FIT testing as she has had BRBPR in the past.  Was seen by CRS last year for hemorrhoids but states she does not want csc at this time and requesting FIT testing.    Past Medical History:  Past Medical History:   Diagnosis Date    Adjustment disorder     Anxiety     Depression     Fatigue     Headache     Hx of psychiatric care     Hypertension     Low vitamin D level     Panic disorder     Psychiatric problem        Home Medications:  Prior to Admission medications    Medication Sig Start Date End Date Taking? Authorizing Provider   cholecalciferol, vitamin D3, (VITAMIN D3) 1,000 unit capsule Take 1,000 Units by mouth 2 (two) times daily.   Yes Historical Provider, MD   cyanocobalamin (VITAMIN B-12) 500 MCG tablet Take 1 tablet (500 mcg total) by mouth once daily. 3/6/18  Yes Ema Gaston MD   metoprolol tartrate  (LOPRESSOR) 25 MG tablet Take 1 tablet (25 mg total) by mouth 2 (two) times daily. 19 Yes Kishore Quiros MD   norgestimate-ethinyl estradiol (TRI-LO-ESTARYLLA) 0.18/0.215/0.25 mg-25 mcg tablet  19  Yes Historical Provider, MD   nystatin (MYCOSTATIN) powder Apply topically 2 (two) times daily. 18  Yes Ema Gaston MD   potassium chloride (KLOR-CON) 10 MEQ TbSR Take 1 tablet (10 mEq total) by mouth once daily. 19  Yes Kishore Quiros MD   PROCTO-MED HC 2.5 % rectal cream as needed.  18  Yes Historical Provider, MD   sumatriptan (IMITREX) 25 MG Tab Take 1 tablet (25 mg total) by mouth daily as needed. 9/17/18 10/17/18  Ema Gaston MD   sertraline (ZOLOFT) 25 MG tablet Take 1/2 tablet (12.5mg) daily for two weeks then stop if tolerated  Patient taking differently: Pt states she does not take any more 18  Lawrence Cabezas MD   TRINESSA LO 0.18/0.215/0.25 mg-25 mcg tablet TK 1 T PO QD 17  Historical Provider, MD       Allergies:  Review of patient's allergies indicates:  No Known Allergies    Social History:  Social History     Tobacco Use    Smoking status: Former Smoker     Packs/day: 0.50     Years: 2.00     Pack years: 1.00     Last attempt to quit: 2017     Years since quittin.6    Smokeless tobacco: Never Used   Substance Use Topics    Alcohol use: No     Frequency: Monthly or less     Drinks per session: 3 or 4     Binge frequency: Never    Drug use: No        Review of Systems   Constitutional: Positive for unexpected weight change (wt gain c ocp change as per HPI). Negative for activity change.   HENT: Negative for hearing loss, rhinorrhea and trouble swallowing.    Eyes: Negative for discharge and visual disturbance.   Respiratory: Negative for chest tightness and wheezing.    Cardiovascular: Negative for chest pain and palpitations.   Gastrointestinal: Negative for blood in stool, constipation, diarrhea and vomiting.   Endocrine:  "Negative for polydipsia and polyuria.   Genitourinary: Negative for difficulty urinating, dysuria, hematuria and menstrual problem.   Musculoskeletal: Negative for arthralgias, joint swelling and neck pain.   Neurological: Positive for headaches (L sided as per HPI). Negative for weakness.   Psychiatric/Behavioral: Negative for confusion and dysphoric mood.         Health Maintenance:     Immunizations:   Influenza is not up to date, declined, Risks and benefits were discussed with patient.  TDap is not up to date, declined, Risks and benefits were discussed with patient.     Cancer Screening:  PAP: is up to date. 6/18/19 Dr. Maloney at [facility]; results pending  Mammogram: is up to date. 6/2019 benign at [facility], results to be scanned.  Colonoscopy: will check FIT for now since pt     Objective:   /80 (BP Location: Left arm, Patient Position: Sitting, BP Method: Medium (Manual))   Pulse 77   Ht 5' 2" (1.575 m)   Wt 71.8 kg (158 lb 4.6 oz)   LMP 06/24/2019   SpO2 99%   BMI 28.95 kg/m²        General: AAO x3, no apparent distress  HEENT: PERRL, OP clear  CV: RRR, no m/r/g  Pulm: Lungs CTAB, no crackles, no wheezes  Abd: s/NT/ND +BS  Extremities: no c/c/e    Labs:     Lab Results   Component Value Date    WBC 7.05 08/28/2018    HGB 12.3 08/28/2018    HCT 37.6 08/28/2018    MCV 93 08/28/2018     08/28/2018     CMP  Sodium   Date Value Ref Range Status   06/01/2018 139 136 - 145 mmol/L Final     Potassium   Date Value Ref Range Status   08/28/2018 4.0 3.5 - 5.1 mmol/L Final     Chloride   Date Value Ref Range Status   06/01/2018 106 95 - 110 mmol/L Final     CO2   Date Value Ref Range Status   06/01/2018 24 23 - 29 mmol/L Final     Glucose   Date Value Ref Range Status   06/01/2018 93 70 - 110 mg/dL Final     BUN, Bld   Date Value Ref Range Status   06/01/2018 16 6 - 20 mg/dL Final     Creatinine   Date Value Ref Range Status   06/01/2018 0.6 0.5 - 1.4 mg/dL Final     Calcium   Date Value Ref Range Status "   06/01/2018 8.8 8.7 - 10.5 mg/dL Final     Total Protein   Date Value Ref Range Status   04/27/2018 6.7 6.0 - 8.4 g/dL Final     Albumin   Date Value Ref Range Status   04/27/2018 3.7 3.5 - 5.2 g/dL Final     Total Bilirubin   Date Value Ref Range Status   04/27/2018 0.4 0.1 - 1.0 mg/dL Final     Comment:     For infants and newborns, interpretation of results should be based  on gestational age, weight and in agreement with clinical  observations.  Premature Infant recommended reference ranges:  Up to 24 hours.............<8.0 mg/dL  Up to 48 hours............<12.0 mg/dL  3-5 days..................<15.0 mg/dL  6-29 days.................<15.0 mg/dL       Alkaline Phosphatase   Date Value Ref Range Status   04/27/2018 36 (L) 55 - 135 U/L Final     AST   Date Value Ref Range Status   04/27/2018 12 10 - 40 U/L Final     ALT   Date Value Ref Range Status   04/27/2018 13 10 - 44 U/L Final     Anion Gap   Date Value Ref Range Status   06/01/2018 9 8 - 16 mmol/L Final     eGFR if    Date Value Ref Range Status   06/01/2018 >60 >60 mL/min/1.73 m^2 Final     eGFR if non    Date Value Ref Range Status   06/01/2018 >60 >60 mL/min/1.73 m^2 Final     Comment:     Calculation used to obtain the estimated glomerular filtration  rate (eGFR) is the CKD-EPI equation.        Lab Results   Component Value Date    LDLCALC 138.4 08/28/2018     Lab Results   Component Value Date    TSH 0.858 01/29/2018     No results found for: LABA1C, HGBA1C      Assessment/Plan     Juanita was seen today for annual exam.    Diagnoses and all orders for this visit:    Visit for annual health examination  History and physical exam completed.  Health maintenance reviewed as above.    Mixed hyperlipidemia  Lab Results   Component Value Date    LDLCALC 138.4 08/28/2018     Not req meds based on age and RF.  Patient counseled on need for a low fat diet.  Avoid red meats and fried foods as well as cream-based foods and processed  foods.  Recommend weight loss with diet and exercise.  Will repeat labs now.  -     Lipid panel    LUPE (generalized anxiety disorder)  Stable off meds, reports she is no longer seeing psych and doing well since the birth of her grandson and now has a new job.  F/u as needed.    Vitamin D deficiency  rec cont D3 2000 daily  Will repeat labs now  -     Vitamin D    B12 deficiency  wnl on last check   Will repeat labs  -     CBC auto differential; Future  -     Vitamin B12; Future  -     CBC auto differential  -     Vitamin B12    Migraine without aura and without status migrainosus, not intractable  As per HPI  On Fioricet prn from gyn, rx'ed by gyn.    Former smoker  -     Requesting labs for insurance, ok to order  -     Nicotine screen, urine    Hypokalemia  As per HPI  Mild, will repeat labs now\  -     Comprehensive metabolic panel; Future  -     Urinalysis; Future  -     Comprehensive metabolic panel  -     Urinalysis    BRBPR (bright red blood per rectum)  As per HPI  Advised that FIT testing indicated for colon cancer screening at 49yo, requesting to do through [facility].  Pt states her  had it done and requesting for peace of mind but does not want csc at this time  -     Fecal Immunochemical Test (iFOBT)    Screening for diabetes mellitus  -     Hemoglobin A1c    Screening for thyroid disorder  -     TSH  -     T4, free    HM as above  RTC in 6 mos, labs this week at [facility] when fasting.    Ema Gaston MD  Department of Internal Medicine - [facility]  06/27/2019

## 2019-06-28 NOTE — PROGRESS NOTES
STAFF NOTE:   Patients case was formally presented to me by Dr. Lizarraga in supervision.  I agree with the assessment and plan as specified.

## 2019-07-04 LAB
25(OH)D3 SERPL-MCNC: 25 NG/ML (ref 30–100)
ALBUMIN SERPL-MCNC: 4 G/DL (ref 3.6–5.1)
ALBUMIN/GLOB SERPL: 1.6 (CALC) (ref 1–2.5)
ALP SERPL-CCNC: 51 U/L (ref 33–115)
ALT SERPL-CCNC: 9 U/L (ref 6–29)
AST SERPL-CCNC: 11 U/L (ref 10–30)
BASOPHILS # BLD AUTO: 43 CELLS/UL (ref 0–200)
BASOPHILS NFR BLD AUTO: 0.7 %
BILIRUB SERPL-MCNC: 0.3 MG/DL (ref 0.2–1.2)
BUN SERPL-MCNC: 14 MG/DL (ref 7–25)
BUN/CREAT SERPL: NORMAL (CALC) (ref 6–22)
CALCIUM SERPL-MCNC: 8.8 MG/DL (ref 8.6–10.2)
CHLORIDE SERPL-SCNC: 105 MMOL/L (ref 98–110)
CHOLEST SERPL-MCNC: 213 MG/DL
CHOLEST/HDLC SERPL: 4.1 (CALC)
CO2 SERPL-SCNC: 29 MMOL/L (ref 20–32)
CREAT SERPL-MCNC: 0.58 MG/DL (ref 0.5–1.1)
EOSINOPHIL # BLD AUTO: 189 CELLS/UL (ref 15–500)
EOSINOPHIL NFR BLD AUTO: 3.1 %
ERYTHROCYTE [DISTWIDTH] IN BLOOD BY AUTOMATED COUNT: 11.8 % (ref 11–15)
GFRSERPLBLD MDRD-ARVRAT: 112 ML/MIN/1.73M2
GLOBULIN SER CALC-MCNC: 2.5 G/DL (CALC) (ref 1.9–3.7)
GLUCOSE SERPL-MCNC: 95 MG/DL (ref 65–99)
HBA1C MFR BLD: 5.2 % OF TOTAL HGB
HCT VFR BLD AUTO: 37.4 % (ref 35–45)
HDLC SERPL-MCNC: 52 MG/DL
HGB BLD-MCNC: 12.6 G/DL (ref 11.7–15.5)
LDLC SERPL CALC-MCNC: 133 MG/DL (CALC)
LYMPHOCYTES # BLD AUTO: 1373 CELLS/UL (ref 850–3900)
LYMPHOCYTES NFR BLD AUTO: 22.5 %
MCH RBC QN AUTO: 30.7 PG (ref 27–33)
MCHC RBC AUTO-ENTMCNC: 33.7 G/DL (ref 32–36)
MCV RBC AUTO: 91.2 FL (ref 80–100)
MONOCYTES # BLD AUTO: 482 CELLS/UL (ref 200–950)
MONOCYTES NFR BLD AUTO: 7.9 %
NEUTROPHILS # BLD AUTO: 4014 CELLS/UL (ref 1500–7800)
NEUTROPHILS NFR BLD AUTO: 65.8 %
NONHDLC SERPL-MCNC: 161 MG/DL (CALC)
PLATELET # BLD AUTO: 241 THOUSAND/UL (ref 140–400)
PMV BLD REES-ECKER: 10.6 FL (ref 7.5–12.5)
POTASSIUM SERPL-SCNC: 3.9 MMOL/L (ref 3.5–5.3)
PROT SERPL-MCNC: 6.5 G/DL (ref 6.1–8.1)
RBC # BLD AUTO: 4.1 MILLION/UL (ref 3.8–5.1)
SODIUM SERPL-SCNC: 140 MMOL/L (ref 135–146)
T4 FREE SERPL-MCNC: 1 NG/DL (ref 0.8–1.8)
TRIGL SERPL-MCNC: 150 MG/DL
TSH SERPL-ACNC: 2.05 MIU/L
VIT B12 SERPL-MCNC: 349 PG/ML (ref 200–1100)
WBC # BLD AUTO: 6.1 THOUSAND/UL (ref 3.8–10.8)

## 2019-07-05 ENCOUNTER — PATIENT MESSAGE (OUTPATIENT)
Dept: ADMINISTRATIVE | Facility: HOSPITAL | Age: 44
End: 2019-07-05

## 2019-07-05 LAB
APPEARANCE UR: CLEAR
BILIRUB UR QL STRIP: NEGATIVE
COLOR UR: YELLOW
COTININE UR QL: NORMAL
GLUCOSE UR QL STRIP: NEGATIVE
HGB UR QL STRIP: ABNORMAL
KETONES UR QL STRIP: NEGATIVE
LEUKOCYTE ESTERASE UR QL STRIP: NEGATIVE
NITRITE UR QL STRIP: NEGATIVE
PH UR STRIP: ABNORMAL [PH] (ref 5–8)
PROT UR QL STRIP: NEGATIVE
SP GR UR STRIP: 1.02 (ref 1–1.03)

## 2019-07-08 ENCOUNTER — PATIENT MESSAGE (OUTPATIENT)
Dept: INTERNAL MEDICINE | Facility: CLINIC | Age: 44
End: 2019-07-08

## 2019-07-09 NOTE — TELEPHONE ENCOUNTER
Pt notified of all results. Chol borderline but acceptable for age.  b12 and vit d suboptimal, rec women's one-a-day MVI.  Will have MA fax forms to insurance per request and mail original copy to pt.  Advised that I cannot email her results but that all results would be available on the pt portal.

## 2019-07-22 ENCOUNTER — OFFICE VISIT (OUTPATIENT)
Dept: CARDIOLOGY | Facility: CLINIC | Age: 44
End: 2019-07-22
Payer: COMMERCIAL

## 2019-07-22 ENCOUNTER — OFFICE VISIT (OUTPATIENT)
Dept: INTERNAL MEDICINE | Facility: CLINIC | Age: 44
End: 2019-07-22
Payer: COMMERCIAL

## 2019-07-22 VITALS
OXYGEN SATURATION: 97 % | HEIGHT: 63 IN | SYSTOLIC BLOOD PRESSURE: 136 MMHG | HEART RATE: 96 BPM | BODY MASS INDEX: 27.7 KG/M2 | DIASTOLIC BLOOD PRESSURE: 80 MMHG | WEIGHT: 156.31 LBS

## 2019-07-22 VITALS
SYSTOLIC BLOOD PRESSURE: 128 MMHG | BODY MASS INDEX: 27.65 KG/M2 | WEIGHT: 156.06 LBS | HEIGHT: 63 IN | TEMPERATURE: 98 F | HEART RATE: 97 BPM | DIASTOLIC BLOOD PRESSURE: 80 MMHG | OXYGEN SATURATION: 97 %

## 2019-07-22 DIAGNOSIS — E78.2 MIXED HYPERLIPIDEMIA: ICD-10-CM

## 2019-07-22 DIAGNOSIS — R00.0 SINUS TACHYCARDIA: ICD-10-CM

## 2019-07-22 DIAGNOSIS — R53.81 MALAISE: Primary | ICD-10-CM

## 2019-07-22 DIAGNOSIS — R00.2 PALPITATIONS: Primary | ICD-10-CM

## 2019-07-22 DIAGNOSIS — K21.9 GASTROESOPHAGEAL REFLUX DISEASE, ESOPHAGITIS PRESENCE NOT SPECIFIED: ICD-10-CM

## 2019-07-22 PROCEDURE — 99999 PR PBB SHADOW E&M-EST. PATIENT-LVL III: ICD-10-PCS | Mod: PBBFAC,,, | Performed by: FAMILY MEDICINE

## 2019-07-22 PROCEDURE — 99999 PR PBB SHADOW E&M-EST. PATIENT-LVL IV: CPT | Mod: PBBFAC,,, | Performed by: NURSE PRACTITIONER

## 2019-07-22 PROCEDURE — 3079F DIAST BP 80-89 MM HG: CPT | Mod: CPTII,S$GLB,, | Performed by: NURSE PRACTITIONER

## 2019-07-22 PROCEDURE — 3008F BODY MASS INDEX DOCD: CPT | Mod: CPTII,S$GLB,, | Performed by: FAMILY MEDICINE

## 2019-07-22 PROCEDURE — 99999 PR PBB SHADOW E&M-EST. PATIENT-LVL IV: ICD-10-PCS | Mod: PBBFAC,,, | Performed by: NURSE PRACTITIONER

## 2019-07-22 PROCEDURE — 3008F PR BODY MASS INDEX (BMI) DOCUMENTED: ICD-10-PCS | Mod: CPTII,S$GLB,, | Performed by: NURSE PRACTITIONER

## 2019-07-22 PROCEDURE — 3075F PR MOST RECENT SYSTOLIC BLOOD PRESS GE 130-139MM HG: ICD-10-PCS | Mod: CPTII,S$GLB,, | Performed by: NURSE PRACTITIONER

## 2019-07-22 PROCEDURE — 99213 OFFICE O/P EST LOW 20 MIN: CPT | Mod: S$GLB,,, | Performed by: FAMILY MEDICINE

## 2019-07-22 PROCEDURE — 99214 PR OFFICE/OUTPT VISIT, EST, LEVL IV, 30-39 MIN: ICD-10-PCS | Mod: S$GLB,,, | Performed by: NURSE PRACTITIONER

## 2019-07-22 PROCEDURE — 3079F PR MOST RECENT DIASTOLIC BLOOD PRESSURE 80-89 MM HG: ICD-10-PCS | Mod: CPTII,S$GLB,, | Performed by: FAMILY MEDICINE

## 2019-07-22 PROCEDURE — 3075F SYST BP GE 130 - 139MM HG: CPT | Mod: CPTII,S$GLB,, | Performed by: NURSE PRACTITIONER

## 2019-07-22 PROCEDURE — 99999 PR PBB SHADOW E&M-EST. PATIENT-LVL III: CPT | Mod: PBBFAC,,, | Performed by: FAMILY MEDICINE

## 2019-07-22 PROCEDURE — 99214 OFFICE O/P EST MOD 30 MIN: CPT | Mod: S$GLB,,, | Performed by: NURSE PRACTITIONER

## 2019-07-22 PROCEDURE — 3074F PR MOST RECENT SYSTOLIC BLOOD PRESSURE < 130 MM HG: ICD-10-PCS | Mod: CPTII,S$GLB,, | Performed by: FAMILY MEDICINE

## 2019-07-22 PROCEDURE — 3008F PR BODY MASS INDEX (BMI) DOCUMENTED: ICD-10-PCS | Mod: CPTII,S$GLB,, | Performed by: FAMILY MEDICINE

## 2019-07-22 PROCEDURE — 3074F SYST BP LT 130 MM HG: CPT | Mod: CPTII,S$GLB,, | Performed by: FAMILY MEDICINE

## 2019-07-22 PROCEDURE — 3008F BODY MASS INDEX DOCD: CPT | Mod: CPTII,S$GLB,, | Performed by: NURSE PRACTITIONER

## 2019-07-22 PROCEDURE — 3079F DIAST BP 80-89 MM HG: CPT | Mod: CPTII,S$GLB,, | Performed by: FAMILY MEDICINE

## 2019-07-22 PROCEDURE — 99213 PR OFFICE/OUTPT VISIT, EST, LEVL III, 20-29 MIN: ICD-10-PCS | Mod: S$GLB,,, | Performed by: FAMILY MEDICINE

## 2019-07-22 PROCEDURE — 3079F PR MOST RECENT DIASTOLIC BLOOD PRESSURE 80-89 MM HG: ICD-10-PCS | Mod: CPTII,S$GLB,, | Performed by: NURSE PRACTITIONER

## 2019-07-22 NOTE — PROGRESS NOTES
"S/  45yo WF here for UC visit, PCP Ema Gaston MD last annual exam with PCP 6/27/19  C/o loss of appetite, indigestion, some diarrhea over weekend  She reports the OCP she has been on for decades wither no longer being made & recent changes to her OCPs have not agreed with her    Wt Readings from Last 20 Encounters:   07/22/19 70.8 kg (156 lb 1.4 oz)   07/22/19 70.9 kg (156 lb 4.9 oz)   06/27/19 71.8 kg (158 lb 4.6 oz)   06/12/19 72.5 kg (159 lb 13.3 oz)   12/04/18 68.5 kg (150 lb 14.5 oz)   10/10/18 64.8 kg (142 lb 13.7 oz)   09/25/18 63.1 kg (139 lb 1.8 oz)   09/25/18 63 kg (138 lb 14.2 oz)   09/18/18 63 kg (139 lb)   09/17/18 62.8 kg (138 lb 7.2 oz)   08/01/18 62.1 kg (136 lb 14.5 oz)   06/27/18 61 kg (134 lb 7.7 oz)   06/12/18 59.1 kg (130 lb 4.7 oz)   06/12/18 59.4 kg (130 lb 15.3 oz)   05/24/18 58.6 kg (129 lb 3 oz)   05/07/18 58 kg (127 lb 12.8 oz)   04/30/18 56.9 kg (125 lb 8.8 oz)   04/18/18 56.2 kg (124 lb)   03/28/18 56.6 kg (124 lb 12.5 oz)   03/26/18 56.6 kg (124 lb 12.5 oz)     Saw cardiology earlier today for   "  Assessment/Plan:   1. Palpitations  Ms. Salas has had these palpitations for a number of years now that wax and wane.  She has had heart monitors in the past with these symptoms without any arrhythmogenic etiology for her sx.  She has a normal structure and function of her heart and her exam is completely normal from a cardiovascular standpoint.  I suspect that her symptoms maybe secondary to reflux given that her sx are improved with antacid use.       2. Mixed hyperlipidemia  Given copy of the mediterranean diet.  Encouraged 150 minutes of exercise a week, wt loss, and mediterranean dietary changes.  ASCVD extremely low.  She does not have an indication for statin therapy at this time.  Reviewed CT of chest from last year and she has very minimal atherosclerosis in her aorta.  I believe that lifestyle modifications are appropriate therapy at this time.     3. Sinus " "tachycardia        4. Gastroesophageal reflux disease, esophagitis presence not specified     - Ambulatory referral to Gastroenterology     "    O/  /80   Pulse 97   Temp 98.3 °F (36.8 °C) (Oral)   Ht 5' 3" (1.6 m)   Wt 70.8 kg (156 lb 1.4 oz)   LMP 06/24/2019   SpO2 97%   BMI 27.65 kg/m²    Pt appears anxious  Cor s1s2, normal rate in 60s at time of exam  Lungs CTA  Abd benign      A/P  Juanita was seen today for diarrhea.    Diagnoses and all orders for this visit:    Malaise  - perhaps some GI component and keep GI appt [facility]  - perhaps some anxiety component, and this is nto new to her  - more likely an effect from change in her hormone pills, I encouraged her to contact her GYN doc to consider either change in the OCPs or an IUD to see if this will resolve the way she had felt since her past OCPs were forced to be changed due to no longer being manufactured      "

## 2019-07-22 NOTE — PATIENT INSTRUCTIONS
Increase cardiovascular exercise to 30 minutes of brisk walking a day for 4-5 days a week.  You can use a stationary bike or swim for 30 minutes a day instead of walking.  Whatever exercise you choose, make sure you are working hard enough to increase your heart rate.     Understanding Heart Palpitations    Heart palpitations are a symptom. Its the feeling you have when your heartbeat seems to be racing, pounding, skipping, or fluttering. Heart palpitations are most often felt in the chest. Sometimes, they may also be felt in the neck.  What causes heart palpitations?  In most cases, heart palpitations are caused by:  · Stress or anxiety  · Exercise  · Pregnancy  · Some medicines  · Caffeine  · Nicotine  · Alcohol  · Illegal drugs, such as cocaine  · Health problems, such as anemia or overactive thyroid  In some cases, heart palpitations may be caused by a problem with the heart. Abnormal heart rhythms (arrhythmias) are the main concern. They may need to be managed by you and your healthcare provider or treated right away.  How are heart palpitations treated?  Treatments for heart palpitations depend on the cause. Options may include:  · Managing the things that trigger your heart palpitations. This could mean:  ¨ Learning ways to reduce stress and anxiety  ¨ Avoiding caffeine, nicotine, alcohol, or illegal drugs  ¨ Stopping the use of certain medicines, under your doctors guidance  · Medicines, procedures, or surgery to treat an arrhythmia or other health problem that is causing your symptoms  What are the complications of heart palpitations?  Complications of heart palpitations are rare unless they are caused by a problem such as an arrhythmia. In such cases, complications can include:  · Fainting  · Heart failure. This problem occurs when the heart is so weak it no longer pumps blood well.  · Blood clots and stroke  · Sudden cardiac arrest. This problem occurs when the heart suddenly stops beating.  When should  I call my healthcare provider?  Call your healthcare provider right away if you have any of these:  · Fever of 100.4°F (38°C) or higher, or as directed  · Symptoms that dont get better with treatment, or symptoms that get worse  · New symptoms, such as chest pain, shortness of breath, dizziness, or fainting   Date Last Reviewed: 5/1/2016  © 0887-9057 [employer]. [address] [place] [address], [place] [address]. All rights reserved. This information is not intended as a substitute for professional medical care. Always follow your healthcare professional's instructions.

## 2019-07-22 NOTE — PROGRESS NOTES
Ms. Salas is a patient of Dr. Quiros and was last seen in [facility] 6/12/2019.      Subjective:   Patient ID:  Juanita Salas is a 44 y.o. female who presents for follow-up of Palpitations and Fatigue    Problems:  Former smoker, quit in 2017  Family hx: maternal GF had first MI in his 30s, maternal GM CVA in her 50s    HPI  Ms. Salas is in clinic today for palpitations that have been worse over the last month.  Reports that the palpitations are worse around her cycle.  The palpitations last for for seconds but continued on and off for several hours.  The pepto bismol and tums help.  No funny taste in her mouth.     Under ACC guidelines, this patient's 10 year risk for atherosclerotic cardiovascular disease (ASCVD) is The 10-year ASCVD risk score (Javier ELIAS Jr., et al., 2013) is: 1.4%    Values used to calculate the score:      Age: 44 years      Sex: Female      Is Non- : No      Diabetic: No      Tobacco smoker: No      Systolic Blood Pressure: 136 mmHg      Is BP treated: Yes      HDL Cholesterol: 52 mg/dL      Total Cholesterol: 213 mg/dL     Review of Systems   Constitution: Negative for decreased appetite, diaphoresis, malaise/fatigue, weight gain and weight loss.   Eyes: Negative for visual disturbance.   Cardiovascular: Positive for palpitations. Negative for chest pain, claudication, dyspnea on exertion, irregular heartbeat, leg swelling, near-syncope, orthopnea, paroxysmal nocturnal dyspnea and syncope.        Denies chest pressure   Respiratory: Negative for cough, hemoptysis, shortness of breath, sleep disturbances due to breathing and snoring.    Endocrine: Negative for cold intolerance and heat intolerance.   Hematologic/Lymphatic: Negative for bleeding problem. Does not bruise/bleed easily.   Musculoskeletal: Negative for myalgias.   Gastrointestinal: Positive for bloating, flatus, heartburn and nausea. Negative for abdominal pain, anorexia, change in bowel habit,  "constipation, diarrhea and vomiting.   Neurological: Negative for difficulty with concentration, disturbances in coordination, excessive daytime sleepiness, dizziness, headaches, light-headedness, loss of balance, numbness and weakness.   Psychiatric/Behavioral: The patient does not have insomnia.        Allergies and current medications updated and reviewed:  Review of patient's allergies indicates:  No Known Allergies  Current Outpatient Medications   Medication Sig    butalbital-acetaminophen-caffeine -40 mg (FIORICET, ESGIC) -40 mg per tablet Take 1 tablet by mouth every 4 (four) hours as needed for Pain.    cholecalciferol, vitamin D3, (VITAMIN D3) 1,000 unit capsule Take 1,000 Units by mouth 2 (two) times daily.    cyanocobalamin (VITAMIN B-12) 500 MCG tablet Take 1 tablet (500 mcg total) by mouth once daily.    norgestimate-ethinyl estradiol (TRI-LO-ESTARYLLA) 0.18/0.215/0.25 mg-25 mcg tablet     nystatin (MYCOSTATIN) powder Apply topically 2 (two) times daily.    potassium chloride (KLOR-CON) 10 MEQ TbSR Take 1 tablet (10 mEq total) by mouth once daily.    PROCTO-MED HC 2.5 % rectal cream as needed.     metoprolol tartrate (LOPRESSOR) 25 MG tablet Take 1 tablet (25 mg total) by mouth 2 (two) times daily.    sumatriptan (IMITREX) 25 MG Tab Take 1 tablet (25 mg total) by mouth daily as needed.     No current facility-administered medications for this visit.      Objective:     Right Arm BP - Sittin/71 (19 1416)  Left Arm BP - Sittin/80 (19 1416)    /80 (BP Location: Left arm, Patient Position: Sitting, BP Method: Medium (Automatic))   Pulse 96   Ht 5' 3" (1.6 m)   Wt 70.9 kg (156 lb 4.9 oz)   LMP 2019   SpO2 97%   BMI 27.69 kg/m²     Physical Exam   Constitutional: She is oriented to person, place, and time. Vital signs are normal. She appears well-developed and well-nourished. She is active. No distress.   HENT:   Head: Normocephalic and " atraumatic.   Eyes: Conjunctivae and lids are normal. No scleral icterus.   Neck: Neck supple. Normal carotid pulses, no hepatojugular reflux and no JVD present. Carotid bruit is not present.   Cardiovascular: Normal rate, regular rhythm, S1 normal, S2 normal and intact distal pulses. PMI is not displaced. Exam reveals no gallop and no friction rub.   No murmur heard.  Pulses:       Carotid pulses are 2+ on the right side, and 2+ on the left side.       Radial pulses are 2+ on the right side, and 2+ on the left side.        Dorsalis pedis pulses are 2+ on the right side, and 2+ on the left side.        Posterior tibial pulses are 1+ on the right side, and 1+ on the left side.   Pulmonary/Chest: Effort normal and breath sounds normal. No respiratory distress. She has no decreased breath sounds. She has no wheezes. She has no rhonchi. She has no rales. She exhibits no tenderness.   Abdominal: Soft. Normal appearance and bowel sounds are normal. She exhibits no distension, no fluid wave, no abdominal bruit, no ascites and no pulsatile midline mass. There is no hepatosplenomegaly. There is no tenderness.   Musculoskeletal: She exhibits no edema.   Neurological: She is alert and oriented to person, place, and time. Gait normal.   Skin: Skin is warm, dry and intact. No rash noted. She is not diaphoretic. Nails show no clubbing.   Psychiatric: She has a normal mood and affect. Her speech is normal and behavior is normal. Judgment and thought content normal. Cognition and memory are normal.   Nursing note and vitals reviewed.      Chemistry        Component Value Date/Time     07/03/2019 0739    K 3.9 07/03/2019 0739     07/03/2019 0739    CO2 29 07/03/2019 0739    BUN 14 07/03/2019 0739    CREATININE 0.58 07/03/2019 0739    GLU 95 07/03/2019 0739        Component Value Date/Time    CALCIUM 8.8 07/03/2019 0739    ALKPHOS 51 07/03/2019 0739    AST 11 07/03/2019 0739    ALT 9 07/03/2019 0739    BILITOT 0.3  07/03/2019 0739    ESTGFRAFRICA 130 07/03/2019 0739    EGFRNONAA 112 07/03/2019 0739        Lab Results   Component Value Date    HGBA1C 5.2 07/03/2019     Recent Labs   Lab 04/18/18  0114  07/03/19  0739   WBC 11.52   < > 6.1   Hemoglobin 12.4   < > 12.6   Hematocrit 36.7 L   < > 37.4   Mean Corpuscular Volume 91   < > 91.2   Platelets 255   < > 241   BNP 17  --   --    TSH  --   --  2.05   Cholesterol  --    < > 213 H   HDL  --    < > 52   LDL Cholesterol  --    < > 133 H   Triglycerides  --    < > 150 H   Hdl/Cholesterol Ratio  --    < > 4.1    < > = values in this interval not displayed.              Test(s) Reviewed  I have reviewed the following in detail:  [] Stress test   [] Angiography   [x] Echocardiogram   [x] Labs   [x] Other:  Heart monitoring       Assessment/Plan:   1. Palpitations  Ms. Salas has had these palpitations for a number of years now that wax and wane.  She has had heart monitors in the past with these symptoms without any arrhythmogenic etiology for her sx.  She has a normal structure and function of her heart and her exam is completely normal from a cardiovascular standpoint.  I suspect that her symptoms maybe secondary to reflux given that her sx are improved with antacid use.      2. Mixed hyperlipidemia  Given copy of the mediterranean diet.  Encouraged 150 minutes of exercise a week, wt loss, and mediterranean dietary changes.  ASCVD extremely low.  She does not have an indication for statin therapy at this time.  Reviewed CT of chest from last year and she has very minimal atherosclerosis in her aorta.  I believe that lifestyle modifications are appropriate therapy at this time.    3. Sinus tachycardia      4. Gastroesophageal reflux disease, esophagitis presence not specified    - Ambulatory referral to Gastroenterology        Patient was discussed with but not examined by Dr. Gray    Follow up if symptoms worsen or fail to improve.

## 2019-08-27 ENCOUNTER — OFFICE VISIT (OUTPATIENT)
Dept: INTERNAL MEDICINE | Facility: CLINIC | Age: 44
End: 2019-08-27
Payer: COMMERCIAL

## 2019-08-27 ENCOUNTER — HOSPITAL ENCOUNTER (OUTPATIENT)
Dept: RADIOLOGY | Facility: HOSPITAL | Age: 44
Discharge: HOME | End: 2019-08-27
Attending: PHYSICIAN ASSISTANT
Payer: COMMERCIAL

## 2019-08-27 VITALS
WEIGHT: 156.31 LBS | HEART RATE: 90 BPM | OXYGEN SATURATION: 99 % | SYSTOLIC BLOOD PRESSURE: 122 MMHG | HEIGHT: 63 IN | BODY MASS INDEX: 27.7 KG/M2 | DIASTOLIC BLOOD PRESSURE: 82 MMHG | TEMPERATURE: 98 F

## 2019-08-27 DIAGNOSIS — R06.02 SOB (SHORTNESS OF BREATH): ICD-10-CM

## 2019-08-27 DIAGNOSIS — R07.89 OTHER CHEST PAIN: Primary | ICD-10-CM

## 2019-08-27 PROBLEM — K62.5 BRBPR (BRIGHT RED BLOOD PER RECTUM): Status: RESOLVED | Noted: 2019-06-27 | Resolved: 2019-08-27

## 2019-08-27 PROBLEM — K64.8 INTERNAL HEMORRHOID: Status: RESOLVED | Noted: 2018-05-24 | Resolved: 2019-08-27

## 2019-08-27 PROCEDURE — 3079F DIAST BP 80-89 MM HG: CPT | Mod: CPTII,S$GLB,, | Performed by: PHYSICIAN ASSISTANT

## 2019-08-27 PROCEDURE — 3008F PR BODY MASS INDEX (BMI) DOCUMENTED: ICD-10-PCS | Mod: CPTII,S$GLB,, | Performed by: PHYSICIAN ASSISTANT

## 2019-08-27 PROCEDURE — 93005 ELECTROCARDIOGRAM TRACING: CPT | Mod: S$GLB,,, | Performed by: PHYSICIAN ASSISTANT

## 2019-08-27 PROCEDURE — 93010 ELECTROCARDIOGRAM REPORT: CPT | Mod: S$GLB,,, | Performed by: INTERNAL MEDICINE

## 2019-08-27 PROCEDURE — 71046 XR CHEST PA AND LATERAL: ICD-10-PCS | Mod: 26,,, | Performed by: RADIOLOGY

## 2019-08-27 PROCEDURE — 99999 PR PBB SHADOW E&M-EST. PATIENT-LVL IV: CPT | Mod: PBBFAC,,, | Performed by: PHYSICIAN ASSISTANT

## 2019-08-27 PROCEDURE — 93010 EKG 12-LEAD: ICD-10-PCS | Mod: S$GLB,,, | Performed by: INTERNAL MEDICINE

## 2019-08-27 PROCEDURE — 3074F SYST BP LT 130 MM HG: CPT | Mod: CPTII,S$GLB,, | Performed by: PHYSICIAN ASSISTANT

## 2019-08-27 PROCEDURE — 3008F BODY MASS INDEX DOCD: CPT | Mod: CPTII,S$GLB,, | Performed by: PHYSICIAN ASSISTANT

## 2019-08-27 PROCEDURE — 3074F PR MOST RECENT SYSTOLIC BLOOD PRESSURE < 130 MM HG: ICD-10-PCS | Mod: CPTII,S$GLB,, | Performed by: PHYSICIAN ASSISTANT

## 2019-08-27 PROCEDURE — 99214 OFFICE O/P EST MOD 30 MIN: CPT | Mod: S$GLB,,, | Performed by: PHYSICIAN ASSISTANT

## 2019-08-27 PROCEDURE — 71046 X-RAY EXAM CHEST 2 VIEWS: CPT | Mod: TC

## 2019-08-27 PROCEDURE — 99999 PR PBB SHADOW E&M-EST. PATIENT-LVL IV: ICD-10-PCS | Mod: PBBFAC,,, | Performed by: PHYSICIAN ASSISTANT

## 2019-08-27 PROCEDURE — 93005 EKG 12-LEAD: ICD-10-PCS | Mod: S$GLB,,, | Performed by: PHYSICIAN ASSISTANT

## 2019-08-27 PROCEDURE — 3079F PR MOST RECENT DIASTOLIC BLOOD PRESSURE 80-89 MM HG: ICD-10-PCS | Mod: CPTII,S$GLB,, | Performed by: PHYSICIAN ASSISTANT

## 2019-08-27 PROCEDURE — 71046 X-RAY EXAM CHEST 2 VIEWS: CPT | Mod: 26,,, | Performed by: RADIOLOGY

## 2019-08-27 PROCEDURE — 99214 PR OFFICE/OUTPT VISIT, EST, LEVL IV, 30-39 MIN: ICD-10-PCS | Mod: S$GLB,,, | Performed by: PHYSICIAN ASSISTANT

## 2019-08-27 RX ORDER — DROSPIRENONE AND ETHINYL ESTRADIOL 0.03MG-3MG
1 KIT ORAL DAILY
COMMUNITY
End: 2020-08-12

## 2019-08-27 NOTE — PROGRESS NOTES
Subjective:       Patient ID: Juanita Salas is a 44 y.o. female.    Chief Complaint: Sore Throat (few days, coughing up cold); Shortness of Breath (since starting a new birth control over a month ago); and URI (other employees at work are sick, pt is going out of town for the weekend)    Patient presents to clinic today for sore throat x 2 days that began on Sunday. She reports of spitting up white mucus, bilateral ear pressure that she describes as uncomfortable, a post-nasal drip that has been irritating her throat, and several sick contacts at work. Patient reports alleviation with Zyrtec-D that she began taking yesterday, and denies of any fever, cough, rhinorrhea, or congestion.     Patient also reports of a recent onset of dyspnea that began 1 week ago that occurs while walking up stairs or while sitting down. She is concerned that this might be attributed to recently switching her oral contraceptives to a new brand. She also reports of dyspneic chest pain that radiates to her bilateral scapula. She denies of any fever, cough, chest pain, leg pain, rashes, changes in temperature of extremities, or any recent trauma.Patient does admit to being a past smoker and quitting 2 years ago, but denies of any shortness of breath while smoking.     Patient was pleasant, calm, and in no acute distress with unlabored breathing    Shortness of Breath   This is a new problem. The current episode started in the past 7 days. The problem occurs daily. The problem has been waxing and waning. The average episode lasts 1 minutes. Associated symptoms include headaches and a sore throat. Pertinent negatives include no abdominal pain, chest pain, claudication, coryza, ear pain, fever, hemoptysis, leg pain, leg swelling, neck pain, orthopnea, PND, rash, rhinorrhea, sputum production, swollen glands, syncope, vomiting or wheezing. Nothing aggravates the symptoms. Risk factors include oral contraceptive. She has tried rest for the  symptoms. The treatment provided mild relief. There is no history of allergies, aspirin allergies, asthma, bronchiolitis, CAD, chronic lung disease, COPD, DVT, a heart failure, PE, pneumonia or a recent surgery.     Review of Systems   Constitutional: Negative for activity change, appetite change, chills and fever.   HENT: Positive for postnasal drip and sore throat. Negative for congestion, ear pain, hearing loss and rhinorrhea.    Respiratory: Positive for chest tightness (dyspneic chest pain) and shortness of breath. Negative for cough, hemoptysis, sputum production and wheezing.    Cardiovascular: Negative for chest pain, orthopnea, claudication, leg swelling, syncope and PND.   Gastrointestinal: Negative for abdominal pain, constipation, diarrhea, nausea and vomiting.   Genitourinary: Negative for difficulty urinating and dysuria.   Musculoskeletal: Negative for arthralgias and neck pain.   Skin: Negative for rash.   Neurological: Positive for headaches.       Objective:      Physical Exam   Constitutional: She is oriented to person, place, and time. She appears well-developed and well-nourished. No distress.   HENT:   Head: Normocephalic and atraumatic.   Right Ear: Hearing, tympanic membrane and external ear normal. No tenderness. Tympanic membrane is not injected, not erythematous and not bulging. Tympanic membrane mobility is normal.   Left Ear: Hearing, tympanic membrane and external ear normal. No tenderness. Tympanic membrane is not injected, not erythematous and not bulging. Tympanic membrane mobility is normal.   Ears:    Nose: Nose normal.   Mouth/Throat: Oropharynx is clear and moist.   Eyes: Pupils are equal, round, and reactive to light. Conjunctivae and EOM are normal.   Neck: Normal range of motion. Neck supple.   Cardiovascular: Normal rate, regular rhythm, normal heart sounds and intact distal pulses.   Pulmonary/Chest: Effort normal and breath sounds normal. No respiratory distress. She has no  wheezes. She exhibits no tenderness.   Abdominal: Soft. Bowel sounds are normal. There is no tenderness. There is no rebound and no guarding.   Musculoskeletal: Normal range of motion. She exhibits no edema or tenderness.   Neurological: She is alert and oriented to person, place, and time.   Skin: Skin is warm and dry. She is not diaphoretic.   Psychiatric: She has a normal mood and affect.   Nursing note and vitals reviewed.      Assessment:       1. Other chest pain    2. SOB (shortness of breath)        Plan:         Juanita was seen today for sore throat, shortness of breath and uri.    Diagnoses and all orders for this visit:    Other chest pain  -     IN OFFICE EKG 12-LEAD (to [facility])    SOB (shortness of breath)  -     CBC auto differential; Future  -     Comprehensive metabolic panel; Future  -     X-Ray Chest PA And Lateral; Future  -     D dimer, quantitative; Future  -     Brain natriuretic peptide; Future  -     High sensitivity CRP (Cardiac CRP); Future     Patient does not fit typical presentation for pulmonary embolism.  Of course this remains in the differential and we will add on any additional workup per her blood test results.  Patient advised to continue the Zyrtec-D, remain well-hydrated, and to take Tylenol/Acetaminophen for any new-onset fevers. Will call patient with lab results.

## 2019-08-28 ENCOUNTER — PATIENT MESSAGE (OUTPATIENT)
Dept: INTERNAL MEDICINE | Facility: CLINIC | Age: 44
End: 2019-08-28

## 2019-08-28 DIAGNOSIS — R05.9 COUGH: Primary | ICD-10-CM

## 2019-08-28 RX ORDER — CODEINE PHOSPHATE AND GUAIFENESIN 10; 100 MG/5ML; MG/5ML
5 SOLUTION ORAL NIGHTLY PRN
Qty: 118 ML | Refills: 0 | Status: SHIPPED | OUTPATIENT
Start: 2019-08-28 | End: 2019-09-07

## 2019-10-16 ENCOUNTER — NURSE TRIAGE (OUTPATIENT)
Dept: ADMINISTRATIVE | Facility: CLINIC | Age: 44
End: 2019-10-16

## 2019-10-16 NOTE — TELEPHONE ENCOUNTER
"    Reason for Disposition   [1] Systolic BP  >= 130 OR Diastolic >= 80 AND [2] pregnant    Additional Information   Negative: Difficult to awaken or acting confused (e.g., disoriented, slurred speech)   Negative: Severe difficulty breathing (e.g., struggling for each breath, speaks in single words)   Negative: [1] Weakness of the face, arm or leg on one side of the body AND [2] new onset   Negative: [1] Numbness (i.e., loss of sensation) of the face, arm or leg on one side of the body AND [2] new onset   Negative: [1] Chest pain lasts > 5 minutes AND [2] history of heart disease  (i.e., heart attack, bypass surgery, angina, angioplasty, CHF)   Negative: [1] Chest pain AND [2] took nitrogylcerin AND [3] pain was not relieved   Negative: Sounds like a life-threatening emergency to the triager   Negative: Symptom is main concern  (e.g., headache, chest pain)   Negative: Low blood pressure is main concern   Negative: [1] Systolic BP  >= 160 OR Diastolic >= 100 AND [2] cardiac or neurologic symptoms (e.g., chest pain, difficulty breathing, unsteady gait, blurred vision)   Negative: [1] Pregnant > 20 weeks AND [2] new hand or face swelling   Negative: [1] Pregnant > 20 weeks AND [2] BP Systolic BP  >= 140 OR Diastolic >= 90   Negative: [1] Systolic BP  >= 200 OR Diastolic >= 120  AND [2] having NO cardiac or neurologic symptoms   Negative: [1] Postpartum < 6 weeks AND [2] BP Systolic BP  >= 140 OR Diastolic >= 90   Negative: [1] Systolic BP  >= 180 OR Diastolic >= 110 AND [2] missed most recent dose of blood pressure medication   Negative: Systolic BP  >= 180 OR Diastolic >= 110   Negative: Ran out of BP medications   Negative: Systolic BP  >= 160 OR Diastolic >= 100   Negative: [1] Taking BP medications AND [2] feels is having side effects (e.g., impotence, cough, dizzy upon standing)    Protocols used: HIGH BLOOD PRESSURE-A-AH    Pt called for appointment. Stated BP is 135/82. Pt answered "No" to " all triage questions above. Pt advised to see PCP in 3 days. Care advice, and when to call back provided per protocol. Pt verbalized understandingTransferred to appt desk for assistance.

## 2019-10-17 ENCOUNTER — OFFICE VISIT (OUTPATIENT)
Dept: INTERNAL MEDICINE | Facility: CLINIC | Age: 44
End: 2019-10-17
Payer: COMMERCIAL

## 2019-10-17 VITALS — HEART RATE: 89 BPM | WEIGHT: 155.19 LBS | HEIGHT: 63 IN | BODY MASS INDEX: 27.5 KG/M2 | OXYGEN SATURATION: 98 %

## 2019-10-17 DIAGNOSIS — R53.83 FATIGUE, UNSPECIFIED TYPE: ICD-10-CM

## 2019-10-17 DIAGNOSIS — Z79.3 BLOOD PRESSURE CHECK ON ORAL CONTRACEPTIVES: Primary | ICD-10-CM

## 2019-10-17 DIAGNOSIS — F41.1 GAD (GENERALIZED ANXIETY DISORDER): ICD-10-CM

## 2019-10-17 DIAGNOSIS — R00.0 SINUS TACHYCARDIA: ICD-10-CM

## 2019-10-17 DIAGNOSIS — Z01.30 BLOOD PRESSURE CHECK ON ORAL CONTRACEPTIVES: Primary | ICD-10-CM

## 2019-10-17 PROCEDURE — 99999 PR PBB SHADOW E&M-EST. PATIENT-LVL III: CPT | Mod: PBBFAC,,, | Performed by: NURSE PRACTITIONER

## 2019-10-17 PROCEDURE — 99999 PR PBB SHADOW E&M-EST. PATIENT-LVL III: ICD-10-PCS | Mod: PBBFAC,,, | Performed by: NURSE PRACTITIONER

## 2019-10-17 PROCEDURE — 99214 OFFICE O/P EST MOD 30 MIN: CPT | Mod: S$GLB,,, | Performed by: NURSE PRACTITIONER

## 2019-10-17 PROCEDURE — 3008F PR BODY MASS INDEX (BMI) DOCUMENTED: ICD-10-PCS | Mod: CPTII,S$GLB,, | Performed by: NURSE PRACTITIONER

## 2019-10-17 PROCEDURE — 3008F BODY MASS INDEX DOCD: CPT | Mod: CPTII,S$GLB,, | Performed by: NURSE PRACTITIONER

## 2019-10-17 PROCEDURE — 99214 PR OFFICE/OUTPT VISIT, EST, LEVL IV, 30-39 MIN: ICD-10-PCS | Mod: S$GLB,,, | Performed by: NURSE PRACTITIONER

## 2019-10-17 RX ORDER — METOPROLOL TARTRATE 25 MG/1
12.5 TABLET, FILM COATED ORAL 2 TIMES DAILY
Qty: 30 TABLET | Refills: 11
Start: 2019-10-17 | End: 2020-03-03

## 2019-10-17 NOTE — PROGRESS NOTES
"INTERNAL MEDICINE CLINIC - SAME DAY APPOINTMENT  Progress Note    PRESENTING HISTORY     PCP: Ema Gaston MD  Chief Complaint/Reason for Visit:   No chief complaint on file.      History of Present Illness & ROS : Ms. Juanita Salas is a 44 y.o. female.    Here for UC visit.   New to this provider.   Reports that started having "headaches and high pressures right before her periods".   Currently on her cycle and started with "headaches before it".     Checking BP at 'work by her boss's cuff'.  She is having some anxiety (obvious), but "not on meds, don't need them".   She is rambling today.     Review of Systems:  Eyes: denies visual changes at this time denies floaters   ENT: no nasal congestion or sore throat  Respiratory: no cough or shorness of breath  Cardiovascular: no chest pain or palpitations  Gastrointestinal: no nausea or vomiting, no abdominal pain or change in bowel habits  Genitourinary: no hematuria or dysuria; denies frequency  Hematologic/Lymphatic: no easy bruising or lymphadenopathy  Musculoskeletal: no arthralgias or myalgias  Neurological: no seizures or tremors  Endocrine: no heat or cold intolerance      PAST HISTORY:     Past Medical History:   Diagnosis Date    Adjustment disorder     Anxiety     Depression     Fatigue     Headache     Hx of psychiatric care     Hypertension     Low vitamin D level     Panic disorder     Psychiatric problem        Past Surgical History:   Procedure Laterality Date     SECTION      DILATION AND CURETTAGE OF UTERUS         Family History   Problem Relation Age of Onset    Cancer Mother 47        pancreatic cancer     Diabetes Mother     Multiple sclerosis Sister     Hypertension Maternal Aunt     Hyperlipidemia Maternal Aunt     Hyperlipidemia Maternal Uncle     Hypertension Maternal Uncle     Hyperlipidemia Paternal Aunt     Hypertension Paternal Aunt     Hyperlipidemia Paternal Uncle     Hypertension Paternal Uncle     " Stroke Maternal Grandmother     Psoriasis Maternal Grandmother     Heart disease Maternal Grandfather     Hyperlipidemia Maternal Grandfather     Hypertension Maternal Grandfather     Colon cancer Neg Hx     Esophageal cancer Neg Hx     Stomach cancer Neg Hx     Rectal cancer Neg Hx     Ulcerative colitis Neg Hx     Irritable bowel syndrome Neg Hx     Crohn's disease Neg Hx     Celiac disease Neg Hx        Social History     Socioeconomic History    Marital status:      Spouse name: Not on file    Number of children: Not on file    Years of education: Not on file    Highest education level: Not on file   Occupational History    Not on file   Social Needs    Financial resource strain: Not very hard    Food insecurity:     Worry: Never true     Inability: Never true    Transportation needs:     Medical: No     Non-medical: No   Tobacco Use    Smoking status: Former Smoker     Packs/day: 0.50     Years: 2.00     Pack years: 1.00     Last attempt to quit: 2017     Years since quittin.9    Smokeless tobacco: Never Used   Substance and Sexual Activity    Alcohol use: No     Frequency: Monthly or less     Drinks per session: 3 or 4     Binge frequency: Never    Drug use: No    Sexual activity: Yes     Partners: Male   Lifestyle    Physical activity:     Days per week: 1 day     Minutes per session: 20 min    Stress: To some extent   Relationships    Social connections:     Talks on phone: More than three times a week     Gets together: More than three times a week     Attends [employer] witness service: Not on file     Active member of club or organization: No     Attends meetings of clubs or organizations: Never     Relationship status: Living with partner   Other Topics Concern    Are you pregnant or think you may be? Not Asked    Breast-feeding Not Asked    Patient feels they ought to cut down on drinking/drug use Not Asked    Patient annoyed by others criticizing their  drinking/drug use Not Asked    Patient has felt bad or guilty about drinking/drug use Not Asked    Patient has had a drink/used drugs as an eye opener in the AM Not Asked   Social History Narrative     with son (23) and daugther (14)       MEDICATIONS & ALLERGIES:     Current Outpatient Medications on File Prior to Visit   Medication Sig Dispense Refill    cholecalciferol, vitamin D3, (VITAMIN D3) 1,000 unit capsule Take 1,000 Units by mouth 2 (two) times daily.      cyanocobalamin (VITAMIN B-12) 500 MCG tablet Take 1 tablet (500 mcg total) by mouth once daily.      drospirenone-ethinyl estradiol (RAMOS) 3-0.03 mg per tablet Take 1 tablet by mouth once daily.      metoprolol tartrate (LOPRESSOR) 25 MG tablet Take 1 tablet (25 mg total) by mouth 2 (two) times daily. 60 tablet 11    nystatin (MYCOSTATIN) powder Apply topically 2 (two) times daily. 30 g 1    potassium chloride (KLOR-CON) 10 MEQ TbSR Take 1 tablet (10 mEq total) by mouth once daily. 90 tablet 3    PROCTO-MED HC 2.5 % rectal cream as needed.       sumatriptan (IMITREX) 25 MG Tab Take 1 tablet (25 mg total) by mouth daily as needed. 9 tablet 0     No current facility-administered medications on file prior to visit.         Review of patient's allergies indicates:  No Known Allergies    Medications Reconciliation:   I have reconciled the patient's home medications with the patient/family. I have updated all changes.  Refer to After-Visit Medication List.    OBJECTIVE:     Vital Signs:  There were no vitals filed for this visit.  Wt Readings from Last 1 Encounters:   08/27/19 0706 70.9 kg (156 lb 4.9 oz)     There is no height or weight on file to calculate BMI.     Wt Readings from Last 3 Encounters:   10/17/19 70.4 kg (155 lb 3.3 oz)   08/27/19 70.9 kg (156 lb 4.9 oz)   07/22/19 70.8 kg (156 lb 1.4 oz)     Temp Readings from Last 3 Encounters:   08/27/19 98.2 °F (36.8 °C) (Oral)   07/22/19 98.3 °F (36.8 °C) (Oral)   10/10/18 97.7 °F  "(36.5 °C)     BP Readings from Last 3 Encounters:   08/27/19 122/82   07/22/19 128/80   07/22/19 136/80     Pulse Readings from Last 3 Encounters:   10/17/19 89   08/27/19 90   07/22/19 97        Physical Exam:  General: Well developed, well nourished. No distress.  HEENT: Head is normocephalic, atraumatic; ears are normal.   Eyes: Clear conjunctiva.  Neck: Supple, symmetrical neck; trachea midline.  Lungs: Clear to auscultation bilaterally and normal respiratory effort.  Cardiovascular: Heart with regular rate and rhythm. No murmurs, gallops or rubs  Extremities: No LE edema. Pulses 2+ and symmetric.   Abdomen: Abdomen is soft, non-tender non-distended with normal bowel sounds.  Skin: Skin color, texture, turgor normal. No rashes.  Musculoskeletal: Normal gait.   Lymph Nodes: No cervical or supraclavicular adenopathy.  Neurologic: Normal strength and tone. No focal numbness or weakness.   Psychiatric: Not depressed.      Laboratory  Lab Results   Component Value Date    WBC 8.71 08/27/2019    HGB 13.3 08/27/2019    HCT 39.7 08/27/2019     08/27/2019    CHOL 213 (H) 07/03/2019    TRIG 150 (H) 07/03/2019    HDL 52 07/03/2019    ALT 17 08/27/2019    AST 15 08/27/2019     08/27/2019    K 3.7 08/27/2019     08/27/2019    CREATININE 0.7 08/27/2019    BUN 15 08/27/2019    CO2 26 08/27/2019    TSH 2.05 07/03/2019    HGBA1C 5.2 07/03/2019       ASSESSMENT & PLAN:     Here for BP Follow up. Seen       Encounter for BP Check:   (Range per documentation in EPic: 116-128/80s at baseline over the past 12 months)  *BP today: 116/92 per MA and by Provider: 120/88.  HR: 80-90s  ` suggest starting the Lopressor 25 / 25 (recommended by Cards, but she is refusing due to "fear that it will make me feel bad"; advised to start to this medication at 50 % of the dose, may need to increase, but if not going to take this medication and BP remains uncontrolled, will need to be commenced to "some" other agent; she voiced " "understanding).   ` check Iron levels   *Monitoring BPs at home.   Advised to follow up with messaging or clinic visit in 2 weeks for repeat check with nurse, sooner if indicated.  Advised to follow up with her GYN; she was recently changed to [facility] and reports that       LUPE:   She is off the "anxiety meds";  She is rambling today and having anxiety in regards to her current medications and "not wanting to take any additional meds".   Advised to follow up with PCP in regards.          Medication List           Accurate as of October 17, 2019  7:58 AM. If you have any questions, ask your nurse or doctor.               CHANGE how you take these medications    metoprolol tartrate 25 MG tablet  Commonly known as:  LOPRESSOR  Take 0.5 tablets (12.5 mg total) by mouth 2 (two) times daily.  What changed:  how much to take  Changed by:  SASHA Mcghee        CONTINUE taking these medications    cyanocobalamin 500 MCG tablet  Commonly known as:  VITAMIN B-12  Take 1 tablet (500 mcg total) by mouth once daily.     drospirenone-ethinyl estradiol 3-0.03 mg per tablet  Commonly known as:  KERI     nystatin powder  Commonly known as:  MYCOSTATIN  Apply topically 2 (two) times daily.     potassium chloride 10 MEQ Tbsr  Commonly known as:  KLOR-CON  Take 1 tablet (10 mEq total) by mouth once daily.     PROCTO-MED HC 2.5 % rectal cream  Generic drug:  hydrocortisone     sumatriptan 25 MG Tab  Commonly known as:  IMITREX  Take 1 tablet (25 mg total) by mouth daily as needed.     VITAMIN D3 1,000 unit capsule  Generic drug:  cholecalciferol (vitamin D3)           Where to Get Your Medications      Information about where to get these medications is not yet available    Ask your nurse or doctor about these medications  · metoprolol tartrate 25 MG tablet         Signing Physician:  SASHA Mcghee  "

## 2019-12-23 ENCOUNTER — OFFICE VISIT (OUTPATIENT)
Dept: FAMILY MEDICINE | Facility: CLINIC | Age: 44
End: 2019-12-23
Payer: COMMERCIAL

## 2019-12-23 VITALS
HEIGHT: 63 IN | OXYGEN SATURATION: 100 % | DIASTOLIC BLOOD PRESSURE: 82 MMHG | BODY MASS INDEX: 28 KG/M2 | HEART RATE: 86 BPM | SYSTOLIC BLOOD PRESSURE: 120 MMHG | WEIGHT: 158.06 LBS | TEMPERATURE: 99 F

## 2019-12-23 DIAGNOSIS — J01.90 ACUTE BACTERIAL RHINOSINUSITIS: ICD-10-CM

## 2019-12-23 DIAGNOSIS — B96.89 ACUTE BACTERIAL RHINOSINUSITIS: ICD-10-CM

## 2019-12-23 PROCEDURE — 3074F PR MOST RECENT SYSTOLIC BLOOD PRESSURE < 130 MM HG: ICD-10-PCS | Mod: CPTII,S$GLB,, | Performed by: NURSE PRACTITIONER

## 2019-12-23 PROCEDURE — 3008F PR BODY MASS INDEX (BMI) DOCUMENTED: ICD-10-PCS | Mod: CPTII,S$GLB,, | Performed by: NURSE PRACTITIONER

## 2019-12-23 PROCEDURE — 99212 PR OFFICE/OUTPT VISIT, EST, LEVL II, 10-19 MIN: ICD-10-PCS | Mod: S$GLB,,, | Performed by: NURSE PRACTITIONER

## 2019-12-23 PROCEDURE — 99999 PR PBB SHADOW E&M-EST. PATIENT-LVL IV: ICD-10-PCS | Mod: PBBFAC,,, | Performed by: NURSE PRACTITIONER

## 2019-12-23 PROCEDURE — 99999 PR PBB SHADOW E&M-EST. PATIENT-LVL IV: CPT | Mod: PBBFAC,,, | Performed by: NURSE PRACTITIONER

## 2019-12-23 PROCEDURE — 3079F DIAST BP 80-89 MM HG: CPT | Mod: CPTII,S$GLB,, | Performed by: NURSE PRACTITIONER

## 2019-12-23 PROCEDURE — 3079F PR MOST RECENT DIASTOLIC BLOOD PRESSURE 80-89 MM HG: ICD-10-PCS | Mod: CPTII,S$GLB,, | Performed by: NURSE PRACTITIONER

## 2019-12-23 PROCEDURE — 3008F BODY MASS INDEX DOCD: CPT | Mod: CPTII,S$GLB,, | Performed by: NURSE PRACTITIONER

## 2019-12-23 PROCEDURE — 3074F SYST BP LT 130 MM HG: CPT | Mod: CPTII,S$GLB,, | Performed by: NURSE PRACTITIONER

## 2019-12-23 PROCEDURE — 99212 OFFICE O/P EST SF 10 MIN: CPT | Mod: S$GLB,,, | Performed by: NURSE PRACTITIONER

## 2019-12-23 RX ORDER — AMOXICILLIN AND CLAVULANATE POTASSIUM 875; 125 MG/1; MG/1
1 TABLET, FILM COATED ORAL EVERY 12 HOURS
Qty: 14 TABLET | Refills: 0 | Status: SHIPPED | OUTPATIENT
Start: 2019-12-23 | End: 2019-12-30

## 2019-12-24 NOTE — PROGRESS NOTES
Subjective:       Patient ID: Juanita Salas is a 44 y.o. female.    Chief Complaint: Sinusitis; Cough; and Sore Throat    45yo female with history as stated on problem list who presents to after hours clinic with complaints of cough and congestion for the past 1-2 weeks. Her symptoms started with sore throat with progression to sinus congestion/pain. She continues to complain of sore throat, coughing with yellow sputum production, ear pain/pressure but denies fever, chills, chest pain, SOB/KAMARA, decreased appetite. She has been taking OTC medications with no major improvement and does not feel major improvement. She complains of increased coughing this morning after showering. She felt slighter better this morning but reports recurrent worsening symptoms this afternoon/evening     Review of Systems   Constitutional: Negative for activity change, appetite change, diaphoresis, fatigue, fever and unexpected weight change.   HENT: Positive for ear pain, sinus pressure, sinus pain and sore throat.    Respiratory: Positive for cough. Negative for apnea, choking, chest tightness and wheezing.    Cardiovascular: Negative for chest pain, palpitations and leg swelling.   Gastrointestinal: Negative for abdominal pain, diarrhea, nausea and vomiting.   Neurological: Positive for dizziness.       Objective:      Physical Exam   Constitutional: She is oriented to person, place, and time. She appears well-developed and well-nourished. No distress.   HENT:   Right Ear: Tympanic membrane normal.   Left Ear: Tympanic membrane normal.   Nose: Mucosal edema present. Right sinus exhibits maxillary sinus tenderness and frontal sinus tenderness. Left sinus exhibits maxillary sinus tenderness and frontal sinus tenderness.   Mouth/Throat: Posterior oropharyngeal edema and posterior oropharyngeal erythema present. No oropharyngeal exudate or tonsillar abscesses.   Cardiovascular: Normal rate and normal heart sounds.   Pulmonary/Chest: Effort  normal.   Abdominal: Soft. Bowel sounds are normal. She exhibits no distension. There is no tenderness.   Neurological: She is alert and oriented to person, place, and time.   Skin: Skin is warm and dry.       Assessment:       1. Acute bacterial rhinosinusitis        Plan:       -Symptoms present for 1-2 weeks with initial improvement then recurrent worsening symptoms  -Given length of symptoms as well as worsening of symptoms will treat with abx  -Augementin BID take with food  -[place] po fluids, rest and Tylenol  -RTC with PCP as recommended or sooner if symptoms persist/worsen

## 2020-01-06 PROBLEM — B96.89 ACUTE BACTERIAL RHINOSINUSITIS: Status: ACTIVE | Noted: 2020-01-06

## 2020-01-06 PROBLEM — J01.90 ACUTE BACTERIAL RHINOSINUSITIS: Status: ACTIVE | Noted: 2020-01-06

## 2020-01-28 ENCOUNTER — OFFICE VISIT (OUTPATIENT)
Dept: INTERNAL MEDICINE | Facility: CLINIC | Age: 45
End: 2020-01-28
Payer: COMMERCIAL

## 2020-01-28 VITALS
DIASTOLIC BLOOD PRESSURE: 80 MMHG | HEART RATE: 93 BPM | BODY MASS INDEX: 28.32 KG/M2 | OXYGEN SATURATION: 98 % | WEIGHT: 159.81 LBS | HEIGHT: 63 IN | SYSTOLIC BLOOD PRESSURE: 110 MMHG | TEMPERATURE: 99 F

## 2020-01-28 DIAGNOSIS — J32.9 SINUSITIS, UNSPECIFIED CHRONICITY, UNSPECIFIED LOCATION: Primary | ICD-10-CM

## 2020-01-28 DIAGNOSIS — E78.2 MIXED HYPERLIPIDEMIA: ICD-10-CM

## 2020-01-28 PROCEDURE — 3008F BODY MASS INDEX DOCD: CPT | Mod: CPTII,S$GLB,, | Performed by: PHYSICIAN ASSISTANT

## 2020-01-28 PROCEDURE — 99213 OFFICE O/P EST LOW 20 MIN: CPT | Mod: S$GLB,,, | Performed by: PHYSICIAN ASSISTANT

## 2020-01-28 PROCEDURE — 99999 PR PBB SHADOW E&M-EST. PATIENT-LVL IV: CPT | Mod: PBBFAC,,, | Performed by: PHYSICIAN ASSISTANT

## 2020-01-28 PROCEDURE — 3074F PR MOST RECENT SYSTOLIC BLOOD PRESSURE < 130 MM HG: ICD-10-PCS | Mod: CPTII,S$GLB,, | Performed by: PHYSICIAN ASSISTANT

## 2020-01-28 PROCEDURE — 3074F SYST BP LT 130 MM HG: CPT | Mod: CPTII,S$GLB,, | Performed by: PHYSICIAN ASSISTANT

## 2020-01-28 PROCEDURE — 99999 PR PBB SHADOW E&M-EST. PATIENT-LVL IV: ICD-10-PCS | Mod: PBBFAC,,, | Performed by: PHYSICIAN ASSISTANT

## 2020-01-28 PROCEDURE — 3079F PR MOST RECENT DIASTOLIC BLOOD PRESSURE 80-89 MM HG: ICD-10-PCS | Mod: CPTII,S$GLB,, | Performed by: PHYSICIAN ASSISTANT

## 2020-01-28 PROCEDURE — 99213 PR OFFICE/OUTPT VISIT, EST, LEVL III, 20-29 MIN: ICD-10-PCS | Mod: S$GLB,,, | Performed by: PHYSICIAN ASSISTANT

## 2020-01-28 PROCEDURE — 3079F DIAST BP 80-89 MM HG: CPT | Mod: CPTII,S$GLB,, | Performed by: PHYSICIAN ASSISTANT

## 2020-01-28 PROCEDURE — 3008F PR BODY MASS INDEX (BMI) DOCUMENTED: ICD-10-PCS | Mod: CPTII,S$GLB,, | Performed by: PHYSICIAN ASSISTANT

## 2020-01-28 RX ORDER — PREDNISONE 10 MG/1
TABLET ORAL
Qty: 9 TABLET | Refills: 0 | Status: SHIPPED | OUTPATIENT
Start: 2020-01-28 | End: 2020-03-03

## 2020-01-28 RX ORDER — AZITHROMYCIN 250 MG/1
TABLET, FILM COATED ORAL
Qty: 6 TABLET | Refills: 0 | Status: SHIPPED | OUTPATIENT
Start: 2020-01-28 | End: 2020-02-02

## 2020-01-28 NOTE — PROGRESS NOTES
Subjective:       Patient ID: Juanita Salas is a 44 y.o. female.    Chief Complaint: Sore Throat; Otalgia; and Cough    Patient presents to clinic with a 5 day history of sore throat, nasal congestion and postnasal drip.  She states she was at [facility] with her sister who is having a long surgery.  She started with symptoms several days later.  She denies any fever, cough or shortness of breath.  She has been using cough drops with no relief.  She denies any other symptoms.  She denies any sick contacts, recent travel outside of the country.    Patient would also like to have a cholesterol level checked.  She states she had very high triglycerides at a health fair work but does not know the accuracy of that test.    Review of Systems   Constitutional: Negative for activity change, appetite change, chills, fatigue and fever.   HENT: Positive for congestion, postnasal drip, rhinorrhea and sore throat. Negative for ear discharge, ear pain, hearing loss, nosebleeds, trouble swallowing and voice change.    Eyes: Negative for discharge, redness and visual disturbance.   Respiratory: Negative for cough, chest tightness, shortness of breath and wheezing.    Cardiovascular: Negative for chest pain and leg swelling.   Gastrointestinal: Negative.    Musculoskeletal: Negative for neck pain.       Objective:      Physical Exam   Constitutional: She appears well-developed and well-nourished. No distress.   HENT:   Head: Normocephalic and atraumatic.   Right Ear: External ear normal.   Left Ear: External ear normal.   Mouth/Throat: Uvula is midline, oropharynx is clear and moist and mucous membranes are normal. No oral lesions. No uvula swelling. No oropharyngeal exudate, posterior oropharyngeal edema or posterior oropharyngeal erythema.   Eyes: Pupils are equal, round, and reactive to light. Conjunctivae and EOM are normal.   Neck: Normal range of motion. Neck supple. No thyromegaly present.   Cardiovascular: Normal  rate, regular rhythm and normal heart sounds. Exam reveals no gallop and no friction rub.   No murmur heard.  Pulmonary/Chest: Effort normal and breath sounds normal. No respiratory distress. She has no wheezes. She has no rales.   Abdominal: Soft. Bowel sounds are normal. There is no tenderness.   Skin: She is not diaphoretic.       Assessment:       1. Sinusitis, unspecified chronicity, unspecified location    2. Mixed hyperlipidemia        Plan:       Juanita was seen today for sore throat, otalgia and cough.    Diagnoses and all orders for this visit:    Sinusitis, unspecified chronicity, unspecified location  -     azithromycin (Z-CIARA) 250 MG tablet; Take 2 tablets by mouth on day 1; Take 1 tablet by mouth on days 2-5  -     predniSONE (DELTASONE) 10 MG tablet; Take 2 pills a day for 3 days then 1 pill a day for 3 days    Mixed hyperlipidemia  -     Lipid panel; Future

## 2020-01-31 ENCOUNTER — LAB VISIT (OUTPATIENT)
Dept: LAB | Facility: HOSPITAL | Age: 45
End: 2020-01-31
Attending: INTERNAL MEDICINE
Payer: COMMERCIAL

## 2020-01-31 DIAGNOSIS — E78.2 MIXED HYPERLIPIDEMIA: ICD-10-CM

## 2020-01-31 LAB
CHOLEST SERPL-MCNC: 191 MG/DL (ref 120–199)
CHOLEST/HDLC SERPL: 2.9 {RATIO} (ref 2–5)
HDLC SERPL-MCNC: 66 MG/DL (ref 40–75)
HDLC SERPL: 34.6 % (ref 20–50)
LDLC SERPL CALC-MCNC: 98.2 MG/DL (ref 63–159)
NONHDLC SERPL-MCNC: 125 MG/DL
TRIGL SERPL-MCNC: 134 MG/DL (ref 30–150)

## 2020-01-31 PROCEDURE — 36415 COLL VENOUS BLD VENIPUNCTURE: CPT

## 2020-01-31 PROCEDURE — 80061 LIPID PANEL: CPT

## 2020-03-03 ENCOUNTER — OFFICE VISIT (OUTPATIENT)
Dept: INTERNAL MEDICINE | Facility: CLINIC | Age: 45
End: 2020-03-03
Payer: COMMERCIAL

## 2020-03-03 VITALS
HEIGHT: 63 IN | SYSTOLIC BLOOD PRESSURE: 140 MMHG | WEIGHT: 154.31 LBS | BODY MASS INDEX: 27.34 KG/M2 | HEART RATE: 110 BPM | TEMPERATURE: 98 F | DIASTOLIC BLOOD PRESSURE: 90 MMHG

## 2020-03-03 DIAGNOSIS — K64.8 INTERNAL HEMORRHOID: Primary | ICD-10-CM

## 2020-03-03 DIAGNOSIS — E78.2 MIXED HYPERLIPIDEMIA: ICD-10-CM

## 2020-03-03 DIAGNOSIS — E87.6 HYPOKALEMIA: ICD-10-CM

## 2020-03-03 PROBLEM — B96.89 ACUTE BACTERIAL RHINOSINUSITIS: Status: RESOLVED | Noted: 2020-01-06 | Resolved: 2020-03-03

## 2020-03-03 PROBLEM — J01.90 ACUTE BACTERIAL RHINOSINUSITIS: Status: RESOLVED | Noted: 2020-01-06 | Resolved: 2020-03-03

## 2020-03-03 PROCEDURE — 99999 PR PBB SHADOW E&M-EST. PATIENT-LVL III: CPT | Mod: PBBFAC,,, | Performed by: PHYSICIAN ASSISTANT

## 2020-03-03 PROCEDURE — 3077F PR MOST RECENT SYSTOLIC BLOOD PRESSURE >= 140 MM HG: ICD-10-PCS | Mod: CPTII,S$GLB,, | Performed by: PHYSICIAN ASSISTANT

## 2020-03-03 PROCEDURE — 99213 OFFICE O/P EST LOW 20 MIN: CPT | Mod: S$GLB,,, | Performed by: PHYSICIAN ASSISTANT

## 2020-03-03 PROCEDURE — 3008F PR BODY MASS INDEX (BMI) DOCUMENTED: ICD-10-PCS | Mod: CPTII,S$GLB,, | Performed by: PHYSICIAN ASSISTANT

## 2020-03-03 PROCEDURE — 3080F PR MOST RECENT DIASTOLIC BLOOD PRESSURE >= 90 MM HG: ICD-10-PCS | Mod: CPTII,S$GLB,, | Performed by: PHYSICIAN ASSISTANT

## 2020-03-03 PROCEDURE — 3080F DIAST BP >= 90 MM HG: CPT | Mod: CPTII,S$GLB,, | Performed by: PHYSICIAN ASSISTANT

## 2020-03-03 PROCEDURE — 99999 PR PBB SHADOW E&M-EST. PATIENT-LVL III: ICD-10-PCS | Mod: PBBFAC,,, | Performed by: PHYSICIAN ASSISTANT

## 2020-03-03 PROCEDURE — 3008F BODY MASS INDEX DOCD: CPT | Mod: CPTII,S$GLB,, | Performed by: PHYSICIAN ASSISTANT

## 2020-03-03 PROCEDURE — 99213 PR OFFICE/OUTPT VISIT, EST, LEVL III, 20-29 MIN: ICD-10-PCS | Mod: S$GLB,,, | Performed by: PHYSICIAN ASSISTANT

## 2020-03-03 PROCEDURE — 3077F SYST BP >= 140 MM HG: CPT | Mod: CPTII,S$GLB,, | Performed by: PHYSICIAN ASSISTANT

## 2020-03-03 RX ORDER — HYDROCORTISONE 25 MG/G
CREAM TOPICAL 2 TIMES DAILY
Qty: 28 G | Refills: 1 | Status: SHIPPED | OUTPATIENT
Start: 2020-03-03 | End: 2020-07-09 | Stop reason: SDUPTHER

## 2020-03-03 NOTE — PROGRESS NOTES
Subjective:       Patient ID: Juanita Salas is a 44 y.o. female.    Chief Complaint: Hemorrhoids (pain=5)    Patient presents with complaints of hemorrhoid.  She states she had a bowel movement this morning and noticed more blood than usual with her hemorrhoids.  She had a 2nd bowel movement shortly after with significantly last blood.  She did complete a Sitz bath this morning and has been using an old prescription of Proctofoam hydrocortisone rectal cream as well as tucks pads.  She has not noticed any further bleeding.    Review of Systems   Constitutional: Negative for activity change and appetite change.   HENT: Negative for nosebleeds.    Eyes: Negative for pain and visual disturbance.   Respiratory: Negative for chest tightness and shortness of breath.    Cardiovascular: Negative for chest pain, palpitations and leg swelling.   Gastrointestinal: Positive for anal bleeding and blood in stool. Negative for abdominal distention, abdominal pain, constipation, diarrhea, nausea, rectal pain and vomiting.   Genitourinary: Negative for difficulty urinating, dysuria and frequency.   Musculoskeletal: Negative for arthralgias, back pain, gait problem, joint swelling and neck pain.   Neurological: Positive for headaches. Negative for dizziness, tremors, weakness, light-headedness and numbness.       Objective:      Physical Exam   Constitutional: She is oriented to person, place, and time. She appears well-developed and well-nourished.   HENT:   Head: Normocephalic and atraumatic.   Eyes: Pupils are equal, round, and reactive to light. Conjunctivae are normal.   Neck: Normal range of motion. Neck supple. No JVD present.   Cardiovascular: Normal rate and regular rhythm. Exam reveals no gallop and no friction rub.   No murmur heard.  Pulmonary/Chest: Effort normal and breath sounds normal. No respiratory distress. She has no wheezes. She has no rales.   Abdominal: Soft. Bowel sounds are normal.   Genitourinary: Rectal exam  shows internal hemorrhoid (Possible small internal hemorrhoid felt but no blood on exam.). Rectal exam shows no external hemorrhoid, no fissure, no mass, no tenderness, anal tone normal and guaiac negative stool.   Neurological: She is alert and oriented to person, place, and time.   Skin: Skin is warm and dry.   Psychiatric: She has a normal mood and affect. Her behavior is normal. Judgment and thought content normal.       Assessment:       1. Internal hemorrhoid    2. Hypokalemia    3. Mixed hyperlipidemia        Plan:       Juanita was seen today for hemorrhoids.    Diagnoses and all orders for this visit:    Internal hemorrhoid  -     hydrocortisone (PROCTO-MED HC) 2.5 % rectal cream; Place rectally 2 (two) times daily.    Hypokalemia  -     TSH; Future  -     TSH    Mixed hyperlipidemia  -     CBC auto differential; Future  -     Comprehensive metabolic panel; Future  -     Lipid panel; Future  -     TSH; Future     Of note patient requested routine blood work to be done in addition to this visit.  She is having labs done at [facility] and we will call her with results

## 2020-03-04 LAB
ALBUMIN SERPL-MCNC: 4.1 G/DL (ref 3.6–5.1)
ALBUMIN/GLOB SERPL: 1.5 (CALC) (ref 1–2.5)
ALP SERPL-CCNC: 48 U/L (ref 31–125)
ALT SERPL-CCNC: 10 U/L (ref 6–29)
AST SERPL-CCNC: 12 U/L (ref 10–30)
BASOPHILS # BLD AUTO: 41 CELLS/UL (ref 0–200)
BASOPHILS NFR BLD AUTO: 0.5 %
BILIRUB SERPL-MCNC: 0.3 MG/DL (ref 0.2–1.2)
BUN SERPL-MCNC: 9 MG/DL (ref 7–25)
BUN/CREAT SERPL: NORMAL (CALC) (ref 6–22)
CALCIUM SERPL-MCNC: 9.1 MG/DL (ref 8.6–10.2)
CHLORIDE SERPL-SCNC: 103 MMOL/L (ref 98–110)
CHOLEST SERPL-MCNC: 235 MG/DL
CHOLEST/HDLC SERPL: 3.7 (CALC)
CO2 SERPL-SCNC: 24 MMOL/L (ref 20–32)
CREAT SERPL-MCNC: 0.57 MG/DL (ref 0.5–1.1)
EOSINOPHIL # BLD AUTO: 41 CELLS/UL (ref 15–500)
EOSINOPHIL NFR BLD AUTO: 0.5 %
ERYTHROCYTE [DISTWIDTH] IN BLOOD BY AUTOMATED COUNT: 12 % (ref 11–15)
GFRSERPLBLD MDRD-ARVRAT: 113 ML/MIN/1.73M2
GLOBULIN SER CALC-MCNC: 2.7 G/DL (CALC) (ref 1.9–3.7)
GLUCOSE SERPL-MCNC: 92 MG/DL (ref 65–99)
HCT VFR BLD AUTO: 38.2 % (ref 35–45)
HDLC SERPL-MCNC: 64 MG/DL
HGB BLD-MCNC: 13 G/DL (ref 11.7–15.5)
LDLC SERPL CALC-MCNC: 139 MG/DL (CALC)
LYMPHOCYTES # BLD AUTO: 1607 CELLS/UL (ref 850–3900)
LYMPHOCYTES NFR BLD AUTO: 19.6 %
MCH RBC QN AUTO: 30.9 PG (ref 27–33)
MCHC RBC AUTO-ENTMCNC: 34 G/DL (ref 32–36)
MCV RBC AUTO: 90.7 FL (ref 80–100)
MONOCYTES # BLD AUTO: 418 CELLS/UL (ref 200–950)
MONOCYTES NFR BLD AUTO: 5.1 %
NEUTROPHILS # BLD AUTO: 6093 CELLS/UL (ref 1500–7800)
NEUTROPHILS NFR BLD AUTO: 74.3 %
NONHDLC SERPL-MCNC: 171 MG/DL (CALC)
PLATELET # BLD AUTO: 322 THOUSAND/UL (ref 140–400)
PMV BLD REES-ECKER: 10.6 FL (ref 7.5–12.5)
POTASSIUM SERPL-SCNC: 3.9 MMOL/L (ref 3.5–5.3)
PROT SERPL-MCNC: 6.8 G/DL (ref 6.1–8.1)
RBC # BLD AUTO: 4.21 MILLION/UL (ref 3.8–5.1)
SODIUM SERPL-SCNC: 140 MMOL/L (ref 135–146)
TRIGL SERPL-MCNC: 184 MG/DL
TSH SERPL-ACNC: 1.34 MIU/L
WBC # BLD AUTO: 8.2 THOUSAND/UL (ref 3.8–10.8)

## 2020-03-11 ENCOUNTER — TELEPHONE (OUTPATIENT)
Dept: INTERNAL MEDICINE | Facility: CLINIC | Age: 45
End: 2020-03-11

## 2020-03-11 DIAGNOSIS — E78.2 MIXED HYPERLIPIDEMIA: Primary | ICD-10-CM

## 2020-03-11 RX ORDER — SIMVASTATIN 10 MG/1
10 TABLET, FILM COATED ORAL NIGHTLY
Qty: 90 TABLET | Refills: 3 | Status: SHIPPED | OUTPATIENT
Start: 2020-03-11 | End: 2020-06-09

## 2020-03-11 NOTE — TELEPHONE ENCOUNTER
Spoke to pt. She is not on any medications for her cholesterol. She wants to know what meds you suggest.

## 2020-03-12 ENCOUNTER — PATIENT MESSAGE (OUTPATIENT)
Dept: INTERNAL MEDICINE | Facility: CLINIC | Age: 45
End: 2020-03-12

## 2020-03-13 ENCOUNTER — PATIENT OUTREACH (OUTPATIENT)
Dept: ADMINISTRATIVE | Facility: OTHER | Age: 45
End: 2020-03-13

## 2020-03-15 ENCOUNTER — TELEPHONE (OUTPATIENT)
Dept: SURGERY | Facility: CLINIC | Age: 45
End: 2020-03-15

## 2020-03-15 NOTE — TELEPHONE ENCOUNTER
Spoke with patient to confirm appointment tomorrow due to Covid 19. Patient requested to cancel appointment. Reports rectal bleeding has stopped.

## 2020-04-03 ENCOUNTER — PATIENT MESSAGE (OUTPATIENT)
Dept: INTERNAL MEDICINE | Facility: CLINIC | Age: 45
End: 2020-04-03

## 2020-04-03 ENCOUNTER — OFFICE VISIT (OUTPATIENT)
Dept: INTERNAL MEDICINE | Facility: CLINIC | Age: 45
End: 2020-04-03
Payer: COMMERCIAL

## 2020-04-03 DIAGNOSIS — R05.9 COUGH: Primary | ICD-10-CM

## 2020-04-03 PROCEDURE — 99213 PR OFFICE/OUTPT VISIT, EST, LEVL III, 20-29 MIN: ICD-10-PCS | Mod: 95,,, | Performed by: INTERNAL MEDICINE

## 2020-04-03 PROCEDURE — 99213 OFFICE O/P EST LOW 20 MIN: CPT | Mod: 95,,, | Performed by: INTERNAL MEDICINE

## 2020-04-03 RX ORDER — CODEINE PHOSPHATE AND GUAIFENESIN 10; 100 MG/5ML; MG/5ML
5 SOLUTION ORAL 3 TIMES DAILY PRN
Qty: 100 ML | Refills: 0 | Status: SHIPPED | OUTPATIENT
Start: 2020-04-03 | End: 2020-04-13

## 2020-04-03 NOTE — PROGRESS NOTES
Subjective:       Patient ID: Juanita Salas is a 44 y.o. female.    Chief Complaint: cough    HPI   Juanita Salas is a 44 y.o. female presenting via video visit for evaluation/follow up of the following issues. This visit occurs during the COVID 19 outbreak.     The patient location is: patient's home  The chief complaint leading to consultation is: cough  Visit type: Virtual visit with synchronous audio and video  Total time spent with patient: 15 minutes  Each patient to whom he or she provides medical services by telemedicine is:  (1) informed of the relationship between the physician and patient and the respective role of any other health care provider with respect to management of the patient; and (2) notified that he or she may decline to receive medical services by telemedicine and may withdraw from such care at any time.    Hope all is well  you told me if I needed message you with all this corona virus going on dont want to come there but I have been having a cough for a few days and spitting up mucus greenish yellow and my upper back hurts from coughing I have been taking my Mucinex Dm and using Isrraels on my chest is there anything you can suggest I take for the cough you can call me [phone] also my anxiety is thru the roof  I had taken 1/2 of  klonopin from 2018 it helped a little     Back hurting from coughing.  Cough  productive in am yellowish  Warm 4 days ago but no fever.   No body aches.,  No confirmed contacts. Working from home.   Also suggested robitussin  She previously took virtussin with codeine which helped.     Anxiety  Previous klonopin in 2018  She was on sertraline for several years. Does not feel she needs to restart at this time but will consider it and let us know if she feels she needs to restart.       Review of Systems   Constitutional: Negative for chills, fatigue and fever.   HENT: Negative for rhinorrhea and sneezing.    Respiratory: Positive for cough. Negative for  chest tightness, shortness of breath and wheezing.    Cardiovascular: Negative for chest pain.   Musculoskeletal: Positive for back pain. Negative for myalgias.   Skin: Negative.        Objective:   There were no vitals taken for this visit.     Physical Exam   Constitutional: She is oriented to person, place, and time. She appears well-developed and well-nourished. No distress.   HENT:   Head: Atraumatic.   Pulmonary/Chest: Effort normal. No respiratory distress.   Speaking in full sentences, minimal dry cough   Neurological: She is alert and oriented to person, place, and time.   Skin: She is not diaphoretic.   Psychiatric: She has a normal mood and affect. Her behavior is normal.   Appears anxious with pressured speech, insight and judgement preserved       Assessment:       1. Cough        Plan:       Diagnoses and all orders for this visit:    Cough  -     guaifenesin-codeine 100-10 mg/5 ml (VIRTUSSIN AC)  mg/5 mL syrup; Take 5 mLs by mouth 3 (three) times daily as needed for Cough.   Rest, fluids, mucinex, tylenol as needed. Symptoms to watch for discussed. I think this is unlikely to be covid 19. Alert clinic with any concerns.     Anxiety  Offered restart of ssri, wants to hold off for now. Will let us know if she changes her mind.

## 2020-06-01 ENCOUNTER — PATIENT OUTREACH (OUTPATIENT)
Dept: ADMINISTRATIVE | Facility: OTHER | Age: 45
End: 2020-06-01

## 2020-06-02 ENCOUNTER — HOSPITAL ENCOUNTER (OUTPATIENT)
Dept: CARDIOLOGY | Facility: CLINIC | Age: 45
Discharge: HOME | End: 2020-06-02
Payer: COMMERCIAL

## 2020-06-02 ENCOUNTER — OFFICE VISIT (OUTPATIENT)
Dept: CARDIOLOGY | Facility: CLINIC | Age: 45
End: 2020-06-02
Payer: COMMERCIAL

## 2020-06-02 VITALS
HEART RATE: 105 BPM | SYSTOLIC BLOOD PRESSURE: 142 MMHG | WEIGHT: 144.19 LBS | BODY MASS INDEX: 25.55 KG/M2 | HEIGHT: 63 IN | DIASTOLIC BLOOD PRESSURE: 83 MMHG

## 2020-06-02 DIAGNOSIS — R00.0 SINUS TACHYCARDIA: ICD-10-CM

## 2020-06-02 DIAGNOSIS — R00.2 PALPITATIONS: ICD-10-CM

## 2020-06-02 DIAGNOSIS — E78.00 HYPERCHOLESTEROLEMIA: ICD-10-CM

## 2020-06-02 DIAGNOSIS — R00.2 PALPITATIONS: Primary | ICD-10-CM

## 2020-06-02 PROCEDURE — 99214 PR OFFICE/OUTPT VISIT, EST, LEVL IV, 30-39 MIN: ICD-10-PCS | Mod: 25,S$GLB,, | Performed by: NURSE PRACTITIONER

## 2020-06-02 PROCEDURE — 3077F SYST BP >= 140 MM HG: CPT | Mod: CPTII,S$GLB,, | Performed by: NURSE PRACTITIONER

## 2020-06-02 PROCEDURE — 3079F DIAST BP 80-89 MM HG: CPT | Mod: CPTII,S$GLB,, | Performed by: NURSE PRACTITIONER

## 2020-06-02 PROCEDURE — 3079F PR MOST RECENT DIASTOLIC BLOOD PRESSURE 80-89 MM HG: ICD-10-PCS | Mod: CPTII,S$GLB,, | Performed by: NURSE PRACTITIONER

## 2020-06-02 PROCEDURE — 93005 ELECTROCARDIOGRAM TRACING: CPT | Mod: S$GLB,,, | Performed by: NURSE PRACTITIONER

## 2020-06-02 PROCEDURE — 3077F PR MOST RECENT SYSTOLIC BLOOD PRESSURE >= 140 MM HG: ICD-10-PCS | Mod: CPTII,S$GLB,, | Performed by: NURSE PRACTITIONER

## 2020-06-02 PROCEDURE — 99999 PR PBB SHADOW E&M-EST. PATIENT-LVL III: ICD-10-PCS | Mod: PBBFAC,,, | Performed by: NURSE PRACTITIONER

## 2020-06-02 PROCEDURE — 3008F PR BODY MASS INDEX (BMI) DOCUMENTED: ICD-10-PCS | Mod: CPTII,S$GLB,, | Performed by: NURSE PRACTITIONER

## 2020-06-02 PROCEDURE — 93010 EKG 12-LEAD: ICD-10-PCS | Mod: S$GLB,,, | Performed by: INTERNAL MEDICINE

## 2020-06-02 PROCEDURE — 99214 OFFICE O/P EST MOD 30 MIN: CPT | Mod: 25,S$GLB,, | Performed by: NURSE PRACTITIONER

## 2020-06-02 PROCEDURE — 99999 PR PBB SHADOW E&M-EST. PATIENT-LVL III: CPT | Mod: PBBFAC,,, | Performed by: NURSE PRACTITIONER

## 2020-06-02 PROCEDURE — 93010 ELECTROCARDIOGRAM REPORT: CPT | Mod: S$GLB,,, | Performed by: INTERNAL MEDICINE

## 2020-06-02 PROCEDURE — 93005 EKG 12-LEAD: ICD-10-PCS | Mod: S$GLB,,, | Performed by: NURSE PRACTITIONER

## 2020-06-02 PROCEDURE — 3008F BODY MASS INDEX DOCD: CPT | Mod: CPTII,S$GLB,, | Performed by: NURSE PRACTITIONER

## 2020-06-02 RX ORDER — PRAVASTATIN SODIUM 20 MG/1
10 TABLET ORAL DAILY
Qty: 15 TABLET | Refills: 11 | Status: SHIPPED | OUTPATIENT
Start: 2020-06-02 | End: 2020-08-12

## 2020-06-02 NOTE — PROGRESS NOTES
Cardiology Consults Clinic Note    Subjective:   Chief Complaint: Palpitations  Last Clinic Visit: 07/22/2020 with Sheridan Sánchez    Problems:  Former smoker, quit in 2017  Family hx: maternal GF had first MI in his 30s, maternal GM CVA in her 50s    History of Present Illness: Juanita Salas is a 44 y.o. female who presents with palpitations.   States that palpitations occur a few times per week.  They last for several seconds and is generally not bothered by them.  However, 3 weeks ago patient states that she has an episode where she felt her heart racing.  Pulse check was revealing of -150s and states that it was sustained for approximately 20 minutes. At the time of this episode patient was cooking dinner and feeling otherwise normal.  Denies other associated symptoms including chest pain, shortness of breath, lightheadedness, or syncope.  Reports that she rested and episode resolved.  Also reports that palpitations are worse around her cycle.        Review of Systems   Constitution: Negative for decreased appetite, fever, malaise/fatigue and weight gain.   HENT: Negative for congestion, hearing loss and nosebleeds.    Eyes: Negative for visual disturbance.   Cardiovascular: Positive for irregular heartbeat and palpitations. Negative for chest pain, dyspnea on exertion, leg swelling and syncope.   Respiratory: Negative for cough, shortness of breath and sleep disturbances due to breathing.    Endocrine: Negative for cold intolerance and heat intolerance.   Hematologic/Lymphatic: Negative for bleeding problem. Does not bruise/bleed easily.   Gastrointestinal: Negative for bloating, abdominal pain, change in bowel habit and heartburn.   Neurological: Negative for dizziness, loss of balance and numbness.   Psychiatric/Behavioral: Negative for altered mental status. The patient is not nervous/anxious.        Medications:  Patient's Medications   New Prescriptions    PRAVASTATIN (PRAVACHOL) 20 MG TABLET     "Take 0.5 tablets (10 mg total) by mouth once daily.   Previous Medications    CHOLECALCIFEROL, VITAMIN D3, (VITAMIN D3) 1,000 UNIT CAPSULE    Take 1,000 Units by mouth 2 (two) times daily.    CYANOCOBALAMIN (VITAMIN B-12) 500 MCG TABLET    Take 1 tablet (500 mcg total) by mouth once daily.    DROSPIRENONE-ETHINYL ESTRADIOL (RAMOS) 3-0.03 MG PER TABLET    Take 1 tablet by mouth once daily.    HYDROCORTISONE (PROCTO-MED HC) 2.5 % RECTAL CREAM    Place rectally 2 (two) times daily.    POTASSIUM CHLORIDE (KLOR-CON) 10 MEQ TBSR    Take 1 tablet (10 mEq total) by mouth once daily.    SIMVASTATIN (ZOCOR) 10 MG TABLET    Take 1 tablet (10 mg total) by mouth every evening.    SUMATRIPTAN (IMITREX) 25 MG TAB    Take 1 tablet (25 mg total) by mouth daily as needed.   Modified Medications    No medications on file   Discontinued Medications    No medications on file       Family History:  Juanita's family history includes Cancer (age of onset: 47) in her mother; Diabetes in her mother; Heart disease in her maternal grandfather; Hyperlipidemia in her maternal aunt, maternal grandfather, maternal uncle, paternal aunt, and paternal uncle; Hypertension in her maternal aunt, maternal grandfather, maternal uncle, paternal aunt, and paternal uncle; Multiple sclerosis in her sister; Psoriasis in her maternal grandmother; Stroke in her maternal grandmother.    Social History:  Juanita reports that she quit smoking about 2 years ago. She has a 1.00 pack-year smoking history. She has never used smokeless tobacco. She reports that she does not drink alcohol or use drugs.    Objective:   BP (!) 142/83 (BP Location: Left arm, Patient Position: Sitting, BP Method: Medium (Automatic))   Pulse 105   Ht 5' 3" (1.6 m)   Wt 65.4 kg (144 lb 2.9 oz)   BMI 25.54 kg/m²     Physical Exam   Constitutional: She is oriented to person, place, and time. Vital signs are normal. She appears well-developed and well-nourished.   HENT:   Head: Normocephalic. "   Eyes: Pupils are equal, round, and reactive to light.   Neck: Normal range of motion. Neck supple. No JVD present. Carotid bruit is not present.   Cardiovascular: Normal rate, regular rhythm, S1 normal, S2 normal, normal heart sounds and intact distal pulses. PMI is not displaced. Exam reveals no gallop and no friction rub.   No murmur heard.  Pulses:       Carotid pulses are 2+ on the right side, and 2+ on the left side.       Radial pulses are 2+ on the right side, and 2+ on the left side.        Dorsalis pedis pulses are 2+ on the right side, and 2+ on the left side.        Posterior tibial pulses are 1+ on the right side, and 1+ on the left side.   Pulmonary/Chest: Effort normal and breath sounds normal. No accessory muscle usage. She has no decreased breath sounds. She has no wheezes.   Abdominal: Soft. Normal appearance and bowel sounds are normal. She exhibits no distension, no fluid wave and no ascites. There is no hepatosplenomegaly. There is no tenderness.   Musculoskeletal: Normal range of motion. She exhibits no edema.   Neurological: She is alert and oriented to person, place, and time. She has normal strength.   Skin: Skin is warm, dry and intact. No ecchymosis and no rash noted. No erythema.   Psychiatric: She has a normal mood and affect. Her speech is normal and behavior is normal. Judgment and thought content normal. Cognition and memory are normal.   Vitals reviewed.      EKG:  My independent visualization of most recent EKG is sinus tachycardia 108 bpm. Otherwise normal EKG.     TTE (11/01/2017):  · Normal left ventricular systolic function (EF 60-65%).   · Normal right ventricular systolic function .   · Normal left ventricular diastolic function.     Lipids:  Recent Labs   Lab 03/03/20  1407   LDL Cholesterol 139 H   HDL 64   Cholesterol 235 H      Renal:  Recent Labs   Lab 03/03/20  1407   Creatinine 0.57   Potassium 3.9   CO2 24   BUN, Bld 9     Liver:  Recent Labs   Lab 03/03/20  1407    AST 12   ALT 10       Assessment:     1. Palpitations: consider possible episode of flutter or SVT. Non-sustained, asymptomatic, no recurrence   2. Sinus tachycardia: stable     3. Hypercholesterolemia: ASCVD risk 1.4%, statin not indicated     Plan:     Juanita Salas was seen in clinic today for palpitations    Diagnoses and associated orders include:      Palpitations  Comments:  In the setting of recurrent and sustained tachycardia with HR>120 plan to proceed with event monitor to r/o atrial tachycardias  Instructed to increased aerobic exercise to 30 minutes daily for at least 5 days/week     Sinus tachycardia    Hypercholesterolemia  Comments:  ASCVD risk 1%  Statin not indicated but patient is concerned of recent lab results and family hx  Requesting to try pravastatin  Repeat FLP in 6 weeks   Orders:  -     pravastatin (PRAVACHOL) 20 MG tablet; Take 0.5 tablets (10 mg total) by mouth once daily.  Dispense: 15 tablet; Refill: 11  -     Lipid Panel; Future; Expected date: 07/14/2020      Follow up as needed    This patient was discussed with Dr. Aysha DÍAZ, SASHA  06/02/2020  2:43 PM      Follow-up:     Future Appointments   Date Time Provider Department Center   6/11/2020  7:10 AM [facility]

## 2020-06-02 NOTE — PATIENT INSTRUCTIONS
Thank you for coming to your cardiology appointment today.     Points of care discussed at today's appointment:    Please call if your heart rate stays elevated for sustained periods of time. We will plan to do a 30 day event monitor at that time.     If you have any questions or concerns related to your cardiology care, please call [phone].      Thank you,  AVA Camara  06/02/2020

## 2020-06-09 ENCOUNTER — HOSPITAL ENCOUNTER (OUTPATIENT)
Dept: RADIOLOGY | Facility: HOSPITAL | Age: 45
Discharge: HOME | End: 2020-06-09
Attending: PHYSICIAN ASSISTANT
Payer: COMMERCIAL

## 2020-06-09 ENCOUNTER — OFFICE VISIT (OUTPATIENT)
Dept: INTERNAL MEDICINE | Facility: CLINIC | Age: 45
End: 2020-06-09
Payer: COMMERCIAL

## 2020-06-09 ENCOUNTER — LAB VISIT (OUTPATIENT)
Dept: LAB | Facility: HOSPITAL | Age: 45
End: 2020-06-09
Payer: COMMERCIAL

## 2020-06-09 VITALS
SYSTOLIC BLOOD PRESSURE: 112 MMHG | HEART RATE: 93 BPM | WEIGHT: 143.06 LBS | OXYGEN SATURATION: 99 % | DIASTOLIC BLOOD PRESSURE: 78 MMHG | HEIGHT: 63 IN | BODY MASS INDEX: 25.35 KG/M2

## 2020-06-09 DIAGNOSIS — E55.9 VITAMIN D DEFICIENCY: ICD-10-CM

## 2020-06-09 DIAGNOSIS — M79.661 RIGHT CALF PAIN: Primary | ICD-10-CM

## 2020-06-09 DIAGNOSIS — E78.2 MIXED HYPERLIPIDEMIA: ICD-10-CM

## 2020-06-09 DIAGNOSIS — M79.661 RIGHT CALF PAIN: ICD-10-CM

## 2020-06-09 LAB
25(OH)D3+25(OH)D2 SERPL-MCNC: 28 NG/ML (ref 30–96)
ALBUMIN SERPL BCP-MCNC: 3.9 G/DL (ref 3.5–5.2)
ALP SERPL-CCNC: 44 U/L (ref 55–135)
ALT SERPL W/O P-5'-P-CCNC: 14 U/L (ref 10–44)
ANION GAP SERPL CALC-SCNC: 8 MMOL/L (ref 8–16)
AST SERPL-CCNC: 13 U/L (ref 10–40)
BASOPHILS # BLD AUTO: 0.03 K/UL (ref 0–0.2)
BASOPHILS NFR BLD: 0.4 % (ref 0–1.9)
BILIRUB SERPL-MCNC: 0.3 MG/DL (ref 0.1–1)
BUN SERPL-MCNC: 12 MG/DL (ref 6–20)
CALCIUM SERPL-MCNC: 9.2 MG/DL (ref 8.7–10.5)
CHLORIDE SERPL-SCNC: 106 MMOL/L (ref 95–110)
CHOLEST SERPL-MCNC: 205 MG/DL (ref 120–199)
CHOLEST/HDLC SERPL: 3.5 {RATIO} (ref 2–5)
CO2 SERPL-SCNC: 26 MMOL/L (ref 23–29)
CREAT SERPL-MCNC: 0.7 MG/DL (ref 0.5–1.4)
DIFFERENTIAL METHOD: NORMAL
EOSINOPHIL # BLD AUTO: 0.2 K/UL (ref 0–0.5)
EOSINOPHIL NFR BLD: 2 % (ref 0–8)
ERYTHROCYTE [DISTWIDTH] IN BLOOD BY AUTOMATED COUNT: 12.1 % (ref 11.5–14.5)
EST. GFR  (AFRICAN AMERICAN): >60 ML/MIN/1.73 M^2
EST. GFR  (NON AFRICAN AMERICAN): >60 ML/MIN/1.73 M^2
GLUCOSE SERPL-MCNC: 101 MG/DL (ref 70–110)
HCT VFR BLD AUTO: 40.3 % (ref 37–48.5)
HDLC SERPL-MCNC: 58 MG/DL (ref 40–75)
HDLC SERPL: 28.3 % (ref 20–50)
HGB BLD-MCNC: 13 G/DL (ref 12–16)
IMM GRANULOCYTES # BLD AUTO: 0.03 K/UL (ref 0–0.04)
IMM GRANULOCYTES NFR BLD AUTO: 0.4 % (ref 0–0.5)
LDLC SERPL CALC-MCNC: 113 MG/DL (ref 63–159)
LYMPHOCYTES # BLD AUTO: 1.8 K/UL (ref 1–4.8)
LYMPHOCYTES NFR BLD: 22.8 % (ref 18–48)
MCH RBC QN AUTO: 31 PG (ref 27–31)
MCHC RBC AUTO-ENTMCNC: 32.3 G/DL (ref 32–36)
MCV RBC AUTO: 96 FL (ref 82–98)
MONOCYTES # BLD AUTO: 0.5 K/UL (ref 0.3–1)
MONOCYTES NFR BLD: 6.6 % (ref 4–15)
NEUTROPHILS # BLD AUTO: 5.4 K/UL (ref 1.8–7.7)
NEUTROPHILS NFR BLD: 67.8 % (ref 38–73)
NONHDLC SERPL-MCNC: 147 MG/DL
NRBC BLD-RTO: 0 /100 WBC
PLATELET # BLD AUTO: 259 K/UL (ref 150–350)
PMV BLD AUTO: 10.6 FL (ref 9.2–12.9)
POTASSIUM SERPL-SCNC: 3.7 MMOL/L (ref 3.5–5.1)
PROT SERPL-MCNC: 7.1 G/DL (ref 6–8.4)
RBC # BLD AUTO: 4.2 M/UL (ref 4–5.4)
SODIUM SERPL-SCNC: 140 MMOL/L (ref 136–145)
TRIGL SERPL-MCNC: 170 MG/DL (ref 30–150)
WBC # BLD AUTO: 8.02 K/UL (ref 3.9–12.7)

## 2020-06-09 PROCEDURE — 3074F SYST BP LT 130 MM HG: CPT | Mod: CPTII,S$GLB,, | Performed by: PHYSICIAN ASSISTANT

## 2020-06-09 PROCEDURE — 80061 LIPID PANEL: CPT

## 2020-06-09 PROCEDURE — 99214 PR OFFICE/OUTPT VISIT, EST, LEVL IV, 30-39 MIN: ICD-10-PCS | Mod: S$GLB,,, | Performed by: PHYSICIAN ASSISTANT

## 2020-06-09 PROCEDURE — 36415 COLL VENOUS BLD VENIPUNCTURE: CPT

## 2020-06-09 PROCEDURE — 93970 EXTREMITY STUDY: CPT | Mod: 26,,, | Performed by: RADIOLOGY

## 2020-06-09 PROCEDURE — 3074F PR MOST RECENT SYSTOLIC BLOOD PRESSURE < 130 MM HG: ICD-10-PCS | Mod: CPTII,S$GLB,, | Performed by: PHYSICIAN ASSISTANT

## 2020-06-09 PROCEDURE — 3008F BODY MASS INDEX DOCD: CPT | Mod: CPTII,S$GLB,, | Performed by: PHYSICIAN ASSISTANT

## 2020-06-09 PROCEDURE — 82306 VITAMIN D 25 HYDROXY: CPT

## 2020-06-09 PROCEDURE — 99999 PR PBB SHADOW E&M-EST. PATIENT-LVL V: ICD-10-PCS | Mod: PBBFAC,,, | Performed by: PHYSICIAN ASSISTANT

## 2020-06-09 PROCEDURE — 99999 PR PBB SHADOW E&M-EST. PATIENT-LVL V: CPT | Mod: PBBFAC,,, | Performed by: PHYSICIAN ASSISTANT

## 2020-06-09 PROCEDURE — 3008F PR BODY MASS INDEX (BMI) DOCUMENTED: ICD-10-PCS | Mod: CPTII,S$GLB,, | Performed by: PHYSICIAN ASSISTANT

## 2020-06-09 PROCEDURE — 80053 COMPREHEN METABOLIC PANEL: CPT

## 2020-06-09 PROCEDURE — 3078F DIAST BP <80 MM HG: CPT | Mod: CPTII,S$GLB,, | Performed by: PHYSICIAN ASSISTANT

## 2020-06-09 PROCEDURE — 93970 US LOWER EXTREMITY VEINS BILATERAL: ICD-10-PCS | Mod: 26,,, | Performed by: RADIOLOGY

## 2020-06-09 PROCEDURE — 85025 COMPLETE CBC W/AUTO DIFF WBC: CPT

## 2020-06-09 PROCEDURE — 93970 EXTREMITY STUDY: CPT | Mod: TC

## 2020-06-09 PROCEDURE — 99214 OFFICE O/P EST MOD 30 MIN: CPT | Mod: S$GLB,,, | Performed by: PHYSICIAN ASSISTANT

## 2020-06-09 PROCEDURE — 3078F PR MOST RECENT DIASTOLIC BLOOD PRESSURE < 80 MM HG: ICD-10-PCS | Mod: CPTII,S$GLB,, | Performed by: PHYSICIAN ASSISTANT

## 2020-06-09 NOTE — PATIENT INSTRUCTIONS
Leg Swelling in a Single Leg  Swelling of the arms, feet, ankles, and legs is called edema. It is caused by extra fluid collecting in the tissues. Because of gravity, extra fluid in the body settles to the lowest part. That is why the legs and feet are most affected. You have swelling in a single leg.  Some of the causes for swelling in only a single leg include:  · Infection in the foot or leg  · Long-term problem with a vein not working well (venous insufficiency)  · Swollen, twisted vein in the leg (varicose veins)  · Insect bite or sting on the foot or leg  · Injury or recent surgery on the foot or leg  · Blood clot in a deep vein of the leg (deep vein thrombosis or DVT)  · Inflammation of the joints of the lower leg  Medical treatment will depend on what is causing your swelling.  Home care  Follow these guidelines when caring for yourself at home:  · Dont wear tight clothing.  · Keep your legs up while lying or sitting.  · Take any medicines as directed.  · If infection, injury, or recent surgery is the cause of your swelling, stay off your legs as much as possible until your symptoms get better.  · If you have venous insufficiency or varicose veins, dont sit or  one place for long periods of time. Take breaks and walk around every few hours. Talk with your healthcare provider about wearing support stockings to help lessen swelling during the day.  · Wear compression stockings with your doctor's approval  Follow-up care  Follow up with your healthcare provider as advised.  Call 911  Call 911 if any of these occur:  · Shortness of breath or trouble breathing  · Chest pain  · Coughing up blood  · Fainting or loss of consciousness   When to seek medical advice  Call your healthcare provider right away if any of these occur:  · Increased pain, swelling, warmth, or redness of the leg, ankle, or foot  · Fever of 100.4°F (38ºC) or higher, or as directed by your healthcare provider  · Weakness or  dizziness  · Shaking chills  · Drenching sweats  Date Last Reviewed: 4/11/2016  © 3799-9191 [employer], [employer]. [address] [place] [address], [place] [address]. All rights reserved. This information is not intended as a substitute for professional medical care. Always follow your healthcare professional's instructions.

## 2020-06-09 NOTE — PROGRESS NOTES
Subjective:       Patient ID: Juanita Salas is a 44 y.o. female.        Chief Complaint: Leg Pain    Juanita Salas is an established patient of Ema Gaston MD here today for urgent care visit.    Lipids - started pravastatin a few days ago and then stopped it, leg pain preceded starting the pravastatin  Lab Results       Component                Value               Date                       CHOL                        235 (H)             03/03/2020                 TRIG                          184 (H)             03/03/2020                 HDL                              64                  03/03/2020                 LDLCALC                  139 (H)             03/03/2020              Vitamin d def - taking vitamin d 1000 IU daily, wants vitamin d level checked    Right calf pain - started 5 days ago, sometimes burns and sometimes cramping, no injury, no rash or redness, no swelling, comes/goes, sometimes feels it in her foot and sometimes in her thigh but mainly in the calf, not worse with walking/moving, just seems random, no chest pain or shortness of breath, no fever, no N/V/D/C         Review of Systems   Constitutional: Negative for chills, diaphoresis, fatigue and fever.   HENT: Negative for congestion and sore throat.    Eyes: Negative for visual disturbance.   Respiratory: Negative for cough, chest tightness and shortness of breath.    Cardiovascular: Negative for chest pain, palpitations and leg swelling.   Gastrointestinal: Negative for abdominal pain, blood in stool, constipation, diarrhea, nausea and vomiting.   Genitourinary: Negative for dysuria, frequency, hematuria and urgency.   Musculoskeletal: Positive for myalgias (right calf pain). Negative for arthralgias and back pain.   Skin: Negative for rash.   Neurological: Negative for dizziness, syncope, weakness and headaches.   Psychiatric/Behavioral: Negative for dysphoric mood and sleep disturbance. The patient is not nervous/anxious.       "  Objective:      Physical Exam   Constitutional: She appears well-developed and well-nourished.   HENT:   Head: Normocephalic.   Right Ear: External ear normal.   Left Ear: External ear normal.   Mouth/Throat: Oropharynx is clear and moist.   Eyes: Pupils are equal, round, and reactive to light.   Cardiovascular: Normal rate, regular rhythm and normal heart sounds. Exam reveals no gallop and no friction rub.   No murmur heard.  Pulmonary/Chest: Effort normal and breath sounds normal. No respiratory distress.   Abdominal: Soft. Normal appearance. There is no tenderness. There is no CVA tenderness.   Musculoskeletal: She exhibits no edema.        Right lower leg: She exhibits no tenderness and no swelling.   RLE - neurovascularly intact   Neurological: She is alert.   Skin: Skin is warm and dry.   Psychiatric: She has a normal mood and affect.   Nursing note and vitals reviewed.      Assessment:       1. Right calf pain    2. Vitamin D deficiency    3. Mixed hyperlipidemia        Plan:       Juanita was seen today for leg pain.    Diagnoses and all orders for this visit:    Right calf pain  -     US Lower Extremity Veins Bilateral; Future  -     CBC auto differential; Future  -     Comprehensive metabolic panel; Future    Vitamin D deficiency  -     Vitamin D; Future    Mixed hyperlipidemia - she is currently holding pravastatin    She stopped her birth control a few days ago and has upcoming appointment with her GYN to discuss birth control options.    Pt has been given instructions populated from [employer] database and has verbalized understanding of the after visit summary and information contained wherein.    Follow up with a primary care provider. May go to ER for acute shortness of breath, lightheadedness, fever, or any other emergent complaints or changes in condition.    "This note will be shared with the patient"    Future Appointments   Date Time Provider Department Center   6/9/2020  5:00 PM [facility]

## 2020-07-08 ENCOUNTER — PATIENT OUTREACH (OUTPATIENT)
Dept: ADMINISTRATIVE | Facility: OTHER | Age: 45
End: 2020-07-08

## 2020-07-09 ENCOUNTER — TELEPHONE (OUTPATIENT)
Dept: HEMATOLOGY/ONCOLOGY | Facility: CLINIC | Age: 45
End: 2020-07-09

## 2020-07-09 ENCOUNTER — OFFICE VISIT (OUTPATIENT)
Dept: SURGERY | Facility: CLINIC | Age: 45
End: 2020-07-09
Payer: COMMERCIAL

## 2020-07-09 VITALS
HEART RATE: 90 BPM | DIASTOLIC BLOOD PRESSURE: 85 MMHG | HEIGHT: 63 IN | SYSTOLIC BLOOD PRESSURE: 127 MMHG | WEIGHT: 149.69 LBS | BODY MASS INDEX: 26.52 KG/M2

## 2020-07-09 DIAGNOSIS — Z80.41 FAMILY HISTORY OF OVARIAN CANCER: ICD-10-CM

## 2020-07-09 DIAGNOSIS — K64.8 INTERNAL BLEEDING HEMORRHOIDS: Primary | ICD-10-CM

## 2020-07-09 DIAGNOSIS — K64.8 INTERNAL HEMORRHOID: ICD-10-CM

## 2020-07-09 DIAGNOSIS — Z80.0 FAMILY HISTORY OF PANCREATIC CANCER: ICD-10-CM

## 2020-07-09 PROCEDURE — 3079F PR MOST RECENT DIASTOLIC BLOOD PRESSURE 80-89 MM HG: ICD-10-PCS | Mod: CPTII,S$GLB,, | Performed by: NURSE PRACTITIONER

## 2020-07-09 PROCEDURE — 99212 OFFICE O/P EST SF 10 MIN: CPT | Mod: 25,S$GLB,, | Performed by: NURSE PRACTITIONER

## 2020-07-09 PROCEDURE — 99999 PR PBB SHADOW E&M-EST. PATIENT-LVL IV: ICD-10-PCS | Mod: PBBFAC,,, | Performed by: NURSE PRACTITIONER

## 2020-07-09 PROCEDURE — 3079F DIAST BP 80-89 MM HG: CPT | Mod: CPTII,S$GLB,, | Performed by: NURSE PRACTITIONER

## 2020-07-09 PROCEDURE — 3008F BODY MASS INDEX DOCD: CPT | Mod: CPTII,S$GLB,, | Performed by: NURSE PRACTITIONER

## 2020-07-09 PROCEDURE — 46600 PR DIAG2STIC A2SCOPY: ICD-10-PCS | Mod: S$GLB,,, | Performed by: NURSE PRACTITIONER

## 2020-07-09 PROCEDURE — 99212 PR OFFICE/OUTPT VISIT, EST, LEVL II, 10-19 MIN: ICD-10-PCS | Mod: 25,S$GLB,, | Performed by: NURSE PRACTITIONER

## 2020-07-09 PROCEDURE — 3008F PR BODY MASS INDEX (BMI) DOCUMENTED: ICD-10-PCS | Mod: CPTII,S$GLB,, | Performed by: NURSE PRACTITIONER

## 2020-07-09 PROCEDURE — 99999 PR PBB SHADOW E&M-EST. PATIENT-LVL IV: CPT | Mod: PBBFAC,,, | Performed by: NURSE PRACTITIONER

## 2020-07-09 PROCEDURE — 46600 DIAGNOSTIC ANOSCOPY SPX: CPT | Mod: S$GLB,,, | Performed by: NURSE PRACTITIONER

## 2020-07-09 PROCEDURE — 3074F PR MOST RECENT SYSTOLIC BLOOD PRESSURE < 130 MM HG: ICD-10-PCS | Mod: CPTII,S$GLB,, | Performed by: NURSE PRACTITIONER

## 2020-07-09 PROCEDURE — 3074F SYST BP LT 130 MM HG: CPT | Mod: CPTII,S$GLB,, | Performed by: NURSE PRACTITIONER

## 2020-07-09 RX ORDER — HYDROCORTISONE ACETATE 25 MG/1
25 SUPPOSITORY RECTAL NIGHTLY PRN
Qty: 10 SUPPOSITORY | Refills: 1 | Status: SHIPPED | OUTPATIENT
Start: 2020-07-09 | End: 2020-07-29

## 2020-07-09 RX ORDER — HYDROCORTISONE 25 MG/G
CREAM TOPICAL 2 TIMES DAILY
Qty: 28 G | Refills: 1 | Status: SHIPPED | OUTPATIENT
Start: 2020-07-09
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
## 2020-07-09 NOTE — PATIENT INSTRUCTIONS
[facility]  High Fiber Diet    25-30 grams of fiber per day is recommended  Fiber cereal = 5 grams (Raisin Bran, Shredded Wheat, Grape Nuts)  Konsyl 1 teaspoon = 6 grams  Metamucil 1 tablespoon = 3 grams  Citrucel 1 tablespoon = 2 grams  Fiber Choice = 3-4 per day  Any psyllium husk    1. Drink at least 4-5 glasses of liquids per day or the fiber can be constipating rather then stimulating to your gut.  2. Boil and bake potatoes in their skin. Eat the skin, too.  3. Include fresh fruits and raw vegetables in your daily diet. Raw fruits and vegetables have more useful fiber than those that have been peeled, cooked, pureed, or otherwise processed.  4. Eat a wide variety of fibrous foods in reasonable amounts. Increase fiber intake slowly especially if you have been on a low-fiber diet.  5. Eat more legumes-peas, beans, soybeans.  6. For snacks, try dried fruit, whole wheat and rye crackers.  7. Avoid instant-cook hot cereals. Use the longer cooking cereals.  8. Use bran whenever possible. Sprinkle it on top of cereal, mix it into mashed potatoes or hamburger meat, or use it in combination dishes such as meat loaf.   9. Substitute whole grain, whole wheat and bran products for white flour products.  10. Eat slowly and chew your food thoroughly.        Start daily fiber.  Take 1 tsp of fiber powder (psyllium or other sugar-free powder).  Mix in 8 oz of water.  Take x 3-5 days.  Then, increase fiber by 1 tsp every 3-5 days until stool is easy to pass.  Stop and continue at that dose.   Do not exceed 6 tsps/day. GOAL:  More well-formed stool (one continuous well-formed piece vs. Pellets) and minimize straining with initiation.        Foods High in Fiber    This diet furnishes adequate amounts of all the essential nutrients needed by the body and a very liberal fiber or roughage content. Roughage is indigestible fiber found in fruits, vegetables and whole grain cereals. It provides bulk to the large  intestine and, accompanied by an adequate fluid intake, is a stimulant to elimination. Regular eating and elimination habits are vital to good health.     Fruits:  Use all fruits and juices liberally; fresh, cooked, dried or canned. Eat fruit raw and with skins when possible. Have at least four servings of fruit daily including a citrus fruit and a stewed dried fruit. Hard seeds of fruits (berries, figs, Grapes, mangoes, tomatoes) etc. may be removed.    Vegetables: Use all vegetables liberally. Green leafy vegetables, such as cabbage, spinach, lettuce, broccoli, and other greens are particularly good.    Potato: As desired. Serve baked frequently and eat the skin. Other starchy foods such as rice, macaroni, etc., may be occasionally substituted. Chew popcorn well and do not eat hard kernels.    Meat, Fish, Poultry: One or two servings daily.    Eggs: One daily if you are not on a low cholesterol diet.    Milk: Include at least one-half pint daily. Whole milk or skimmed may be used.     Cereals: Use whole grain breads and cereals such as bran, bran flakes, grape nut flakes, puffed wheat, oatmeal, Wheaties, etc., as much as possible. Refined breaks and cereals are not restricted; however, they do not contain the bulk necessary for this diet.     Sugars, Sweets: Use very moderately. Depend on fruit as dessert.    Fats: Use butter or margarine as desired. Oil or dressing on salads as desired. Fried foods may be used in moderation. Nuts may be eaten if you chew them well and may be ground or finely chopped for cooking.   Sample Menu                                                                                 Breakfast                          Lunch  Orange juice, 4 ounces                                                Vegetable soup                    Stewed fruit                                                                 Fresh fruit plate with cottage cheese  Shredded wheat                                                            Whole wheat toast  Scrambled eggs                                                           Butter  Whole wheat toast                                                       Coffee or tea  Dinner                                                                         Bedtime  Large glass tomato juice                                             1 glass milk  Roast beef                                                                   stewed fruit  Baked potato with skin  Buttered spinach  Raw vegetable salad  Baked apple with skin   Coffee or tea

## 2020-07-16 ENCOUNTER — OFFICE VISIT (OUTPATIENT)
Dept: HEMATOLOGY/ONCOLOGY | Facility: CLINIC | Age: 45
End: 2020-07-16
Payer: COMMERCIAL

## 2020-07-16 DIAGNOSIS — Z80.0 FAMILY HISTORY OF PANCREATIC CANCER: ICD-10-CM

## 2020-07-16 DIAGNOSIS — Z00.00 GENERAL MEDICAL EXAM: ICD-10-CM

## 2020-07-16 DIAGNOSIS — Z71.83 ENCOUNTER FOR NONPROCREATIVE GENETIC COUNSELING: Primary | ICD-10-CM

## 2020-07-16 PROCEDURE — 99215 OFFICE O/P EST HI 40 MIN: CPT | Mod: 95,,, | Performed by: NURSE PRACTITIONER

## 2020-07-16 PROCEDURE — 99215 PR OFFICE/OUTPT VISIT, EST, LEVL V, 40-54 MIN: ICD-10-PCS | Mod: 95,,, | Performed by: NURSE PRACTITIONER

## 2020-07-16 NOTE — PROGRESS NOTES
Hereditary and High-Risk Clinic  Department of Hematology and Oncology  [facility]  [facility]  [address] [place]  [address]    07/17/2020  Provider:  Vinicius Floyd DNP    Patient ID: Juanita Salas is a 45 y.o. female.    Chief Complaint: Genetic Evaluation      Referring Provider:  Teresa Mullins NP  [address] [place] [address]    The patient location is: [place]  The chief complaint leading to consultation is: germline genetic oncology evaluation  Visit type: Virtual visit with synchronous audio and video  Total time spent with patient: 53 minutes  Each patient to whom he or she provides medical services by telemedicine is:  (1) informed of the relationship between the physician and patient and the respective role of any other health care provider with respect to management of the patient; and (2) notified that he or she may decline to receive medical services by telemedicine and may withdraw from such care at any time.    Notes: as below    SUBJECTIVE:      History of Present Illness (HPI):  New patient presents today for an evaluation as it pertains to hereditary cancer syndromes.    Pedigree      Review of Systems - See HPI.  Patient's Distress Score today was 8/10 (with 10 being the worst).  Patient attributes this to COVID-19 pandemic.  Patient denies experiencing suicidal or homicidal ideations (SI/HI).  Instructed patient to contact 911 immediately with any SI/HI.  Offered patient a referral to Social Work and Psychology, and patient declined both.      OBJECTIVE:     Physical Exam   Significantly limited secondary to the inherent nature of a virtual visit.  Constitutional: She appears to be in no distress.   Neurological: She is alert.   Psychiatric: She has a normal mood and affect. Her speech is normal and behavior is normal. Her thought content is normal.     COUNSELING:     Based on the information provided to me by the  "patient, Juanita Salas, she meets criteria for genetic testing for Pancreatic Cancer Susceptibility Genes based on current National Comprehensive Cancer Network (NCCN) Guidelines, and she also meets clinical recommendations for genetic testing of her VHL gene based on her reported family history of a VHL gene mutation.     Germline genetic oncology panel testing consists of testing multiple genes known to be related to hereditary cancer syndromes.  These genes are known as "tumor suppressor genes."  A key role of tumor suppressor genes is to prevent cancer.  When a tumor suppressor gene has a clinically significant mutation, it affects the functioning of the gene, and the carrier may be more likely to develop cancers in certain organs.      Only some cancers are hereditary.  When a gene mutation is not identified, it does not completely rule out the possibility of hereditary cancers but does make them less likely.  Additionally, it is possible to see familial clustering of related cancer amongst family members, and these are sometimes caused by environmental factors and/or lifestyle factors that may be shared amongst family members.       Results of genetic testing include positive, negative, and variant of uncertain significance (VUS) (i.e. unclear) results.  If positive, the patient's specific cancer risks vary depending upon the tumor suppressor gene(s) in which there is/are a mutation(s).  In some cases, depending upon the result and the patient's clinical history, modified management may be recommended, including measures for risk reduction and/or surveillance, though modified management is not always an option.  Modified management may also be recommended, even with a negative result, based upon risk assessment that incorporates the family history.  A VUS indicates that the amino acids within a gene are lining up in a way different from usual, but there is not presently enough data for the laboratory to make a " determination as to whether the variant is benign or pathogenic; VUSs are not typically acted upon clinically.       If Juanita tests positive for a mutation inherited in an autosomal-dominant manner, her first-degree relatives would each have a 50% chance of having the same mutation, and other blood-relatives would also be at (a lesser) risk of having the same mutation.       The Genetic Information Nondiscrimination Act (HAZEL) prohibits most health insurance agencies and employers with 16 or more employees from discriminating against an individual based on the results of genetic testing; however, HAZEL does not protect individuals from discrimination by other types of policies/entities, including but not limited to life insurance, disability, long-term care insurance,  benefits or insurance, and Citizen of [place] benefits).     An outside laboratory would perform the testing after a blood sample is collected at an [facility] laboratory or a saliva sample is collected by the patient at home.  With genetic testing, there is a potential for the patient to incur out-of-pocket costs.  Results can take several weeks.       Offered Juanita germline genetic oncology testing today, versus deferring testing at this time or declining testing altogether.  Juanita desires to proceed with germline genetic oncology testing today and has provided informed consent to proceed with the test(s) indicated in the plan as below, after a discussion regarding various genetic testing panels that could be performed as well as associated risks.     Post-test genetic counseling will be conducted once the genetic testing results are available.    The patient's primary care provider (PCP) is Dr. Seth and gynecologist (GYN) is Dr. SAMY Maloney with [facility].  I recommend she undergo total-body skin exams at least annually with a Dermatology provider, and the patient would like a referral for this, which I have placed.     Questions were  "encouraged and answered to the patient's satisfaction, and she verbalized understanding of information and agreement with the plan.       ASSESSMENT/PLAN:       Juanita was seen today for genetic evaluation.    Diagnoses and all orders for this visit:    Encounter for nonprocreative genetic counseling  A saliva sample for the [facility] Pancreatic Cancer "Detect" test, the [facility] Colorectal Cancer Panel, and the VHL gene is to be collected by the patient at her home, with results expected in approximately 3 weeks, at which point post-test genetic counseling is to be conducted either in person or via audiovisual virtual visit.  I have sent the patient's informed-consent form to her and will have a saliva sample collection kit mailed to the patient's verified mailing address.    Family history of pancreatic cancer  -     Ambulatory referral/consult to Genetics:  Completed  - As above    General medical exam  -     Ambulatory referral/consult to Dermatology; Future           Follow up in about 3 weeks (around 8/6/2020) for post-test genetics visit.      During this encounter, I spent approximately 53 minutes face-to-face with this patient, more than half of which was spent counseling the patient and/or coordinating her care.    Vinicius Gruber, DNP, APRN, FNP-BC, AOCNP  Nurse Practitioner, Hereditary and High-Risk Clinic  Department of Hematology and Oncology  [facility], 3rd floor  [facility] [address], [place]  [address]  office phone: [phone]    office fax: [phone]     07/17/2020  "

## 2020-07-16 NOTE — LETTER
July 17, 2020      Teresa Mullins, NP  [address]  [facility] [address]           [facility] - Hereditary and High Risk  [address]  [facility] [address]  Phone: [phone]  Fax: [phone]          Patient: Juanita Salas   MR Number: [id number]   YOB: 1975   Date of Visit: 7/16/2020       Dear Teresa Mullins:    Thank you for referring Juanita Salas to me for evaluation. Attached you will find relevant portions of my assessment and plan of care.    If you have questions, please do not hesitate to call me. I look forward to following Juanita Salas along with you.    Sincerely,    Vinicius Floyd, DNP    Enclosure  CC:  No Recipients    If you would like to receive this communication electronically, please contact [email or web address] or [phone] to request more information on [employer] Link access.    For providers and/or their staff who would like to refer a patient to [facility], please contact us through our one-stop-shop provider referral line, [facility] , at [phone].    If you feel you have received this communication in error or would no longer like to receive these types of communications, please e-mail [email or web address]

## 2020-08-05 ENCOUNTER — TELEPHONE (OUTPATIENT)
Dept: HEMATOLOGY/ONCOLOGY | Facility: CLINIC | Age: 45
End: 2020-08-05

## 2020-08-05 NOTE — TELEPHONE ENCOUNTER
Phoned pt to inquire about genetic testing informed consent. Pt stated she will complete it ASAP. Will have test kit sent to her home signed informed consent is received. Patient verbalized understanding of information and agreement with the plan.

## 2020-08-12 ENCOUNTER — OFFICE VISIT (OUTPATIENT)
Dept: INTERNAL MEDICINE | Facility: CLINIC | Age: 45
End: 2020-08-12
Payer: COMMERCIAL

## 2020-08-12 DIAGNOSIS — E53.8 B12 DEFICIENCY: ICD-10-CM

## 2020-08-12 DIAGNOSIS — F41.1 GAD (GENERALIZED ANXIETY DISORDER): ICD-10-CM

## 2020-08-12 DIAGNOSIS — Z79.3 BLOOD PRESSURE CHECK ON ORAL CONTRACEPTIVES: ICD-10-CM

## 2020-08-12 DIAGNOSIS — E78.2 MIXED HYPERLIPIDEMIA: ICD-10-CM

## 2020-08-12 DIAGNOSIS — Z01.30 BLOOD PRESSURE CHECK ON ORAL CONTRACEPTIVES: ICD-10-CM

## 2020-08-12 DIAGNOSIS — E55.9 VITAMIN D DEFICIENCY: ICD-10-CM

## 2020-08-12 DIAGNOSIS — E87.6 HYPOKALEMIA: ICD-10-CM

## 2020-08-12 DIAGNOSIS — Z20.822 EXPOSURE TO COVID-19 VIRUS: ICD-10-CM

## 2020-08-12 DIAGNOSIS — R53.81 MALAISE: Primary | ICD-10-CM

## 2020-08-12 PROCEDURE — 99213 PR OFFICE/OUTPT VISIT, EST, LEVL III, 20-29 MIN: ICD-10-PCS | Mod: 95,,, | Performed by: NURSE PRACTITIONER

## 2020-08-12 PROCEDURE — 99213 OFFICE O/P EST LOW 20 MIN: CPT | Mod: 95,,, | Performed by: NURSE PRACTITIONER

## 2020-08-12 RX ORDER — CLONAZEPAM 0.5 MG/1
0.5 TABLET ORAL DAILY PRN
Qty: 30 TABLET | Refills: 0 | Status: SHIPPED | OUTPATIENT
Start: 2020-08-12 | End: 2021-03-29

## 2020-08-12 NOTE — PROGRESS NOTES
INTERNAL MEDICINE URGENT CARE NOTE    CHIEF COMPLAINT     Chief Complaint   Patient presents with    Fatigue       HPI     Juanita Salas is a 45 y.o. female with LUPE, panic disorder, migraine, sinus tach, HLD, HTN, vit d def, and B12 deficiency who presents for an urgent visit today.    The patient location is: [place], [place]   The chief complaint leading to consultation is: fatigue     Visit type: audiovisual    Face to Face time with patient: 15 minutes of total time spent on the encounter, which includes face to face time and non-face to face time preparing to see the patient (eg, review of tests), Obtaining and/or reviewing separately obtained history, Documenting clinical information in the electronic or other health record, Independently interpreting results (not separately reported) and communicating results to the patient/family/caregiver, or Care coordination (not separately reported).         Each patient to whom he or she provides medical services by telemedicine is:  (1) informed of the relationship between the physician and patient and the respective role of any other health care provider with respect to management of the patient; and (2) notified that he or she may decline to receive medical services by telemedicine and may withdraw from such care at any time.    Notes:     Stopped OCP 2/2 blood pressure concerns. BP has been normal since then. Stopped potassium supplement as well.     LUPE- reports feeling better since stopping OCP. But still using klonopin sparingly. Same 30 from 2018 uses 1/2 sparingly     States she had bad sinus infection in January. Thinks it may have been covid     Denies sore throat, fever, chills, GBA.       Past Medical History:  Past Medical History:   Diagnosis Date    Adjustment disorder     Anxiety     Depression     Fatigue     Headache     Hx of psychiatric care     Hypertension     Low vitamin D level     Panic disorder     Psychiatric problem        Home  Medications:  Prior to Admission medications    Medication Sig Start Date End Date Taking? Authorizing Provider   cholecalciferol, vitamin D3, (VITAMIN D3) 1,000 unit capsule Take 1,000 Units by mouth 2 (two) times daily.    Historical Provider, MD   cyanocobalamin (VITAMIN B-12) 500 MCG tablet Take 1 tablet (500 mcg total) by mouth once daily. 3/6/18   Ema Gaston MD   drospirenone-ethinyl estradiol (RAMOS) 3-0.03 mg per tablet Take 1 tablet by mouth once daily.    Historical Provider, MD   hydrocortisone (PROCTO-MED HC) 2.5 % rectal cream Place rectally 2 (two) times daily. 7/9/20   Teresa Mullins NP   potassium chloride (KLOR-CON) 10 MEQ TbSR Take 1 tablet (10 mEq total) by mouth once daily.  Patient not taking: Reported on 7/9/2020 6/12/19   Kishore Quiros MD   pravastatin (PRAVACHOL) 20 MG tablet Take 0.5 tablets (10 mg total) by mouth once daily.  Patient not taking: Reported on 7/9/2020 6/2/20 6/2/21  Bev Sterling NP   sumatriptan (IMITREX) 25 MG Tab Take 1 tablet (25 mg total) by mouth daily as needed.  Patient not taking: Reported on 6/9/2020 9/17/18 12/23/19  Ema Gaston MD       Review of Systems:  Review of Systems   Constitutional: Negative for activity change and unexpected weight change.   HENT: Negative for hearing loss, rhinorrhea and trouble swallowing.    Eyes: Negative for discharge and visual disturbance.   Respiratory: Negative for chest tightness and wheezing.    Cardiovascular: Positive for palpitations. Negative for chest pain.   Gastrointestinal: Negative for blood in stool, constipation, diarrhea and vomiting.   Endocrine: Negative for polydipsia and polyuria.   Genitourinary: Negative for difficulty urinating, dysuria, hematuria and menstrual problem.   Musculoskeletal: Negative for arthralgias, joint swelling and neck pain.   Neurological: Negative for weakness and headaches.   Psychiatric/Behavioral: Negative for confusion and dysphoric mood.       Health Maintainence:    Immunizations:  Health Maintenance       Date Due Completion Date    TETANUS VACCINE 09/26/2015 9/26/2005    Cervical Cancer Screening 05/01/2020 5/1/2017 (Done)    Override on 5/1/2017: Done (Dr Maloney at [facility] per MD note)    Influenza Vaccine (1) 09/01/2020 ---    Mammogram 05/30/2021 5/30/2019 (Done)    Override on 5/30/2019: Done (Dr. Maloney/ [facility])    Override on 5/8/2017: Done (via gyn, benign per pt)    Lipid Panel 06/09/2025 6/9/2020           PHYSICAL EXAM     There were no vitals taken for this visit.    Physical Exam  Constitutional:       Appearance: Normal appearance.   Pulmonary:      Effort: Pulmonary effort is normal.   Neurological:      Mental Status: She is alert.   Psychiatric:         Mood and Affect: Mood is anxious.         LABS     Lab Results   Component Value Date    HGBA1C 5.2 07/03/2019     CMP  Sodium   Date Value Ref Range Status   06/09/2020 140 136 - 145 mmol/L Final     Potassium   Date Value Ref Range Status   06/09/2020 3.7 3.5 - 5.1 mmol/L Final     Chloride   Date Value Ref Range Status   06/09/2020 106 95 - 110 mmol/L Final     CO2   Date Value Ref Range Status   06/09/2020 26 23 - 29 mmol/L Final     Glucose   Date Value Ref Range Status   06/09/2020 101 70 - 110 mg/dL Final     BUN, Bld   Date Value Ref Range Status   06/09/2020 12 6 - 20 mg/dL Final     Creatinine   Date Value Ref Range Status   06/09/2020 0.7 0.5 - 1.4 mg/dL Final     Calcium   Date Value Ref Range Status   06/09/2020 9.2 8.7 - 10.5 mg/dL Final     Total Protein   Date Value Ref Range Status   06/09/2020 7.1 6.0 - 8.4 g/dL Final     Albumin   Date Value Ref Range Status   06/09/2020 3.9 3.5 - 5.2 g/dL Final     Total Bilirubin   Date Value Ref Range Status   06/09/2020 0.3 0.1 - 1.0 mg/dL Final     Comment:     For infants and newborns, interpretation of results should be based  on gestational age, weight and in agreement with clinical  observations.  Premature Infant recommended reference  ranges:  Up to 24 hours.............<8.0 mg/dL  Up to 48 hours............<12.0 mg/dL  3-5 days..................<15.0 mg/dL  6-29 days.................<15.0 mg/dL       Alkaline Phosphatase   Date Value Ref Range Status   06/09/2020 44 (L) 55 - 135 U/L Final     AST   Date Value Ref Range Status   06/09/2020 13 10 - 40 U/L Final     ALT   Date Value Ref Range Status   06/09/2020 14 10 - 44 U/L Final     Anion Gap   Date Value Ref Range Status   06/09/2020 8 8 - 16 mmol/L Final     eGFR if    Date Value Ref Range Status   06/09/2020 >60.0 >60 mL/min/1.73 m^2 Final     eGFR if non    Date Value Ref Range Status   06/09/2020 >60.0 >60 mL/min/1.73 m^2 Final     Comment:     Calculation used to obtain the estimated glomerular filtration  rate (eGFR) is the CKD-EPI equation.        Lab Results   Component Value Date    WBC 8.02 06/09/2020    HGB 13.0 06/09/2020    HCT 40.3 06/09/2020    MCV 96 06/09/2020     06/09/2020     Lab Results   Component Value Date    CHOL 205 (H) 06/09/2020    CHOL 235 (H) 03/03/2020    CHOL 191 01/31/2020     Lab Results   Component Value Date    HDL 58 06/09/2020    HDL 64 03/03/2020    HDL 66 01/31/2020     Lab Results   Component Value Date    LDLCALC 113.0 06/09/2020    LDLCALC 139 (H) 03/03/2020    LDLCALC 98.2 01/31/2020     Lab Results   Component Value Date    TRIG 170 (H) 06/09/2020    TRIG 184 (H) 03/03/2020    TRIG 134 01/31/2020     Lab Results   Component Value Date    CHOLHDL 28.3 06/09/2020    CHOLHDL 3.7 03/03/2020    CHOLHDL 34.6 01/31/2020     Lab Results   Component Value Date    TSH 1.34 03/03/2020       ASSESSMENT/PLAN     Juanita Salas is a 45 y.o. female     Malaise- will repeat K+ and CBC. Encouraged exercise and healthy diet.   -     CBC auto differential; Future; Expected date: 08/12/2020  -     Comprehensive metabolic panel; Future; Expected date: 08/12/2020  -     TSH; Future; Expected date: 08/12/2020    LUPE (generalized  anxiety disorder)- will refill klonopin x 1 - advised that this should last 1 year. Use very sparingly. Pt states understanding.   -     TSH; Future; Expected date: 08/12/2020    Mixed hyperlipidemia- will repeat HLD off OCP   -     Lipid Panel; Future; Expected date: 08/12/2020    Blood pressure check on oral contraceptives- At goal per pt. Will cont healthy diet and exercise.   -     CBC auto differential; Future; Expected date: 08/12/2020  -     Comprehensive metabolic panel; Future; Expected date: 08/12/2020  -     TSH; Future; Expected date: 08/12/2020    Vitamin D deficiency  -     Vitamin D; Future; Expected date: 08/12/2020    Hypokalemia  -     CBC auto differential; Future; Expected date: 08/12/2020  -     Comprehensive metabolic panel; Future; Expected date: 08/12/2020  -     TSH; Future; Expected date: 08/12/2020    B12 deficiency  -     Vitamin B12 Deficiency Panel; Future; Expected date: 08/12/2020    Exposure to Covid-19 Virus  -     COVID-19 (SARS CoV-2) IgG Antibody; Future; Expected date: 08/12/2020          Follow up with PCP     Patient education provided from [facility]. Patient was counseled on when and how to seek emergent care.       Erika CARL, APRN, FNP-c   Department of Internal Medicine - [facility] [address]  11:51 AM

## 2020-08-13 ENCOUNTER — LAB VISIT (OUTPATIENT)
Dept: LAB | Facility: HOSPITAL | Age: 45
End: 2020-08-13
Payer: COMMERCIAL

## 2020-08-13 DIAGNOSIS — E87.6 HYPOKALEMIA: ICD-10-CM

## 2020-08-13 DIAGNOSIS — R53.81 MALAISE: ICD-10-CM

## 2020-08-13 DIAGNOSIS — E78.2 MIXED HYPERLIPIDEMIA: ICD-10-CM

## 2020-08-13 DIAGNOSIS — Z79.3 BLOOD PRESSURE CHECK ON ORAL CONTRACEPTIVES: ICD-10-CM

## 2020-08-13 DIAGNOSIS — E53.8 B12 DEFICIENCY: ICD-10-CM

## 2020-08-13 DIAGNOSIS — E55.9 VITAMIN D DEFICIENCY: ICD-10-CM

## 2020-08-13 DIAGNOSIS — F41.1 GAD (GENERALIZED ANXIETY DISORDER): ICD-10-CM

## 2020-08-13 DIAGNOSIS — Z20.822 EXPOSURE TO COVID-19 VIRUS: ICD-10-CM

## 2020-08-13 DIAGNOSIS — Z01.30 BLOOD PRESSURE CHECK ON ORAL CONTRACEPTIVES: ICD-10-CM

## 2020-08-13 LAB
25(OH)D3+25(OH)D2 SERPL-MCNC: 29 NG/ML (ref 30–96)
ALBUMIN SERPL BCP-MCNC: 3.8 G/DL (ref 3.5–5.2)
ALP SERPL-CCNC: 49 U/L (ref 55–135)
ALT SERPL W/O P-5'-P-CCNC: 11 U/L (ref 10–44)
ANION GAP SERPL CALC-SCNC: 6 MMOL/L (ref 8–16)
AST SERPL-CCNC: 14 U/L (ref 10–40)
BASOPHILS # BLD AUTO: 0.05 K/UL (ref 0–0.2)
BASOPHILS NFR BLD: 0.7 % (ref 0–1.9)
BILIRUB SERPL-MCNC: 0.3 MG/DL (ref 0.1–1)
BUN SERPL-MCNC: 11 MG/DL (ref 6–20)
CALCIUM SERPL-MCNC: 8.7 MG/DL (ref 8.7–10.5)
CHLORIDE SERPL-SCNC: 108 MMOL/L (ref 95–110)
CHOLEST SERPL-MCNC: 208 MG/DL (ref 120–199)
CHOLEST/HDLC SERPL: 4.2 {RATIO} (ref 2–5)
CO2 SERPL-SCNC: 28 MMOL/L (ref 23–29)
CREAT SERPL-MCNC: 0.7 MG/DL (ref 0.5–1.4)
DIFFERENTIAL METHOD: ABNORMAL
EOSINOPHIL # BLD AUTO: 0.2 K/UL (ref 0–0.5)
EOSINOPHIL NFR BLD: 3.1 % (ref 0–8)
ERYTHROCYTE [DISTWIDTH] IN BLOOD BY AUTOMATED COUNT: 12 % (ref 11.5–14.5)
EST. GFR  (AFRICAN AMERICAN): >60 ML/MIN/1.73 M^2
EST. GFR  (NON AFRICAN AMERICAN): >60 ML/MIN/1.73 M^2
GLUCOSE SERPL-MCNC: 97 MG/DL (ref 70–110)
HCT VFR BLD AUTO: 39.6 % (ref 37–48.5)
HDLC SERPL-MCNC: 49 MG/DL (ref 40–75)
HDLC SERPL: 23.6 % (ref 20–50)
HGB BLD-MCNC: 12.3 G/DL (ref 12–16)
IMM GRANULOCYTES # BLD AUTO: 0.01 K/UL (ref 0–0.04)
IMM GRANULOCYTES NFR BLD AUTO: 0.1 % (ref 0–0.5)
LDLC SERPL CALC-MCNC: 129 MG/DL (ref 63–159)
LYMPHOCYTES # BLD AUTO: 2.2 K/UL (ref 1–4.8)
LYMPHOCYTES NFR BLD: 31.9 % (ref 18–48)
MCH RBC QN AUTO: 29.6 PG (ref 27–31)
MCHC RBC AUTO-ENTMCNC: 31.1 G/DL (ref 32–36)
MCV RBC AUTO: 95 FL (ref 82–98)
MONOCYTES # BLD AUTO: 0.4 K/UL (ref 0.3–1)
MONOCYTES NFR BLD: 6.4 % (ref 4–15)
NEUTROPHILS # BLD AUTO: 4 K/UL (ref 1.8–7.7)
NEUTROPHILS NFR BLD: 57.8 % (ref 38–73)
NONHDLC SERPL-MCNC: 159 MG/DL
NRBC BLD-RTO: 0 /100 WBC
PLATELET # BLD AUTO: 276 K/UL (ref 150–350)
PMV BLD AUTO: 10.8 FL (ref 9.2–12.9)
POTASSIUM SERPL-SCNC: 3.7 MMOL/L (ref 3.5–5.1)
PROT SERPL-MCNC: 6.8 G/DL (ref 6–8.4)
RBC # BLD AUTO: 4.15 M/UL (ref 4–5.4)
SARS-COV-2 IGG SERPLBLD QL IA.RAPID: NEGATIVE
SODIUM SERPL-SCNC: 142 MMOL/L (ref 136–145)
TRIGL SERPL-MCNC: 150 MG/DL (ref 30–150)
TSH SERPL DL<=0.005 MIU/L-ACNC: 2.28 UIU/ML (ref 0.4–4)
WBC # BLD AUTO: 6.87 K/UL (ref 3.9–12.7)

## 2020-08-13 PROCEDURE — 86769 SARS-COV-2 COVID-19 ANTIBODY: CPT

## 2020-08-13 PROCEDURE — 36415 COLL VENOUS BLD VENIPUNCTURE: CPT

## 2020-08-13 PROCEDURE — 80061 LIPID PANEL: CPT

## 2020-08-13 PROCEDURE — 84443 ASSAY THYROID STIM HORMONE: CPT

## 2020-08-13 PROCEDURE — 82306 VITAMIN D 25 HYDROXY: CPT

## 2020-08-13 PROCEDURE — 85025 COMPLETE CBC W/AUTO DIFF WBC: CPT

## 2020-08-13 PROCEDURE — 80053 COMPREHEN METABOLIC PANEL: CPT

## 2020-08-13 PROCEDURE — 83921 ORGANIC ACID SINGLE QUANT: CPT

## 2020-08-13 PROCEDURE — 82607 VITAMIN B-12: CPT

## 2020-08-14 ENCOUNTER — PATIENT MESSAGE (OUTPATIENT)
Dept: INTERNAL MEDICINE | Facility: CLINIC | Age: 45
End: 2020-08-14

## 2020-08-14 LAB — VIT B12 SERPL-MCNC: 385 NG/L (ref 180–914)

## 2020-08-18 LAB — METHYLMALONATE SERPL-SCNC: 0.1 NMOL/ML

## 2020-09-02 ENCOUNTER — TELEPHONE (OUTPATIENT)
Dept: CARDIOLOGY | Facility: CLINIC | Age: 45
End: 2020-09-02

## 2020-09-02 DIAGNOSIS — R00.2 PALPITATIONS: ICD-10-CM

## 2020-09-02 DIAGNOSIS — R00.0 SINUS TACHYCARDIA: Primary | ICD-10-CM

## 2020-09-02 NOTE — TELEPHONE ENCOUNTER
----- Message from Amanda Tomlinson sent at 9/2/2020 10:58 AM CDT -----  Regarding: Question  Pt running high heart rate not as bad as in last visit, has few questions. Thanks     [phone]

## 2020-09-02 NOTE — TELEPHONE ENCOUNTER
lv 6/2/20 with ALEXANDRA Sterling NP. Reports ongoing tachycardia. Denies SOB or Dizziness. Would like to have event monitor as mentioned at last office visit. Reports thought was supposed to have echocardiogram as well at some point. Routed to Dr Olmstead for review.

## 2020-09-10 ENCOUNTER — TELEPHONE (OUTPATIENT)
Dept: ELECTROPHYSIOLOGY | Facility: CLINIC | Age: 45
End: 2020-09-10

## 2020-09-10 NOTE — TELEPHONE ENCOUNTER
Spoke with pt to r/s appt that she has scheduled with SK. I explained that it would be more beneficial for her to see us in clinic after she has completed wearing the event monitor that she is picking up on 9/16, so instead of seeing SK in clinic on 9/18, she should r/s appt in clinic to the middle of October.  Pt understood, but was unsure if she wanted to wait to see SK at the end of December, which is the soonest available, or Debra in the middle of October.  Pt will contact us to schedule appt once she has decided which provider she would prefer to see.  I explained that Delmy is able to answer all of her questions, but if she would prefer, she could schedule an appt with Debra on a day that Dr Green is in clinic in the event that he may need to see her.

## 2020-09-15 ENCOUNTER — PATIENT OUTREACH (OUTPATIENT)
Dept: ADMINISTRATIVE | Facility: OTHER | Age: 45
End: 2020-09-15

## 2020-09-16 ENCOUNTER — OFFICE VISIT (OUTPATIENT)
Dept: DERMATOLOGY | Facility: CLINIC | Age: 45
End: 2020-09-16
Payer: COMMERCIAL

## 2020-09-16 ENCOUNTER — CLINICAL SUPPORT (OUTPATIENT)
Dept: CARDIOLOGY | Facility: HOSPITAL | Age: 45
End: 2020-09-16
Attending: INTERNAL MEDICINE
Payer: COMMERCIAL

## 2020-09-16 ENCOUNTER — HOSPITAL ENCOUNTER (OUTPATIENT)
Dept: CARDIOLOGY | Facility: HOSPITAL | Age: 45
Discharge: HOME | End: 2020-09-16
Attending: INTERNAL MEDICINE
Payer: COMMERCIAL

## 2020-09-16 ENCOUNTER — PATIENT MESSAGE (OUTPATIENT)
Dept: CARDIOLOGY | Facility: CLINIC | Age: 45
End: 2020-09-16

## 2020-09-16 VITALS
HEIGHT: 63 IN | DIASTOLIC BLOOD PRESSURE: 60 MMHG | SYSTOLIC BLOOD PRESSURE: 110 MMHG | HEART RATE: 72 BPM | WEIGHT: 150 LBS | BODY MASS INDEX: 26.58 KG/M2

## 2020-09-16 DIAGNOSIS — R00.0 SINUS TACHYCARDIA: ICD-10-CM

## 2020-09-16 DIAGNOSIS — R00.2 PALPITATIONS: ICD-10-CM

## 2020-09-16 DIAGNOSIS — B07.9 VIRAL WARTS, UNSPECIFIED TYPE: Primary | ICD-10-CM

## 2020-09-16 DIAGNOSIS — Z12.83 SKIN EXAM, SCREENING FOR CANCER: ICD-10-CM

## 2020-09-16 DIAGNOSIS — D18.01 CHERRY ANGIOMA: ICD-10-CM

## 2020-09-16 DIAGNOSIS — L81.2 FRECKLE: ICD-10-CM

## 2020-09-16 DIAGNOSIS — L82.1 SEBORRHEIC KERATOSES: ICD-10-CM

## 2020-09-16 DIAGNOSIS — D22.9 BENIGN MOLE: ICD-10-CM

## 2020-09-16 LAB
ASCENDING AORTA: 2.6 CM
AV INDEX (PROSTH): 0.75
AV MEAN GRADIENT: 5 MMHG
AV PEAK GRADIENT: 9 MMHG
AV VALVE AREA: 2.87 CM2
AV VELOCITY RATIO: 0.72
BSA FOR ECHO PROCEDURE: 1.74 M2
CV ECHO LV RWT: 0.33 CM
DOP CALC AO PEAK VEL: 1.54 M/S
DOP CALC AO VTI: 29.84 CM
DOP CALC LVOT AREA: 3.8 CM2
DOP CALC LVOT DIAMETER: 2.21 CM
DOP CALC LVOT PEAK VEL: 1.11 M/S
DOP CALC LVOT STROKE VOLUME: 85.54 CM3
DOP CALCLVOT PEAK VEL VTI: 22.31 CM
E WAVE DECELERATION TIME: 160 MSEC
E/A RATIO: 0.84
ECHO LV POSTERIOR WALL: 0.73 CM (ref 0.6–1.1)
FRACTIONAL SHORTENING: 29 % (ref 28–44)
INTERVENTRICULAR SEPTUM: 0.67 CM (ref 0.6–1.1)
LA MAJOR: 4.38 CM
LA MINOR: 3.88 CM
LA WIDTH: 3.23 CM
LEFT ATRIUM SIZE: 3.45 CM
LEFT ATRIUM VOLUME INDEX: 22.8 ML/M2
LEFT ATRIUM VOLUME: 38.98 CM3
LEFT INTERNAL DIMENSION IN SYSTOLE: 3.11 CM (ref 2.1–4)
LEFT VENTRICLE DIASTOLIC VOLUME INDEX: 51.27 ML/M2
LEFT VENTRICLE DIASTOLIC VOLUME: 87.73 ML
LEFT VENTRICLE MASS INDEX: 54 G/M2
LEFT VENTRICLE SYSTOLIC VOLUME INDEX: 22.4 ML/M2
LEFT VENTRICLE SYSTOLIC VOLUME: 38.35 ML
LEFT VENTRICULAR INTERNAL DIMENSION IN DIASTOLE: 4.4 CM (ref 3.5–6)
LEFT VENTRICULAR MASS: 92.06 G
MV PEAK A VEL: 0.95 M/S
MV PEAK E VEL: 0.8 M/S
MV STENOSIS PRESSURE HALF TIME: 46.4 MS
MV VALVE AREA P 1/2 METHOD: 4.74 CM2
PISA TR MAX VEL: 2.27 M/S
PULM VEIN S/D RATIO: 1.71
PV PEAK D VEL: 0.35 M/S
PV PEAK S VEL: 0.6 M/S
RA MAJOR: 4.13 CM
RA PRESSURE: 3 MMHG
RA WIDTH: 3.11 CM
RIGHT VENTRICULAR END-DIASTOLIC DIMENSION: 3.14 CM
SINUS: 2.41 CM
STJ: 2.35 CM
TR MAX PG: 21 MMHG
TRICUSPID ANNULAR PLANE SYSTOLIC EXCURSION: 1.79 CM
TV REST PULMONARY ARTERY PRESSURE: 24 MMHG

## 2020-09-16 PROCEDURE — 99214 OFFICE O/P EST MOD 30 MIN: CPT | Mod: S$GLB,,, | Performed by: DERMATOLOGY

## 2020-09-16 PROCEDURE — 93306 TTE W/DOPPLER COMPLETE: CPT

## 2020-09-16 PROCEDURE — 93272 CARDIAC EVENT MONITOR (CUPID ONLY): ICD-10-PCS | Mod: ,,, | Performed by: INTERNAL MEDICINE

## 2020-09-16 PROCEDURE — 93272 ECG/REVIEW INTERPRET ONLY: CPT | Mod: ,,, | Performed by: INTERNAL MEDICINE

## 2020-09-16 PROCEDURE — 93306 ECHO (CUPID ONLY): ICD-10-PCS | Mod: 26,,, | Performed by: INTERNAL MEDICINE

## 2020-09-16 PROCEDURE — 99999 PR PBB SHADOW E&M-EST. PATIENT-LVL III: CPT | Mod: PBBFAC,,, | Performed by: DERMATOLOGY

## 2020-09-16 PROCEDURE — 99214 PR OFFICE/OUTPT VISIT, EST, LEVL IV, 30-39 MIN: ICD-10-PCS | Mod: S$GLB,,, | Performed by: DERMATOLOGY

## 2020-09-16 PROCEDURE — 93271 ECG/MONITORING AND ANALYSIS: CPT

## 2020-09-16 PROCEDURE — 93306 TTE W/DOPPLER COMPLETE: CPT | Mod: 26,,, | Performed by: INTERNAL MEDICINE

## 2020-09-16 PROCEDURE — 99999 PR PBB SHADOW E&M-EST. PATIENT-LVL III: ICD-10-PCS | Mod: PBBFAC,,, | Performed by: DERMATOLOGY

## 2020-09-16 NOTE — PROGRESS NOTES
Subjective:       Patient ID:  Juanita Salas is a 45 y.o. female who presents for   Chief Complaint   Patient presents with    Skin Check     TBSE     Bump on finger for months.    Bump on abd.    Red spot on nose.  Has been treated by outside derm but recurs.  Told cosmetic.      Review of Systems   Constitutional: Negative.    HENT: Negative.    Respiratory: Negative.    Musculoskeletal: Negative.    Skin: Negative for daily sunscreen use.   Hematologic/Lymphatic: Negative.         Objective:    Physical Exam   Constitutional: She appears well-developed and well-nourished. No distress.   HENT:   Mouth/Throat: Lips normal.    Eyes: No conjunctival no injection.   Cardiovascular: There is no local extremity swelling and no dependent edema.     Neurological: She is alert and oriented to person, place, and time. She is not disoriented.   Psychiatric: She has a normal mood and affect.   Skin:   Areas Examined (abnormalities noted in diagram):   Scalp / Hair Palpated and Inspected  Head / Face Inspection Performed  Neck Inspection Performed  Chest / Axilla Inspection Performed  Abdomen Inspection Performed  Genitals / Buttocks / Groin Inspection Performed  Back Inspection Performed  RUE Inspected  LUE Inspection Performed  RLE Inspected  LLE Inspection Performed  Nails and Digits Inspection Performed                       Diagram Legend     Erythematous scaling macule/papule c/w actinic keratosis       Vascular papule c/w angioma      Pigmented verrucoid papule/plaque c/w seborrheic keratosis      Yellow umbilicated papule c/w sebaceous hyperplasia      Irregularly shaped tan macule c/w lentigo     1-2 mm smooth white papules consistent with Milia      Movable subcutaneous cyst with punctum c/w epidermal inclusion cyst      Subcutaneous movable cyst c/w pilar cyst      Firm pink to brown papule c/w dermatofibroma      Pedunculated fleshy papule(s) c/w skin tag(s)      Evenly pigmented macule c/w junctional nevus      Mildly variegated pigmented, slightly irregular-bordered macule c/w mildly atypical nevus      Flesh colored to evenly pigmented papule c/w intradermal nevus       Pink pearly papule/plaque c/w basal cell carcinoma      Erythematous hyperkeratotic cursted plaque c/w SCC      Surgical scar with no sign of skin cancer recurrence      Open and closed comedones      Inflammatory papules and pustules      Verrucoid papule consistent consistent with wart     Erythematous eczematous patches and plaques     Dystrophic onycholytic nail with subungual debris c/w onychomycosis     Umbilicated papule    Erythematous-base heme-crusted tan verrucoid plaque consistent with inflamed seborrheic keratosis     Erythematous Silvery Scaling Plaque c/w Psoriasis     See annotation      Assessment / Plan:        Viral warts, unspecified type  Warts are caused by the human papillomavirus (HPV). There are over 150 types of HPV. This virus can spread between people. But you can be exposed to the virus and not get warts. Warts tend to form where skin is damaged or broken. But they can also appear elsewhere. Left untreated, warts can grow in number. They can also spread to other parts of the body.    Different treatment options were reviewed.  For small warts or recurrences, over the counter acid treatments can be used until irritation occurs or mild scabbing.    20-40% salicylic acid can be used with occlusion for significant warts daily with careful paring and debulking with breaks as needed for discomfort or sensitivity.  This can be done on a protracted basis for clearance of warts over time.      Offered cryotherapy today, but patient wishes to wait on this for now.  Patient to monitor areas for worsening and to return to clinic promptly if growth or worsening of lesions is noted.    Seborrheic keratoses  Discussed with patient the benign nature of these lesions and that no treatment is indicated.      Skin exam, screening for cancer  No  other seriously suspicious lesions noted for body areas examined today.  Patient to inform of us if they notice any dark or changing or suspicious spots in areas not examined today.  Follow up for routine monitoring recommended to patient.    Instructed patient to watch out for dark spots, bleeding spots, crusty spots, sores that break out repeatedly in the same spot.  These characteristics are risk factors for skin cancer, and patient is to notify us if they experience any of these symptoms.    Cherry angioma  Discussed with patient the benign nature of these lesions and that no treatment is indicated.  Consider cosmetic tx later?    Freckle  Discussed with patient the benign nature of these lesions and that no treatment is indicated.    Patient instructed in importance in daily sun protection. Sun avoidance and topical protection discussed.     Patient encouraged to wear hat for all outdoor exposure.     Also discussed sun protective clothing.    Benign mole  Discussed all of the following with the patient.    Patient to check their breasts (if applicable), buttocks, and groin with a mirror.  Patient deferred our examination of these areas today.    Each month, check your body for any spots such as freckles, age spots, and moles.  Watch for color changes and shape changes and growth in size.    Have your brisneo or  pay attention to any dark color changes to moles of the scalp.    Moles, also called nevi, are small, colored (pigmented) marks on the skin. They have no known purpose. Many moles appear before age 30, but they also increase frequently as people age. Moles most often are not cancer (benign) and are harmless. But some become cancerous (malignant). Thats why you need to watch the moles on your body and tell your healthcare provider about any that concern you.    Low vit d  Primary is following this.  Discussed with patient the etiology and pathogenesis of the disease or skin lesion(s) and  possible treatments and aggravators.    Discussed okay to get sun exposure before ten in the morning and after four in the afternoon.             Follow up in about 1 year (around 9/16/2021) for TBSE.

## 2020-09-16 NOTE — PATIENT INSTRUCTIONS
Over the counter 20-30% salicylic acid     Apply night then cover with band-aid     Wash off in the morning , file dead skin as needed.

## 2020-09-17 NOTE — PROGRESS NOTES
Called patient, explained echocardiogram within normal limits, normal EF, she continues to have palpitations, suspect there is a strong anxiety component after telephone interview today.  Will follow-up event monitor, she finishes in October, then appointment with us at the end of October.    -Kishore Quiros

## 2020-09-25 ENCOUNTER — OFFICE VISIT (OUTPATIENT)
Dept: INTERNAL MEDICINE | Facility: CLINIC | Age: 45
End: 2020-09-25
Payer: COMMERCIAL

## 2020-09-25 DIAGNOSIS — J32.9 RHINOSINUSITIS: Primary | ICD-10-CM

## 2020-09-25 PROBLEM — R00.0 SINUS TACHYCARDIA: Status: RESOLVED | Noted: 2018-02-21 | Resolved: 2020-09-25

## 2020-09-25 PROBLEM — E87.6 HYPOKALEMIA: Status: RESOLVED | Noted: 2018-02-21 | Resolved: 2020-09-25

## 2020-09-25 PROBLEM — R00.2 PALPITATIONS: Status: RESOLVED | Noted: 2017-11-21 | Resolved: 2020-09-25

## 2020-09-25 PROCEDURE — 99442 PR PHYSICIAN TELEPHONE EVALUATION 11-20 MIN: CPT | Mod: 95,,, | Performed by: NURSE PRACTITIONER

## 2020-09-25 PROCEDURE — 99442 PR PHYSICIAN TELEPHONE EVALUATION 11-20 MIN: ICD-10-PCS | Mod: 95,,, | Performed by: NURSE PRACTITIONER

## 2020-09-25 RX ORDER — LEVOCETIRIZINE DIHYDROCHLORIDE 5 MG/1
5 TABLET, FILM COATED ORAL NIGHTLY
Qty: 30 TABLET | Refills: 11
Start: 2020-09-25 | End: 2021-03-29

## 2020-09-25 RX ORDER — AMOXICILLIN AND CLAVULANATE POTASSIUM 500; 125 MG/1; MG/1
1 TABLET, FILM COATED ORAL 2 TIMES DAILY
Qty: 10 TABLET | Refills: 0 | Status: SHIPPED | OUTPATIENT
Start: 2020-09-25 | End: 2021-03-29

## 2020-09-25 NOTE — PROGRESS NOTES
"The patient location is: Home ([place])   The chief complaint leading to consultation is: Sinus    Visit type: audiovisual    Face to Face time with patient:   12 minutes of total time spent on the encounter, which includes face to face time and non-face to face time preparing to see the patient (eg, review of tests), Obtaining and/or reviewing separately obtained history, Documenting clinical information in the electronic or other health record, Independently interpreting results (not separately reported) and communicating results to the patient/family/caregiver, or Care coordination (not separately reported).         Each patient to whom he or she provides medical services by telemedicine is:  (1) informed of the relationship between the physician and patient and the respective role of any other health care provider with respect to management of the patient; and (2) notified that he or she may decline to receive medical services by telemedicine and may withdraw from such care at any time.    Notes:   Answers for HPI/ROS submitted by the patient on 9/25/2020   Sore throat  Chronicity: new  Onset: yesterday  Progression since onset: unchanged  Pain worse on: neither  Fever: no fever  Pain - numeric: 7/10  abdominal pain: No  congestion: No  cough: No  diarrhea: No  drooling: No  ear discharge: No  ear pain: Yes  headaches: No  hoarse voice: No  neck pain: No  plugged ear sensation: Yes  shortness of breath: No  stridor: No  swollen glands: Yes  trouble swallowing: No  vomiting: No  strep: No  mono: No  Treatments tried: nothing  Pain severity: moderate    *Noted to have been tx'd for "sinusitis" in 01/2020 with Z-luiz and Medrol Dose Luiz (pre COVID) and again in 12/2019, Rx'd Augmentin.      Reports that 2 days ago, began to experience "sinus pressure, over left eye, ear pain to both sides and a sore throat".   Denies fever, coughing, diarrhea or SOB.   *Former Smoker    Rhinosinusitis    Other orders  -     " amoxicillin-clavulanate 500-125mg (AUGMENTIN) 500-125 mg Tab; Take 1 tablet (500 mg total) by mouth 2 (two) times daily.  Dispense: 10 tablet; Refill: 0  -     levocetirizine (XYZAL) 5 MG tablet; Take 1 tablet (5 mg total) by mouth every evening.  Dispense: 30 tablet; Refill: 11        S Herbert Schmitt, MSN,APRN,FNP-BC

## 2020-10-15 ENCOUNTER — TELEPHONE (OUTPATIENT)
Dept: CARDIOLOGY | Facility: HOSPITAL | Age: 45
End: 2020-10-15

## 2020-10-15 NOTE — TELEPHONE ENCOUNTER
----- Message from Dai Corona sent at 10/15/2020  9:37 AM CDT -----  Regarding: FW: didn't wear monitor for full 30 days  Contact: patient  Spoke with patient and informed her that I am not sure if she could extend the event monitor or not. I informed her that I would have you give her a call about this. Please call her back at below number. Thanks.   ----- Message -----  From: Eloisa Cortez  Sent: 10/15/2020   9:31 AM CDT  To: [facility] Arrhythmia Device Staff  Subject: didn't wear monitor for full 30 days             The pt is calling to let you know that she didn't wear the monitor for the full 30 days and she states that she left it at her brothers house for 2 weeks and she wants to know if she can extend it for an extra 2 weeks? Please call her back @ [phone]. Thanks, Eloisa

## 2020-10-15 NOTE — TELEPHONE ENCOUNTER
Phoned Areli with [employer] to extend service on Ms Salas cardioac monitor. Contacted and inform Ms Salas that her service will not end til 10/29. Patient stated understanding and appreciate return call placed.

## 2020-10-28 ENCOUNTER — PATIENT OUTREACH (OUTPATIENT)
Dept: ADMINISTRATIVE | Facility: OTHER | Age: 45
End: 2020-10-28

## 2020-10-28 NOTE — PROGRESS NOTES
Requested updates within Care Everywhere.  Patient's chart was reviewed for overdue RAEGAN topics.  Immunizations reconciled.

## 2020-11-06 NOTE — PROGRESS NOTES
I called the patient and let her know about the results of the Holter monitor.  She has some questions about dysautonomia, can we schedule her for a routine follow-up with me?    Thanks,  Kishore

## 2020-11-25 ENCOUNTER — OFFICE VISIT (OUTPATIENT)
Dept: CARDIOLOGY | Facility: CLINIC | Age: 45
End: 2020-11-25
Payer: COMMERCIAL

## 2020-11-25 VITALS
HEIGHT: 63 IN | BODY MASS INDEX: 26.91 KG/M2 | HEART RATE: 84 BPM | OXYGEN SATURATION: 98 % | DIASTOLIC BLOOD PRESSURE: 84 MMHG | WEIGHT: 151.88 LBS | SYSTOLIC BLOOD PRESSURE: 112 MMHG

## 2020-11-25 DIAGNOSIS — G90.8 DYSAUTONOMIA-LIKE DISORDER: Primary | ICD-10-CM

## 2020-11-25 DIAGNOSIS — E78.2 MIXED HYPERLIPIDEMIA: ICD-10-CM

## 2020-11-25 DIAGNOSIS — E55.9 VITAMIN D DEFICIENCY: Chronic | ICD-10-CM

## 2020-11-25 DIAGNOSIS — F41.1 GAD (GENERALIZED ANXIETY DISORDER): Chronic | ICD-10-CM

## 2020-11-25 PROCEDURE — 3074F SYST BP LT 130 MM HG: CPT | Mod: CPTII,S$GLB,,

## 2020-11-25 PROCEDURE — 99999 PR PBB SHADOW E&M-EST. PATIENT-LVL IV: ICD-10-PCS | Mod: PBBFAC,,,

## 2020-11-25 PROCEDURE — 3079F DIAST BP 80-89 MM HG: CPT | Mod: CPTII,S$GLB,,

## 2020-11-25 PROCEDURE — 3079F PR MOST RECENT DIASTOLIC BLOOD PRESSURE 80-89 MM HG: ICD-10-PCS | Mod: CPTII,S$GLB,,

## 2020-11-25 PROCEDURE — 99999 PR PBB SHADOW E&M-EST. PATIENT-LVL IV: CPT | Mod: PBBFAC,,,

## 2020-11-25 PROCEDURE — 99213 OFFICE O/P EST LOW 20 MIN: CPT | Mod: S$GLB,,,

## 2020-11-25 PROCEDURE — 3074F PR MOST RECENT SYSTOLIC BLOOD PRESSURE < 130 MM HG: ICD-10-PCS | Mod: CPTII,S$GLB,,

## 2020-11-25 PROCEDURE — 99213 PR OFFICE/OUTPT VISIT, EST, LEVL III, 20-29 MIN: ICD-10-PCS | Mod: S$GLB,,,
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
## 2020-11-25 NOTE — PATIENT INSTRUCTIONS
Start exercise therapy with postural training, core strength and cardiovascular component as below    mild, can stop weekly 50,000U, continue daily multivitamin    Discussed exercise therapy based on 150-min per week AHA recommendations for moderate intensity exercise/75 min for high-intensity exercise

## 2020-11-30 NOTE — PROGRESS NOTES
I have seen the patient, reviewed the Fellow's history and physical, assessment, and plan. I have personally interviewed and examined the patient at bedside and agree with the findings.         MD Pablo De Jesus samantha-Cardiology [facility] 3rd Floor

## 2021-01-20 DIAGNOSIS — Z12.31 OTHER SCREENING MAMMOGRAM: ICD-10-CM

## 2021-03-29 ENCOUNTER — PATIENT MESSAGE (OUTPATIENT)
Dept: INTERNAL MEDICINE | Facility: CLINIC | Age: 46
End: 2021-03-29
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
## 2021-03-29 RX ORDER — TRIAMCINOLONE ACETONIDE 1 MG/G
CREAM TOPICAL 2 TIMES DAILY
Qty: 45 G | Refills: 0 | Status: SHIPPED | OUTPATIENT
Start: 2021-03-29 | End: 2022-06-28

## 2021-03-30 ENCOUNTER — OFFICE VISIT (OUTPATIENT)
Dept: INTERNAL MEDICINE | Facility: CLINIC | Age: 46
End: 2021-03-30
Payer: COMMERCIAL

## 2021-03-30 VITALS
BODY MASS INDEX: 26.95 KG/M2 | SYSTOLIC BLOOD PRESSURE: 120 MMHG | HEART RATE: 95 BPM | WEIGHT: 152.13 LBS | HEIGHT: 63 IN | DIASTOLIC BLOOD PRESSURE: 82 MMHG | OXYGEN SATURATION: 99 %
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
## 2021-03-30 PROCEDURE — 3008F PR BODY MASS INDEX (BMI) DOCUMENTED: ICD-10-PCS | Mod: CPTII,S$GLB,, | Performed by: INTERNAL MEDICINE

## 2021-03-30 PROCEDURE — 1126F PR PAIN SEVERITY QUANTIFIED, NO PAIN PRESENT: ICD-10-PCS | Mod: S$GLB,,, | Performed by: INTERNAL MEDICINE

## 2021-03-30 PROCEDURE — 3074F PR MOST RECENT SYSTOLIC BLOOD PRESSURE < 130 MM HG: ICD-10-PCS | Mod: CPTII,S$GLB,, | Performed by: INTERNAL MEDICINE

## 2021-03-30 PROCEDURE — 3008F BODY MASS INDEX DOCD: CPT | Mod: CPTII,S$GLB,, | Performed by: INTERNAL MEDICINE

## 2021-03-30 PROCEDURE — 1126F AMNT PAIN NOTED NONE PRSNT: CPT | Mod: S$GLB,,, | Performed by: INTERNAL MEDICINE

## 2021-03-30 PROCEDURE — 3074F SYST BP LT 130 MM HG: CPT | Mod: CPTII,S$GLB,, | Performed by: INTERNAL MEDICINE

## 2021-03-30 PROCEDURE — 99999 PR PBB SHADOW E&M-EST. PATIENT-LVL III: ICD-10-PCS | Mod: PBBFAC,,, | Performed by: INTERNAL MEDICINE

## 2021-03-30 PROCEDURE — 3079F DIAST BP 80-89 MM HG: CPT | Mod: CPTII,S$GLB,, | Performed by: INTERNAL MEDICINE

## 2021-03-30 PROCEDURE — 99212 PR OFFICE/OUTPT VISIT, EST, LEVL II, 10-19 MIN: ICD-10-PCS | Mod: S$GLB,,, | Performed by: INTERNAL MEDICINE

## 2021-03-30 PROCEDURE — 99212 OFFICE O/P EST SF 10 MIN: CPT | Mod: S$GLB,,, | Performed by: INTERNAL MEDICINE

## 2021-03-30 PROCEDURE — 99999 PR PBB SHADOW E&M-EST. PATIENT-LVL III: CPT | Mod: PBBFAC,,, | Performed by: INTERNAL MEDICINE

## 2021-03-30 PROCEDURE — 3079F PR MOST RECENT DIASTOLIC BLOOD PRESSURE 80-89 MM HG: ICD-10-PCS | Mod: CPTII,S$GLB,, | Performed by: INTERNAL MEDICINE

## 2021-03-30 RX ORDER — DEXTROAMPHETAMINE SACCHARATE, AMPHETAMINE ASPARTATE MONOHYDRATE, DEXTROAMPHETAMINE SULFATE AND AMPHETAMINE SULFATE 1.25; 1.25; 1.25; 1.25 MG/1; MG/1; MG/1; MG/1
5 CAPSULE, EXTENDED RELEASE ORAL DAILY
COMMUNITY
End: 2022-04-11

## 2021-03-30 RX ORDER — DEXTROAMPHETAMINE SACCHARATE, AMPHETAMINE ASPARTATE MONOHYDRATE, DEXTROAMPHETAMINE SULFATE AND AMPHETAMINE SULFATE 1.25; 1.25; 1.25; 1.25 MG/1; MG/1; MG/1; MG/1
CAPSULE, EXTENDED RELEASE ORAL
COMMUNITY
Start: 2021-01-04 | End: 2021-03-30 | Stop reason: SDUPTHER

## 2021-03-30 RX ORDER — ERGOCALCIFEROL 1.25 MG/1
50000 CAPSULE ORAL
COMMUNITY
Start: 2021-03-12

## 2021-04-05 ENCOUNTER — PATIENT MESSAGE (OUTPATIENT)
Dept: ADMINISTRATIVE | Facility: HOSPITAL | Age: 46
End: 2021-04-05

## 2021-04-26 ENCOUNTER — TELEPHONE (OUTPATIENT)
Dept: INTERNAL MEDICINE | Facility: CLINIC | Age: 46
End: 2021-04-26

## 2021-04-26 ENCOUNTER — PATIENT MESSAGE (OUTPATIENT)
Dept: INTERNAL MEDICINE | Facility: CLINIC | Age: 46
End: 2021-04-26

## 2021-04-30 ENCOUNTER — OFFICE VISIT (OUTPATIENT)
Dept: INTERNAL MEDICINE | Facility: CLINIC | Age: 46
End: 2021-04-30
Payer: COMMERCIAL

## 2021-04-30 DIAGNOSIS — J06.0 SORE THROAT AND LARYNGITIS: Primary | ICD-10-CM

## 2021-04-30 PROCEDURE — 99214 PR OFFICE/OUTPT VISIT, EST, LEVL IV, 30-39 MIN: ICD-10-PCS | Mod: 95,,, | Performed by: INTERNAL MEDICINE

## 2021-04-30 PROCEDURE — 99214 OFFICE O/P EST MOD 30 MIN: CPT | Mod: 95,,, | Performed by: INTERNAL MEDICINE

## 2021-04-30 RX ORDER — PREDNISONE 20 MG/1
20 TABLET ORAL DAILY
Qty: 2 TABLET | Refills: 0 | Status: SHIPPED | OUTPATIENT
Start: 2021-04-30 | End: 2021-06-03

## 2021-06-03 ENCOUNTER — OFFICE VISIT (OUTPATIENT)
Dept: INTERNAL MEDICINE | Facility: CLINIC | Age: 46
End: 2021-06-03
Payer: COMMERCIAL

## 2021-06-03 ENCOUNTER — PATIENT OUTREACH (OUTPATIENT)
Dept: ADMINISTRATIVE | Facility: HOSPITAL | Age: 46
End: 2021-06-03

## 2021-06-03 VITALS
DIASTOLIC BLOOD PRESSURE: 80 MMHG | OXYGEN SATURATION: 99 % | BODY MASS INDEX: 26.99 KG/M2 | WEIGHT: 152.31 LBS | HEIGHT: 63 IN | SYSTOLIC BLOOD PRESSURE: 110 MMHG | HEART RATE: 86 BPM

## 2021-06-03 DIAGNOSIS — E55.9 VITAMIN D DEFICIENCY: Chronic | ICD-10-CM

## 2021-06-03 DIAGNOSIS — Z12.11 SCREEN FOR COLON CANCER: ICD-10-CM

## 2021-06-03 DIAGNOSIS — F41.1 GAD (GENERALIZED ANXIETY DISORDER): Chronic | ICD-10-CM

## 2021-06-03 DIAGNOSIS — R53.82 CHRONIC FATIGUE: ICD-10-CM

## 2021-06-03 DIAGNOSIS — Z00.00 VISIT FOR ANNUAL HEALTH EXAMINATION: Primary | ICD-10-CM

## 2021-06-03 DIAGNOSIS — E78.2 MIXED HYPERLIPIDEMIA: ICD-10-CM

## 2021-06-03 PROCEDURE — 99396 PR PREVENTIVE VISIT,EST,40-64: ICD-10-PCS | Mod: S$GLB,,, | Performed by: INTERNAL MEDICINE

## 2021-06-03 PROCEDURE — 3008F BODY MASS INDEX DOCD: CPT | Mod: CPTII,S$GLB,, | Performed by: INTERNAL MEDICINE

## 2021-06-03 PROCEDURE — 99396 PREV VISIT EST AGE 40-64: CPT | Mod: S$GLB,,, | Performed by: INTERNAL MEDICINE

## 2021-06-03 PROCEDURE — 99999 PR PBB SHADOW E&M-EST. PATIENT-LVL III: ICD-10-PCS | Mod: PBBFAC,,, | Performed by: INTERNAL MEDICINE

## 2021-06-03 PROCEDURE — 1126F AMNT PAIN NOTED NONE PRSNT: CPT | Mod: S$GLB,,, | Performed by: INTERNAL MEDICINE

## 2021-06-03 PROCEDURE — 3008F PR BODY MASS INDEX (BMI) DOCUMENTED: ICD-10-PCS | Mod: CPTII,S$GLB,, | Performed by: INTERNAL MEDICINE

## 2021-06-03 PROCEDURE — 99999 PR PBB SHADOW E&M-EST. PATIENT-LVL III: CPT | Mod: PBBFAC,,, | Performed by: INTERNAL MEDICINE

## 2021-06-03 PROCEDURE — 1126F PR PAIN SEVERITY QUANTIFIED, NO PAIN PRESENT: ICD-10-PCS | Mod: S$GLB,,, | Performed by: INTERNAL MEDICINE

## 2021-06-05 LAB
25(OH)D3 SERPL-MCNC: 53 NG/ML (ref 30–100)
ALBUMIN SERPL-MCNC: 4.3 G/DL (ref 3.6–5.1)
ALBUMIN/GLOB SERPL: 1.7 (CALC) (ref 1–2.5)
ALP SERPL-CCNC: 48 U/L (ref 31–125)
ALT SERPL-CCNC: 13 U/L (ref 6–29)
AST SERPL-CCNC: 14 U/L (ref 10–35)
BASOPHILS # BLD AUTO: 52 CELLS/UL (ref 0–200)
BASOPHILS NFR BLD AUTO: 0.8 %
BILIRUB SERPL-MCNC: 0.4 MG/DL (ref 0.2–1.2)
BUN SERPL-MCNC: 12 MG/DL (ref 7–25)
BUN/CREAT SERPL: NORMAL (CALC) (ref 6–22)
CALCIUM SERPL-MCNC: 9.5 MG/DL (ref 8.6–10.2)
CHLORIDE SERPL-SCNC: 99 MMOL/L (ref 98–110)
CHOLEST SERPL-MCNC: 214 MG/DL
CHOLEST/HDLC SERPL: 3.6 (CALC)
CO2 SERPL-SCNC: 31 MMOL/L (ref 20–32)
CREAT SERPL-MCNC: 0.57 MG/DL (ref 0.5–1.1)
EOSINOPHIL # BLD AUTO: 273 CELLS/UL (ref 15–500)
EOSINOPHIL NFR BLD AUTO: 4.2 %
ERYTHROCYTE [DISTWIDTH] IN BLOOD BY AUTOMATED COUNT: 12.2 % (ref 11–15)
GLOBULIN SER CALC-MCNC: 2.6 G/DL (CALC) (ref 1.9–3.7)
GLUCOSE SERPL-MCNC: 96 MG/DL (ref 65–99)
HCT VFR BLD AUTO: 38.4 % (ref 35–45)
HDLC SERPL-MCNC: 59 MG/DL
HGB BLD-MCNC: 13 G/DL (ref 11.7–15.5)
LDLC SERPL CALC-MCNC: 129 MG/DL (CALC)
LYMPHOCYTES # BLD AUTO: 1658 CELLS/UL (ref 850–3900)
LYMPHOCYTES NFR BLD AUTO: 25.5 %
MCH RBC QN AUTO: 31.2 PG (ref 27–33)
MCHC RBC AUTO-ENTMCNC: 33.9 G/DL (ref 32–36)
MCV RBC AUTO: 92.1 FL (ref 80–100)
MONOCYTES # BLD AUTO: 403 CELLS/UL (ref 200–950)
MONOCYTES NFR BLD AUTO: 6.2 %
NEUTROPHILS # BLD AUTO: 4115 CELLS/UL (ref 1500–7800)
NEUTROPHILS NFR BLD AUTO: 63.3 %
NONHDLC SERPL-MCNC: 155 MG/DL (CALC)
PLATELET # BLD AUTO: 269 THOUSAND/UL (ref 140–400)
PMV BLD REES-ECKER: 10.2 FL (ref 7.5–12.5)
POTASSIUM SERPL-SCNC: 3.9 MMOL/L (ref 3.5–5.3)
PROT SERPL-MCNC: 6.9 G/DL (ref 6.1–8.1)
RBC # BLD AUTO: 4.17 MILLION/UL (ref 3.8–5.1)
SODIUM SERPL-SCNC: 139 MMOL/L (ref 135–146)
TRIGL SERPL-MCNC: 148 MG/DL
WBC # BLD AUTO: 6.5 THOUSAND/UL (ref 3.8–10.8)

## 2021-06-07 ENCOUNTER — PATIENT OUTREACH (OUTPATIENT)
Dept: ADMINISTRATIVE | Facility: HOSPITAL | Age: 46
End: 2021-06-07

## 2021-06-09 LAB
COMMENT: NORMAL
COTININE UR QL: NORMAL

## 2021-06-11 ENCOUNTER — PATIENT MESSAGE (OUTPATIENT)
Dept: INTERNAL MEDICINE | Facility: CLINIC | Age: 46
End: 2021-06-11

## 2021-06-14 ENCOUNTER — PATIENT MESSAGE (OUTPATIENT)
Dept: INTERNAL MEDICINE | Facility: CLINIC | Age: 46
End: 2021-06-14

## 2021-06-24 ENCOUNTER — PATIENT OUTREACH (OUTPATIENT)
Dept: ADMINISTRATIVE | Facility: HOSPITAL | Age: 46
End: 2021-06-24

## 2021-06-24 ENCOUNTER — PATIENT MESSAGE (OUTPATIENT)
Dept: INTERNAL MEDICINE | Facility: CLINIC | Age: 46
End: 2021-06-24

## 2021-07-13 ENCOUNTER — TELEPHONE (OUTPATIENT)
Dept: DERMATOLOGY | Facility: CLINIC | Age: 46
End: 2021-07-13

## 2021-07-19 ENCOUNTER — OFFICE VISIT (OUTPATIENT)
Dept: DERMATOLOGY | Facility: CLINIC | Age: 46
End: 2021-07-19
Payer: COMMERCIAL

## 2021-07-19 DIAGNOSIS — L30.8 OTHER ECZEMA: ICD-10-CM

## 2021-07-19 DIAGNOSIS — L65.0 TELOGEN EFFLUVIUM: ICD-10-CM

## 2021-07-19 DIAGNOSIS — L64.9 ANDROGENIC ALOPECIA: Primary | ICD-10-CM

## 2021-07-19 PROCEDURE — 99999 PR PBB SHADOW E&M-EST. PATIENT-LVL III: ICD-10-PCS | Mod: PBBFAC,,, | Performed by: DERMATOLOGY

## 2021-07-19 PROCEDURE — 1126F AMNT PAIN NOTED NONE PRSNT: CPT | Mod: CPTII,S$GLB,, | Performed by: DERMATOLOGY

## 2021-07-19 PROCEDURE — 99999 PR PBB SHADOW E&M-EST. PATIENT-LVL III: CPT | Mod: PBBFAC,,, | Performed by: DERMATOLOGY

## 2021-07-19 PROCEDURE — 1126F PR PAIN SEVERITY QUANTIFIED, NO PAIN PRESENT: ICD-10-PCS | Mod: CPTII,S$GLB,, | Performed by: DERMATOLOGY

## 2021-07-19 PROCEDURE — 99214 OFFICE O/P EST MOD 30 MIN: CPT | Mod: S$GLB,,, | Performed by: DERMATOLOGY

## 2021-07-19 PROCEDURE — 99214 PR OFFICE/OUTPT VISIT, EST, LEVL IV, 30-39 MIN: ICD-10-PCS | Mod: S$GLB,,, | Performed by: DERMATOLOGY

## 2021-07-19 RX ORDER — SPIRONOLACTONE 50 MG/1
TABLET, FILM COATED ORAL
Qty: 60 TABLET | Refills: 3 | Status: SHIPPED | OUTPATIENT
Start: 2021-07-19 | End: 2022-06-28

## 2021-07-19 RX ORDER — KETOCONAZOLE 20 MG/ML
SHAMPOO, SUSPENSION TOPICAL
Qty: 120 ML | Refills: 5 | Status: SHIPPED | OUTPATIENT
Start: 2021-07-19 | End: 2024-02-01

## 2021-07-28 ENCOUNTER — DOCUMENTATION ONLY (OUTPATIENT)
Dept: HEMATOLOGY/ONCOLOGY | Facility: CLINIC | Age: 46
End: 2021-07-28

## 2021-08-02 ENCOUNTER — TELEPHONE (OUTPATIENT)
Dept: SURGERY | Facility: CLINIC | Age: 46
End: 2021-08-02

## 2021-08-11 ENCOUNTER — PATIENT MESSAGE (OUTPATIENT)
Dept: SURGERY | Facility: CLINIC | Age: 46
End: 2021-08-11

## 2021-08-11 ENCOUNTER — TELEPHONE (OUTPATIENT)
Dept: SURGERY | Facility: CLINIC | Age: 46
End: 2021-08-11

## 2021-08-16 ENCOUNTER — PATIENT OUTREACH (OUTPATIENT)
Dept: ADMINISTRATIVE | Facility: OTHER | Age: 46
End: 2021-08-16

## 2021-08-17 ENCOUNTER — OFFICE VISIT (OUTPATIENT)
Dept: CARDIOLOGY | Facility: CLINIC | Age: 46
End: 2021-08-17
Payer: COMMERCIAL

## 2021-08-17 VITALS
HEART RATE: 80 BPM | DIASTOLIC BLOOD PRESSURE: 86 MMHG | SYSTOLIC BLOOD PRESSURE: 120 MMHG | WEIGHT: 152.31 LBS | BODY MASS INDEX: 26.99 KG/M2 | HEIGHT: 63 IN

## 2021-08-17 DIAGNOSIS — E78.2 MIXED HYPERLIPIDEMIA: Primary | ICD-10-CM

## 2021-08-17 DIAGNOSIS — Z87.891 FORMER SMOKER: ICD-10-CM

## 2021-08-17 DIAGNOSIS — L65.9 ALOPECIA: ICD-10-CM

## 2021-08-17 DIAGNOSIS — R00.2 PALPITATIONS: ICD-10-CM

## 2021-08-17 PROCEDURE — 93000 EKG 12-LEAD: ICD-10-PCS | Mod: S$GLB,,, | Performed by: INTERNAL MEDICINE

## 2021-08-17 PROCEDURE — 1160F PR REVIEW ALL MEDS BY PRESCRIBER/CLIN PHARMACIST DOCUMENTED: ICD-10-PCS | Mod: CPTII,S$GLB,, | Performed by: NURSE PRACTITIONER

## 2021-08-17 PROCEDURE — 3074F PR MOST RECENT SYSTOLIC BLOOD PRESSURE < 130 MM HG: ICD-10-PCS | Mod: CPTII,S$GLB,, | Performed by: NURSE PRACTITIONER

## 2021-08-17 PROCEDURE — 93000 ELECTROCARDIOGRAM COMPLETE: CPT | Mod: S$GLB,,, | Performed by: INTERNAL MEDICINE

## 2021-08-17 PROCEDURE — 1159F PR MEDICATION LIST DOCUMENTED IN MEDICAL RECORD: ICD-10-PCS | Mod: CPTII,S$GLB,, | Performed by: NURSE PRACTITIONER

## 2021-08-17 PROCEDURE — 99999 PR PBB SHADOW E&M-EST. PATIENT-LVL IV: ICD-10-PCS | Mod: PBBFAC,,, | Performed by: NURSE PRACTITIONER

## 2021-08-17 PROCEDURE — 99999 PR PBB SHADOW E&M-EST. PATIENT-LVL IV: CPT | Mod: PBBFAC,,, | Performed by: NURSE PRACTITIONER

## 2021-08-17 PROCEDURE — 3008F BODY MASS INDEX DOCD: CPT | Mod: CPTII,S$GLB,, | Performed by: NURSE PRACTITIONER

## 2021-08-17 PROCEDURE — 3079F PR MOST RECENT DIASTOLIC BLOOD PRESSURE 80-89 MM HG: ICD-10-PCS | Mod: CPTII,S$GLB,, | Performed by: NURSE PRACTITIONER

## 2021-08-17 PROCEDURE — 99214 PR OFFICE/OUTPT VISIT, EST, LEVL IV, 30-39 MIN: ICD-10-PCS | Mod: S$GLB,,, | Performed by: NURSE PRACTITIONER

## 2021-08-17 PROCEDURE — 1159F MED LIST DOCD IN RCRD: CPT | Mod: CPTII,S$GLB,, | Performed by: NURSE PRACTITIONER

## 2021-08-17 PROCEDURE — 3079F DIAST BP 80-89 MM HG: CPT | Mod: CPTII,S$GLB,, | Performed by: NURSE PRACTITIONER

## 2021-08-17 PROCEDURE — 1160F RVW MEDS BY RX/DR IN RCRD: CPT | Mod: CPTII,S$GLB,, | Performed by: NURSE PRACTITIONER

## 2021-08-17 PROCEDURE — 99214 OFFICE O/P EST MOD 30 MIN: CPT | Mod: S$GLB,,, | Performed by: NURSE PRACTITIONER

## 2021-08-17 PROCEDURE — 3008F PR BODY MASS INDEX (BMI) DOCUMENTED: ICD-10-PCS | Mod: CPTII,S$GLB,, | Performed by: NURSE PRACTITIONER

## 2021-08-17 PROCEDURE — 1126F AMNT PAIN NOTED NONE PRSNT: CPT | Mod: CPTII,S$GLB,, | Performed by: NURSE PRACTITIONER

## 2021-08-17 PROCEDURE — 1126F PR PAIN SEVERITY QUANTIFIED, NO PAIN PRESENT: ICD-10-PCS | Mod: CPTII,S$GLB,, | Performed by: NURSE PRACTITIONER

## 2021-08-17 PROCEDURE — 3074F SYST BP LT 130 MM HG: CPT | Mod: CPTII,S$GLB,, | Performed by: NURSE PRACTITIONER

## 2021-08-17 RX ORDER — CLONAZEPAM 0.5 MG/1
0.5 TABLET ORAL
COMMUNITY
Start: 2021-07-14

## 2021-08-18 ENCOUNTER — DOCUMENTATION ONLY (OUTPATIENT)
Dept: CARDIOLOGY | Facility: CLINIC | Age: 46
End: 2021-08-18

## 2021-08-26 ENCOUNTER — OFFICE VISIT (OUTPATIENT)
Dept: ALLERGY | Facility: CLINIC | Age: 46
End: 2021-08-26
Payer: COMMERCIAL

## 2021-08-26 VITALS — WEIGHT: 154.31 LBS | BODY MASS INDEX: 27.34 KG/M2 | HEIGHT: 63 IN

## 2021-08-26 DIAGNOSIS — T50.Z95A ADVERSE EFFECT OF VACCINE, INITIAL ENCOUNTER: Primary | ICD-10-CM

## 2021-08-26 PROCEDURE — 1160F RVW MEDS BY RX/DR IN RCRD: CPT | Mod: CPTII,S$GLB,, | Performed by: ALLERGY & IMMUNOLOGY

## 2021-08-26 PROCEDURE — 1126F AMNT PAIN NOTED NONE PRSNT: CPT | Mod: CPTII,S$GLB,, | Performed by: ALLERGY & IMMUNOLOGY

## 2021-08-26 PROCEDURE — 99999 PR PBB SHADOW E&M-EST. PATIENT-LVL III: CPT | Mod: PBBFAC,,, | Performed by: ALLERGY & IMMUNOLOGY

## 2021-08-26 PROCEDURE — 99204 PR OFFICE/OUTPT VISIT, NEW, LEVL IV, 45-59 MIN: ICD-10-PCS | Mod: S$GLB,,, | Performed by: ALLERGY & IMMUNOLOGY

## 2021-08-26 PROCEDURE — 1160F PR REVIEW ALL MEDS BY PRESCRIBER/CLIN PHARMACIST DOCUMENTED: ICD-10-PCS | Mod: CPTII,S$GLB,, | Performed by: ALLERGY & IMMUNOLOGY

## 2021-08-26 PROCEDURE — 3008F BODY MASS INDEX DOCD: CPT | Mod: CPTII,S$GLB,, | Performed by: ALLERGY & IMMUNOLOGY

## 2021-08-26 PROCEDURE — 99999 PR PBB SHADOW E&M-EST. PATIENT-LVL III: ICD-10-PCS | Mod: PBBFAC,,, | Performed by: ALLERGY & IMMUNOLOGY

## 2021-08-26 PROCEDURE — 99204 OFFICE O/P NEW MOD 45 MIN: CPT | Mod: S$GLB,,, | Performed by: ALLERGY & IMMUNOLOGY

## 2021-08-26 PROCEDURE — 1159F MED LIST DOCD IN RCRD: CPT | Mod: CPTII,S$GLB,, | Performed by: ALLERGY & IMMUNOLOGY

## 2021-08-26 PROCEDURE — 3008F PR BODY MASS INDEX (BMI) DOCUMENTED: ICD-10-PCS | Mod: CPTII,S$GLB,, | Performed by: ALLERGY & IMMUNOLOGY

## 2021-08-26 PROCEDURE — 1159F PR MEDICATION LIST DOCUMENTED IN MEDICAL RECORD: ICD-10-PCS | Mod: CPTII,S$GLB,, | Performed by: ALLERGY & IMMUNOLOGY

## 2021-08-26 PROCEDURE — 1126F PR PAIN SEVERITY QUANTIFIED, NO PAIN PRESENT: ICD-10-PCS | Mod: CPTII,S$GLB,, | Performed by: ALLERGY & IMMUNOLOGY

## 2021-08-31 ENCOUNTER — PATIENT MESSAGE (OUTPATIENT)
Dept: HEMATOLOGY/ONCOLOGY | Facility: CLINIC | Age: 46
End: 2021-08-31

## 2021-09-27 ENCOUNTER — TELEPHONE (OUTPATIENT)
Dept: CARDIOLOGY | Facility: CLINIC | Age: 46
End: 2021-09-27

## 2021-10-11 ENCOUNTER — PATIENT MESSAGE (OUTPATIENT)
Dept: INTERNAL MEDICINE | Facility: CLINIC | Age: 46
End: 2021-10-11

## 2021-10-11 DIAGNOSIS — D64.9 ANEMIA, UNSPECIFIED TYPE: Primary | ICD-10-CM

## 2021-10-12 ENCOUNTER — PATIENT MESSAGE (OUTPATIENT)
Dept: INTERNAL MEDICINE | Facility: CLINIC | Age: 46
End: 2021-10-12

## 2021-10-14 ENCOUNTER — PATIENT MESSAGE (OUTPATIENT)
Dept: INTERNAL MEDICINE | Facility: CLINIC | Age: 46
End: 2021-10-14

## 2021-10-14 LAB
FERRITIN SERPL-MCNC: 18 NG/ML (ref 16–232)
IRON SATN MFR SERPL: 30 % (CALC) (ref 16–45)
IRON SERPL-MCNC: 94 MCG/DL (ref 40–190)
TIBC SERPL-MCNC: 315 MCG/DL (CALC) (ref 250–450)

## 2021-10-18 ENCOUNTER — TELEPHONE (OUTPATIENT)
Dept: CARDIOLOGY | Facility: CLINIC | Age: 46
End: 2021-10-18

## 2021-10-20 ENCOUNTER — OFFICE VISIT (OUTPATIENT)
Dept: INTERNAL MEDICINE | Facility: CLINIC | Age: 46
End: 2021-10-20
Payer: COMMERCIAL

## 2021-10-20 DIAGNOSIS — R53.82 CHRONIC FATIGUE: ICD-10-CM

## 2021-10-20 DIAGNOSIS — F41.1 GAD (GENERALIZED ANXIETY DISORDER): ICD-10-CM

## 2021-10-20 DIAGNOSIS — R42 EPISODIC LIGHTHEADEDNESS: Primary | ICD-10-CM

## 2021-10-20 DIAGNOSIS — F41.0 PANIC DISORDER: ICD-10-CM

## 2021-10-20 PROCEDURE — 1160F PR REVIEW ALL MEDS BY PRESCRIBER/CLIN PHARMACIST DOCUMENTED: ICD-10-PCS | Mod: CPTII,95,, | Performed by: NURSE PRACTITIONER

## 2021-10-20 PROCEDURE — 1160F RVW MEDS BY RX/DR IN RCRD: CPT | Mod: CPTII,95,, | Performed by: NURSE PRACTITIONER

## 2021-10-20 PROCEDURE — 1159F MED LIST DOCD IN RCRD: CPT | Mod: CPTII,95,, | Performed by: NURSE PRACTITIONER

## 2021-10-20 PROCEDURE — 99213 OFFICE O/P EST LOW 20 MIN: CPT | Mod: 95,,, | Performed by: NURSE PRACTITIONER

## 2021-10-20 PROCEDURE — 1159F PR MEDICATION LIST DOCUMENTED IN MEDICAL RECORD: ICD-10-PCS | Mod: CPTII,95,, | Performed by: NURSE PRACTITIONER

## 2021-10-20 PROCEDURE — 99213 PR OFFICE/OUTPT VISIT, EST, LEVL III, 20-29 MIN: ICD-10-PCS | Mod: 95,,, | Performed by: NURSE PRACTITIONER

## 2021-12-06 ENCOUNTER — OFFICE VISIT (OUTPATIENT)
Dept: INTERNAL MEDICINE | Facility: CLINIC | Age: 46
End: 2021-12-06
Payer: COMMERCIAL

## 2021-12-06 VITALS
HEIGHT: 63 IN | DIASTOLIC BLOOD PRESSURE: 84 MMHG | HEART RATE: 98 BPM | OXYGEN SATURATION: 99 % | SYSTOLIC BLOOD PRESSURE: 122 MMHG | WEIGHT: 154.56 LBS | BODY MASS INDEX: 27.39 KG/M2

## 2021-12-06 DIAGNOSIS — J01.00 ACUTE NON-RECURRENT MAXILLARY SINUSITIS: ICD-10-CM

## 2021-12-06 DIAGNOSIS — J01.00 ACUTE NON-RECURRENT MAXILLARY SINUSITIS: Primary | ICD-10-CM

## 2021-12-06 DIAGNOSIS — M25.572 ACUTE LEFT ANKLE PAIN: ICD-10-CM

## 2021-12-06 PROCEDURE — 99999 PR PBB SHADOW E&M-EST. PATIENT-LVL IV: ICD-10-PCS | Mod: PBBFAC,,, | Performed by: INTERNAL MEDICINE

## 2021-12-06 PROCEDURE — 1160F RVW MEDS BY RX/DR IN RCRD: CPT | Mod: CPTII,S$GLB,, | Performed by: INTERNAL MEDICINE

## 2021-12-06 PROCEDURE — 99214 PR OFFICE/OUTPT VISIT, EST, LEVL IV, 30-39 MIN: ICD-10-PCS | Mod: S$GLB,,, | Performed by: INTERNAL MEDICINE

## 2021-12-06 PROCEDURE — 3074F PR MOST RECENT SYSTOLIC BLOOD PRESSURE < 130 MM HG: ICD-10-PCS | Mod: CPTII,S$GLB,, | Performed by: INTERNAL MEDICINE

## 2021-12-06 PROCEDURE — 3008F PR BODY MASS INDEX (BMI) DOCUMENTED: ICD-10-PCS | Mod: CPTII,S$GLB,, | Performed by: INTERNAL MEDICINE

## 2021-12-06 PROCEDURE — 3079F DIAST BP 80-89 MM HG: CPT | Mod: CPTII,S$GLB,, | Performed by: INTERNAL MEDICINE

## 2021-12-06 PROCEDURE — 1160F PR REVIEW ALL MEDS BY PRESCRIBER/CLIN PHARMACIST DOCUMENTED: ICD-10-PCS | Mod: CPTII,S$GLB,, | Performed by: INTERNAL MEDICINE

## 2021-12-06 PROCEDURE — 3008F BODY MASS INDEX DOCD: CPT | Mod: CPTII,S$GLB,, | Performed by: INTERNAL MEDICINE

## 2021-12-06 PROCEDURE — 1159F PR MEDICATION LIST DOCUMENTED IN MEDICAL RECORD: ICD-10-PCS | Mod: CPTII,S$GLB,, | Performed by: INTERNAL MEDICINE

## 2021-12-06 PROCEDURE — 3074F SYST BP LT 130 MM HG: CPT | Mod: CPTII,S$GLB,, | Performed by: INTERNAL MEDICINE

## 2021-12-06 PROCEDURE — 1159F MED LIST DOCD IN RCRD: CPT | Mod: CPTII,S$GLB,, | Performed by: INTERNAL MEDICINE

## 2021-12-06 PROCEDURE — 99999 PR PBB SHADOW E&M-EST. PATIENT-LVL IV: CPT | Mod: PBBFAC,,, | Performed by: INTERNAL MEDICINE

## 2021-12-06 PROCEDURE — 3079F PR MOST RECENT DIASTOLIC BLOOD PRESSURE 80-89 MM HG: ICD-10-PCS | Mod: CPTII,S$GLB,, | Performed by: INTERNAL MEDICINE

## 2021-12-06 PROCEDURE — 99214 OFFICE O/P EST MOD 30 MIN: CPT | Mod: S$GLB,,, | Performed by: INTERNAL MEDICINE

## 2021-12-06 RX ORDER — CODEINE PHOSPHATE AND GUAIFENESIN 10; 100 MG/5ML; MG/5ML
5 SOLUTION ORAL NIGHTLY PRN
Qty: 118 ML | Refills: 0 | Status: SHIPPED | OUTPATIENT
Start: 2021-12-06 | End: 2021-12-06 | Stop reason: SDUPTHER

## 2021-12-06 RX ORDER — AMOXICILLIN AND CLAVULANATE POTASSIUM 875; 125 MG/1; MG/1
1 TABLET, FILM COATED ORAL EVERY 12 HOURS
Qty: 10 TABLET | Refills: 0 | Status: SHIPPED | OUTPATIENT
Start: 2021-12-06 | End: 2021-12-11

## 2021-12-06 RX ORDER — CODEINE PHOSPHATE AND GUAIFENESIN 10; 100 MG/5ML; MG/5ML
5 SOLUTION ORAL NIGHTLY PRN
Qty: 118 ML | Refills: 0 | Status: SHIPPED | OUTPATIENT
Start: 2021-12-06 | End: 2021-12-16

## 2021-12-06 RX ORDER — NAPROXEN 500 MG/1
500 TABLET ORAL 2 TIMES DAILY
Qty: 14 TABLET | Refills: 0 | Status: SHIPPED | OUTPATIENT
Start: 2021-12-06 | End: 2021-12-13

## 2021-12-06 RX ORDER — METHYLPREDNISOLONE 4 MG/1
TABLET ORAL
Qty: 21 EACH | Refills: 0 | Status: SHIPPED | OUTPATIENT
Start: 2021-12-06 | End: 2021-12-27

## 2021-12-06 RX ORDER — ACYCLOVIR 400 MG/1
TABLET ORAL
COMMUNITY
Start: 2021-09-27 | End: 2022-07-21

## 2021-12-06 RX ORDER — ESCITALOPRAM OXALATE 20 MG/1
TABLET ORAL
COMMUNITY
Start: 2021-10-04 | End: 2022-04-11

## 2021-12-06 NOTE — PROGRESS NOTES
INTERNAL MEDICINE ESTABLISHED PATIENT VISIT NOTE    Subjective:     Chief Complaint: Cough       Patient ID: Juanita Salas is a 46 y.o. female with anxiety and depression c panic d/o, sinus tachycardia (hx palpitations c overall neg Holter and normal echo, sees Dr. Ruelas on the [facility]), migraines, Vit D def, rebeca hematuria (see by Uro, had cysto which pt reports was normal), former smoker, hx night sweats over the past year with neg w/u (see last note for details), hemorrhoids, last seen by me in June for her annual, here today for focused same day visit for c/o cough.    Sx since Friday.  Cough productive c yellow to green sputum.  Also c sinus congestion and ear fullness B.  Also c sore throat.  Was worried since her daughter who is expecting, lives c her.  Has tried otc Mucinex and Delsym s relief.  No fever.  No body aches.    Today also c L ankle pain.   Thinks she may     Past Medical History:  Past Medical History:   Diagnosis Date    Adjustment disorder     Anxiety     Depression     Fatigue     Headache     Hx of psychiatric care     Hypertension     Low vitamin D level     Panic disorder     Psychiatric problem        Home Medications:  Prior to Admission medications    Medication Sig Start Date End Date Taking? Authorizing Provider   cholecalciferol, vitamin D3, (VITAMIN D3) 1,000 unit capsule Take 1,000 Units by mouth 2 (two) times daily.    Historical Provider   clonazePAM (KLONOPIN) 0.5 MG tablet Take 0.5 mg by mouth as needed. 7/14/21   Historical Provider   dextroamphetamine-amphetamine (ADDERALL XR) 5 MG 24 hr capsule Take 5 mg by mouth once daily.    Historical Provider   ergocalciferol (ERGOCALCIFEROL) 50,000 unit Cap Take 50,000 Units by mouth every 7 days.  3/12/21   Historical Provider   hydrocortisone (PROCTO-MED HC) 2.5 % rectal cream Place rectally 2 (two) times daily. 7/9/20   Teresa Mullins, NP   ketoconazole (NIZORAL) 2 % shampoo Wash hair with medicated shampoo at least  "2x/week - let sit on scalp at least 5 minutes prior to rinsing 21   Purvi Cartagena MD   spironolactone (ALDACTONE) 50 MG tablet Start with 1 po qday, increase to 2 po qday as tolerated 21   Purvi Cartagena MD   triamcinolone acetonide 0.1% (KENALOG) 0.1 % cream Apply topically 2 (two) times daily.  Patient not taking: Reported on 2021 3/29/21   Ema Gaston MD       Allergies:  Review of patient's allergies indicates:   Allergen Reactions    Azithromycin Nausea And Vomiting       Social History:  Social History     Tobacco Use    Smoking status: Former Smoker     Packs/day: 0.50     Years: 2.00     Pack years: 1.00     Quit date: 2017     Years since quittin.1    Smokeless tobacco: Never Used   Substance Use Topics    Alcohol use: No    Drug use: No        Review of Systems   Constitutional: Negative for chills, fatigue and fever.   HENT: Positive for congestion, ear pain, postnasal drip, sinus pressure, sinus pain and sore throat.    Respiratory: Negative for cough, chest tightness and shortness of breath.    Cardiovascular: Negative for chest pain.   Gastrointestinal: Negative for abdominal pain and blood in stool.   Genitourinary: Negative for dysuria and frequency.         Health Maintenance:     Immunizations:   Influenza is not up to date, declined, Risks and benefits were discussed with patient.  TDap is not up to date, declined, Risks and benefits were discussed with patient.  COVID vaccine recommended but declined by pt.     Cancer Screening:  PAP: 2020 Dr. Maloney at at , records requesting via [employer]  Mammogram: 2021 benign at [facility]  Colonoscopy:  cologuard ordered at last visit but never completed by pt.  Advised to complete.    Objective:   /84 (BP Location: Right arm, Patient Position: Sitting, BP Method: Medium (Manual))   Pulse 98   Ht 5' 3" (1.6 m)   Wt 70.1 kg (154 lb 8.7 oz)   SpO2 99%   BMI 27.38 kg/m²        General: AAO x3, no apparent " distress  HEENT: PERRL, post nasal drip noted, fluid behind TM B, mild TTP over frontal sinuses.  CV: RRR, no m/r/g  Pulm: Lungs CTAB, no crackles, no wheezes  Abd: s/NT/ND +BS  Extremities: no c/c/e    Labs:     Lab Results   Component Value Date    WBC 6.5 06/04/2021    HGB 13.0 06/04/2021    HCT 38.4 06/04/2021    MCV 92.1 06/04/2021     06/04/2021     Sodium   Date Value Ref Range Status   06/04/2021 139 135 - 146 mmol/L Final     Potassium   Date Value Ref Range Status   06/04/2021 3.9 3.5 - 5.3 mmol/L Final     Chloride   Date Value Ref Range Status   06/04/2021 99 98 - 110 mmol/L Final     CO2   Date Value Ref Range Status   06/04/2021 31 20 - 32 mmol/L Final     Glucose   Date Value Ref Range Status   06/04/2021 96 65 - 99 mg/dL Final     Comment:                   Fasting reference interval          BUN   Date Value Ref Range Status   06/04/2021 12 7 - 25 mg/dL Final     Creatinine   Date Value Ref Range Status   06/04/2021 0.57 0.50 - 1.10 mg/dL Final     Calcium   Date Value Ref Range Status   06/04/2021 9.5 8.6 - 10.2 mg/dL Final     Total Protein   Date Value Ref Range Status   06/04/2021 6.9 6.1 - 8.1 g/dL Final     Albumin   Date Value Ref Range Status   06/04/2021 4.3 3.6 - 5.1 g/dL Final     Total Bilirubin   Date Value Ref Range Status   06/04/2021 0.4 0.2 - 1.2 mg/dL Final     Alkaline Phosphatase   Date Value Ref Range Status   08/13/2020 49 (L) 55 - 135 U/L Final     AST   Date Value Ref Range Status   06/04/2021 14 10 - 35 U/L Final     ALT   Date Value Ref Range Status   06/04/2021 13 6 - 29 U/L Final     Anion Gap   Date Value Ref Range Status   08/13/2020 6 (L) 8 - 16 mmol/L Final     eGFR if    Date Value Ref Range Status   06/04/2021 130 > OR = 60 mL/min/1.73m2 Final     eGFR if non    Date Value Ref Range Status   06/04/2021 112 > OR = 60 mL/min/1.73m2 Final     Lab Results   Component Value Date    HGBA1C 5.2 07/03/2019     Lab Results   Component  Value Date    LDLCALC 129 (H) 06/04/2021     Lab Results   Component Value Date    TSH 2.283 08/13/2020         Assessment/Plan     Juanita was seen today for cough.    Diagnoses and all orders for this visit:    Acute non-recurrent maxillary sinusitis  Will tx  -     amoxicillin-clavulanate 875-125mg (AUGMENTIN) 875-125 mg per tablet; Take 1 tablet by mouth every 12 (twelve) hours. for 5 days  -     methylPREDNISolone (MEDROL DOSEPACK) 4 mg tablet; use as directed  -     Discontinue: guaiFENesin-codeine 100-10 mg/5 ml (TUSSI-ORGANIDIN NR)  mg/5 mL syrup; Take 5 mLs by mouth nightly as needed for Cough.    Acute left ankle pain  Will give trial of naproxen  -     naproxen (NAPROSYN) 500 MG tablet; Take 1 tablet (500 mg total) by mouth 2 (two) times daily. for 7 days      HM as above  RTC in 6 mos for routine annual exam, sooner if needed.    Ema Gaston MD  Department of Internal Medicine - [facility]  01/18/2022

## 2021-12-09 ENCOUNTER — HOSPITAL ENCOUNTER (OUTPATIENT)
Dept: RADIOLOGY | Facility: HOSPITAL | Age: 46
Discharge: HOME | End: 2021-12-09
Attending: INTERNAL MEDICINE
Payer: COMMERCIAL

## 2021-12-09 ENCOUNTER — PATIENT MESSAGE (OUTPATIENT)
Dept: INTERNAL MEDICINE | Facility: CLINIC | Age: 46
End: 2021-12-09
Payer: COMMERCIAL

## 2021-12-09 DIAGNOSIS — M25.572 PAIN OF JOINT OF LEFT ANKLE AND FOOT: ICD-10-CM

## 2021-12-09 DIAGNOSIS — R05.9 COUGH: Primary | ICD-10-CM

## 2021-12-09 PROCEDURE — 73610 X-RAY EXAM OF ANKLE: CPT | Mod: 26,LT,, | Performed by: RADIOLOGY

## 2021-12-09 PROCEDURE — 73630 X-RAY EXAM OF FOOT: CPT | Mod: 26,LT,, | Performed by: RADIOLOGY

## 2021-12-09 PROCEDURE — 73630 XR FOOT COMPLETE 3 VIEW LEFT: ICD-10-PCS | Mod: 26,LT,, | Performed by: RADIOLOGY

## 2021-12-09 PROCEDURE — 73610 XR ANKLE COMPLETE 3 VIEW LEFT: ICD-10-PCS | Mod: 26,LT,, | Performed by: RADIOLOGY

## 2021-12-09 PROCEDURE — 73610 X-RAY EXAM OF ANKLE: CPT | Mod: TC,LT

## 2021-12-09 PROCEDURE — 73630 X-RAY EXAM OF FOOT: CPT | Mod: TC,LT

## 2021-12-09 RX ORDER — BENZONATATE 100 MG/1
100 CAPSULE ORAL 3 TIMES DAILY PRN
Qty: 30 CAPSULE | Refills: 0 | Status: SHIPPED | OUTPATIENT
Start: 2021-12-09 | End: 2021-12-19

## 2021-12-10 ENCOUNTER — PATIENT MESSAGE (OUTPATIENT)
Dept: INTERNAL MEDICINE | Facility: CLINIC | Age: 46
End: 2021-12-10
Payer: COMMERCIAL

## 2022-01-18 ENCOUNTER — PATIENT MESSAGE (OUTPATIENT)
Dept: ADMINISTRATIVE | Facility: HOSPITAL | Age: 47
End: 2022-01-18
Payer: COMMERCIAL

## 2022-04-11 ENCOUNTER — OFFICE VISIT (OUTPATIENT)
Dept: INTERNAL MEDICINE | Facility: CLINIC | Age: 47
End: 2022-04-11
Payer: COMMERCIAL

## 2022-04-11 ENCOUNTER — LAB VISIT (OUTPATIENT)
Dept: LAB | Facility: HOSPITAL | Age: 47
End: 2022-04-11
Payer: COMMERCIAL

## 2022-04-11 VITALS
HEART RATE: 85 BPM | BODY MASS INDEX: 26.93 KG/M2 | SYSTOLIC BLOOD PRESSURE: 110 MMHG | DIASTOLIC BLOOD PRESSURE: 68 MMHG | OXYGEN SATURATION: 97 % | WEIGHT: 152 LBS | HEIGHT: 63 IN

## 2022-04-11 DIAGNOSIS — R53.83 FATIGUE, UNSPECIFIED TYPE: ICD-10-CM

## 2022-04-11 DIAGNOSIS — E53.8 B12 DEFICIENCY: ICD-10-CM

## 2022-04-11 DIAGNOSIS — Z00.00 HEALTH CARE MAINTENANCE: ICD-10-CM

## 2022-04-11 DIAGNOSIS — E55.9 VITAMIN D DEFICIENCY: Chronic | ICD-10-CM

## 2022-04-11 DIAGNOSIS — Z00.00 HEALTH CARE MAINTENANCE: Primary | ICD-10-CM

## 2022-04-11 DIAGNOSIS — G47.9 SLEEP DISTURBANCE: ICD-10-CM

## 2022-04-11 LAB
BILIRUB UR QL STRIP: NEGATIVE
CLARITY UR REFRACT.AUTO: CLEAR
COLOR UR AUTO: YELLOW
GLUCOSE UR QL STRIP: NEGATIVE
HGB UR QL STRIP: ABNORMAL
KETONES UR QL STRIP: NEGATIVE
LEUKOCYTE ESTERASE UR QL STRIP: NEGATIVE
MICROSCOPIC COMMENT: ABNORMAL
NITRITE UR QL STRIP: NEGATIVE
PH UR STRIP: 5 [PH] (ref 5–8)
PROT UR QL STRIP: NEGATIVE
RBC #/AREA URNS AUTO: 6 /HPF (ref 0–4)
SP GR UR STRIP: 1.02 (ref 1–1.03)
SQUAMOUS #/AREA URNS AUTO: 2 /HPF
URN SPEC COLLECT METH UR: ABNORMAL
WBC #/AREA URNS AUTO: 1 /HPF (ref 0–5)

## 2022-04-11 PROCEDURE — 99214 PR OFFICE/OUTPT VISIT, EST, LEVL IV, 30-39 MIN: ICD-10-PCS | Mod: S$GLB,,, | Performed by: PHYSICIAN ASSISTANT

## 2022-04-11 PROCEDURE — 3074F SYST BP LT 130 MM HG: CPT | Mod: CPTII,S$GLB,, | Performed by: PHYSICIAN ASSISTANT

## 2022-04-11 PROCEDURE — 3074F PR MOST RECENT SYSTOLIC BLOOD PRESSURE < 130 MM HG: ICD-10-PCS | Mod: CPTII,S$GLB,, | Performed by: PHYSICIAN ASSISTANT

## 2022-04-11 PROCEDURE — 99214 OFFICE O/P EST MOD 30 MIN: CPT | Mod: S$GLB,,, | Performed by: PHYSICIAN ASSISTANT

## 2022-04-11 PROCEDURE — 1159F PR MEDICATION LIST DOCUMENTED IN MEDICAL RECORD: ICD-10-PCS | Mod: CPTII,S$GLB,, | Performed by: PHYSICIAN ASSISTANT

## 2022-04-11 PROCEDURE — 99999 PR PBB SHADOW E&M-EST. PATIENT-LVL IV: CPT | Mod: PBBFAC,,, | Performed by: PHYSICIAN ASSISTANT

## 2022-04-11 PROCEDURE — 1160F PR REVIEW ALL MEDS BY PRESCRIBER/CLIN PHARMACIST DOCUMENTED: ICD-10-PCS | Mod: CPTII,S$GLB,, | Performed by: PHYSICIAN ASSISTANT

## 2022-04-11 PROCEDURE — 81001 URINALYSIS AUTO W/SCOPE: CPT | Performed by: PHYSICIAN ASSISTANT

## 2022-04-11 PROCEDURE — 1159F MED LIST DOCD IN RCRD: CPT | Mod: CPTII,S$GLB,, | Performed by: PHYSICIAN ASSISTANT

## 2022-04-11 PROCEDURE — 1160F RVW MEDS BY RX/DR IN RCRD: CPT | Mod: CPTII,S$GLB,, | Performed by: PHYSICIAN ASSISTANT

## 2022-04-11 PROCEDURE — 3008F PR BODY MASS INDEX (BMI) DOCUMENTED: ICD-10-PCS | Mod: CPTII,S$GLB,, | Performed by: PHYSICIAN ASSISTANT

## 2022-04-11 PROCEDURE — 3078F DIAST BP <80 MM HG: CPT | Mod: CPTII,S$GLB,, | Performed by: PHYSICIAN ASSISTANT

## 2022-04-11 PROCEDURE — 3008F BODY MASS INDEX DOCD: CPT | Mod: CPTII,S$GLB,, | Performed by: PHYSICIAN ASSISTANT

## 2022-04-11 PROCEDURE — 3078F PR MOST RECENT DIASTOLIC BLOOD PRESSURE < 80 MM HG: ICD-10-PCS | Mod: CPTII,S$GLB,, | Performed by: PHYSICIAN ASSISTANT

## 2022-04-11 PROCEDURE — 99999 PR PBB SHADOW E&M-EST. PATIENT-LVL IV: ICD-10-PCS | Mod: PBBFAC,,, | Performed by: PHYSICIAN ASSISTANT

## 2022-04-11 RX ORDER — DEXTROAMPHETAMINE SACCHARATE, AMPHETAMINE ASPARTATE, DEXTROAMPHETAMINE SULFATE, AND AMPHETAMINE SULFATE 3.125; 3.125; 3.125; 3.125 MG/1; MG/1; MG/1; MG/1
TABLET ORAL
COMMUNITY
Start: 2022-03-29 | End: 2022-09-21

## 2022-04-11 NOTE — PROGRESS NOTES
Subjective:       Patient ID: Juanita Salas is a 46 y.o. female.        Chief Complaint: Follow-up    Juanita Salas is an established patient of Ema Gaston MD here today for annual exam.    Went 5-6 months with no menstrual cycle, then had a menstrual cycle, had a few menstrual cycles, then had 2-3 menstrual cycles, irregular, plans to see her outside gynecologist to have hormone levels checked, no desire for HRT    Past medical history anxiety and depression with panic d/o, sinus tachycardia (h/o palpitations with overall negative Holter and normal echo), migraines, vitamin d def, former smoker, hemorrhoids, b12 def no longer on supplement.  Given spironolactone for hair loss but she is not taking it.    She is followed by outside cardiologist and on adderall 12.5 mg BID for fatigue, occ takes klonopin 0.5 mg at night to help with sleep    Anxiety and depression - no longer on lexapro, does not feel anxious or depressed    Vitamin d def - taking vitamin d 50,000 U/week    Hemorrhoids - uses HC rectal cream prn    Lipids - not taking a statin, pravastatin in past caused leg pain?         Review of Systems   Constitutional: Positive for fatigue. Negative for chills, diaphoresis and fever.   HENT: Negative for congestion and sore throat.    Eyes: Negative for visual disturbance.   Respiratory: Negative for cough, chest tightness and shortness of breath.    Cardiovascular: Negative for chest pain, palpitations and leg swelling.   Gastrointestinal: Negative for abdominal pain, blood in stool, constipation, diarrhea, nausea and vomiting.   Genitourinary: Positive for menstrual problem (irregular cycles). Negative for dysuria, frequency, hematuria and urgency.   Musculoskeletal: Negative for arthralgias and back pain.   Skin: Negative for rash.   Neurological: Negative for dizziness, syncope, weakness and headaches.   Psychiatric/Behavioral: Negative for dysphoric mood and sleep disturbance. The patient is not  nervous/anxious.        Objective:      Physical Exam  Vitals and nursing note reviewed.   Constitutional:       Appearance: Normal appearance. She is well-developed.   HENT:      Head: Normocephalic.      Right Ear: External ear normal.      Left Ear: External ear normal.   Eyes:      Pupils: Pupils are equal, round, and reactive to light.   Cardiovascular:      Rate and Rhythm: Normal rate and regular rhythm.      Heart sounds: Normal heart sounds. No murmur heard.    No friction rub. No gallop.   Pulmonary:      Effort: Pulmonary effort is normal. No respiratory distress.      Breath sounds: Normal breath sounds.   Abdominal:      Palpations: Abdomen is soft.      Tenderness: There is no abdominal tenderness.   Skin:     General: Skin is warm and dry.   Neurological:      Mental Status: She is alert.         Assessment:       1. Health care maintenance    2. Fatigue, unspecified type    3. Sleep disturbance    4. B12 deficiency    5. Vitamin D deficiency        Plan:       Juanita was seen today for follow-up.    Diagnoses and all orders for this visit:    Health care maintenance  -     CBC Auto Differential; Future  -     Comprehensive Metabolic Panel; Future  -     Lipid Panel; Future  -     TSH; Future  -     Urinalysis, Reflex to Urine Culture Urine, Clean Catch; Future    Fatigue, unspecified type - much improved with adderall, followed by outside cardiologist  -     Ferritin; Future  -     Iron and TIBC; Future    Sleep disturbance - occ takes klonopin, maybe 3-4 times/month    B12 deficiency - no longer taking b12 supplement  -     Vitamin B12; Future    Vitamin D deficiency  -     Vitamin D; Future    She will see her gynecologist for irregular menstrual cycles and hormone levels    Pt has been given instructions populated from [employer] database and has verbalized understanding of the after visit summary and information contained wherein.    Follow up with a primary care provider. May go to ER for acute  "shortness of breath, lightheadedness, fever, or any other emergent complaints or changes in condition.    "This note will be shared with the patient"    Future Appointments   Date Time Provider Department Center   9/14/2022 11:30 AM Ema Gaston MD [facility]                 "

## 2022-04-12 ENCOUNTER — TELEPHONE (OUTPATIENT)
Dept: INTERNAL MEDICINE | Facility: CLINIC | Age: 47
End: 2022-04-12
Payer: COMMERCIAL

## 2022-04-12 DIAGNOSIS — R31.29 MICROSCOPIC HEMATURIA: Primary | ICD-10-CM

## 2022-04-12 NOTE — TELEPHONE ENCOUNTER
Pt saw her results on Portal. She states that she was not menstruating at the time of collection yesterday. Her current menstrual cycles are sporadic. She was seen in Urology in the past and really did not give much reason for the cause.

## 2022-04-12 NOTE — TELEPHONE ENCOUNTER
----- Message from Kinza Thomas PA-C sent at 4/12/2022  6:23 AM CDT -----  Please call patient  When was last menstrual cycle?  Is it about to start or just completed?  Microscopic blood in the urine  In review of Dr. Gaston's note, she has had microscopic blood in the urine evaluated by urology in the past?  Please confirm

## 2022-04-18 ENCOUNTER — PATIENT OUTREACH (OUTPATIENT)
Dept: ADMINISTRATIVE | Facility: OTHER | Age: 47
End: 2022-04-18
Payer: COMMERCIAL

## 2022-04-18 NOTE — PROGRESS NOTES
LINKS immunization registry updated  Care Everywhere updated  Health Maintenance updated  Chart reviewed for overdue Proactive [facility] ([facility]) health maintenance testing (CRS, Breast Ca, Diabetic Eye Exam)   Orders entered:N/A

## 2022-04-19 ENCOUNTER — OFFICE VISIT (OUTPATIENT)
Dept: UROLOGY | Facility: CLINIC | Age: 47
End: 2022-04-19
Payer: COMMERCIAL

## 2022-04-19 VITALS
HEART RATE: 93 BPM | DIASTOLIC BLOOD PRESSURE: 80 MMHG | HEIGHT: 63 IN | BODY MASS INDEX: 26.49 KG/M2 | SYSTOLIC BLOOD PRESSURE: 122 MMHG | WEIGHT: 149.5 LBS

## 2022-04-19 DIAGNOSIS — R31.29 HEMATURIA, MICROSCOPIC: Primary | ICD-10-CM

## 2022-04-19 DIAGNOSIS — R31.29 MICROSCOPIC HEMATURIA: ICD-10-CM

## 2022-04-19 PROCEDURE — 88112 CYTOPATH CELL ENHANCE TECH: CPT | Performed by: PATHOLOGY

## 2022-04-19 PROCEDURE — 3008F BODY MASS INDEX DOCD: CPT | Mod: CPTII,S$GLB,, | Performed by: UROLOGY

## 2022-04-19 PROCEDURE — 3079F DIAST BP 80-89 MM HG: CPT | Mod: CPTII,S$GLB,, | Performed by: UROLOGY

## 2022-04-19 PROCEDURE — 1159F MED LIST DOCD IN RCRD: CPT | Mod: CPTII,S$GLB,, | Performed by: UROLOGY

## 2022-04-19 PROCEDURE — 99999 PR PBB SHADOW E&M-EST. PATIENT-LVL III: ICD-10-PCS | Mod: PBBFAC,,, | Performed by: UROLOGY

## 2022-04-19 PROCEDURE — 99203 OFFICE O/P NEW LOW 30 MIN: CPT | Mod: S$GLB,,, | Performed by: UROLOGY

## 2022-04-19 PROCEDURE — 88112 CYTOPATH CELL ENHANCE TECH: CPT | Mod: 26,,, | Performed by: PATHOLOGY

## 2022-04-19 PROCEDURE — 1159F PR MEDICATION LIST DOCUMENTED IN MEDICAL RECORD: ICD-10-PCS | Mod: CPTII,S$GLB,, | Performed by: UROLOGY

## 2022-04-19 PROCEDURE — 3079F PR MOST RECENT DIASTOLIC BLOOD PRESSURE 80-89 MM HG: ICD-10-PCS | Mod: CPTII,S$GLB,, | Performed by: UROLOGY

## 2022-04-19 PROCEDURE — 3074F SYST BP LT 130 MM HG: CPT | Mod: CPTII,S$GLB,, | Performed by: UROLOGY

## 2022-04-19 PROCEDURE — 88112 PR  CYTOPATH, CELL ENHANCE TECH: ICD-10-PCS | Mod: 26,,, | Performed by: PATHOLOGY

## 2022-04-19 PROCEDURE — 99999 PR PBB SHADOW E&M-EST. PATIENT-LVL III: CPT | Mod: PBBFAC,,, | Performed by: UROLOGY

## 2022-04-19 PROCEDURE — 3074F PR MOST RECENT SYSTOLIC BLOOD PRESSURE < 130 MM HG: ICD-10-PCS | Mod: CPTII,S$GLB,, | Performed by: UROLOGY

## 2022-04-19 PROCEDURE — 99203 PR OFFICE/OUTPT VISIT, NEW, LEVL III, 30-44 MIN: ICD-10-PCS | Mod: S$GLB,,, | Performed by: UROLOGY

## 2022-04-19 PROCEDURE — 3008F PR BODY MASS INDEX (BMI) DOCUMENTED: ICD-10-PCS | Mod: CPTII,S$GLB,, | Performed by: UROLOGY

## 2022-04-19 NOTE — PROGRESS NOTES
Subjective:       Patient ID: Juanita Salas is a 46 y.o. female.    Chief Complaint: hematuria, microscopic (Pt had a history of blood in the urine back in 2017 , she was told to follow up per pcp with history of blood in urine. )    HPI patient is here for microscopic hematuria.  No lower tract irritative symptoms.  She had a negative workup for 5 years ago when she had 4 red cells.  On her last urinalysis she had 6 red blood cells.  This is not seem to be is significant change.    Past Medical History:   Diagnosis Date    Adjustment disorder     Anxiety     Depression     Fatigue     Headache     Hx of psychiatric care     Hypertension     Low vitamin D level     Panic disorder     Psychiatric problem        Past Surgical History:   Procedure Laterality Date     SECTION      DILATION AND CURETTAGE OF UTERUS         Family History   Problem Relation Age of Onset    Diabetes Mother     Pancreatic cancer Mother 47    Skin cancer Father         NMSC    Multiple sclerosis Sister     Cancer Sister 41        uterine origin suspected    Eczema Daughter     Hypertension Maternal Aunt     Hyperlipidemia Maternal Aunt     Hyperlipidemia Maternal Uncle     Hypertension Maternal Uncle     Hyperlipidemia Paternal Aunt     Hypertension Paternal Aunt     Hyperlipidemia Paternal Uncle     Hypertension Paternal Uncle     Stroke Maternal Grandmother     Psoriasis Maternal Grandmother     Heart disease Maternal Grandfather     Hyperlipidemia Maternal Grandfather     Hypertension Maternal Grandfather     Skin cancer Paternal Grandmother         NMSC    Other Paternal Grandfather         patient believes he had bladder cancer    Other Paternal Aunt         pheochromocytoma; Von-Hippel-Lindau syndrome    Other Paternal Cousin         brain tumor- benign vs malignant?    Colon cancer Neg Hx     Esophageal cancer Neg Hx     Stomach cancer Neg Hx     Rectal cancer Neg Hx     Ulcerative  colitis Neg Hx     Irritable bowel syndrome Neg Hx     Crohn's disease Neg Hx     Celiac disease Neg Hx     Asthma Neg Hx     Allergic rhinitis Neg Hx     Allergies Neg Hx     Angioedema Neg Hx     Atopy Neg Hx     Immunodeficiency Neg Hx     Rhinitis Neg Hx     Urticaria Neg Hx        Social History     Socioeconomic History    Marital status:    Tobacco Use    Smoking status: Former Smoker     Packs/day: 0.50     Years: 2.00     Pack years: 1.00     Quit date: 2017     Years since quittin.4    Smokeless tobacco: Never Used   Substance and Sexual Activity    Alcohol use: No    Drug use: No    Sexual activity: Yes     Partners: Male     Birth control/protection: Condom   Social History Narrative     with son (23) and daugther (14)     Social Determinants of Health     Financial Resource Strain: Low Risk     Difficulty of Paying Living Expenses: Not very hard   Food Insecurity: No Food Insecurity    Worried About Running Out of Food in the Last Year: Never true    Ran Out of Food in the Last Year: Never true   Transportation Needs: No Transportation Needs    Lack of Transportation (Medical): No    Lack of Transportation (Non-Medical): No   Physical Activity: Insufficiently Active    Days of Exercise per Week: 3 days    Minutes of Exercise per Session: 30 min   Stress: Stress Concern Present    Feeling of Stress : To some extent   Social Connections: Unknown    Frequency of Communication with Friends and Family: More than three times a week    Frequency of Social Gatherings with Friends and Family: Once a week    Active Member of Clubs or Organizations: No    Attends Club or Organization Meetings: Never    Marital Status: Living with partner   Housing Stability: Low Risk     Unable to Pay for Housing in the Last Year: No    Number of Places Lived in the Last Year: 1    Unstable Housing in the Last Year: No        Allergies:  Azithromycin    Medications:    Current Outpatient Medications:     ADDERALL 12.5 mg tablet, Take 1 tablet by mouth   in morning, half at noon and half in late afternoon, Disp: , Rfl:     cholecalciferol, vitamin D3, (VITAMIN D3) 25 mcg (1,000 unit) capsule, Take 1,000 Units by mouth 2 (two) times daily., Disp: , Rfl:     clonazePAM (KLONOPIN) 0.5 MG tablet, Take 0.5 mg by mouth as needed., Disp: , Rfl:     ergocalciferol (ERGOCALCIFEROL) 50,000 unit Cap, Take 50,000 Units by mouth every 7 days. , Disp: , Rfl:     hydrocortisone (PROCTO-MED HC) 2.5 % rectal cream, Place rectally 2 (two) times daily., Disp: 28 g, Rfl: 1    ketoconazole (NIZORAL) 2 % shampoo, Wash hair with medicated shampoo at least 2x/week - let sit on scalp at least 5 minutes prior to rinsing, Disp: 120 mL, Rfl: 5    acyclovir (ZOVIRAX) 400 MG tablet, , Disp: , Rfl:     spironolactone (ALDACTONE) 50 MG tablet, Start with 1 po qday, increase to 2 po qday as tolerated (Patient not taking: No sig reported), Disp: 60 tablet, Rfl: 3    triamcinolone acetonide 0.1% (KENALOG) 0.1 % cream, Apply topically 2 (two) times daily. (Patient not taking: Reported on 4/19/2022), Disp: 45 g, Rfl: 0    Review of Systems    Objective:      Physical Exam  Constitutional:       Appearance: She is well-developed.   HENT:      Head: Normocephalic.   Cardiovascular:      Rate and Rhythm: Normal rate.   Pulmonary:      Effort: Pulmonary effort is normal.   Abdominal:      Palpations: Abdomen is soft.   Musculoskeletal:         General: Normal range of motion.   Skin:     General: Skin is warm.   Neurological:      Mental Status: She is alert.         Assessment:       1. Hematuria, microscopic    2. Microscopic hematuria        Plan:       Juanita was seen today for hematuria, microscopic.    Diagnoses and all orders for this visit:    Hematuria, microscopic    Microscopic hematuria  -     Ambulatory referral/consult to Urology  -     Cytology,  Urine     Patient no longer smokes.    she feels well and this is not a significant change in h er uri microscopically she has 2 red blood cells.  For will send cytology and recheck her urine in 4 months

## 2022-04-22 ENCOUNTER — PATIENT MESSAGE (OUTPATIENT)
Dept: UROLOGY | Facility: CLINIC | Age: 47
End: 2022-04-22
Payer: COMMERCIAL

## 2022-04-22 LAB
FINAL PATHOLOGIC DIAGNOSIS: NORMAL
Lab: NORMAL

## 2022-06-09 ENCOUNTER — PATIENT MESSAGE (OUTPATIENT)
Dept: ADMINISTRATIVE | Facility: HOSPITAL | Age: 47
End: 2022-06-09
Payer: COMMERCIAL

## 2022-06-09 DIAGNOSIS — Z12.11 SCREENING FOR COLON CANCER: ICD-10-CM

## 2022-06-15 ENCOUNTER — PATIENT MESSAGE (OUTPATIENT)
Dept: INTERNAL MEDICINE | Facility: CLINIC | Age: 47
End: 2022-06-15
Payer: COMMERCIAL

## 2022-06-22 DIAGNOSIS — Z12.31 OTHER SCREENING MAMMOGRAM: ICD-10-CM

## 2022-06-28 ENCOUNTER — OFFICE VISIT (OUTPATIENT)
Dept: INTERNAL MEDICINE | Facility: CLINIC | Age: 47
End: 2022-06-28
Payer: COMMERCIAL

## 2022-06-28 DIAGNOSIS — J02.9 SORE THROAT: Primary | ICD-10-CM

## 2022-06-28 DIAGNOSIS — R09.81 SINUS CONGESTION: ICD-10-CM

## 2022-06-28 PROCEDURE — 99213 PR OFFICE/OUTPT VISIT, EST, LEVL III, 20-29 MIN: ICD-10-PCS | Mod: 95,,, | Performed by: INTERNAL MEDICINE

## 2022-06-28 PROCEDURE — 99213 OFFICE O/P EST LOW 20 MIN: CPT | Mod: 95,,, | Performed by: INTERNAL MEDICINE

## 2022-06-28 RX ORDER — AMOXICILLIN 875 MG/1
875 TABLET, FILM COATED ORAL EVERY 12 HOURS
Qty: 14 TABLET | Refills: 0 | Status: SHIPPED | OUTPATIENT
Start: 2022-06-28 | End: 2022-07-14 | Stop reason: ALTCHOICE

## 2022-06-28 NOTE — PROGRESS NOTES
Subjective:       Patient ID: Juanita Salas is a 47 y.o. female.    Chief Complaint: Sore Throat      The patient location is: [place], [place]  The chief complaint leading to consultation is: Sinus problems    Visit type: audiovisual    Face to Face time with patient: 10 minutes  13 minutes of total time spent on the encounter, which includes face to face time and non-face to face time preparing to see the patient (eg, review of tests), Obtaining and/or reviewing separately obtained history, Documenting clinical information in the electronic or other health record, Independently interpreting results (not separately reported) and communicating results to the patient/family/caregiver, or Care coordination (not separately reported).         Each patient to whom he or she provides medical services by telemedicine is:  (1) informed of the relationship between the physician and patient and the respective role of any other health care provider with respect to management of the patient; and (2) notified that he or she may decline to receive medical services by telemedicine and may withdraw from such care at any time.    Notes:     HPI: 5 days, sore throat, drip. Slight cough, mild temp. Yellow sputum. Tried Mucinex.    patient states she gets a sinus infection every year and it feels just like this and she wants some antibiotics because she is going out of town later this week.  I explained that a lot of our COVID infections or acting like sinus infections allergies and sore throat so I would like her to do a COVID test 1st.  She says she has a home test and will do that and send me the results.  I explained if negative we can certainly put her on an antibiotic prior to her trip.   She expressed understanding    Review of Systems   Constitutional: Negative for activity change and unexpected weight change.   HENT: Positive for postnasal drip, sinus pressure/congestion and sore throat. Negative for hearing loss, rhinorrhea and  "trouble swallowing.    Eyes: Negative for discharge and visual disturbance.   Respiratory: Negative for chest tightness and wheezing.    Cardiovascular: Negative for chest pain and palpitations.   Gastrointestinal: Negative for blood in stool, constipation, diarrhea and vomiting.   Endocrine: Negative for polydipsia and polyuria.   Genitourinary: Negative for difficulty urinating, dysuria, hematuria and menstrual problem.   Musculoskeletal: Negative for arthralgias, joint swelling and neck pain.   Neurological: Negative for weakness and headaches.   Psychiatric/Behavioral: Negative for confusion and dysphoric mood.         Objective:      Physical Exam  Constitutional:       Appearance: Normal appearance.   Pulmonary:      Effort: No respiratory distress.   Neurological:      General: No focal deficit present.      Mental Status: She is alert and oriented to person, place, and time.   Psychiatric:         Mood and Affect: Mood normal.         Behavior: Behavior normal.         Assessment:       Problem List Items Addressed This Visit    None     Visit Diagnoses     Sore throat    -  Primary    Sinus congestion              Plan:       Juanita was seen today for sore throat.    Diagnoses and all orders for this visit:    Sore throat    Sinus congestion    Other orders  -     amoxicillin (AMOXIL) 875 MG tablet; Take 1 tablet (875 mg total) by mouth every 12 (twelve) hours.              will send antibiotic if patient does a COVID test and if negative      Portions of this note may have been created with voice recognition software. Occasional "wrong-word" or "sound-a-like" substitutions may have occurred due to the inherent limitations of voice recognition software. Please, read the note carefully and recognize, using context, where substitutions have occurred.  "

## 2022-07-11 ENCOUNTER — PATIENT MESSAGE (OUTPATIENT)
Dept: ADMINISTRATIVE | Facility: HOSPITAL | Age: 47
End: 2022-07-11
Payer: COMMERCIAL

## 2022-07-13 ENCOUNTER — OFFICE VISIT (OUTPATIENT)
Dept: INTERNAL MEDICINE | Facility: CLINIC | Age: 47
End: 2022-07-13
Payer: COMMERCIAL

## 2022-07-13 VITALS
BODY MASS INDEX: 27.7 KG/M2 | OXYGEN SATURATION: 98 % | WEIGHT: 156.31 LBS | SYSTOLIC BLOOD PRESSURE: 118 MMHG | DIASTOLIC BLOOD PRESSURE: 80 MMHG | HEIGHT: 63 IN | HEART RATE: 95 BPM

## 2022-07-13 DIAGNOSIS — R59.9 ENLARGED LYMPH NODE: ICD-10-CM

## 2022-07-13 DIAGNOSIS — Z28.20 VACCINE REFUSED BY PATIENT: ICD-10-CM

## 2022-07-13 DIAGNOSIS — Z87.891 FORMER SMOKER: ICD-10-CM

## 2022-07-13 DIAGNOSIS — J06.9 VIRAL URI WITH COUGH: Primary | ICD-10-CM

## 2022-07-13 PROCEDURE — 99999 PR PBB SHADOW E&M-EST. PATIENT-LVL IV: CPT | Mod: PBBFAC,,, | Performed by: NURSE PRACTITIONER

## 2022-07-13 PROCEDURE — 1160F PR REVIEW ALL MEDS BY PRESCRIBER/CLIN PHARMACIST DOCUMENTED: ICD-10-PCS | Mod: CPTII,S$GLB,, | Performed by: NURSE PRACTITIONER

## 2022-07-13 PROCEDURE — 3079F DIAST BP 80-89 MM HG: CPT | Mod: CPTII,S$GLB,, | Performed by: NURSE PRACTITIONER

## 2022-07-13 PROCEDURE — 1160F RVW MEDS BY RX/DR IN RCRD: CPT | Mod: CPTII,S$GLB,, | Performed by: NURSE PRACTITIONER

## 2022-07-13 PROCEDURE — 3079F PR MOST RECENT DIASTOLIC BLOOD PRESSURE 80-89 MM HG: ICD-10-PCS | Mod: CPTII,S$GLB,, | Performed by: NURSE PRACTITIONER

## 2022-07-13 PROCEDURE — 3074F SYST BP LT 130 MM HG: CPT | Mod: CPTII,S$GLB,, | Performed by: NURSE PRACTITIONER

## 2022-07-13 PROCEDURE — 1159F PR MEDICATION LIST DOCUMENTED IN MEDICAL RECORD: ICD-10-PCS | Mod: CPTII,S$GLB,, | Performed by: NURSE PRACTITIONER

## 2022-07-13 PROCEDURE — 1159F MED LIST DOCD IN RCRD: CPT | Mod: CPTII,S$GLB,, | Performed by: NURSE PRACTITIONER

## 2022-07-13 PROCEDURE — 99999 PR PBB SHADOW E&M-EST. PATIENT-LVL IV: ICD-10-PCS | Mod: PBBFAC,,, | Performed by: NURSE PRACTITIONER

## 2022-07-13 PROCEDURE — 3074F PR MOST RECENT SYSTOLIC BLOOD PRESSURE < 130 MM HG: ICD-10-PCS | Mod: CPTII,S$GLB,, | Performed by: NURSE PRACTITIONER

## 2022-07-13 PROCEDURE — 99214 PR OFFICE/OUTPT VISIT, EST, LEVL IV, 30-39 MIN: ICD-10-PCS | Mod: S$GLB,,, | Performed by: NURSE PRACTITIONER

## 2022-07-13 PROCEDURE — 99214 OFFICE O/P EST MOD 30 MIN: CPT | Mod: S$GLB,,, | Performed by: NURSE PRACTITIONER

## 2022-07-13 PROCEDURE — 3008F PR BODY MASS INDEX (BMI) DOCUMENTED: ICD-10-PCS | Mod: CPTII,S$GLB,, | Performed by: NURSE PRACTITIONER

## 2022-07-13 PROCEDURE — 3008F BODY MASS INDEX DOCD: CPT | Mod: CPTII,S$GLB,, | Performed by: NURSE PRACTITIONER

## 2022-07-13 RX ORDER — PROMETHAZINE HYDROCHLORIDE AND DEXTROMETHORPHAN HYDROBROMIDE 6.25; 15 MG/5ML; MG/5ML
5 SYRUP ORAL NIGHTLY PRN
Qty: 118 ML | Refills: 0 | Status: SHIPPED | OUTPATIENT
Start: 2022-07-13 | End: 2022-07-23

## 2022-07-13 RX ORDER — PREDNISONE 20 MG/1
40 TABLET ORAL DAILY
Qty: 10 TABLET | Refills: 0 | Status: SHIPPED | OUTPATIENT
Start: 2022-07-13 | End: 2022-07-18

## 2022-07-13 RX ORDER — PREDNISONE 20 MG/1
40 TABLET ORAL DAILY
Qty: 10 TABLET | Refills: 0 | Status: SHIPPED | OUTPATIENT
Start: 2022-07-13 | End: 2022-07-13

## 2022-07-13 RX ORDER — BENZONATATE 200 MG/1
200 CAPSULE ORAL 3 TIMES DAILY PRN
Qty: 30 CAPSULE | Refills: 0 | Status: SHIPPED | OUTPATIENT
Start: 2022-07-13 | End: 2022-07-23

## 2022-07-13 NOTE — PROGRESS NOTES
INTERNAL MEDICINE PROGRESS/URGENT CARE NOTE    CHIEF COMPLAINT     Chief Complaint   Patient presents with    Cough    Mass       HPI     Juanita Salas is a 47 y.o. female who presents for an urgent/follow up visit today. She is a current patient of Dr. Seth, last seen in clinic on 06/28/2022 for sore throat.    Virtual visit 06/28/2022 with Ladi for URI symptoms. She took full course of amoxicillin and states she still has productive cough. She states symptoms have improved. Denies SOB, fever, nausea, vomiting, headaches.     Knot behind knee- 2 days, denies injury or trauma. Has had enlarged lymph node under armpit in the past. Denies CP, leg swelling, unintended weight loss.     HM- she is overdue for mammogram, colonoscopy, tdap, and 1st dose COVID-19 vaccine.     Past Medical History:  Past Medical History:   Diagnosis Date    Adjustment disorder     Anxiety     Depression     Fatigue     Headache     Hx of psychiatric care     Hypertension     Low vitamin D level     Panic disorder     Psychiatric problem        Home Medications:  Prior to Admission medications    Medication Sig Start Date End Date Taking? Authorizing Provider   acyclovir (ZOVIRAX) 400 MG tablet  9/27/21   Historical Provider   ADDERALL 12.5 mg tablet Take 1 tablet by mouth   in morning, half at noon and half in late afternoon 3/29/22   Historical Provider   amoxicillin (AMOXIL) 875 MG tablet Take 1 tablet (875 mg total) by mouth every 12 (twelve) hours. 6/28/22   Walter Bledsoe MD   cholecalciferol, vitamin D3, (VITAMIN D3) 25 mcg (1,000 unit) capsule Take 1,000 Units by mouth 2 (two) times daily.    Historical Provider   clonazePAM (KLONOPIN) 0.5 MG tablet Take 0.5 mg by mouth as needed. 7/14/21   Historical Provider   ergocalciferol (ERGOCALCIFEROL) 50,000 unit Cap Take 50,000 Units by mouth every 7 days.  3/12/21   Historical Provider   hydrocortisone (PROCTO-MED HC) 2.5 % rectal cream Place rectally 2 (two) times  "daily. 7/9/20   Teresa Mullins, NP   ketoconazole (NIZORAL) 2 % shampoo Wash hair with medicated shampoo at least 2x/week - let sit on scalp at least 5 minutes prior to rinsing 7/19/21   Purvi Cartagena MD       Review of Systems:  Review of Systems   As per HPI and below:     General: No fever.   HENT: No facial pain.   Eyes: No eye pain.   Cardiovascular: No chest pain.   Respiratory: No dyspnea.   GI: No abdominal pain. No vomiting  Skin: No rashes.  Neuro: No syncope.    Musculoskeletal: No extremity pain. No swelling.    All other systems negative.     Health Maintainence:   Immunizations:  Health Maintenance       Date Due Completion Date    COVID-19 Vaccine (1) Never done ---    TETANUS VACCINE 09/26/2015 9/26/2005    Colorectal Cancer Screening Never done ---    Mammogram 06/14/2022 6/14/2021    Override on 5/30/2019: Done (Dr. Maloney/ [facility])    Override on 5/8/2017: Done (via gyn, benign per pt)    Influenza Vaccine (1) 09/01/2022 ---    Lipid Panel 04/11/2027 4/11/2022    Cervical Cancer Screening 06/27/2027 6/27/2022           PHYSICAL EXAM     /80 (BP Location: Right arm, Patient Position: Sitting, BP Method: Medium (Manual))   Pulse 95   Ht 5' 3" (1.6 m)   Wt 70.9 kg (156 lb 4.9 oz)   LMP 04/30/2022 (Within Weeks)   SpO2 98%   BMI 27.69 kg/m²     Physical Exam  Vitals and nursing note reviewed.   Constitutional:       General: She is not in acute distress.     Appearance: Normal appearance. She is well-developed.   HENT:      Head: Normocephalic and atraumatic.      Right Ear: External ear normal.      Left Ear: External ear normal.   Eyes:      General: No scleral icterus.     Extraocular Movements: Extraocular movements intact.      Conjunctiva/sclera: Conjunctivae normal.   Cardiovascular:      Rate and Rhythm: Normal rate and regular rhythm.      Pulses:           Dorsalis pedis pulses are 2+ on the right side and 2+ on the left side.      Heart sounds: Normal heart " sounds. No murmur heard.  Pulmonary:      Effort: Pulmonary effort is normal.      Breath sounds: Normal breath sounds. No wheezing, rhonchi or rales.   Musculoskeletal:      Right lower leg: No edema.      Left lower leg: No edema.   Lymphadenopathy:      Comments: Approximately 1 x 1 cm round, smooth, firm, mobile, non-tender mass behind right knee. No surrounding erythema, warmth.   Skin:     General: Skin is warm and dry.   Neurological:      General: No focal deficit present.      Mental Status: She is alert and oriented to person, place, and time.      Coordination: Coordination normal.      Gait: Gait normal.   Psychiatric:         Mood and Affect: Mood normal.         Behavior: Behavior normal.          LABS     Lab Results   Component Value Date    HGBA1C 5.2 07/03/2019     CMP  Sodium   Date Value Ref Range Status   04/11/2022 141 136 - 145 mmol/L Final     Potassium   Date Value Ref Range Status   04/11/2022 3.8 3.5 - 5.1 mmol/L Final     Chloride   Date Value Ref Range Status   04/11/2022 105 95 - 110 mmol/L Final     CO2   Date Value Ref Range Status   04/11/2022 29 23 - 29 mmol/L Final     Glucose   Date Value Ref Range Status   04/11/2022 103 70 - 110 mg/dL Final     BUN   Date Value Ref Range Status   04/11/2022 14 6 - 20 mg/dL Final     Creatinine   Date Value Ref Range Status   04/11/2022 0.7 0.5 - 1.4 mg/dL Final     Calcium   Date Value Ref Range Status   04/11/2022 9.0 8.7 - 10.5 mg/dL Final     Total Protein   Date Value Ref Range Status   04/11/2022 7.0 6.0 - 8.4 g/dL Final     Albumin   Date Value Ref Range Status   04/11/2022 4.0 3.5 - 5.2 g/dL Final     Total Bilirubin   Date Value Ref Range Status   04/11/2022 0.3 0.1 - 1.0 mg/dL Final     Comment:     For infants and newborns, interpretation of results should be based  on gestational age, weight and in agreement with clinical  observations.    Premature Infant recommended reference ranges:  Up to 24 hours.............<8.0 mg/dL  Up to 48  hours............<12.0 mg/dL  3-5 days..................<15.0 mg/dL  6-29 days.................<15.0 mg/dL       Alkaline Phosphatase   Date Value Ref Range Status   04/11/2022 45 (L) 55 - 135 U/L Final     AST   Date Value Ref Range Status   04/11/2022 16 10 - 40 U/L Final     ALT   Date Value Ref Range Status   04/11/2022 14 10 - 44 U/L Final     Anion Gap   Date Value Ref Range Status   04/11/2022 7 (L) 8 - 16 mmol/L Final     eGFR if    Date Value Ref Range Status   04/11/2022 >60.0 >60 mL/min/1.73 m^2 Final     eGFR if non    Date Value Ref Range Status   04/11/2022 >60.0 >60 mL/min/1.73 m^2 Final     Comment:     Calculation used to obtain the estimated glomerular filtration  rate (eGFR) is the CKD-EPI equation.        Lab Results   Component Value Date    WBC 6.20 04/11/2022    HGB 12.5 04/11/2022    HCT 38.8 04/11/2022    MCV 93 04/11/2022     04/11/2022     Lab Results   Component Value Date    CHOL 196 04/11/2022    CHOL 214 (H) 06/04/2021    CHOL 208 (H) 08/13/2020     Lab Results   Component Value Date    HDL 52 04/11/2022    HDL 59 06/04/2021    HDL 49 08/13/2020     Lab Results   Component Value Date    LDLCALC 129.2 04/11/2022    LDLCALC 129 (H) 06/04/2021    LDLCALC 129.0 08/13/2020     Lab Results   Component Value Date    TRIG 74 04/11/2022    TRIG 148 06/04/2021    TRIG 150 08/13/2020     Lab Results   Component Value Date    CHOLHDL 26.5 04/11/2022    CHOLHDL 3.6 06/04/2021    CHOLHDL 23.6 08/13/2020     Lab Results   Component Value Date    TSH 1.934 04/11/2022       ASSESSMENT/PLAN     Juanita Salas is a 47 y.o. female     Juanita was seen today for cough and mass.    Diagnoses and all orders for this visit:    Viral URI with cough-  The patient appears to have a viral upper respiratory infection.  Based upon the history and physical exam the patient does not appear to have a serious bacterial infection such as pneumonia, sepsis, bacterial sinusitis, strep  pharyngitis, parapharyngeal or peritonsillar abscess, meningitis.  Patient appears very well and I have given specific return precautions to the patient.  The patient can take the prescribed cough medications, short course of oral steroids per her request, and does not appear to need antibiotics at this time.   -     benzonatate (TESSALON) 200 MG capsule; Take 1 capsule (200 mg total) by mouth 3 (three) times daily as needed for Cough.  -     promethazine-dextromethorphan (PROMETHAZINE-DM) 6.25-15 mg/5 mL Syrp; Take 5 mLs by mouth nightly as needed (Cough).  -     Discontinue: predniSONE (DELTASONE) 20 MG tablet; Take 2 tablets (40 mg total) by mouth once daily. for 5 days  -     predniSONE (DELTASONE) 20 MG tablet; Take 2 tablets (40 mg total) by mouth once daily. for 5 days    Enlarged lymph node- discussed with patient that lymph nodes can become enlarged due to many factors (bacterial, viral, fungal, autoimmune). Advised close monitoring.    Comments:  right popliteal    Former smoker  -     Discontinue: predniSONE (DELTASONE) 20 MG tablet; Take 2 tablets (40 mg total) by mouth once daily. for 5 days  -     predniSONE (DELTASONE) 20 MG tablet; Take 2 tablets (40 mg total) by mouth once daily. for 5 days    Vaccine refused by patient- declined COVID-19 vaccination.        Patient education provided from [facility]. Patient was counseled on when and how to seek emergent care.     This note is dictated using the M*Modal Fluency Direct word recognition program. There are word recognition mistakes that are occasionally missed on review.     Department of Internal Medicine - [facility]  6:12 PM

## 2022-07-21 ENCOUNTER — OFFICE VISIT (OUTPATIENT)
Dept: INTERNAL MEDICINE | Facility: CLINIC | Age: 47
End: 2022-07-21
Payer: COMMERCIAL

## 2022-07-21 VITALS
HEART RATE: 115 BPM | OXYGEN SATURATION: 98 % | SYSTOLIC BLOOD PRESSURE: 130 MMHG | DIASTOLIC BLOOD PRESSURE: 86 MMHG | BODY MASS INDEX: 27.85 KG/M2 | WEIGHT: 157.19 LBS | HEIGHT: 63 IN

## 2022-07-21 DIAGNOSIS — R05.9 COUGH: ICD-10-CM

## 2022-07-21 DIAGNOSIS — J01.90 ACUTE BACTERIAL SINUSITIS: Primary | ICD-10-CM

## 2022-07-21 DIAGNOSIS — B96.89 ACUTE BACTERIAL SINUSITIS: Primary | ICD-10-CM

## 2022-07-21 DIAGNOSIS — F41.1 GAD (GENERALIZED ANXIETY DISORDER): ICD-10-CM

## 2022-07-21 DIAGNOSIS — Z87.891 FORMER SMOKER: ICD-10-CM

## 2022-07-21 DIAGNOSIS — R09.81 SINUS CONGESTION: ICD-10-CM

## 2022-07-21 PROCEDURE — 3008F BODY MASS INDEX DOCD: CPT | Mod: CPTII,S$GLB,, | Performed by: NURSE PRACTITIONER

## 2022-07-21 PROCEDURE — 1159F MED LIST DOCD IN RCRD: CPT | Mod: CPTII,S$GLB,, | Performed by: NURSE PRACTITIONER

## 2022-07-21 PROCEDURE — 99999 PR PBB SHADOW E&M-EST. PATIENT-LVL III: CPT | Mod: PBBFAC,,, | Performed by: NURSE PRACTITIONER

## 2022-07-21 PROCEDURE — 99999 PR PBB SHADOW E&M-EST. PATIENT-LVL III: ICD-10-PCS | Mod: PBBFAC,,, | Performed by: NURSE PRACTITIONER

## 2022-07-21 PROCEDURE — 99214 OFFICE O/P EST MOD 30 MIN: CPT | Mod: S$GLB,,, | Performed by: NURSE PRACTITIONER

## 2022-07-21 PROCEDURE — 3075F PR MOST RECENT SYSTOLIC BLOOD PRESS GE 130-139MM HG: ICD-10-PCS | Mod: CPTII,S$GLB,, | Performed by: NURSE PRACTITIONER

## 2022-07-21 PROCEDURE — 3079F PR MOST RECENT DIASTOLIC BLOOD PRESSURE 80-89 MM HG: ICD-10-PCS | Mod: CPTII,S$GLB,, | Performed by: NURSE PRACTITIONER

## 2022-07-21 PROCEDURE — 3008F PR BODY MASS INDEX (BMI) DOCUMENTED: ICD-10-PCS | Mod: CPTII,S$GLB,, | Performed by: NURSE PRACTITIONER

## 2022-07-21 PROCEDURE — 99214 PR OFFICE/OUTPT VISIT, EST, LEVL IV, 30-39 MIN: ICD-10-PCS | Mod: S$GLB,,, | Performed by: NURSE PRACTITIONER

## 2022-07-21 PROCEDURE — 1159F PR MEDICATION LIST DOCUMENTED IN MEDICAL RECORD: ICD-10-PCS | Mod: CPTII,S$GLB,, | Performed by: NURSE PRACTITIONER

## 2022-07-21 PROCEDURE — 3075F SYST BP GE 130 - 139MM HG: CPT | Mod: CPTII,S$GLB,, | Performed by: NURSE PRACTITIONER

## 2022-07-21 PROCEDURE — 3079F DIAST BP 80-89 MM HG: CPT | Mod: CPTII,S$GLB,, | Performed by: NURSE PRACTITIONER

## 2022-07-21 RX ORDER — LEVOFLOXACIN 750 MG/1
750 TABLET ORAL DAILY
Qty: 5 TABLET | Refills: 0 | Status: SHIPPED | OUTPATIENT
Start: 2022-07-21 | End: 2022-07-25 | Stop reason: SDUPTHER

## 2022-07-21 NOTE — PROGRESS NOTES
INTERNAL MEDICINE URGENT CARE NOTE    CHIEF COMPLAINT     Chief Complaint   Patient presents with    Cough       HPI     Juanita Salas is a 47 y.o. female with migraines, LUPE, panic disorder, HTN, b12 def, vit d def, and former smoker who presents for an urgent visit today.    Here with cough     Was seen by Dr Bledsoe 6/28/2022 VV for sore throat, congestin, and cough. treated with amoxicillin.   Home covid negative x2   UC visit with TEVIN Fuller 7/13/2022- treated with prednisone, cough syrup and tessalon pearls.   Sick contacts at work     Has been ill for over 1 month with cough productive yellow   No wheezing.   +nasal congestion   +sinus pain and pressure to the frontal sinus region   No fever       Past Medical History:  Past Medical History:   Diagnosis Date    Adjustment disorder     Anxiety     Depression     Fatigue     Headache     Hx of psychiatric care     Hypertension     Low vitamin D level     Panic disorder     Psychiatric problem        Home Medications:  Prior to Admission medications    Medication Sig Start Date End Date Taking? Authorizing Provider   ADDERALL 12.5 mg tablet Take 1 tablet by mouth   in morning, half at noon and half in late afternoon 3/29/22  Yes Historical Provider   benzonatate (TESSALON) 200 MG capsule Take 1 capsule (200 mg total) by mouth 3 (three) times daily as needed for Cough. 7/13/22 7/23/22 Yes Bria Fuller NP   cholecalciferol, vitamin D3, (VITAMIN D3) 25 mcg (1,000 unit) capsule Take 1,000 Units by mouth 2 (two) times daily.   Yes Historical Provider   clonazePAM (KLONOPIN) 0.5 MG tablet Take 0.5 mg by mouth as needed. 7/14/21  Yes Historical Provider   ergocalciferol (ERGOCALCIFEROL) 50,000 unit Cap Take 50,000 Units by mouth every 7 days.  3/12/21  Yes Historical Provider   hydrocortisone (PROCTO-MED HC) 2.5 % rectal cream Place rectally 2 (two) times daily. 7/9/20  Yes Teresa Mullins, DIMA   ketoconazole (NIZORAL) 2 % shampoo Wash hair with medicated  "shampoo at least 2x/week - let sit on scalp at least 5 minutes prior to rinsing 7/19/21  Yes Purvi Cartagena MD   promethazine-dextromethorphan (PROMETHAZINE-DM) 6.25-15 mg/5 mL Syrp Take 5 mLs by mouth nightly as needed (Cough). 7/13/22 7/23/22 Yes Bria Fuller NP   acyclovir (ZOVIRAX) 400 MG tablet  9/27/21   Historical Provider       Review of Systems:  Review of Systems   Constitutional: Negative for chills and fever.   HENT: Positive for congestion, sinus pressure, sinus pain and sore throat.    Respiratory: Positive for cough. Negative for shortness of breath and wheezing.    Cardiovascular: Negative for chest pain.   Gastrointestinal: Negative for abdominal pain, constipation, diarrhea, nausea and vomiting.   Skin: Negative for rash.   Neurological: Positive for headaches (with coughing ).       Health Maintainence:   Immunizations:  Health Maintenance       Date Due Completion Date    COVID-19 Vaccine (1) Never done ---    TETANUS VACCINE 09/26/2015 9/26/2005    Colorectal Cancer Screening Never done ---    Mammogram 06/14/2022 6/14/2021    Override on 5/30/2019: Done (Dr. Maloney/ [facility])    Override on 5/8/2017: Done (via gyn, benign per pt)    Influenza Vaccine (1) 09/01/2022 ---    Lipid Panel 04/11/2027 4/11/2022    Cervical Cancer Screening 06/27/2027 6/27/2022           PHYSICAL EXAM     /86 (BP Location: Right arm, Patient Position: Sitting, BP Method: Medium (Manual))   Pulse (!) 115   Ht 5' 3" (1.6 m)   Wt 71.3 kg (157 lb 3 oz)   SpO2 98%   BMI 27.84 kg/m²     Physical Exam  Vitals reviewed.   Constitutional:       Appearance: She is well-developed.   HENT:      Head: Normocephalic.      Right Ear: Tympanic membrane and external ear normal.      Left Ear: Tympanic membrane and external ear normal.      Nose: Nose normal.      Mouth/Throat:      Pharynx: No oropharyngeal exudate or posterior oropharyngeal erythema.      Tonsils: No tonsillar exudate.      Comments: Post nasal " drip   Eyes:      Pupils: Pupils are equal, round, and reactive to light.   Neck:      Thyroid: No thyromegaly.      Vascular: No JVD.      Trachea: No tracheal deviation.   Cardiovascular:      Rate and Rhythm: Normal rate and regular rhythm.      Heart sounds: Normal heart sounds. No murmur heard.    No friction rub. No gallop.   Pulmonary:      Effort: Pulmonary effort is normal. No respiratory distress.      Breath sounds: Normal breath sounds. No wheezing or rales.   Abdominal:      General: Bowel sounds are normal. There is no distension.      Palpations: Abdomen is soft.      Tenderness: There is no abdominal tenderness.   Musculoskeletal:         General: No tenderness. Normal range of motion.      Cervical back: Neck supple.   Lymphadenopathy:      Cervical: No cervical adenopathy.   Skin:     General: Skin is warm and dry.      Findings: No rash.   Neurological:      Mental Status: She is alert and oriented to person, place, and time.   Psychiatric:         Behavior: Behavior normal.         LABS     Lab Results   Component Value Date    HGBA1C 5.2 07/03/2019     CMP  Sodium   Date Value Ref Range Status   04/11/2022 141 136 - 145 mmol/L Final     Potassium   Date Value Ref Range Status   04/11/2022 3.8 3.5 - 5.1 mmol/L Final     Chloride   Date Value Ref Range Status   04/11/2022 105 95 - 110 mmol/L Final     CO2   Date Value Ref Range Status   04/11/2022 29 23 - 29 mmol/L Final     Glucose   Date Value Ref Range Status   04/11/2022 103 70 - 110 mg/dL Final     BUN   Date Value Ref Range Status   04/11/2022 14 6 - 20 mg/dL Final     Creatinine   Date Value Ref Range Status   04/11/2022 0.7 0.5 - 1.4 mg/dL Final     Calcium   Date Value Ref Range Status   04/11/2022 9.0 8.7 - 10.5 mg/dL Final     Total Protein   Date Value Ref Range Status   04/11/2022 7.0 6.0 - 8.4 g/dL Final     Albumin   Date Value Ref Range Status   04/11/2022 4.0 3.5 - 5.2 g/dL Final     Total Bilirubin   Date Value Ref Range Status    04/11/2022 0.3 0.1 - 1.0 mg/dL Final     Comment:     For infants and newborns, interpretation of results should be based  on gestational age, weight and in agreement with clinical  observations.    Premature Infant recommended reference ranges:  Up to 24 hours.............<8.0 mg/dL  Up to 48 hours............<12.0 mg/dL  3-5 days..................<15.0 mg/dL  6-29 days.................<15.0 mg/dL       Alkaline Phosphatase   Date Value Ref Range Status   04/11/2022 45 (L) 55 - 135 U/L Final     AST   Date Value Ref Range Status   04/11/2022 16 10 - 40 U/L Final     ALT   Date Value Ref Range Status   04/11/2022 14 10 - 44 U/L Final     Anion Gap   Date Value Ref Range Status   04/11/2022 7 (L) 8 - 16 mmol/L Final     eGFR if    Date Value Ref Range Status   04/11/2022 >60.0 >60 mL/min/1.73 m^2 Final     eGFR if non    Date Value Ref Range Status   04/11/2022 >60.0 >60 mL/min/1.73 m^2 Final     Comment:     Calculation used to obtain the estimated glomerular filtration  rate (eGFR) is the CKD-EPI equation.        Lab Results   Component Value Date    WBC 6.20 04/11/2022    HGB 12.5 04/11/2022    HCT 38.8 04/11/2022    MCV 93 04/11/2022     04/11/2022     Lab Results   Component Value Date    CHOL 196 04/11/2022    CHOL 214 (H) 06/04/2021    CHOL 208 (H) 08/13/2020     Lab Results   Component Value Date    HDL 52 04/11/2022    HDL 59 06/04/2021    HDL 49 08/13/2020     Lab Results   Component Value Date    LDLCALC 129.2 04/11/2022    LDLCALC 129 (H) 06/04/2021    LDLCALC 129.0 08/13/2020     Lab Results   Component Value Date    TRIG 74 04/11/2022    TRIG 148 06/04/2021    TRIG 150 08/13/2020     Lab Results   Component Value Date    CHOLHDL 26.5 04/11/2022    CHOLHDL 3.6 06/04/2021    CHOLHDL 23.6 08/13/2020     Lab Results   Component Value Date    TSH 1.934 04/11/2022       ASSESSMENT/PLAN     Juanita Salas is a 47 y.o. female     Acute bacterial sinusitis- will start  levaquin and mucinex. Monitor s/s   -     levoFLOXacin (LEVAQUIN) 750 MG tablet; Take 1 tablet (750 mg total) by mouth once daily.  Dispense: 5 tablet; Refill: 0    Cough- will start levaquin and mucinex. Monitor s/s   -     levoFLOXacin (LEVAQUIN) 750 MG tablet; Take 1 tablet (750 mg total) by mouth once daily.  Dispense: 5 tablet; Refill: 0    Former smoker- stable. Cont cessation     Sinus congestion- will start levaquin and mucinex. Monitor s/s     LUPE (generalized anxiety disorder)- stable. Will cont current meds.     Follow up with PCP    Patient education provided from [facility]. Patient was counseled on when and how to seek emergent care.       Erika CARL, ARJUN, FNP-c   Department of Internal Medicine - [facility]  10:19 AM

## 2022-07-25 ENCOUNTER — PATIENT MESSAGE (OUTPATIENT)
Dept: INTERNAL MEDICINE | Facility: CLINIC | Age: 47
End: 2022-07-25
Payer: COMMERCIAL

## 2022-07-25 DIAGNOSIS — R05.9 COUGH: ICD-10-CM

## 2022-07-25 DIAGNOSIS — J01.90 ACUTE BACTERIAL SINUSITIS: ICD-10-CM

## 2022-07-25 DIAGNOSIS — B96.89 ACUTE BACTERIAL SINUSITIS: ICD-10-CM

## 2022-07-25 RX ORDER — LEVOFLOXACIN 750 MG/1
750 TABLET ORAL DAILY
Qty: 2 TABLET | Refills: 0 | Status: SHIPPED | OUTPATIENT
Start: 2022-07-25 | End: 2022-09-14

## 2022-08-08 ENCOUNTER — OFFICE VISIT (OUTPATIENT)
Dept: OTOLARYNGOLOGY | Facility: CLINIC | Age: 47
End: 2022-08-08
Payer: COMMERCIAL

## 2022-08-08 VITALS — DIASTOLIC BLOOD PRESSURE: 89 MMHG | HEART RATE: 95 BPM | SYSTOLIC BLOOD PRESSURE: 126 MMHG

## 2022-08-08 DIAGNOSIS — R09.82 POST-NASAL DRIP: ICD-10-CM

## 2022-08-08 DIAGNOSIS — R05.9 COUGH IN ADULT: ICD-10-CM

## 2022-08-08 DIAGNOSIS — Z87.09 HISTORY OF ACUTE SINUSITIS: ICD-10-CM

## 2022-08-08 DIAGNOSIS — H92.03 EAR DISCOMFORT, BILATERAL: ICD-10-CM

## 2022-08-08 DIAGNOSIS — R09.81 NASAL CONGESTION: Primary | ICD-10-CM

## 2022-08-08 PROCEDURE — 1159F PR MEDICATION LIST DOCUMENTED IN MEDICAL RECORD: ICD-10-PCS | Mod: CPTII,S$GLB,, | Performed by: OTOLARYNGOLOGY

## 2022-08-08 PROCEDURE — 3074F SYST BP LT 130 MM HG: CPT | Mod: CPTII,S$GLB,, | Performed by: OTOLARYNGOLOGY

## 2022-08-08 PROCEDURE — 3079F DIAST BP 80-89 MM HG: CPT | Mod: CPTII,S$GLB,, | Performed by: OTOLARYNGOLOGY

## 2022-08-08 PROCEDURE — 3074F PR MOST RECENT SYSTOLIC BLOOD PRESSURE < 130 MM HG: ICD-10-PCS | Mod: CPTII,S$GLB,, | Performed by: OTOLARYNGOLOGY

## 2022-08-08 PROCEDURE — 99999 PR PBB SHADOW E&M-EST. PATIENT-LVL III: CPT | Mod: PBBFAC,,, | Performed by: OTOLARYNGOLOGY

## 2022-08-08 PROCEDURE — 1159F MED LIST DOCD IN RCRD: CPT | Mod: CPTII,S$GLB,, | Performed by: OTOLARYNGOLOGY

## 2022-08-08 PROCEDURE — 3079F PR MOST RECENT DIASTOLIC BLOOD PRESSURE 80-89 MM HG: ICD-10-PCS | Mod: CPTII,S$GLB,, | Performed by: OTOLARYNGOLOGY

## 2022-08-08 PROCEDURE — 99999 PR PBB SHADOW E&M-EST. PATIENT-LVL III: ICD-10-PCS | Mod: PBBFAC,,, | Performed by: OTOLARYNGOLOGY

## 2022-08-08 PROCEDURE — 99203 OFFICE O/P NEW LOW 30 MIN: CPT | Mod: S$GLB,,, | Performed by: OTOLARYNGOLOGY

## 2022-08-08 PROCEDURE — 99203 PR OFFICE/OUTPT VISIT, NEW, LEVL III, 30-44 MIN: ICD-10-PCS | Mod: S$GLB,,, | Performed by: OTOLARYNGOLOGY

## 2022-08-08 NOTE — PROGRESS NOTES
Subjective:       Patient ID: Juanita Salas is a 47 y.o. female.    Chief Complaint: Sinus Problem    HPI: Ms. 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  She is concerned about her ongoing sx of cough and viscous mucous drainage down her throat.   She wants to be sure she will be well enough to drive to [place] for vacation 22.  She was examined by Family Medicine nurse practitioner 2022 for a chief complaint of cough.  She reported feeling ill for over a month with a cough productive of yellow mucus.  She denied lung/ chest wheezing but she did endorse nasal congestion sx and sinus pain and pressure in the frontal sinus region without fever.  She was then treated with a 5 day (+2 more days)  course of Levaquin 750 mg.    She had previously visited with  Dr. Bledsoe 2022 complaining of sore throat congestion and cough symptoms treated with a course of Amoxicillin.  Home COVID testing was negative x 2.   She was subsequently examined an urgent care clinic 2022 treated with a course of prednisone cough syrup and Tessalon Perles.    She indicates use of Adderall medication; she was made aware possible interaction with Sudafed/decongestant  type products.  Medication list also includes Klonopin.  She indicates a diagnosis of dysautonomia.  She may be moving to the [place] to be near her grandchildren.    Past Medical History:   Diagnosis Date    Adjustment disorder     Anxiety     Depression     Fatigue     Headache     Hx of psychiatric care     Hypertension     Low vitamin D level     Panic disorder     Psychiatric problem    ALL; Azithromycin  Past Surgical History:   Procedure Laterality Date     SECTION      DILATION AND CURETTAGE OF UTERUS       Current Outpatient Medications on File Prior to Visit   Medication Sig Dispense Refill    ADDERALL 12.5 mg tablet Take 1 tablet by mouth   in morning, half at  noon and half in late afternoon      cholecalciferol, vitamin D3, (VITAMIN D3) 25 mcg (1,000 unit) capsule Take 1,000 Units by mouth 2 (two) times daily.      clonazePAM (KLONOPIN) 0.5 MG tablet Take 0.5 mg by mouth as needed.      ergocalciferol (ERGOCALCIFEROL) 50,000 unit Cap Take 50,000 Units by mouth every 7 days.       hydrocortisone (PROCTO-MED HC) 2.5 % rectal cream Place rectally 2 (two) times daily. 28 g 1    ketoconazole (NIZORAL) 2 % shampoo Wash hair with medicated shampoo at least 2x/week - let sit on scalp at least 5 minutes prior to rinsing 120 mL 5    levoFLOXacin (LEVAQUIN) 750 MG tablet Take 1 tablet (750 mg total) by mouth once daily. 2 tablet 0     No current facility-administered medications on file prior to visit.         Review of Systems        Objective:    Gen.: alert and oriented anxious appearing CF in no acute distress  Rodolfo-Synephrine sprayed in each nasal passage x 1 each.  Chest auscultated; no evidence of rales or wheezes in a lung field anterior or posterior  Physical Exam  Constitutional:       Appearance: She is well-developed.   HENT:      Head: Normocephalic.      Jaw: No trismus.      Right Ear: Tympanic membrane and external ear normal. No decreased hearing noted. No drainage. No foreign body. No mastoid tenderness. Tympanic membrane is not perforated.      Left Ear: Tympanic membrane and external ear normal. No decreased hearing noted. No drainage. No foreign body. No mastoid tenderness. Tympanic membrane is not perforated.      Ears:        Nose: Nose normal. No nasal deformity or septal deviation.      Right Sinus: No maxillary sinus tenderness or frontal sinus tenderness.      Left Sinus: No maxillary sinus tenderness or frontal sinus tenderness.        Mouth/Throat:      Mouth: No oral lesions.      Pharynx: Uvula midline. No oropharyngeal exudate or uvula swelling.      Tonsils: No tonsillar abscesses.     Neck:      Thyroid: No thyromegaly.      Trachea: No  tracheal deviation.   Pulmonary:      Effort: Pulmonary effort is normal.      Breath sounds: No stridor.   Musculoskeletal:      Cervical back: Neck supple.   Lymphadenopathy:      Cervical: No cervical adenopathy.   Skin:     Findings: No rash.   Neurological:      Mental Status: She is alert and oriented to person, place, and time.         Assessment:           1. Nasal congestion     2. Post-nasal drip     3. Ear discomfort, bilateral     4. History of acute sinusitis ; Rx with 7 days of Levaquin 750 mg;  7/21/22    5.      Cough; occasionally productive    Plan:     Pt. reassured; no evidenc of  bacterial infection  Hydration encouraged  May use Flonase daily   May use AYR nasal mist prn  May d/c Zyrtec if no allergy sx  May use either plan Mucinex ( to thin secretions) or Mucinex DM for cough  Call for any significant change in status ( may consider course of Doxycycline)   Patient aware of dry climate in [place]

## 2022-08-08 NOTE — PATIENT INSTRUCTIONS
Pt. reassured; no evidenc of  bacterial infection  Hydration encouraged  May use Flonase daily   May use AYR nasal mist prn  May d/c Zyrtec if no allergy sx  May use either plan Mucinex ( to thin secretions) or Mucinex DM for cough  Call for any significant change in status ( may consider course of Doxycycline)   Patient aware of dry climate in [place]

## 2022-08-15 ENCOUNTER — OFFICE VISIT (OUTPATIENT)
Dept: FAMILY MEDICINE | Facility: CLINIC | Age: 47
End: 2022-08-15
Payer: COMMERCIAL

## 2022-08-15 DIAGNOSIS — J01.10 SUBACUTE FRONTAL SINUSITIS: Primary | ICD-10-CM

## 2022-08-15 PROCEDURE — 99213 PR OFFICE/OUTPT VISIT, EST, LEVL III, 20-29 MIN: ICD-10-PCS | Mod: 95,,, | Performed by: FAMILY MEDICINE

## 2022-08-15 PROCEDURE — 1160F PR REVIEW ALL MEDS BY PRESCRIBER/CLIN PHARMACIST DOCUMENTED: ICD-10-PCS | Mod: CPTII,95,, | Performed by: FAMILY MEDICINE

## 2022-08-15 PROCEDURE — 1159F PR MEDICATION LIST DOCUMENTED IN MEDICAL RECORD: ICD-10-PCS | Mod: CPTII,95,, | Performed by: FAMILY MEDICINE

## 2022-08-15 PROCEDURE — 1160F RVW MEDS BY RX/DR IN RCRD: CPT | Mod: CPTII,95,, | Performed by: FAMILY MEDICINE

## 2022-08-15 PROCEDURE — 99213 OFFICE O/P EST LOW 20 MIN: CPT | Mod: 95,,, | Performed by: FAMILY MEDICINE

## 2022-08-15 PROCEDURE — 1159F MED LIST DOCD IN RCRD: CPT | Mod: CPTII,95,, | Performed by: FAMILY MEDICINE

## 2022-08-15 RX ORDER — METHYLPREDNISOLONE 4 MG/1
TABLET ORAL
Qty: 1 EACH | Refills: 0 | Status: SHIPPED | OUTPATIENT
Start: 2022-08-15 | End: 2022-09-14

## 2022-08-15 RX ORDER — MONTELUKAST SODIUM 10 MG/1
10 TABLET ORAL NIGHTLY
Qty: 30 TABLET | Refills: 0 | Status: SHIPPED | OUTPATIENT
Start: 2022-08-15 | End: 2022-09-14 | Stop reason: SDUPTHER

## 2022-08-15 RX ORDER — FLUTICASONE PROPIONATE 50 MCG
1 SPRAY, SUSPENSION (ML) NASAL DAILY
Qty: 16 G | Refills: 0 | Status: SHIPPED | OUTPATIENT
Start: 2022-08-15 | End: 2022-10-13 | Stop reason: SDUPTHER

## 2022-08-15 NOTE — PROGRESS NOTES
Assessment & Plan  Subacute frontal sinusitis  -     methylPREDNISolone (MEDROL DOSEPACK) 4 mg tablet; use as directed  Dispense: 1 each; Refill: 0  -     montelukast (SINGULAIR) 10 mg tablet; Take 1 tablet (10 mg total) by mouth every evening.  Dispense: 30 tablet; Refill: 0  -     fluticasone propionate (FLONASE) 50 mcg/actuation nasal spray; 1 spray (50 mcg total) by Each Nostril route once daily.  Dispense: 16 g; Refill: 0    Start Medrol dose pack and Singulair.   Advised daily nasal saline rinses followed by Flonase.  Caution with using Sudafed products with Adderall to avoid interactions.    The patient location is:  Patient Home   The chief complaint leading to consultation is: noted below  Visit type: Virtual visit with synchronous audio and video  Total time spent with patient: 10 minutes  Each patient to whom he or she provides medical services by telemedicine is:  (1) informed of the relationship between the physician and patient and the respective role of any other health care provider with respect to management of the patient; and (2) notified that he or she may decline to receive medical services by telemedicine and may withdraw from such care at any time.    Follow-up: Follow up with ENT if symptoms worsen or fail to improve.    ______________________________________________________________________    Chief Complaint  Chief Complaint   Patient presents with    Sinus Problem       HPI  Juanita Salas is a 47 y.o. female with multiple medical diagnoses as listed in the medical history and problem list that presents in a virtual visit c/o ongoing nasal sinus congestion and pressure and PND that began in June. Reports production of yellow, thick mucus. She has been treated with amoxicillin and Levaquin, as well as prescribed antitussives. She was having a cough but this has resolved. She has also been prescribed prednisone but she never took this. Taking Zyrtec and Sudafed PE for her symptoms. She has a  nasal spray but it is . She saw ENT on  and symptomatic care was recommended.        PAST MEDICAL HISTORY:  Past Medical History:   Diagnosis Date    Adjustment disorder     Anxiety     Depression     Fatigue     Headache     Hx of psychiatric care     Hypertension     Low vitamin D level     Panic disorder     Psychiatric problem        PAST SURGICAL HISTORY:  Past Surgical History:   Procedure Laterality Date     SECTION      DILATION AND CURETTAGE OF UTERUS         SOCIAL HISTORY:  Social History     Socioeconomic History    Marital status:    Tobacco Use    Smoking status: Former Smoker     Packs/day: 0.50     Years: 2.00     Pack years: 1.00     Quit date: 2017     Years since quittin.7    Smokeless tobacco: Never Used   Substance and Sexual Activity    Alcohol use: No    Drug use: No    Sexual activity: Yes     Partners: Male     Birth control/protection: Condom   Social History Narrative     with son (23) and daugther (14)     Social Determinants of Health     Financial Resource Strain: Low Risk     Difficulty of Paying Living Expenses: Not hard at all   Food Insecurity: No Food Insecurity    Worried About Running Out of Food in the Last Year: Never true    Ran Out of Food in the Last Year: Never true   Transportation Needs: No Transportation Needs    Lack of Transportation (Medical): No    Lack of Transportation (Non-Medical): No   Physical Activity: Insufficiently Active    Days of Exercise per Week: 3 days    Minutes of Exercise per Session: 30 min   Stress: Stress Concern Present    Feeling of Stress : To some extent   Social Connections: Unknown    Frequency of Communication with Friends and Family: More than three times a week    Frequency of Social Gatherings with Friends and Family: More than three times a week    Active Member of Clubs or Organizations: No    Attends Club or Organization Meetings: Never    Marital Status:  Living with partner   Housing Stability: Low Risk     Unable to Pay for Housing in the Last Year: No    Number of Places Lived in the Last Year: 1    Unstable Housing in the Last Year: No       FAMILY HISTORY:  Family History   Problem Relation Age of Onset    Diabetes Mother     Pancreatic cancer Mother 47    Skin cancer Father         NMSC    Multiple sclerosis Sister     Cancer Sister 41        uterine origin suspected    Eczema Daughter     Hypertension Maternal Aunt     Hyperlipidemia Maternal Aunt     Hyperlipidemia Maternal Uncle     Hypertension Maternal Uncle     Hyperlipidemia Paternal Aunt     Hypertension Paternal Aunt     Hyperlipidemia Paternal Uncle     Hypertension Paternal Uncle     Stroke Maternal Grandmother     Psoriasis Maternal Grandmother     Heart disease Maternal Grandfather     Hyperlipidemia Maternal Grandfather     Hypertension Maternal Grandfather     Skin cancer Paternal Grandmother         NMSC    Other Paternal Grandfather         patient believes he had bladder cancer    Other Paternal Aunt         pheochromocytoma; Von-Hippel-Lindau syndrome    Other Paternal Cousin         brain tumor- benign vs malignant?    Colon cancer Neg Hx     Esophageal cancer Neg Hx     Stomach cancer Neg Hx     Rectal cancer Neg Hx     Ulcerative colitis Neg Hx     Irritable bowel syndrome Neg Hx     Crohn's disease Neg Hx     Celiac disease Neg Hx     Asthma Neg Hx     Allergic rhinitis Neg Hx     Allergies Neg Hx     Angioedema Neg Hx     Atopy Neg Hx     Immunodeficiency Neg Hx     Rhinitis Neg Hx     Urticaria Neg Hx        ALLERGIES AND MEDICATIONS: updated and reviewed.  Review of patient's allergies indicates:   Allergen Reactions    Azithromycin Nausea And Vomiting     Current Outpatient Medications   Medication Sig Dispense Refill    ADDERALL 12.5 mg tablet Take 1 tablet by mouth   in morning, half at noon and half in late afternoon       cholecalciferol, vitamin D3, (VITAMIN D3) 25 mcg (1,000 unit) capsule Take 1,000 Units by mouth 2 (two) times daily.      clonazePAM (KLONOPIN) 0.5 MG tablet Take 0.5 mg by mouth as needed.      ergocalciferol (ERGOCALCIFEROL) 50,000 unit Cap Take 50,000 Units by mouth every 7 days.       fluticasone propionate (FLONASE) 50 mcg/actuation nasal spray 1 spray (50 mcg total) by Each Nostril route once daily. 16 g 0    hydrocortisone (PROCTO-MED HC) 2.5 % rectal cream Place rectally 2 (two) times daily. 28 g 1    ketoconazole (NIZORAL) 2 % shampoo Wash hair with medicated shampoo at least 2x/week - let sit on scalp at least 5 minutes prior to rinsing 120 mL 5    levoFLOXacin (LEVAQUIN) 750 MG tablet Take 1 tablet (750 mg total) by mouth once daily. 2 tablet 0    methylPREDNISolone (MEDROL DOSEPACK) 4 mg tablet use as directed 1 each 0    montelukast (SINGULAIR) 10 mg tablet Take 1 tablet (10 mg total) by mouth every evening. 30 tablet 0     No current facility-administered medications for this visit.         ROS  Review of Systems   Constitutional: Negative for activity change and unexpected weight change.   HENT: Positive for congestion, postnasal drip and sinus pressure. Negative for hearing loss, rhinorrhea and trouble swallowing.    Eyes: Negative for discharge and visual disturbance.   Respiratory: Negative for cough, chest tightness and wheezing.    Cardiovascular: Negative for chest pain and palpitations.   Gastrointestinal: Negative for blood in stool, constipation, diarrhea and vomiting.   Endocrine: Negative for polydipsia and polyuria.   Genitourinary: Negative for difficulty urinating, dysuria, hematuria and menstrual problem.   Musculoskeletal: Negative for arthralgias, joint swelling and neck pain.   Neurological: Negative for weakness and headaches.   Psychiatric/Behavioral: Negative for confusion and dysphoric mood.           Physical Exam  Physical Exam  Constitutional:       General: She is not  in acute distress.  HENT:      Head: Normocephalic and atraumatic.   Neurological:      Mental Status: She is alert. Mental status is at baseline.   Psychiatric:         Mood and Affect: Mood normal.         Behavior: Behavior normal.         *Physical exam limited by virtual visit.    Health Maintenance       Date Due Completion Date    COVID-19 Vaccine (1) Never done ---    TETANUS VACCINE 09/26/2015 9/26/2005    Colorectal Cancer Screening Never done ---    Mammogram 06/14/2022 6/14/2021    Override on 5/30/2019: Done (Dr. Maloney/ [facility]as[facility] Ar[facility]eta)    Override on 5/8/2017: Done (via gyn, benign per pt)    Influenza Vaccine (1) 09/01/2022 ---    Lipid Panel 04/11/2027 4/11/2022    Cervical Cancer Screening 06/27/2027 6/27/2022

## 2022-08-24 ENCOUNTER — PATIENT MESSAGE (OUTPATIENT)
Dept: ADMINISTRATIVE | Facility: HOSPITAL | Age: 47
End: 2022-08-24
Payer: COMMERCIAL

## 2022-08-29 ENCOUNTER — PATIENT OUTREACH (OUTPATIENT)
Dept: ADMINISTRATIVE | Facility: HOSPITAL | Age: 47
End: 2022-08-29
Payer: COMMERCIAL

## 2022-08-29 NOTE — PROGRESS NOTES
Health Maintenance Due   Topic Date Due    COVID-19 Vaccine (1) Never done    TETANUS VACCINE  09/26/2015    Colorectal Cancer Screening  Never done     Triggered LINKS. Updated Care Everywhere. Checked for outside mammogram results in [facility]. Imported outside mammography results into . Chart review completed.

## 2022-09-10 ENCOUNTER — LAB VISIT (OUTPATIENT)
Dept: LAB | Facility: HOSPITAL | Age: 47
End: 2022-09-10
Attending: INTERNAL MEDICINE
Payer: COMMERCIAL

## 2022-09-10 DIAGNOSIS — Z12.11 COLON CANCER SCREENING: ICD-10-CM

## 2022-09-10 PROCEDURE — 82274 ASSAY TEST FOR BLOOD FECAL: CPT | Performed by: INTERNAL MEDICINE

## 2022-09-14 ENCOUNTER — OFFICE VISIT (OUTPATIENT)
Dept: INTERNAL MEDICINE | Facility: CLINIC | Age: 47
End: 2022-09-14
Payer: COMMERCIAL

## 2022-09-14 ENCOUNTER — HOSPITAL ENCOUNTER (OUTPATIENT)
Dept: RADIOLOGY | Facility: HOSPITAL | Age: 47
Discharge: HOME | End: 2022-09-14
Attending: INTERNAL MEDICINE
Payer: COMMERCIAL

## 2022-09-14 ENCOUNTER — PATIENT MESSAGE (OUTPATIENT)
Dept: INTERNAL MEDICINE | Facility: CLINIC | Age: 47
End: 2022-09-14

## 2022-09-14 VITALS
HEART RATE: 100 BPM | WEIGHT: 158.06 LBS | SYSTOLIC BLOOD PRESSURE: 128 MMHG | DIASTOLIC BLOOD PRESSURE: 82 MMHG | OXYGEN SATURATION: 99 % | HEIGHT: 63 IN | BODY MASS INDEX: 28 KG/M2

## 2022-09-14 DIAGNOSIS — R05.3 CHRONIC COUGH: ICD-10-CM

## 2022-09-14 DIAGNOSIS — J32.0 CHRONIC MAXILLARY SINUSITIS: ICD-10-CM

## 2022-09-14 DIAGNOSIS — Z00.00 VISIT FOR ANNUAL HEALTH EXAMINATION: Primary | ICD-10-CM

## 2022-09-14 DIAGNOSIS — Z01.00 ROUTINE EYE EXAM: ICD-10-CM

## 2022-09-14 PROCEDURE — 99396 PR PREVENTIVE VISIT,EST,40-64: ICD-10-PCS | Mod: S$GLB,,, | Performed by: INTERNAL MEDICINE

## 2022-09-14 PROCEDURE — 99999 PR PBB SHADOW E&M-EST. PATIENT-LVL V: CPT | Mod: PBBFAC,,, | Performed by: INTERNAL MEDICINE

## 2022-09-14 PROCEDURE — 99999 PR PBB SHADOW E&M-EST. PATIENT-LVL V: ICD-10-PCS | Mod: PBBFAC,,, | Performed by: INTERNAL MEDICINE

## 2022-09-14 PROCEDURE — 70486 CT MAXILLOFACIAL W/O DYE: CPT | Mod: TC

## 2022-09-14 PROCEDURE — 71046 X-RAY EXAM CHEST 2 VIEWS: CPT | Mod: TC

## 2022-09-14 PROCEDURE — 3008F PR BODY MASS INDEX (BMI) DOCUMENTED: ICD-10-PCS | Mod: CPTII,S$GLB,, | Performed by: INTERNAL MEDICINE

## 2022-09-14 PROCEDURE — 71046 XR CHEST PA AND LATERAL: ICD-10-PCS | Mod: 26,,, | Performed by: RADIOLOGY

## 2022-09-14 PROCEDURE — 1159F PR MEDICATION LIST DOCUMENTED IN MEDICAL RECORD: ICD-10-PCS | Mod: CPTII,S$GLB,, | Performed by: INTERNAL MEDICINE

## 2022-09-14 PROCEDURE — 70486 CT MAXILLOFACIAL W/O DYE: CPT | Mod: 26,,, | Performed by: RADIOLOGY

## 2022-09-14 PROCEDURE — 1159F MED LIST DOCD IN RCRD: CPT | Mod: CPTII,S$GLB,, | Performed by: INTERNAL MEDICINE

## 2022-09-14 PROCEDURE — 70486 CT SINUSES WITHOUT CONTRAST: ICD-10-PCS | Mod: 26,,, | Performed by: RADIOLOGY

## 2022-09-14 PROCEDURE — 3008F BODY MASS INDEX DOCD: CPT | Mod: CPTII,S$GLB,, | Performed by: INTERNAL MEDICINE

## 2022-09-14 PROCEDURE — 3079F DIAST BP 80-89 MM HG: CPT | Mod: CPTII,S$GLB,, | Performed by: INTERNAL MEDICINE

## 2022-09-14 PROCEDURE — 1160F RVW MEDS BY RX/DR IN RCRD: CPT | Mod: CPTII,S$GLB,, | Performed by: INTERNAL MEDICINE

## 2022-09-14 PROCEDURE — 71046 X-RAY EXAM CHEST 2 VIEWS: CPT | Mod: 26,,, | Performed by: RADIOLOGY

## 2022-09-14 PROCEDURE — 1160F PR REVIEW ALL MEDS BY PRESCRIBER/CLIN PHARMACIST DOCUMENTED: ICD-10-PCS | Mod: CPTII,S$GLB,, | Performed by: INTERNAL MEDICINE

## 2022-09-14 PROCEDURE — 99396 PREV VISIT EST AGE 40-64: CPT | Mod: S$GLB,,, | Performed by: INTERNAL MEDICINE

## 2022-09-14 PROCEDURE — 3074F SYST BP LT 130 MM HG: CPT | Mod: CPTII,S$GLB,, | Performed by: INTERNAL MEDICINE

## 2022-09-14 PROCEDURE — 3074F PR MOST RECENT SYSTOLIC BLOOD PRESSURE < 130 MM HG: ICD-10-PCS | Mod: CPTII,S$GLB,, | Performed by: INTERNAL MEDICINE

## 2022-09-14 PROCEDURE — 3079F PR MOST RECENT DIASTOLIC BLOOD PRESSURE 80-89 MM HG: ICD-10-PCS | Mod: CPTII,S$GLB,, | Performed by: INTERNAL MEDICINE

## 2022-09-14 RX ORDER — MONTELUKAST SODIUM 10 MG/1
10 TABLET ORAL NIGHTLY
Qty: 90 TABLET | Refills: 3 | Status: SHIPPED | OUTPATIENT
Start: 2022-09-14 | End: 2022-10-14

## 2022-09-14 RX ORDER — AZELASTINE 1 MG/ML
1 SPRAY, METERED NASAL 2 TIMES DAILY
Qty: 30 ML | Refills: 0 | Status: SHIPPED | OUTPATIENT
Start: 2022-09-14 | End: 2023-10-04

## 2022-09-14 NOTE — PROGRESS NOTES
INTERNAL MEDICINE ESTABLISHED PATIENT VISIT NOTE    Subjective:     Chief Complaint: Annual Exam       Patient ID: Juanita Salas is a 47 y.o. female with anxiety and depression c panic d/o, sinus tachycardia (hx palpitations c overall neg Holter and normal echo, sees Dr. Ruelas on the [facility]), migraines, Vit D def, rebeca hematuria (see by Uro, had cysto which pt reports was normal), former smoker, hx night sweats over the past year with neg w/u (see last note for details), hemorrhoids, last seen by me 12/2021 for cough, here today for annual exam.    Today c c/o chronic sinus issues.  Has had intermittent sinus congestion, fullness/pain, post nasal drip.  No fever.  Seen by several other providers and went through several rounds of abx and Medrol dose pack.  Also seen by ENT.  States sx have persisted.  Also states her eyes have been red and irritated.  Also c cough, sometimes productive c yellow-green sputum.  States sx worse at work, has carpeting at work and also feels the air feels damp at work and is worried about mold.  Recently went on vacation and had improvement of sx but states sx recurred upon going back into her office.    Has been using otc Flonase and also recently rx'ed Singulair but states sx have persisted.    Past Medical History:  Past Medical History:   Diagnosis Date    Adjustment disorder     Anxiety     Depression     Fatigue     Headache     Hx of psychiatric care     Hypertension     Low vitamin D level     Panic disorder     Psychiatric problem        Home Medications:  Prior to Admission medications    Medication Sig Start Date End Date Taking? Authorizing Provider   ADDERALL 12.5 mg tablet Take 1 tablet by mouth   in morning, half at noon and half in late afternoon 3/29/22  Yes Historical Provider   clonazePAM (KLONOPIN) 0.5 MG tablet Take 0.5 mg by mouth as needed. 7/14/21  Yes Historical Provider   ergocalciferol (ERGOCALCIFEROL) 50,000 unit Cap Take 50,000 Units by mouth every 7 days.   3/12/21  Yes Historical Provider   fluticasone propionate (FLONASE) 50 mcg/actuation nasal spray 1 spray (50 mcg total) by Each Nostril route once daily. 8/15/22  Yes Juana Wang DO   hydrocortisone (PROCTO-MED HC) 2.5 % rectal cream Place rectally 2 (two) times daily. 20  Yes Teresa Mullins NP   montelukast (SINGULAIR) 10 mg tablet Take 1 tablet (10 mg total) by mouth every evening. 8/15/22 9/14/22 Yes Juana Wang DO   cholecalciferol, vitamin D3, (VITAMIN D3) 25 mcg (1,000 unit) capsule Take 1,000 Units by mouth 2 (two) times daily.    Historical Provider   ketoconazole (NIZORAL) 2 % shampoo Wash hair with medicated shampoo at least 2x/week - let sit on scalp at least 5 minutes prior to rinsing  Patient not taking: Reported on 2022   Purvi Cartagena MD   levoFLOXacin (LEVAQUIN) 750 MG tablet Take 1 tablet (750 mg total) by mouth once daily. 22   Ema Gaston MD   methylPREDNISolone (MEDROL DOSEPACK) 4 mg tablet use as directed  Patient not taking: Reported on 2022 8/15/22   Juana Wang DO       Allergies:  Review of patient's allergies indicates:   Allergen Reactions    Azithromycin Nausea And Vomiting       Social History:  Social History     Tobacco Use    Smoking status: Former     Packs/day: 0.50     Years: 2.00     Pack years: 1.00     Types: Cigarettes     Quit date: 2017     Years since quittin.8    Smokeless tobacco: Never   Substance Use Topics    Alcohol use: No    Drug use: No        Review of Systems   Constitutional:  Negative for appetite change, chills, fatigue, fever and unexpected weight change.   HENT:  Positive for congestion, postnasal drip, rhinorrhea, sinus pressure and sinus pain. Negative for hearing loss.    Eyes:  Positive for redness and itching. Negative for pain and visual disturbance.   Respiratory:  Positive for cough. Negative for chest tightness, shortness of breath and wheezing.    Cardiovascular:  Negative for chest  "pain, palpitations and leg swelling.   Gastrointestinal:  Negative for abdominal distention and abdominal pain.   Endocrine: Negative for polydipsia and polyuria.   Genitourinary:  Negative for decreased urine volume, difficulty urinating, dysuria, hematuria and vaginal discharge.   Neurological:  Negative for weakness, numbness and headaches.   Psychiatric/Behavioral:  Negative for behavioral problems and confusion. The patient is nervous/anxious.        Health Maintenance:     Immunizations:   Influenza is not up to date, declined, Risks and benefits were discussed with patient.  TDap is not up to date, declined, Risks and benefits were discussed with patient.  COVID vaccine recommended but declined by pt.     Cancer Screening:  PAP: 6/2022 NILM c neg HPV Dr. Maloney at at   Mammogram: 6/2022 benign at [facility]  Colonoscopy:  FIT kit completed by pt this morning, advised to mail in today.    Objective:   /82 (BP Location: Left arm, Patient Position: Sitting, BP Method: Medium (Manual))   Pulse 100   Ht 5' 3" (1.6 m)   Wt 71.7 kg (158 lb 1.1 oz)   LMP 08/19/2022   SpO2 99%   BMI 28.00 kg/m²        General: AAO x3, no apparent distress  HEENT: PERRL, no conjunctival erythema, no exudate.  Nasal turbinates slightly boggy  CV: RRR, no m/r/g  Pulm: Lungs CTAB, no crackles, no wheezes  Abd: s/NT/ND +BS  Extremities: no c/c/e    Labs:     Lab Results   Component Value Date    WBC 11.37 09/14/2022    HGB 12.2 09/14/2022    HCT 36.9 (L) 09/14/2022    MCV 93 09/14/2022     09/14/2022     Sodium   Date Value Ref Range Status   04/11/2022 141 136 - 145 mmol/L Final     Potassium   Date Value Ref Range Status   04/11/2022 3.8 3.5 - 5.1 mmol/L Final     Chloride   Date Value Ref Range Status   04/11/2022 105 95 - 110 mmol/L Final     CO2   Date Value Ref Range Status   04/11/2022 29 23 - 29 mmol/L Final     Glucose   Date Value Ref Range Status   04/11/2022 103 70 - 110 mg/dL Final     BUN "   Date Value Ref Range Status   04/11/2022 14 6 - 20 mg/dL Final     Creatinine   Date Value Ref Range Status   04/11/2022 0.7 0.5 - 1.4 mg/dL Final     Calcium   Date Value Ref Range Status   04/11/2022 9.0 8.7 - 10.5 mg/dL Final     Total Protein   Date Value Ref Range Status   04/11/2022 7.0 6.0 - 8.4 g/dL Final     Albumin   Date Value Ref Range Status   04/11/2022 4.0 3.5 - 5.2 g/dL Final     Total Bilirubin   Date Value Ref Range Status   04/11/2022 0.3 0.1 - 1.0 mg/dL Final     Comment:     For infants and newborns, interpretation of results should be based  on gestational age, weight and in agreement with clinical  observations.    Premature Infant recommended reference ranges:  Up to 24 hours.............<8.0 mg/dL  Up to 48 hours............<12.0 mg/dL  3-5 days..................<15.0 mg/dL  6-29 days.................<15.0 mg/dL       Alkaline Phosphatase   Date Value Ref Range Status   04/11/2022 45 (L) 55 - 135 U/L Final     AST   Date Value Ref Range Status   04/11/2022 16 10 - 40 U/L Final     ALT   Date Value Ref Range Status   04/11/2022 14 10 - 44 U/L Final     Anion Gap   Date Value Ref Range Status   04/11/2022 7 (L) 8 - 16 mmol/L Final     eGFR if    Date Value Ref Range Status   04/11/2022 >60.0 >60 mL/min/1.73 m^2 Final     eGFR if non    Date Value Ref Range Status   04/11/2022 >60.0 >60 mL/min/1.73 m^2 Final     Comment:     Calculation used to obtain the estimated glomerular filtration  rate (eGFR) is the CKD-EPI equation.        Lab Results   Component Value Date    HGBA1C 5.2 07/03/2019     Lab Results   Component Value Date    LDLCALC 129.2 04/11/2022     Lab Results   Component Value Date    TSH 1.934 04/11/2022         Assessment/Plan     Juanita was seen today for annual exam.    Diagnoses and all orders for this visit:    Visit for annual health examination  History and physical exam completed.  Health maintenance reviewed as above.    Chronic maxillary  sinusitis  Rec cont Flonase and Singulair  Will add Astelin  Already seen by ENT but would consider following up since sx have persisted..  Will check CT and also refer to Allergy for eval per pt request as sx appear worse at work  Pt also request COVID Ab check, okay to order but advised this will not affect our mgmt  -     CBC Auto Differential; Future  -     COVID-19 (SARS CoV-2) IgG Antibody Quant; Future  -     CT Sinuses without Contrast; Future  -     Ambulatory referral/consult to Allergy; Future  -     azelastine (ASTELIN) 137 mcg (0.1 %) nasal spray; 1 spray (137 mcg total) by Nasal route 2 (two) times daily.  -     montelukast (SINGULAIR) 10 mg tablet; Take 1 tablet (10 mg total) by mouth every evening.    Chronic cough  -     X-Ray Chest PA And Lateral; Future    Routine eye exam  -     Ambulatory referral/consult to Optometry; Future      HM as above  RTC in 6 mos, sooner if needed.  Labs and CXR today.    Ema Gaston MD  Department of Internal Medicine - [facility]  09/18/2022

## 2022-09-16 DIAGNOSIS — Z12.11 COLON CANCER SCREENING: Primary | ICD-10-CM

## 2022-09-20 LAB — HEMOCCULT STL QL IA: NEGATIVE

## 2022-09-21 ENCOUNTER — OFFICE VISIT (OUTPATIENT)
Dept: ALLERGY | Facility: CLINIC | Age: 47
End: 2022-09-21
Payer: COMMERCIAL

## 2022-09-21 ENCOUNTER — LAB VISIT (OUTPATIENT)
Dept: LAB | Facility: HOSPITAL | Age: 47
End: 2022-09-21
Payer: COMMERCIAL

## 2022-09-21 VITALS — HEART RATE: 103 BPM | BODY MASS INDEX: 28.24 KG/M2 | OXYGEN SATURATION: 98 % | HEIGHT: 63 IN | WEIGHT: 159.38 LBS

## 2022-09-21 DIAGNOSIS — J32.0 CHRONIC MAXILLARY SINUSITIS: ICD-10-CM

## 2022-09-21 LAB
BASOPHILS # BLD AUTO: 0.03 K/UL (ref 0–0.2)
BASOPHILS NFR BLD: 0.4 % (ref 0–1.9)
DIFFERENTIAL METHOD: NORMAL
EOSINOPHIL # BLD AUTO: 0.1 K/UL (ref 0–0.5)
EOSINOPHIL NFR BLD: 1.3 % (ref 0–8)
ERYTHROCYTE [DISTWIDTH] IN BLOOD BY AUTOMATED COUNT: 12.4 % (ref 11.5–14.5)
HCT VFR BLD AUTO: 39.3 % (ref 37–48.5)
HGB BLD-MCNC: 12.7 G/DL (ref 12–16)
IGA SERPL-MCNC: 317 MG/DL (ref 40–350)
IGE SERPL-ACNC: <35 IU/ML (ref 0–100)
IGG SERPL-MCNC: 924 MG/DL (ref 650–1600)
IGM SERPL-MCNC: 151 MG/DL (ref 50–300)
IMM GRANULOCYTES # BLD AUTO: 0.03 K/UL (ref 0–0.04)
IMM GRANULOCYTES NFR BLD AUTO: 0.4 % (ref 0–0.5)
LYMPHOCYTES # BLD AUTO: 1.9 K/UL (ref 1–4.8)
LYMPHOCYTES NFR BLD: 23.3 % (ref 18–48)
MCH RBC QN AUTO: 30.4 PG (ref 27–31)
MCHC RBC AUTO-ENTMCNC: 32.3 G/DL (ref 32–36)
MCV RBC AUTO: 94 FL (ref 82–98)
MONOCYTES # BLD AUTO: 0.4 K/UL (ref 0.3–1)
MONOCYTES NFR BLD: 5.4 % (ref 4–15)
NEUTROPHILS # BLD AUTO: 5.7 K/UL (ref 1.8–7.7)
NEUTROPHILS NFR BLD: 69.2 % (ref 38–73)
NRBC BLD-RTO: 0 /100 WBC
PLATELET # BLD AUTO: 332 K/UL (ref 150–450)
PMV BLD AUTO: 10.6 FL (ref 9.2–12.9)
RBC # BLD AUTO: 4.18 M/UL (ref 4–5.4)
WBC # BLD AUTO: 8.16 K/UL (ref 3.9–12.7)

## 2022-09-21 PROCEDURE — 99244 PR OFFICE CONSULTATION,LEVEL IV: ICD-10-PCS | Mod: S$GLB,,, | Performed by: ALLERGY & IMMUNOLOGY

## 2022-09-21 PROCEDURE — 99999 PR PBB SHADOW E&M-EST. PATIENT-LVL III: CPT | Mod: PBBFAC,,, | Performed by: ALLERGY & IMMUNOLOGY

## 2022-09-21 PROCEDURE — 82784 ASSAY IGA/IGD/IGG/IGM EACH: CPT | Performed by: ALLERGY & IMMUNOLOGY

## 2022-09-21 PROCEDURE — 3008F PR BODY MASS INDEX (BMI) DOCUMENTED: ICD-10-PCS | Mod: CPTII,S$GLB,, | Performed by: ALLERGY & IMMUNOLOGY

## 2022-09-21 PROCEDURE — 99999 PR PBB SHADOW E&M-EST. PATIENT-LVL III: ICD-10-PCS | Mod: PBBFAC,,, | Performed by: ALLERGY & IMMUNOLOGY

## 2022-09-21 PROCEDURE — 85025 COMPLETE CBC W/AUTO DIFF WBC: CPT | Performed by: ALLERGY & IMMUNOLOGY

## 2022-09-21 PROCEDURE — 3008F BODY MASS INDEX DOCD: CPT | Mod: CPTII,S$GLB,, | Performed by: ALLERGY & IMMUNOLOGY

## 2022-09-21 PROCEDURE — 86003 ALLG SPEC IGE CRUDE XTRC EA: CPT | Performed by: ALLERGY & IMMUNOLOGY

## 2022-09-21 PROCEDURE — 1159F MED LIST DOCD IN RCRD: CPT | Mod: CPTII,S$GLB,, | Performed by: ALLERGY & IMMUNOLOGY

## 2022-09-21 PROCEDURE — 86003 ALLG SPEC IGE CRUDE XTRC EA: CPT | Mod: 59 | Performed by: ALLERGY & IMMUNOLOGY

## 2022-09-21 PROCEDURE — 82785 ASSAY OF IGE: CPT | Performed by: ALLERGY & IMMUNOLOGY

## 2022-09-21 PROCEDURE — 86317 IMMUNOASSAY INFECTIOUS AGENT: CPT | Performed by: ALLERGY & IMMUNOLOGY

## 2022-09-21 PROCEDURE — 1159F PR MEDICATION LIST DOCUMENTED IN MEDICAL RECORD: ICD-10-PCS | Mod: CPTII,S$GLB,, | Performed by: ALLERGY & IMMUNOLOGY

## 2022-09-21 PROCEDURE — 99244 OFF/OP CNSLTJ NEW/EST MOD 40: CPT | Mod: S$GLB,,, | Performed by: ALLERGY & IMMUNOLOGY

## 2022-09-21 NOTE — PROGRESS NOTES
Subjective:       Patient ID: Juanita Salas is a 47 y.o. female.    Chief Complaint:  Allergies (Sinus pressure, cough, sputum)      HPI:   Patient's symptoms include cough, itchy eyes, nasal congestion, postnasal drip, pressure sensation in ears, sneezing, and burning sensation in eyes . These symptoms are perennial. Current triggers include exposure to mold and possibly an exposure at work . The patient has been suffering from these symptoms for approximately 3 months. The patient has tried  flonase and nasal saline  with inadequate relief of symptoms. Only used flonase x 1 week. Immunotherapy has never been tried. The patient has never had nasal polyps.   Has noted sleep disturbance, reported talking in sleep since starting Singulair.  The patient has no history of asthma. The patient has no history of eczema.  Concern of chronic, recurrent sinus sx's over last 3 mo . Prior would be on abx for sinusitis once every 1-2 years. Symptoms do improve with antibiotics. The patient has not had sinus surgery in the past.   Reports many sick contacts at work. Concern of poss mold exposure at work.  Works in office setting. Same office x 4 years.  Denies overt GERD sx's.    Environmental History: Pets in the home: none.  James: tile or linoleum floors  Tobacco Smoke in Home: no    Past Medical History:   Diagnosis Date    Adjustment disorder     Allergy     Anxiety     Depression     Fatigue     Headache     Hx of psychiatric care     Hypertension     Low vitamin D level     Panic disorder     Psychiatric problem          Family History   Problem Relation Age of Onset    Diabetes Mother     Pancreatic cancer Mother 47    Skin cancer Father         NMSC    Multiple sclerosis Sister     Cancer Sister 41        uterine origin suspected    Eczema Daughter     Hypertension Maternal Aunt     Hyperlipidemia Maternal Aunt     Hyperlipidemia Maternal Uncle     Hypertension Maternal Uncle     Hyperlipidemia Paternal Aunt      Hypertension Paternal Aunt     Hyperlipidemia Paternal Uncle     Hypertension Paternal Uncle     Stroke Maternal Grandmother     Psoriasis Maternal Grandmother     Heart disease Maternal Grandfather     Hyperlipidemia Maternal Grandfather     Hypertension Maternal Grandfather     Skin cancer Paternal Grandmother         NMSC    Other Paternal Grandfather         patient believes he had bladder cancer    Other Paternal Aunt         pheochromocytoma; Von-Hippel-Lindau syndrome    Other Paternal Cousin         brain tumor- benign vs malignant?    Colon cancer Neg Hx     Esophageal cancer Neg Hx     Stomach cancer Neg Hx     Rectal cancer Neg Hx     Ulcerative colitis Neg Hx     Irritable bowel syndrome Neg Hx     Crohn's disease Neg Hx     Celiac disease Neg Hx     Asthma Neg Hx     Allergic rhinitis Neg Hx     Allergies Neg Hx     Angioedema Neg Hx     Atopy Neg Hx     Immunodeficiency Neg Hx     Rhinitis Neg Hx     Urticaria Neg Hx          Review of Systems   Constitutional:  Negative for activity change, fatigue and fever.   HENT:  Positive for congestion, postnasal drip, sinus pressure and voice change. Negative for rhinorrhea and sneezing.    Eyes:  Negative for discharge, redness and itching.   Respiratory:  Negative for cough, shortness of breath and wheezing.    Cardiovascular:  Negative for chest pain.   Gastrointestinal:  Negative for diarrhea, nausea and vomiting.   Genitourinary:  Negative for dysuria.   Musculoskeletal:  Negative for arthralgias and joint swelling.   Skin:  Negative for rash.   Neurological:  Negative for headaches.   Hematological:  Does not bruise/bleed easily.   Psychiatric/Behavioral:  Negative for behavioral problems and sleep disturbance.         Objective:   Physical Exam  Vitals and nursing note reviewed.   Constitutional:       General: She is not in acute distress.     Appearance: She is well-developed.   HENT:      Head: Normocephalic.      Right Ear: Tympanic membrane and  external ear normal.      Left Ear: Tympanic membrane and external ear normal.      Nose: No septal deviation, mucosal edema or rhinorrhea.      Right Sinus: No maxillary sinus tenderness or frontal sinus tenderness.      Left Sinus: No maxillary sinus tenderness or frontal sinus tenderness.      Mouth/Throat:      Pharynx: Uvula midline. No uvula swelling.   Eyes:      General:         Right eye: No discharge.         Left eye: No discharge.      Conjunctiva/sclera: Conjunctivae normal.   Cardiovascular:      Rate and Rhythm: Normal rate and regular rhythm.   Pulmonary:      Effort: Pulmonary effort is normal. No respiratory distress.      Breath sounds: Normal breath sounds. No wheezing.   Abdominal:      General: Bowel sounds are normal.      Palpations: Abdomen is soft.      Tenderness: There is no abdominal tenderness.   Musculoskeletal:         General: No tenderness. Normal range of motion.      Cervical back: Normal range of motion and neck supple.   Lymphadenopathy:      Cervical: No cervical adenopathy.   Skin:     General: Skin is warm.      Findings: No erythema or rash.   Neurological:      Mental Status: She is alert and oriented to person, place, and time.   Psychiatric:         Behavior: Behavior normal.         Thought Content: Thought content normal.         Judgment: Judgment normal.           No results found for: IGGSERUM, IGM, IGA     No results found for: IGE    Eos #   Date Value Ref Range Status   09/14/2022 0.2 0.0 - 0.5 K/uL Final   04/11/2022 0.2 0.0 - 0.5 K/uL Final   06/04/2021 273 15 - 500 cells/uL Final     Eosinophil %   Date Value Ref Range Status   09/14/2022 1.4 0.0 - 8.0 % Final   04/11/2022 3.4 0.0 - 8.0 % Final   06/04/2021 4.2 % Final     CT sinus report reviewed      Assessment:       1. Chronic maxillary sinusitis         Plan:       Juanita was seen today for allergies.    Diagnoses and all orders for this visit:    Chronic maxillary sinusitis  -     Ambulatory  referral/consult to Allergy-       Immunoglobulins (IgG, IgA, IgM) Quantitative; Future  -     IgE; Future  -     CBC Auto Differential; Future  -     Cat epithelium IgE; Future  -     Dog dander IgE; Future  -     D. farinae IgE; Future  -     D. pteronyssinus IgE; Future  -     Aspergillus fumagatus IgE; Future  -     Allergen-Alternaria Alternata; Future  -     Cockroach, American IgE; Future  -     Bahia grass IgE; Future  -     Liang IgE; Future  -     Oak, white IgE; Future  -     Allergen-Cedar; Future  -     Allergen, Pecan Tree IgE; Future  -     Ragweed, short, common IgE; Future  -     Marsh elder, rough IgE; Future  -     Plantain, English IgE; Future  -     S.pneumoniae IgG Serotypes; Future  -     Cladosporium IgE; Future  -     Curvularia lunata IgE; Future  -     Penicillium IgE; Future    Routine use flonase and astelin, 2 sen ea nostril daily for both  Discontinue singulair--has had assoc sleep disturbance  Fu pending results

## 2022-09-23 LAB
A ALTERNATA IGE QN: <0.1 KU/L
A FUMIGATUS IGE QN: <0.1 KU/L
BAHIA GRASS IGE QN: <0.1 KU/L
C HERBARUM IGE QN: <0.1 KU/L
C LUNATA IGE QN: <0.1 KU/L
CAT DANDER IGE QN: <0.1 KU/L
CEDAR IGE QN: <0.1 KU/L
COMMON RAGWEED IGE QN: <0.1 KU/L
D FARINAE IGE QN: <0.1 KU/L
D PTERONYSS IGE QN: <0.1 KU/L
DEPRECATED A ALTERNATA IGE RAST QL: NORMAL
DEPRECATED A FUMIGATUS IGE RAST QL: NORMAL
DEPRECATED BAHIA GRASS IGE RAST QL: NORMAL
DEPRECATED C HERBARUM IGE RAST QL: NORMAL
DEPRECATED C LUNATA IGE RAST QL: NORMAL
DEPRECATED CAT DANDER IGE RAST QL: NORMAL
DEPRECATED CEDAR IGE RAST QL: NORMAL
DEPRECATED COMMON RAGWEED IGE RAST QL: NORMAL
DEPRECATED D FARINAE IGE RAST QL: NORMAL
DEPRECATED D PTERONYSS IGE RAST QL: NORMAL
DEPRECATED DOG DANDER IGE RAST QL: NORMAL
DEPRECATED ELDER IGE RAST QL: NORMAL
DEPRECATED ENGL PLANTAIN IGE RAST QL: NORMAL
DEPRECATED P NOTATUM IGE RAST QL: NORMAL
DEPRECATED PECAN/HICK TREE IGE RAST QL: NORMAL
DEPRECATED ROACH IGE RAST QL: NORMAL
DEPRECATED TIMOTHY IGE RAST QL: NORMAL
DEPRECATED WHITE OAK IGE RAST QL: NORMAL
DOG DANDER IGE QN: <0.1 KU/L
ELDER IGE QN: <0.1 KU/L
ENGL PLANTAIN IGE QN: <0.1 KU/L
P NOTATUM IGE QN: <0.1 KU/L
PECAN/HICK TREE IGE QN: <0.1 KU/L
ROACH IGE QN: <0.1 KU/L
TIMOTHY IGE QN: <0.1 KU/L
WHITE OAK IGE QN: <0.1 KU/L

## 2022-09-27 LAB
DEPRECATED S PNEUM12 IGG SER-MCNC: 0.3 UG/ML
DEPRECATED S PNEUM23 IGG SER-MCNC: 0.3 UG/ML
DEPRECATED S PNEUM4 IGG SER-MCNC: <0.3 UG/ML
DEPRECATED S PNEUM8 IGG SER-MCNC: 0.3 UG/ML
DEPRECATED S PNEUM9 IGG SER-MCNC: 1.9 UG/ML
S PN DA SERO 19F IGG SER-MCNC: 2.8 UG/ML
S PNEUM DA 1 IGG SER-MCNC: <0.3 UG/ML
S PNEUM DA 14 IGG SER-MCNC: 3.3
S PNEUM DA 18C IGG SER-MCNC: <0.3
S PNEUM DA 3 IGG SER-MCNC: 0.9 UG/ML
S PNEUM DA 5 IGG SER-MCNC: 1 UG/ML
S PNEUM DA 6B IGG SER-MCNC: <0.3 UG/ML
S PNEUM DA 7F IGG SER-MCNC: 0.5 UG/ML
S PNEUM DA 9V IGG SER-MCNC: 1.2 UG/ML

## 2022-09-29 NOTE — PROGRESS NOTES
Please call to let know that evaluation of pt's immune system showed that pt may benefit from an extra vaccine, Pneumovax, to decrease frequency of infections.     Please schedule follow up appointment to review labs and discuss Pneumovax vaccine.    Allergy tests to common inhalant allergens, including common molds, are negative.

## 2022-10-02 ENCOUNTER — OUTSIDE PLACE OF SERVICE (OUTPATIENT)
Dept: URGENT CARE | Facility: CLINIC | Age: 47
End: 2022-10-02
Payer: COMMERCIAL

## 2022-10-02 DIAGNOSIS — K04.7 PERIAPICAL ABSCESS WITHOUT SINUS: Primary | ICD-10-CM

## 2022-10-02 PROCEDURE — 99212 PR OFFICE/OUTPT VISIT, EST, LEVL II, 10-19 MIN: ICD-10-PCS | Mod: 95,,, | Performed by: NURSE PRACTITIONER

## 2022-10-02 PROCEDURE — 99212 OFFICE O/P EST SF 10 MIN: CPT | Mod: 95,,, | Performed by: NURSE PRACTITIONER

## 2022-10-04 ENCOUNTER — PATIENT MESSAGE (OUTPATIENT)
Dept: ALLERGY | Facility: CLINIC | Age: 47
End: 2022-10-04
Payer: COMMERCIAL

## 2022-10-13 DIAGNOSIS — J01.10 SUBACUTE FRONTAL SINUSITIS: ICD-10-CM

## 2022-10-13 RX ORDER — FLUTICASONE PROPIONATE 50 MCG
1 SPRAY, SUSPENSION (ML) NASAL DAILY
Qty: 16 G | Refills: 3 | Status: SHIPPED | OUTPATIENT
Start: 2022-10-13 | End: 2023-07-27

## 2022-10-13 RX ORDER — NORETHINDRONE ACETATE AND ETHINYL ESTRADIOL 1; 5 MG/1; UG/1
1 TABLET ORAL DAILY
COMMUNITY
Start: 2022-06-27 | End: 2024-02-01

## 2022-10-13 RX ORDER — BETAXOLOL 10 MG/1
TABLET, FILM COATED ORAL
COMMUNITY
Start: 2022-07-07 | End: 2023-07-27

## 2022-10-13 RX ORDER — DEXTROAMPHETAMINE SACCHARATE, AMPHETAMINE ASPARTATE, DEXTROAMPHETAMINE SULFATE AND AMPHETAMINE SULFATE 3.75; 3.75; 3.75; 3.75 MG/1; MG/1; MG/1; MG/1
12.5 TABLET ORAL
COMMUNITY
Start: 2022-06-01

## 2022-10-13 RX ORDER — EZETIMIBE 10 MG/1
TABLET ORAL
COMMUNITY
Start: 2022-07-07 | End: 2023-07-27

## 2022-10-13 NOTE — TELEPHONE ENCOUNTER
No new care gaps identified.  [facility] Embedded Care Gaps. Reference number: [id number]. 10/13/2022   1:05:01 PM CDT

## 2022-10-18 ENCOUNTER — OFFICE VISIT (OUTPATIENT)
Dept: UROLOGY | Facility: CLINIC | Age: 47
End: 2022-10-18
Payer: COMMERCIAL

## 2022-10-18 VITALS
SYSTOLIC BLOOD PRESSURE: 124 MMHG | BODY MASS INDEX: 28.16 KG/M2 | HEIGHT: 63 IN | DIASTOLIC BLOOD PRESSURE: 82 MMHG | WEIGHT: 158.94 LBS | HEART RATE: 94 BPM

## 2022-10-18 DIAGNOSIS — R31.29 HEMATURIA, MICROSCOPIC: Primary | ICD-10-CM

## 2022-10-18 PROCEDURE — 1159F MED LIST DOCD IN RCRD: CPT | Mod: CPTII,S$GLB,, | Performed by: UROLOGY

## 2022-10-18 PROCEDURE — 99213 OFFICE O/P EST LOW 20 MIN: CPT | Mod: S$GLB,,, | Performed by: UROLOGY

## 2022-10-18 PROCEDURE — 1159F PR MEDICATION LIST DOCUMENTED IN MEDICAL RECORD: ICD-10-PCS | Mod: CPTII,S$GLB,, | Performed by: UROLOGY

## 2022-10-18 PROCEDURE — 3074F PR MOST RECENT SYSTOLIC BLOOD PRESSURE < 130 MM HG: ICD-10-PCS | Mod: CPTII,S$GLB,, | Performed by: UROLOGY

## 2022-10-18 PROCEDURE — 3074F SYST BP LT 130 MM HG: CPT | Mod: CPTII,S$GLB,, | Performed by: UROLOGY

## 2022-10-18 PROCEDURE — 99213 PR OFFICE/OUTPT VISIT, EST, LEVL III, 20-29 MIN: ICD-10-PCS | Mod: S$GLB,,, | Performed by: UROLOGY

## 2022-10-18 PROCEDURE — 3079F PR MOST RECENT DIASTOLIC BLOOD PRESSURE 80-89 MM HG: ICD-10-PCS | Mod: CPTII,S$GLB,, | Performed by: UROLOGY

## 2022-10-18 PROCEDURE — 3079F DIAST BP 80-89 MM HG: CPT | Mod: CPTII,S$GLB,, | Performed by: UROLOGY

## 2022-10-18 PROCEDURE — 99999 PR PBB SHADOW E&M-EST. PATIENT-LVL III: ICD-10-PCS | Mod: PBBFAC,,, | Performed by: UROLOGY

## 2022-10-18 PROCEDURE — 99999 PR PBB SHADOW E&M-EST. PATIENT-LVL III: CPT | Mod: PBBFAC,,, | Performed by: UROLOGY

## 2022-10-18 NOTE — PROGRESS NOTES
Subjective:       Patient ID: Juanita Salas is a 47 y.o. female.    Chief Complaint: hematuria microscopic (Follow up with history of micro hematuria . Had a cystoscopy that was done on 2018 )    HPI patient is here for microscopic hematuria.  She has trace blood water to red cells.  No complaints negative workup in the past.  No complaints    Past Medical History:   Diagnosis Date    Adjustment disorder     Allergy     Anxiety     Depression     Fatigue     Headache     Hx of psychiatric care     Hypertension     Low vitamin D level     Panic disorder     Psychiatric problem        Past Surgical History:   Procedure Laterality Date     SECTION      DILATION AND CURETTAGE OF UTERUS         Family History   Problem Relation Age of Onset    Diabetes Mother     Pancreatic cancer Mother 47    Skin cancer Father         NMSC    Multiple sclerosis Sister     Cancer Sister 41        uterine origin suspected    Eczema Daughter     Hypertension Maternal Aunt     Hyperlipidemia Maternal Aunt     Hyperlipidemia Maternal Uncle     Hypertension Maternal Uncle     Hyperlipidemia Paternal Aunt     Hypertension Paternal Aunt     Hyperlipidemia Paternal Uncle     Hypertension Paternal Uncle     Stroke Maternal Grandmother     Psoriasis Maternal Grandmother     Heart disease Maternal Grandfather     Hyperlipidemia Maternal Grandfather     Hypertension Maternal Grandfather     Skin cancer Paternal Grandmother         NMSC    Other Paternal Grandfather         patient believes he had bladder cancer    Other Paternal Aunt         pheochromocytoma; Von-Hippel-Lindau syndrome    Other Paternal Cousin         brain tumor- benign vs malignant?    Colon cancer Neg Hx     Esophageal cancer Neg Hx     Stomach cancer Neg Hx     Rectal cancer Neg Hx     Ulcerative colitis Neg Hx     Irritable bowel syndrome Neg Hx     Crohn's disease Neg Hx     Celiac disease Neg Hx     Asthma Neg Hx     Allergic rhinitis Neg Hx     Allergies Neg  Hx     Angioedema Neg Hx     Atopy Neg Hx     Immunodeficiency Neg Hx     Rhinitis Neg Hx     Urticaria Neg Hx        Social History     Socioeconomic History    Marital status:    Tobacco Use    Smoking status: Former     Packs/day: 0.50     Years: 2.00     Pack years: 1.00     Types: Cigarettes     Quit date: 2017     Years since quittin.9    Smokeless tobacco: Never   Substance and Sexual Activity    Alcohol use: No    Drug use: No    Sexual activity: Yes     Partners: Male     Birth control/protection: Condom   Social History Narrative     with son (23) and daugther (14)     Social Determinants of Health     Financial Resource Strain: Low Risk     Difficulty of Paying Living Expenses: Not hard at all   Food Insecurity: No Food Insecurity    Worried About Running Out of Food in the Last Year: Never true    Ran Out of Food in the Last Year: Never true   Transportation Needs: No Transportation Needs    Lack of Transportation (Medical): No    Lack of Transportation (Non-Medical): No   Physical Activity: Insufficiently Active    Days of Exercise per Week: 3 days    Minutes of Exercise per Session: 30 min   Stress: Stress Concern Present    Feeling of Stress : To some extent   Social Connections: Unknown    Frequency of Communication with Friends and Family: More than three times a week    Frequency of Social Gatherings with Friends and Family: More than three times a week    Active Member of Clubs or Organizations: No    Attends Club or Organization Meetings: Never    Marital Status: Living with partner   Housing Stability: Low Risk     Unable to Pay for Housing in the Last Year: No    Number of Places Lived in the Last Year: 1    Unstable Housing in the Last Year: No       Allergies:  Azithromycin    Medications:    Current Outpatient Medications:     dextroamphetamine-amphetamine (ADDERALL) 15 mg tablet, 12.5 mg., Disp: , Rfl:     ergocalciferol (ERGOCALCIFEROL) 50,000 unit Cap, Take  50,000 Units by mouth every 7 days. , Disp: , Rfl:     hydrocortisone (PROCTO-MED HC) 2.5 % rectal cream, Place rectally 2 (two) times daily., Disp: 28 g, Rfl: 1    azelastine (ASTELIN) 137 mcg (0.1 %) nasal spray, 1 spray (137 mcg total) by Nasal route 2 (two) times daily. (Patient not taking: Reported on 10/18/2022), Disp: 30 mL, Rfl: 0    betaxoloL (KERLONE) 10 mg Tab, Take by mouth., Disp: , Rfl:     cholecalciferol, vitamin D3, (VITAMIN D3) 25 mcg (1,000 unit) capsule, Take 1,000 Units by mouth 2 (two) times daily., Disp: , Rfl:     clonazePAM (KLONOPIN) 0.5 MG tablet, Take 0.5 mg by mouth as needed., Disp: , Rfl:     ezetimibe (ZETIA) 10 mg tablet, , Disp: , Rfl:     fluticasone propionate (FLONASE) 50 mcg/actuation nasal spray, 1 spray (50 mcg total) by Each Nostril route once daily. (Patient not taking: Reported on 10/18/2022), Disp: 16 g, Rfl: 3    ketoconazole (NIZORAL) 2 % shampoo, Wash hair with medicated shampoo at least 2x/week - let sit on scalp at least 5 minutes prior to rinsing (Patient not taking: Reported on 10/18/2022), Disp: 120 mL, Rfl: 5    norethindrone-ethinyl estradiol (FEMHRT 1/5) 1-5 mg-mcg Tab, Take 1 tablet by mouth once daily., Disp: , Rfl:     Review of Systems   Constitutional: Negative.    HENT: Negative.     Eyes: Negative.    Respiratory: Negative.     Endocrine: Negative.    Genitourinary: Negative.    Musculoskeletal: Negative.    Allergic/Immunologic: Negative.    Neurological: Negative.    Hematological: Negative.    Psychiatric/Behavioral: Negative.       Objective:      Physical Exam  Constitutional:       Appearance: She is well-developed.   HENT:      Head: Normocephalic.   Cardiovascular:      Rate and Rhythm: Normal rate.   Pulmonary:      Effort: Pulmonary effort is normal.   Abdominal:      Palpations: Abdomen is soft.   Musculoskeletal:         General: Normal range of motion.   Skin:     General: Skin is warm.   Neurological:      Mental Status: She is alert.        Assessment:       1. Hematuria, microscopic          Plan:       Juanita was seen today for hematuria microscopic.    Diagnoses and all orders for this visit:    Hematuria, microscopic

## 2022-11-25 ENCOUNTER — NURSE TRIAGE (OUTPATIENT)
Dept: ADMINISTRATIVE | Facility: CLINIC | Age: 47
End: 2022-11-25
Payer: COMMERCIAL

## 2022-11-25 NOTE — TELEPHONE ENCOUNTER
Not sure if she had too much salt or if she is dehydrated or if she has anxiety. Bp is starting to go down. It was 170/high earlier. She took 1/4 of a clonidine. Bp is currently 140/80. Pt would like to be seen. Care advice recommend pt is seen today. Pt offered and accepted [facility].  Reason for Disposition   Patient wants to be seen    Additional Information   Negative: Sounds like a life-threatening emergency to the triager   Negative: Systolic BP >= 160 OR Diastolic >= 100, and any cardiac or neurologic symptoms (e.g., chest pain, difficulty breathing, unsteady gait, blurred vision)   Negative: Pregnant 20 or more weeks (or postpartum < 6 weeks) with new hand or face swelling   Negative: Pregnant 20 or more weeks (or postpartum < 6 weeks) and Systolic BP >= 160 OR Diastolic >= 100   Negative: Patient sounds very sick or weak to the triager   Negative: Systolic BP >= 200 OR Diastolic >= 120 and having NO cardiac or neurologic symptoms   Negative: Pregnant 20 or more weeks (or postpartum < 6 weeks) with Systolic BP >= 140 OR Diastolic >= 90   Negative: Systolic BP >= 180 OR Diastolic >= 110, and missed most recent dose of blood pressure medication   Negative: Systolic BP >= 180 OR Diastolic >= 110    Protocols used: Blood Pressure - High-A-OH

## 2023-03-02 ENCOUNTER — OFFICE VISIT (OUTPATIENT)
Dept: OBSTETRICS AND GYNECOLOGY | Facility: CLINIC | Age: 48
End: 2023-03-02
Payer: COMMERCIAL

## 2023-03-02 VITALS
HEIGHT: 63 IN | WEIGHT: 162.25 LBS | DIASTOLIC BLOOD PRESSURE: 93 MMHG | BODY MASS INDEX: 28.75 KG/M2 | SYSTOLIC BLOOD PRESSURE: 140 MMHG

## 2023-03-02 DIAGNOSIS — N95.1 VASOMOTOR SYMPTOMS DUE TO MENOPAUSE: Primary | ICD-10-CM

## 2023-03-02 DIAGNOSIS — N93.9 ABNORMAL UTERINE BLEEDING (AUB): ICD-10-CM

## 2023-03-02 PROCEDURE — 99203 PR OFFICE/OUTPT VISIT, NEW, LEVL III, 30-44 MIN: ICD-10-PCS | Mod: S$GLB,,, | Performed by: OBSTETRICS & GYNECOLOGY

## 2023-03-02 PROCEDURE — 1160F RVW MEDS BY RX/DR IN RCRD: CPT | Mod: CPTII,S$GLB,, | Performed by: OBSTETRICS & GYNECOLOGY

## 2023-03-02 PROCEDURE — 3008F BODY MASS INDEX DOCD: CPT | Mod: CPTII,S$GLB,, | Performed by: OBSTETRICS & GYNECOLOGY

## 2023-03-02 PROCEDURE — 3077F PR MOST RECENT SYSTOLIC BLOOD PRESSURE >= 140 MM HG: ICD-10-PCS | Mod: CPTII,S$GLB,, | Performed by: OBSTETRICS & GYNECOLOGY

## 2023-03-02 PROCEDURE — 3080F DIAST BP >= 90 MM HG: CPT | Mod: CPTII,S$GLB,, | Performed by: OBSTETRICS & GYNECOLOGY

## 2023-03-02 PROCEDURE — 3077F SYST BP >= 140 MM HG: CPT | Mod: CPTII,S$GLB,, | Performed by: OBSTETRICS & GYNECOLOGY

## 2023-03-02 PROCEDURE — 99999 PR PBB SHADOW E&M-EST. PATIENT-LVL III: CPT | Mod: PBBFAC,,, | Performed by: OBSTETRICS & GYNECOLOGY

## 2023-03-02 PROCEDURE — 99203 OFFICE O/P NEW LOW 30 MIN: CPT | Mod: S$GLB,,, | Performed by: OBSTETRICS & GYNECOLOGY

## 2023-03-02 PROCEDURE — 1160F PR REVIEW ALL MEDS BY PRESCRIBER/CLIN PHARMACIST DOCUMENTED: ICD-10-PCS | Mod: CPTII,S$GLB,, | Performed by: OBSTETRICS & GYNECOLOGY

## 2023-03-02 PROCEDURE — 3080F PR MOST RECENT DIASTOLIC BLOOD PRESSURE >= 90 MM HG: ICD-10-PCS | Mod: CPTII,S$GLB,, | Performed by: OBSTETRICS & GYNECOLOGY

## 2023-03-02 PROCEDURE — 1159F MED LIST DOCD IN RCRD: CPT | Mod: CPTII,S$GLB,, | Performed by: OBSTETRICS & GYNECOLOGY

## 2023-03-02 PROCEDURE — 1159F PR MEDICATION LIST DOCUMENTED IN MEDICAL RECORD: ICD-10-PCS | Mod: CPTII,S$GLB,, | Performed by: OBSTETRICS & GYNECOLOGY

## 2023-03-02 PROCEDURE — 99999 PR PBB SHADOW E&M-EST. PATIENT-LVL III: ICD-10-PCS | Mod: PBBFAC,,, | Performed by: OBSTETRICS & GYNECOLOGY

## 2023-03-02 PROCEDURE — 3008F PR BODY MASS INDEX (BMI) DOCUMENTED: ICD-10-PCS | Mod: CPTII,S$GLB,, | Performed by: OBSTETRICS & GYNECOLOGY

## 2023-03-02 NOTE — ASSESSMENT & PLAN NOTE
Patient reports symptoms of perimenopause including hot flashes, night sweats, weight gain, mood changes, and hair thinning. Interested in HRT but has concerns about risks. Also notes has not gone a full 12 months without a menstrual cycle, however labs from 4/2022 indicate postmenopausal status (FSH 55). Will repeat labs today and obtain TVUS to assess endometrium. If thickened will obtain EMBx. If no concern for PMB can consider HRT, but will obtain results and call patient to discuss next steps.

## 2023-03-02 NOTE — PROGRESS NOTES
"  Chief Complaint   Patient presents with    hormones       HPI:   47 y.o.  here today to discuss hormones. Notices difficulty losing weight, night sweats, occasional hot flashes, hair thinning, however no significant change in sex drive, denies vaginal changes or dryness. Diagnosed with dysautonomia, not taking any medications regularly for it. Taking Adderall 12.5 mg for ADHD/fatigue. Suffers from chronic constipation but about to start a "colon cleanse." Has blood in urine but has had a workup with a urologist including cystoscopy that was normal. Denies urinary complaints, abnormal discharge, or other complaints or concerns today.      Still having a cycle every few months. FSH 56 from 2022, E2 32. Reports abdominal bloating and feels full faster. No unexplained weight loss.     Sister passed from an endometrioid adenocarcinoma, unclear primary (path report reviewed).     LMP Dates from Last 1 Encounters:   LMP: 2023       Labs / Significant Studies  Pap (2022): NILM/HPV neg  Cologuard (): Negative  MMG (): Negative per patient (does at DIS)    No visits with results within 3 Month(s) from this visit.   Latest known visit with results is:   Lab Visit on 2022   Component Date Value Ref Range Status    IgG 2022 924  650 - 1600 mg/dL Final    IgG Cord Blood Reference Range: 650-1600 mg/dL.    IgA 2022 317  40 - 350 mg/dL Final    IgA Cord Blood Reference Range: <5 mg/dL.    IgM 2022 151  50 - 300 mg/dL Final    IgM Cord Blood Reference Range: <25 mg/dL.    IgE 2022 <35  0 - 100 IU/mL Final    WBC 2022 8.16  3.90 - 12.70 K/uL Final    RBC 2022 4.18  4.00 - 5.40 M/uL Final    Hemoglobin 2022 12.7  12.0 - 16.0 g/dL Final    Hematocrit 2022 39.3  37.0 - 48.5 % Final    MCV 2022 94  82 - 98 fL Final    MCH 2022 30.4  27.0 - 31.0 pg Final    MCHC 2022 32.3  32.0 - 36.0 g/dL Final    RDW 2022 12.4  11.5 - 14.5 % Final    " Platelets 09/21/2022 332  150 - 450 K/uL Final    MPV 09/21/2022 10.6  9.2 - 12.9 fL Final    Immature Granulocytes 09/21/2022 0.4  0.0 - 0.5 % Final    Gran # (ANC) 09/21/2022 5.7  1.8 - 7.7 K/uL Final    Immature Grans (Abs) 09/21/2022 0.03  0.00 - 0.04 K/uL Final    Comment: Mild elevation in immature granulocytes is non specific and   can be seen in a variety of conditions including stress response,   acute inflammation, trauma and pregnancy. Correlation with other   laboratory and clinical findings is essential.      Lymph # 09/21/2022 1.9  1.0 - 4.8 K/uL Final    Mono # 09/21/2022 0.4  0.3 - 1.0 K/uL Final    Eos # 09/21/2022 0.1  0.0 - 0.5 K/uL Final    Baso # 09/21/2022 0.03  0.00 - 0.20 K/uL Final    nRBC 09/21/2022 0  0 /100 WBC Final    Gran % 09/21/2022 69.2  38.0 - 73.0 % Final    Lymph % 09/21/2022 23.3  18.0 - 48.0 % Final    Mono % 09/21/2022 5.4  4.0 - 15.0 % Final    Eosinophil % 09/21/2022 1.3  0.0 - 8.0 % Final    Basophil % 09/21/2022 0.4  0.0 - 1.9 % Final    Differential Method 09/21/2022 Automated   Final    Cat Dander 09/21/2022 <0.10  <0.10 kU/L Final    Cat Epithelium Class 09/21/2022 CLASS 0   Final    Comment: Test performed at [facility],  [address] [place]  [address] [phone]  Jeramie Ross MD  - Medical Director      Dog Cruzder, IgE 09/21/2022 <0.10  <0.10 kU/L Final    Dog Dander Class 09/21/2022 CLASS 0   Final    Comment: Test performed at [facility],  [address], [place]  [address] [phone]  Jeramie Ross MD  - Medical Director      D. farinae 09/21/2022 <0.10  <0.10 kU/L Final    D. farinae Class 09/21/2022 CLASS 0   Final    Comment: Test performed at [facility],  [address] , [place]  [address] [phone]  Jeramie Ross MD  - Medical Director      Mite Dust Pteronyssinus IgE 09/21/2022 <0.10  <0.10 kU/L Final    D. pteronyssinus Class 09/21/2022 CLASS 0   Final    Comment: Test  performed at [facility],  [address] [place]  [address] [phone]  Jeramie Ross MD  - Medical Director      Aspergillus Fumigatus IgE 09/21/2022 <0.10  <0.10 kU/L Final    A. fumigatus Class 09/21/2022 CLASS 0   Final    Comment: Test performed at [facility],  [address] [place]  [address] [phone]  Jeramie Ross MD  - Medical Director      Alternaria alternata 09/21/2022 <0.10  <0.10 kU/L Final    Altern. alternata Class 09/21/2022 CLASS 0   Final    Comment: Test performed at [facility],  [address] [place] [id number] [phone]  Jeramie Ross MD  - Medical Director      Cockroach, IgE 09/21/2022 <0.10  <0.10 kU/L Final    Cockroach, IgE 09/21/2022 CLASS 0   Final    Comment: Test performed at [facility],  [address] [place] [id number] [phone]  Jeramie Ross MD  - Medical Director      Bahia Grass 09/21/2022 <0.10  <0.10 kU/L Final    Bahia Class 09/21/2022 CLASS 0   Final    Comment: Test performed at [facility],  [address] [place]  [address] [phone]  Jeramie Ross MD  - Medical Director      Liang Grass 09/21/2022 <0.10  <0.10 kU/L Final    Liang Grass Class 09/21/2022 CLASS 0   Final    Comment: Test performed at [facility],  [address] [place]  [address] [phone]  Jeramie Ross MD  - Medical Director      [place](Quercus alba) IgE 09/21/2022 <0.10  <0.10 kU/L Final    Stewart, Class 09/21/2022 CLASS 0   Final    Comment: Test performed at [facility],  [address] [place]  [address] [phone]  Jeramie Ross MD  - Medical Director      Colerain IgE 09/21/2022 <0.10  <0.10 kU/L Final    Colerain Class 09/21/2022 CLASS 0   Final    Comment: Test performed at [facility],  [address], [place]  [address] [phone]  Jeramie Ross MD  - [facility]  Director      Heidi Hurtado Tree 09/21/2022 <0.10  <0.10 kU/L Final    Pecan, Class 09/21/2022 CLASS 0   Final    Comment: Test performed at [facility],  [address] [place] [id number] [phone]  Jeramie Ross MD  - Medical Director      OlyJae lyn, IgE 09/21/2022 <0.10  <0.10 kU/L Final    Olyeboni Jae, Class 09/21/2022 CLASS 0   Final    Comment: Test performed at [facility],  [address] [place] [id number] [phone]  Jeramie Ross MD  - Medical Director      [facility] IgE 09/21/2022 <0.10  <0.10 kU/L Final    Marshelder Class 09/21/2022 CLASS 0   Final    Comment: Test performed at [facility],  [address] [place] [id number] [phone]  Jeramie Ross MD  - Medical Director      Plantain 09/21/2022 <0.10  <0.10 kU/L Final    English Plantain Class 09/21/2022 CLASS 0   Final    Comment: Test performed at [facility],  [address] [place] [id number] [phone]  Jeramie Ross MD  - Medical Director      S.pneumoniae Type 1 09/21/2022 <0.3   Final    S.pneumoniae Type 3 09/21/2022 0.9   Final    Strep pneumo Type 4 09/21/2022 <0.3   Final    S.pneumoniae Type 5 09/21/2022 1.0   Final    S.pneumoniae Type 8 09/21/2022 0.3   Final    S.pneumoniae Type 9N 09/21/2022 1.9   Final    S.pneumoniae Type 12F 09/21/2022 0.3   Final    Strep pneumo Type 14 09/21/2022 3.3   Final    S.pneumoniae Type 19F 09/21/2022 2.8   Final    S.pneumoniae Type 23F 09/21/2022 0.3   Final    S.pneumoniae Type 6B 09/21/2022 <0.3   Final    S.pneumoniae Type 7F 09/21/2022 0.5   Final    S.pneumoniae Type 18C 09/21/2022 <0.3   Final    S.pneumoniae Type 9V Abs 09/21/2022 1.2   Final    Comment: Serologic correlates of protection against pneumococcal  disease have not been rigorously established for all  patient populations. Published data and expert consensus  (including WHO) suggest protection from invasive  disease  usually occurs at levels >or =0.3-0.50 mcg/mL for healthy  children receiving pneumococcal conjugate vaccines.  Higher titers may be necessary to protect from  non-invasive infection (e.g., pneumonia, otitis,  sinusitis). Expert opinion suggests that a cut-off  of >= 1.3 mcg/mL may be a more relevant value to assess  antibody responses after pneumococcal polysaccharide  vaccines or for immunocompromised patients. In addition  to antibody quantity, protection also depends on antibody  avidity and opsonophagocytic activity. Some experts  consider that post-vaccination (4-6 weeks) IgG  seroconversion and/or 2- to 4-fold rise in IgG titers  for >50% to 70% of vaccine serotypes demonstrates a  normal post-vaccine serologic response. Persons with  high initial serotype                           -specific titers may have less  robust responses.    [facility] uses a multi-analyte immunodetection  (MAID) method. The method employs the Swapferit flow  cytometric system which measures multiple analytes  simultaneously. The FDA standard reference serum 89-S is  used as the calibration standard. Results are reported in  mcg/mL.    This assay detects 11 of the 13 serotypes in the  13-valent conjugate vaccine, and 14 of the 23 serotypes  in the 23-valent polysaccharide vaccine.    This test was developed and its analytical performance  characteristics have been determined by [employer].  It has not been cleared or approved by FDA. This assay  has been validated pursuant to the CLIA regulations  and is used for clinical purposes.    For additional information, please refer to  http://education.Fotolia.Rezolve/faq/DVH846  (This link is being provided for informational/  educational purposes only.)    Test Performed at:  [facility]  [address] [place] [address]  [id number]     SHEN Faulkner MD, PhD      Cladosporium, IgE 09/21/2022 <0.10   <0.10 kU/L Final    Cladosporium Class 2022 CLASS 0   Final    Comment: Test performed at [facility],  [address] [place], [place]  [id number] [phone]  Jeramie Ross MD  - Medical Director      Curvularia lunata 2022 <0.10  <0.10 kU/L Final    Curvularia Lunata Class 2022 CLASS 0   Final    Comment: Test performed at [facility],  [address] [place]  [address] [phone]  Jermaie Ross MD  - Medical Director      Penicillium, IgE 2022 <0.10  <0.10 kU/L Final    Penicillium Class 2022 CLASS 0   Final    Comment: Test performed at [facility],  [address] [place], [place]  [id number] [phone]  Jeramie Ross MD  - Medical Director          Past Medical History:   Diagnosis Date    Adjustment disorder     Allergy     Anxiety     Depression     Fatigue     Headache     Hx of psychiatric care     Hypertension     Low vitamin D level     Panic disorder     Psychiatric problem      Past Surgical History:   Procedure Laterality Date     SECTION      DILATION AND CURETTAGE OF UTERUS         Current Outpatient Medications:     azelastine (ASTELIN) 137 mcg (0.1 %) nasal spray, 1 spray (137 mcg total) by Nasal route 2 (two) times daily., Disp: 30 mL, Rfl: 0    cholecalciferol, vitamin D3, (VITAMIN D3) 25 mcg (1,000 unit) capsule, Take 1,000 Units by mouth 2 (two) times daily., Disp: , Rfl:     clonazePAM (KLONOPIN) 0.5 MG tablet, Take 0.5 mg by mouth as needed., Disp: , Rfl:     dextroamphetamine-amphetamine (ADDERALL) 15 mg tablet, 12.5 mg., Disp: , Rfl:     fluticasone propionate (FLONASE) 50 mcg/actuation nasal spray, 1 spray (50 mcg total) by Each Nostril route once daily., Disp: 16 g, Rfl: 3    hydrocortisone (PROCTO-MED HC) 2.5 % rectal cream, Place rectally 2 (two) times daily., Disp: 28 g, Rfl: 1    ketoconazole (NIZORAL) 2 % shampoo, Wash hair with medicated shampoo at least 2x/week - let sit on scalp  at least 5 minutes prior to rinsing, Disp: 120 mL, Rfl: 5    betaxoloL (KERLONE) 10 mg Tab, Take by mouth., Disp: , Rfl:     ergocalciferol (ERGOCALCIFEROL) 50,000 unit Cap, Take 50,000 Units by mouth every 7 days. , Disp: , Rfl:     ezetimibe (ZETIA) 10 mg tablet, , Disp: , Rfl:     norethindrone-ethinyl estradiol (FEMHRT ) 1-5 mg-mcg Tab, Take 1 tablet by mouth once daily., Disp: , Rfl:   Review of patient's allergies indicates:   Allergen Reactions    Azithromycin Nausea And Vomiting     OB History    Para Term  AB Living   3 2     1 2   SAB IAB Ectopic Multiple Live Births   1              # Outcome Date GA Lbr Reynaldo/2nd Weight Sex Delivery Anes PTL Lv   3 SAB            2 Para      Vag-Spont      1 Para      CS-LTranv        Social History     Tobacco Use    Smoking status: Former     Packs/day: 0.50     Years: 2.00     Pack years: 1.00     Types: Cigarettes     Quit date: 2017     Years since quittin.2    Smokeless tobacco: Never   Substance Use Topics    Alcohol use: No    Drug use: No     Family History   Problem Relation Age of Onset    Diabetes Mother     Pancreatic cancer Mother 47    Skin cancer Father         NMSC    Multiple sclerosis Sister     Cancer Sister 41        uterine origin suspected    Eczema Daughter     Hypertension Maternal Aunt     Hyperlipidemia Maternal Aunt     Hyperlipidemia Maternal Uncle     Hypertension Maternal Uncle     Hyperlipidemia Paternal Aunt     Hypertension Paternal Aunt     Hyperlipidemia Paternal Uncle     Hypertension Paternal Uncle     Stroke Maternal Grandmother     Psoriasis Maternal Grandmother     Heart disease Maternal Grandfather     Hyperlipidemia Maternal Grandfather     Hypertension Maternal Grandfather     Skin cancer Paternal Grandmother         NMSC    Other Paternal Grandfather         patient believes he had bladder cancer    Other Paternal Aunt         pheochromocytoma; Von-Hippel-Lindau syndrome    Other Paternal Cousin          brain tumor- benign vs malignant?    Colon cancer Neg Hx     Esophageal cancer Neg Hx     Stomach cancer Neg Hx     Rectal cancer Neg Hx     Ulcerative colitis Neg Hx     Irritable bowel syndrome Neg Hx     Crohn's disease Neg Hx     Celiac disease Neg Hx     Asthma Neg Hx     Allergic rhinitis Neg Hx     Allergies Neg Hx     Angioedema Neg Hx     Atopy Neg Hx     Immunodeficiency Neg Hx     Rhinitis Neg Hx     Urticaria Neg Hx        Review of Systems   Negative except as in HPI      Physical Exam   Vitals:    03/02/23 0902   BP: (!) 140/93     Body mass index is 28.74 kg/m².    Physical Exam  Constitutional:       General: She is not in acute distress.     Appearance: Normal appearance.   HENT:      Head: Normocephalic and atraumatic.   Pulmonary:      Effort: Pulmonary effort is normal.   Musculoskeletal:         General: Normal range of motion.      Cervical back: Normal range of motion.   Neurological:      General: No focal deficit present.      Mental Status: She is alert and oriented to person, place, and time.   Skin:     General: Skin is warm and dry.   Psychiatric:         Mood and Affect: Mood normal.         Behavior: Behavior normal.         Thought Content: Thought content normal.        Labs reviewed: Pap, HPV, FSH/E2    ASSESSMENT:   Patient Active Problem List   Diagnosis    Vitamin D deficiency    Migraine without aura and without status migrainosus, not intractable    LUPE (generalized anxiety disorder)    Panic disorder    Former smoker    Mixed hyperlipidemia    Mitral regurgitation    B12 deficiency    Chronic fatigue    Vasomotor symptoms due to menopause    Abnormal uterine bleeding (AUB)       PLAN:  Problem List Items Addressed This Visit          Renal/    Vasomotor symptoms due to menopause - Primary    Current Assessment & Plan     Patient reports symptoms of perimenopause including hot flashes, night sweats, weight gain, mood changes, and hair thinning. Interested in HRT but has  concerns about risks. Also notes has not gone a full 12 months without a menstrual cycle, however labs from 4/2022 indicate postmenopausal status (FSH 55). Will repeat labs today and obtain TVUS to assess endometrium. If thickened will obtain EMBx. If no concern for PMB can consider HRT, but will obtain results and call patient to discuss next steps.          Abnormal uterine bleeding (AUB)    Relevant Orders    Follicle Stimulating Hormone    Estradiol    US Pelvis Comp with Transvag NON-OB (xpd    TSH        Total time spent on this encounter was 20 minutes.  This includes preparing to see the patient;  obtaining/reviewing separately obtained history;  performing a medical exam and/or evaluation;   counseling/educating the patient;  ordering medications, tests, of procedures;   referring/communicating with other health care professionals;  EMR documentation;  interpreting/communicating results to the patient;  and/or care coordination.    Follow up for Will call w/ results.       Chantel Parnell MD  Department of Obstetrics & Gynecology  [facility]

## 2023-03-03 ENCOUNTER — LAB VISIT (OUTPATIENT)
Dept: LAB | Facility: HOSPITAL | Age: 48
End: 2023-03-03
Attending: OBSTETRICS & GYNECOLOGY
Payer: COMMERCIAL

## 2023-03-03 DIAGNOSIS — N93.9 ABNORMAL UTERINE BLEEDING (AUB): ICD-10-CM

## 2023-03-03 LAB
ESTRADIOL SERPL-MCNC: 20 PG/ML
FSH SERPL-ACNC: 70.01 MIU/ML
TSH SERPL DL<=0.005 MIU/L-ACNC: 2.54 UIU/ML (ref 0.4–4)

## 2023-03-03 PROCEDURE — 82670 ASSAY OF TOTAL ESTRADIOL: CPT | Performed by: OBSTETRICS & GYNECOLOGY

## 2023-03-03 PROCEDURE — 36415 COLL VENOUS BLD VENIPUNCTURE: CPT | Performed by: OBSTETRICS & GYNECOLOGY

## 2023-03-03 PROCEDURE — 83001 ASSAY OF GONADOTROPIN (FSH): CPT | Performed by: OBSTETRICS & GYNECOLOGY

## 2023-03-03 PROCEDURE — 84443 ASSAY THYROID STIM HORMONE: CPT | Performed by: OBSTETRICS & GYNECOLOGY

## 2023-03-06 ENCOUNTER — PATIENT MESSAGE (OUTPATIENT)
Dept: OBSTETRICS AND GYNECOLOGY | Facility: CLINIC | Age: 48
End: 2023-03-06
Payer: COMMERCIAL

## 2023-03-10 ENCOUNTER — HOSPITAL ENCOUNTER (OUTPATIENT)
Dept: RADIOLOGY | Facility: HOSPITAL | Age: 48
Discharge: HOME | End: 2023-03-10
Attending: OBSTETRICS & GYNECOLOGY
Payer: COMMERCIAL

## 2023-03-10 ENCOUNTER — PATIENT MESSAGE (OUTPATIENT)
Dept: OBSTETRICS AND GYNECOLOGY | Facility: CLINIC | Age: 48
End: 2023-03-10
Payer: COMMERCIAL

## 2023-03-10 DIAGNOSIS — N93.9 ABNORMAL UTERINE BLEEDING (AUB): ICD-10-CM

## 2023-03-10 PROCEDURE — 76856 US EXAM PELVIC COMPLETE: CPT | Mod: 26,,, | Performed by: STUDENT IN AN ORGANIZED HEALTH CARE EDUCATION/TRAINING PROGRAM

## 2023-03-10 PROCEDURE — 76830 US PELVIS COMP WITH TRANSVAG NON-OB (XPD): ICD-10-PCS | Mod: 26,,, | Performed by: STUDENT IN AN ORGANIZED HEALTH CARE EDUCATION/TRAINING PROGRAM

## 2023-03-10 PROCEDURE — 76856 US PELVIS COMP WITH TRANSVAG NON-OB (XPD): ICD-10-PCS | Mod: 26,,, | Performed by: STUDENT IN AN ORGANIZED HEALTH CARE EDUCATION/TRAINING PROGRAM

## 2023-03-10 PROCEDURE — 76856 US EXAM PELVIC COMPLETE: CPT | Mod: TC

## 2023-03-10 PROCEDURE — 76830 TRANSVAGINAL US NON-OB: CPT | Mod: 26,,, | Performed by: STUDENT IN AN ORGANIZED HEALTH CARE EDUCATION/TRAINING PROGRAM

## 2023-06-20 LAB — BCS RECOMMENDATION EXT: NORMAL

## 2023-07-27 ENCOUNTER — OFFICE VISIT (OUTPATIENT)
Dept: INTERNAL MEDICINE | Facility: CLINIC | Age: 48
End: 2023-07-27
Payer: COMMERCIAL

## 2023-07-27 ENCOUNTER — HOSPITAL ENCOUNTER (OUTPATIENT)
Dept: RADIOLOGY | Facility: HOSPITAL | Age: 48
Discharge: HOME | End: 2023-07-27
Attending: NURSE PRACTITIONER
Payer: COMMERCIAL

## 2023-07-27 VITALS
SYSTOLIC BLOOD PRESSURE: 144 MMHG | HEART RATE: 98 BPM | OXYGEN SATURATION: 99 % | BODY MASS INDEX: 28.59 KG/M2 | HEIGHT: 63 IN | DIASTOLIC BLOOD PRESSURE: 92 MMHG | WEIGHT: 161.38 LBS

## 2023-07-27 DIAGNOSIS — M79.642 PAIN OF LEFT HAND: ICD-10-CM

## 2023-07-27 DIAGNOSIS — M77.8 TENDONITIS OF WRIST, LEFT: Primary | ICD-10-CM

## 2023-07-27 DIAGNOSIS — M79.89 SWELLING OF LEFT HAND: ICD-10-CM

## 2023-07-27 DIAGNOSIS — M77.8 TENDONITIS OF WRIST, LEFT: ICD-10-CM

## 2023-07-27 DIAGNOSIS — Z01.30 ENCOUNTER FOR BLOOD PRESSURE EXAMINATION: ICD-10-CM

## 2023-07-27 PROCEDURE — 73110 X-RAY EXAM OF WRIST: CPT | Mod: 26,LT,, | Performed by: RADIOLOGY

## 2023-07-27 PROCEDURE — 3077F PR MOST RECENT SYSTOLIC BLOOD PRESSURE >= 140 MM HG: ICD-10-PCS | Mod: CPTII,S$GLB,, | Performed by: NURSE PRACTITIONER

## 2023-07-27 PROCEDURE — 3008F PR BODY MASS INDEX (BMI) DOCUMENTED: ICD-10-PCS | Mod: CPTII,S$GLB,, | Performed by: NURSE PRACTITIONER

## 2023-07-27 PROCEDURE — 99214 OFFICE O/P EST MOD 30 MIN: CPT | Mod: S$GLB,,, | Performed by: NURSE PRACTITIONER

## 2023-07-27 PROCEDURE — 3077F SYST BP >= 140 MM HG: CPT | Mod: CPTII,S$GLB,, | Performed by: NURSE PRACTITIONER

## 2023-07-27 PROCEDURE — 1159F MED LIST DOCD IN RCRD: CPT | Mod: CPTII,S$GLB,, | Performed by: NURSE PRACTITIONER

## 2023-07-27 PROCEDURE — 73130 XR HAND COMPLETE 3 VIEW LEFT: ICD-10-PCS | Mod: 26,LT,, | Performed by: RADIOLOGY

## 2023-07-27 PROCEDURE — 3080F DIAST BP >= 90 MM HG: CPT | Mod: CPTII,S$GLB,, | Performed by: NURSE PRACTITIONER

## 2023-07-27 PROCEDURE — 99999 PR PBB SHADOW E&M-EST. PATIENT-LVL V: ICD-10-PCS | Mod: PBBFAC,,, | Performed by: NURSE PRACTITIONER

## 2023-07-27 PROCEDURE — 73130 X-RAY EXAM OF HAND: CPT | Mod: 26,LT,, | Performed by: RADIOLOGY

## 2023-07-27 PROCEDURE — 73130 X-RAY EXAM OF HAND: CPT | Mod: TC,LT

## 2023-07-27 PROCEDURE — 73110 XR WRIST COMPLETE 3 VIEWS LEFT: ICD-10-PCS | Mod: 26,LT,, | Performed by: RADIOLOGY

## 2023-07-27 PROCEDURE — 3008F BODY MASS INDEX DOCD: CPT | Mod: CPTII,S$GLB,, | Performed by: NURSE PRACTITIONER

## 2023-07-27 PROCEDURE — 1159F PR MEDICATION LIST DOCUMENTED IN MEDICAL RECORD: ICD-10-PCS | Mod: CPTII,S$GLB,, | Performed by: NURSE PRACTITIONER

## 2023-07-27 PROCEDURE — 73110 X-RAY EXAM OF WRIST: CPT | Mod: TC,LT

## 2023-07-27 PROCEDURE — 3080F PR MOST RECENT DIASTOLIC BLOOD PRESSURE >= 90 MM HG: ICD-10-PCS | Mod: CPTII,S$GLB,, | Performed by: NURSE PRACTITIONER

## 2023-07-27 PROCEDURE — 99999 PR PBB SHADOW E&M-EST. PATIENT-LVL V: CPT | Mod: PBBFAC,,, | Performed by: NURSE PRACTITIONER

## 2023-07-27 PROCEDURE — 99214 PR OFFICE/OUTPT VISIT, EST, LEVL IV, 30-39 MIN: ICD-10-PCS | Mod: S$GLB,,, | Performed by: NURSE PRACTITIONER

## 2023-07-27 NOTE — PATIENT INSTRUCTIONS
1) CALL DR. SAM IN REGARDS TO YOUR BLOOD PRESSURES     2) WILL CALL YOU ABOUT YOUR XRAYS; AND I WILL CALL YOU WITH THE RESULTS.     3) MEET WITH MY  TODAY TO SCHEDULE AN APPT WITH AN ORTHOPEDIC HAND EXPERT.

## 2023-07-27 NOTE — PROGRESS NOTES
"INTERNAL MEDICINE CLINIC - SAME DAY APPOINTMENT  Progress Note    PRESENTING HISTORY     PCP: Ema Gaston MD    Chief Complaint/Reason for Visit:   No chief complaint on file.    History of Present Illness & ROS : Ms. Juanita Salas is a 48 y.o. female.    Same day apt.   Est'd with Dr. Gaston.   Chart has been reviewed.   Reports that she has been having issues with control of her Blood Pressure. Monitoring at home and reports: 160/100s. Has been having this issue for years and will take 'Klonopin and seems to help'. Followed by her Cardiologist in [place], [place], Dr. Ruelas, being followed very closely for her labile blood pressures and he is working on 'managing'. She is having no new associated symptoms, including headaches, dizziness, blurred vision or chest wall discomforts, and no cough.   She was hoping to get message to her Cardiologist and plans on 'doing this today'.   Request labs today: check potassium and CBC. Has an old resolving hematoma to right forearm believes due to taking "Aleve, so stopped taking'.   Also mentions today that having 'pain to left wrist, no trauma, hurts if bend wrist and swells'. Been going on for 'about a month'.   Request a work note for missing today.     Review of Systems:  Eyes: denies visual changes at this time denies floaters   ENT: no nasal congestion or sore throat  Respiratory: no cough or shorness of breath  Cardiovascular: no chest pain or palpitations  Gastrointestinal: no nausea or vomiting, no abdominal pain or change in bowel habits  Genitourinary: no hematuria or dysuria; denies frequency  Hematologic/Lymphatic: no easy bruising or lymphadenopathy  Neurological: no seizures or tremors  Endocrine: no heat or cold intolerance      PAST HISTORY:     Past Medical History:   Diagnosis Date    Adjustment disorder     Allergy     Anxiety     Depression     Fatigue     Headache     Hx of psychiatric care     Hypertension     Low vitamin D level     Panic disorder     " Psychiatric problem        Past Surgical History:   Procedure Laterality Date     SECTION      DILATION AND CURETTAGE OF UTERUS         Family History   Problem Relation Age of Onset    Diabetes Mother     Pancreatic cancer Mother 47    Skin cancer Father         NMSC    Multiple sclerosis Sister     Cancer Sister 41        uterine origin suspected    Eczema Daughter     Hypertension Maternal Aunt     Hyperlipidemia Maternal Aunt     Hyperlipidemia Maternal Uncle     Hypertension Maternal Uncle     Hyperlipidemia Paternal Aunt     Hypertension Paternal Aunt     Hyperlipidemia Paternal Uncle     Hypertension Paternal Uncle     Stroke Maternal Grandmother     Psoriasis Maternal Grandmother     Heart disease Maternal Grandfather     Hyperlipidemia Maternal Grandfather     Hypertension Maternal Grandfather     Skin cancer Paternal Grandmother         NMSC    Other Paternal Grandfather         patient believes he had bladder cancer    Other Paternal Aunt         pheochromocytoma; Von-Hippel-Lindau syndrome    Other Paternal Cousin         brain tumor- benign vs malignant?    Colon cancer Neg Hx     Esophageal cancer Neg Hx     Stomach cancer Neg Hx     Rectal cancer Neg Hx     Ulcerative colitis Neg Hx     Irritable bowel syndrome Neg Hx     Crohn's disease Neg Hx     Celiac disease Neg Hx     Asthma Neg Hx     Allergic rhinitis Neg Hx     Allergies Neg Hx     Angioedema Neg Hx     Atopy Neg Hx     Immunodeficiency Neg Hx     Rhinitis Neg Hx     Urticaria Neg Hx        Social History     Socioeconomic History    Marital status:    Tobacco Use    Smoking status: Former     Packs/day: 0.50     Years: 2.00     Pack years: 1.00     Types: Cigarettes     Quit date: 2017     Years since quittin.6    Smokeless tobacco: Never   Substance and Sexual Activity    Alcohol use: No    Drug use: No    Sexual activity: Yes     Partners: Male     Birth control/protection: Condom   Social History Narrative      with son (23) and daugther (14)     Social Determinants of Health     Financial Resource Strain: Low Risk     Difficulty of Paying Living Expenses: Not hard at all   Food Insecurity: No Food Insecurity    Worried About Running Out of Food in the Last Year: Never true    Ran Out of Food in the Last Year: Never true   Transportation Needs: No Transportation Needs    Lack of Transportation (Medical): No    Lack of Transportation (Non-Medical): No   Physical Activity: Unknown    Days of Exercise per Week: 3 days   Stress: Stress Concern Present    Feeling of Stress : To some extent   Social Connections: Unknown    Frequency of Communication with Friends and Family: More than three times a week    Frequency of Social Gatherings with Friends and Family: More than three times a week    Active Member of Clubs or Organizations: No    Attends Club or Organization Meetings: Never    Marital Status: Living with partner   Housing Stability: Unknown    Unable to Pay for Housing in the Last Year: No    Unstable Housing in the Last Year: No       MEDICATIONS & ALLERGIES:     Current Outpatient Medications on File Prior to Visit   Medication Sig Dispense Refill    azelastine (ASTELIN) 137 mcg (0.1 %) nasal spray 1 spray (137 mcg total) by Nasal route 2 (two) times daily. 30 mL 0    betaxoloL (KERLONE) 10 mg Tab Take by mouth.      cholecalciferol, vitamin D3, (VITAMIN D3) 25 mcg (1,000 unit) capsule Take 1,000 Units by mouth 2 (two) times daily.      clonazePAM (KLONOPIN) 0.5 MG tablet Take 0.5 mg by mouth as needed.      dextroamphetamine-amphetamine (ADDERALL) 15 mg tablet 12.5 mg.      ergocalciferol (ERGOCALCIFEROL) 50,000 unit Cap Take 50,000 Units by mouth every 7 days.       ezetimibe (ZETIA) 10 mg tablet       fluticasone propionate (FLONASE) 50 mcg/actuation nasal spray 1 spray (50 mcg total) by Each Nostril route once daily. 16 g 3    hydrocortisone (PROCTO-MED HC) 2.5 % rectal cream Place rectally 2 (two) times  daily. 28 g 1    ketoconazole (NIZORAL) 2 % shampoo Wash hair with medicated shampoo at least 2x/week - let sit on scalp at least 5 minutes prior to rinsing 120 mL 5    norethindrone-ethinyl estradiol (FEMHRT 1/5) 1-5 mg-mcg Tab Take 1 tablet by mouth once daily.       No current facility-administered medications on file prior to visit.        Review of patient's allergies indicates:   Allergen Reactions    Azithromycin Nausea And Vomiting       Medications Reconciliation:   I have reconciled the patient's home medications with the patient/family. I have updated all changes.  Refer to After-Visit Medication List.    OBJECTIVE:     Vital Signs:  There were no vitals filed for this visit.  Wt Readings from Last 3 Encounters:   03/02/23 0902 73.6 kg (162 lb 4.1 oz)   10/18/22 0704 72.1 kg (158 lb 15.2 oz)   09/21/22 1107 72.3 kg (159 lb 6.3 oz)     There is no height or weight on file to calculate BMI.   Wt Readings from Last 3 Encounters:   07/27/23 73.2 kg (161 lb 6 oz)   03/02/23 73.6 kg (162 lb 4.1 oz)   10/18/22 72.1 kg (158 lb 15.2 oz)     Temp Readings from Last 3 Encounters:   03/03/20 97.5 °F (36.4 °C)   01/28/20 98.6 °F (37 °C) (Oral)   12/23/19 98.6 °F (37 °C) (Oral)     BP Readings from Last 3 Encounters:   07/27/23 (!) 144/92   03/02/23 (!) 140/93   10/18/22 124/82     Pulse Readings from Last 3 Encounters:   07/27/23 98   10/18/22 94   09/21/22 103       Physical Exam:  (Focused Exam)  General: Well developed, well nourished. No distress.  HEENT: Head is normocephalic, atraumatic  Eyes: Clear conjunctiva.  Neck: Supple, symmetrical neck; trachea midline.  Cardiovascular: Heart with regular rate and rhythm. No murmurs, gallops or rubs  Extremities: No LE edema. Pulses 2+ and symmetric.   Skin: Skin color, texture, turgor normal. No rashes.  Musculoskeletal: Normal gait.   Neurologic: Normal strength and tone. No focal numbness or weakness.     Laboratory  Lab Results   Component Value Date    WBC 8.16  09/21/2022    HGB 12.7 09/21/2022    HCT 39.3 09/21/2022     09/21/2022    CHOL 196 04/11/2022    TRIG 74 04/11/2022    HDL 52 04/11/2022    ALT 14 04/11/2022    AST 16 04/11/2022     04/11/2022    K 3.8 04/11/2022     04/11/2022    CREATININE 0.7 04/11/2022    BUN 14 04/11/2022    CO2 29 04/11/2022    TSH 2.538 03/03/2023    HGBA1C 5.2 07/03/2019         ASSESSMENT & PLAN:     Same day appt.     Tendonitis of wrist, left    Swelling of left hand    Pain of left hand  *Discussed Rest, Icing, elevation and soft hand support splint; she understands that NSAIDS may challenge her BP control and recommend holding off on taking those.   -     X-Ray Hand 3 View Left; Future; Expected date: 07/27/2023  -     Ambulatory referral/consult to Orthopedics; Future; Expected date: 08/03/2023  -     X-Ray Wrist Complete 3 views Left; Future; Expected date: 07/27/2023    Encounter for blood pressure examination  Encounter BP check:   *On HRT and may challenge control.  Today:  144/92  *Will respectfully defer to her established Cardiologist.      HOME INSTRUCTIONS  1) CALL DR. SAM IN REGARDS TO YOUR BLOOD PRESSURES     2) WILL CALL YOU ABOUT YOUR XRAYS; AND I WILL CALL YOU WITH THE RESULTS.     3) MEET WITH MY  TODAY TO SCHEDULE AN APPT WITH AN ORTHOPEDIC HAND EXPERT.     Future Appointments   Date Time Provider Department Center   7/27/2023  2:50 PM [facility]   8/1/2023  8:00 AM Florin Ward III, MD [facility] CARDIO [facility]   8/17/2023  3:00 PM Erika Nielson NP [facility]   8/29/2023  1:45 PM Adrianna Gleason MD [facility]   9/18/2023  8:00 AM Ema Gaston MD [facility]        Medication List            Accurate as of July 27, 2023 11:51 AM. If you have any questions, ask your nurse or doctor.                CONTINUE taking these medications      azelastine 137 mcg (0.1 %) nasal spray  Commonly known as: ASTELIN  1  spray (137 mcg total) by Nasal route 2 (two) times daily.     cholecalciferol (vitamin D3) 25 mcg (1,000 unit) capsule  Commonly known as: VITAMIN D3     clonazePAM 0.5 MG tablet  Commonly known as: KlonoPIN     dextroamphetamine-amphetamine 15 mg tablet  Commonly known as: ADDERALL     ergocalciferol 50,000 unit Cap  Commonly known as: ERGOCALCIFEROL     hydrocortisone 2.5 % rectal cream  Commonly known as: PROCTO-MED HC  Place rectally 2 (two) times daily.     ketoconazole 2 % shampoo  Commonly known as: NIZORAL  Wash hair with medicated shampoo at least 2x/week - let sit on scalp at least 5 minutes prior to rinsing     norethindrone-ethinyl estradiol 1-5 mg-mcg Tab  Commonly known as: FEMHRT 1/5            STOP taking these medications      betaxoloL 10 mg Tab  Commonly known as: KERLONE  Stopped by: SASHA Mcghee     ezetimibe 10 mg tablet  Commonly known as: ZETIA  Stopped by: SASHA Mcghee     fluticasone propionate 50 mcg/actuation nasal spray  Commonly known as: FLONASE  Stopped by: SASHA Mcghee              Signing Physician:  SASHA Mcghee

## 2023-07-28 PROBLEM — R03.0 ELEVATED BLOOD PRESSURE READING: Status: ACTIVE | Noted: 2023-07-28

## 2023-07-31 DIAGNOSIS — R03.0 ELEVATED BLOOD PRESSURE READING: Primary | ICD-10-CM

## 2023-08-23 ENCOUNTER — TELEPHONE (OUTPATIENT)
Dept: ORTHOPEDICS | Facility: CLINIC | Age: 48
End: 2023-08-23
Payer: COMMERCIAL

## 2023-08-29 ENCOUNTER — OFFICE VISIT (OUTPATIENT)
Dept: ORTHOPEDICS | Facility: CLINIC | Age: 48
End: 2023-08-29
Payer: COMMERCIAL

## 2023-08-29 VITALS — HEIGHT: 63 IN | BODY MASS INDEX: 28.53 KG/M2 | WEIGHT: 161 LBS

## 2023-08-29 DIAGNOSIS — M77.8 TENDONITIS OF WRIST, LEFT: ICD-10-CM

## 2023-08-29 DIAGNOSIS — M79.89 SWELLING OF LEFT HAND: ICD-10-CM

## 2023-08-29 PROCEDURE — 20550 NJX 1 TENDON SHEATH/LIGAMENT: CPT | Mod: LT,S$GLB,, | Performed by: ORTHOPAEDIC SURGERY

## 2023-08-29 PROCEDURE — 99204 PR OFFICE/OUTPT VISIT, NEW, LEVL IV, 45-59 MIN: ICD-10-PCS | Mod: 25,S$GLB,, | Performed by: ORTHOPAEDIC SURGERY

## 2023-08-29 PROCEDURE — 99999 PR PBB SHADOW E&M-EST. PATIENT-LVL III: ICD-10-PCS | Mod: PBBFAC,,, | Performed by: ORTHOPAEDIC SURGERY

## 2023-08-29 PROCEDURE — 3008F BODY MASS INDEX DOCD: CPT | Mod: CPTII,S$GLB,, | Performed by: ORTHOPAEDIC SURGERY

## 2023-08-29 PROCEDURE — 1159F PR MEDICATION LIST DOCUMENTED IN MEDICAL RECORD: ICD-10-PCS | Mod: CPTII,S$GLB,, | Performed by: ORTHOPAEDIC SURGERY

## 2023-08-29 PROCEDURE — 3008F PR BODY MASS INDEX (BMI) DOCUMENTED: ICD-10-PCS | Mod: CPTII,S$GLB,, | Performed by: ORTHOPAEDIC SURGERY

## 2023-08-29 PROCEDURE — 20550 TENDON SHEATH: ICD-10-PCS | Mod: LT,S$GLB,, | Performed by: ORTHOPAEDIC SURGERY

## 2023-08-29 PROCEDURE — 99999 PR PBB SHADOW E&M-EST. PATIENT-LVL III: CPT | Mod: PBBFAC,,, | Performed by: ORTHOPAEDIC SURGERY

## 2023-08-29 PROCEDURE — 99204 OFFICE O/P NEW MOD 45 MIN: CPT | Mod: 25,S$GLB,, | Performed by: ORTHOPAEDIC SURGERY

## 2023-08-29 PROCEDURE — 1159F MED LIST DOCD IN RCRD: CPT | Mod: CPTII,S$GLB,, | Performed by: ORTHOPAEDIC SURGERY

## 2023-08-29 RX ADMIN — DEXAMETHASONE SODIUM PHOSPHATE 4 MG: 4 INJECTION, SOLUTION INTRA-ARTICULAR; INTRALESIONAL; INTRAMUSCULAR; INTRAVENOUS; SOFT TISSUE at 01:08

## 2023-08-29 NOTE — PROCEDURES
Tendon Sheath    Date/Time: 8/29/2023 1:45 PM    Performed by: Adrianna Gleason MD  Authorized by: Adrianna Gleason MD    Consent Done?:  Yes (Verbal)  Indications:  Pain  Timeout: prior to procedure the correct patient, procedure, and site was verified    Prep: patient was prepped and draped in usual sterile fashion      Local anesthesia used?: Yes    Anesthesia:  Local infiltration  Local anesthetic:  Lidocaine 1% without epinephrine  Location:  Wrist  Site:  L first doral compartment  Ultrasonic guidance for needle placement?: Yes    Needle size:  25 G  Medications:  4 mg dexAMETHasone 4 mg/mL

## 2023-08-29 NOTE — PROGRESS NOTES
[facility]  History & Physical  Orthopedics    SUBJECTIVE:      COVID-19 attestation:  This patient was treated during the COVID- pandemic.  This was discussed with the patient, they are aware of our current policies and procedures, were given the option of delaying their visit and or switching to a virtual visit, delaying their surgery when applicable, and they elect to proceed.    Chief Complaint:   Chief Complaint   Patient presents with    Left Hand - Pain    Left Wrist - Pain       Referring Provider: Dejah Edwards     History of Present Illness:  Patient is a 48 y.o. right hand dominant female who presents today with complaints of  left wrist pain over the radial side since .  She states she is difficulty opening up jars and in particular picking up her grandson or when she extends her thumb.  She is never had any previous episodes.  No recent trauma or falls. Xrays completed with results below.   She is not want any braces.  She denies any paresthesias.     The patient denies any fevers, chills, N/V, D/C and presents for evaluation.       Past Medical History:   Diagnosis Date    Adjustment disorder     Allergy     Anxiety     Depression     Fatigue     Headache     Hx of psychiatric care     Low vitamin D level     Panic disorder     Psychiatric problem      Past Surgical History:   Procedure Laterality Date     SECTION      DILATION AND CURETTAGE OF UTERUS       Review of patient's allergies indicates:   Allergen Reactions    Azithromycin Nausea And Vomiting     Social History     Social History Narrative     with son (23) and daugther (14)     Family History   Problem Relation Age of Onset    Diabetes Mother     Pancreatic cancer Mother 47    Skin cancer Father         NMSC    Multiple sclerosis Sister     Cancer Sister 41        uterine origin suspected    Eczema Daughter     Hypertension Maternal Aunt     Hyperlipidemia Maternal Aunt      Hyperlipidemia Maternal Uncle     Hypertension Maternal Uncle     Hyperlipidemia Paternal Aunt     Hypertension Paternal Aunt     Hyperlipidemia Paternal Uncle     Hypertension Paternal Uncle     Stroke Maternal Grandmother     Psoriasis Maternal Grandmother     Heart disease Maternal Grandfather     Hyperlipidemia Maternal Grandfather     Hypertension Maternal Grandfather     Skin cancer Paternal Grandmother         NMSC    Other Paternal Grandfather         patient believes he had bladder cancer    Other Paternal Aunt         pheochromocytoma; Von-Hippel-Lindau syndrome    Other Paternal Cousin         brain tumor- benign vs malignant?    Colon cancer Neg Hx     Esophageal cancer Neg Hx     Stomach cancer Neg Hx     Rectal cancer Neg Hx     Ulcerative colitis Neg Hx     Irritable bowel syndrome Neg Hx     Crohn's disease Neg Hx     Celiac disease Neg Hx     Asthma Neg Hx     Allergic rhinitis Neg Hx     Allergies Neg Hx     Angioedema Neg Hx     Atopy Neg Hx     Immunodeficiency Neg Hx     Rhinitis Neg Hx     Urticaria Neg Hx          Current Outpatient Medications:     azelastine (ASTELIN) 137 mcg (0.1 %) nasal spray, 1 spray (137 mcg total) by Nasal route 2 (two) times daily., Disp: 30 mL, Rfl: 0    cholecalciferol, vitamin D3, (VITAMIN D3) 25 mcg (1,000 unit) capsule, Take 1,000 Units by mouth 2 (two) times daily., Disp: , Rfl:     clonazePAM (KLONOPIN) 0.5 MG tablet, Take 0.5 mg by mouth as needed., Disp: , Rfl:     dextroamphetamine-amphetamine (ADDERALL) 15 mg tablet, 12.5 mg., Disp: , Rfl:     ergocalciferol (ERGOCALCIFEROL) 50,000 unit Cap, Take 50,000 Units by mouth every 7 days. , Disp: , Rfl:     hydrocortisone (PROCTO-MED HC) 2.5 % rectal cream, Place rectally 2 (two) times daily., Disp: 28 g, Rfl: 1    ketoconazole (NIZORAL) 2 % shampoo, Wash hair with medicated shampoo at least 2x/week - let sit on scalp at least 5 minutes prior to rinsing, Disp: 120 mL, Rfl: 5    norethindrone-ethinyl estradiol  "(FEMHRT 1/5) 1-5 mg-mcg Tab, Take 1 tablet by mouth once daily., Disp: , Rfl:     ROS    Review of Systems:  Constitutional: no fever or chills  Eyes: no visual changes  ENT: no nasal congestion or sore throat  Respiratory: no cough or shortness of breath  Cardiovascular: no chest pain  Gastrointestinal: no nausea or vomiting, tolerating diet  Musculoskeletal: pain and soreness    OBJECTIVE:      Vital Signs (Most Recent):  Vitals:    08/29/23 1331   Weight: 73 kg (161 lb)   Height: 5' 3" (1.6 m)     Body mass index is 28.52 kg/m².    Physical Exam    Physical Exam:  Constitutional: The patient appears well-developed and well-nourished. No distress.   Head: Normocephalic and atraumatic.   Nose: Nose normal.   Eyes: Conjunctivae and EOM are normal.   Neck: No tracheal deviation present.   Cardiovascular: Normal rate and intact distal pulses.    Pulmonary/Chest: Effort normal. No respiratory distress.   Abdominal: There is no guarding.   Lymphatic: Negative for adenopathy   Neurological: The patient is alert.   Psychiatric: The patient has a normal mood and affect.     Bilateral Hand/Wrist Examination:    Observation/Inspection:  Swelling   Swelling over left radial styloid dorsal 1st compartment    Deformity  none  Discoloration  none     Scars   none    Atrophy  none    HAND/WRIST EXAMINATION:    ROM hand/wrist/elbow full     Positive Finkelstein's test, tenderness to palpation over dorsal radial styloid -left thumb    Neurovascular Exam:  Digits WWP, brisk CR < 3s throughout  NVI motor/LTS to M/R/U nerves, radial pulse 2+  2+ biceps and brachioradialis reflexes    Diagnostic Results:     EXAMINATION:  XR WRIST COMPLETE 3 VIEWS LEFT     CLINICAL HISTORY:  Encounter for examination of blood pressure without abnormal findings     TECHNIQUE:  PA, lateral, and oblique views of the left wrist were performed.     COMPARISON:  None     FINDINGS:  No fracture or dislocation.  No bone destruction identified   "   ASSESSMENT/PLAN:      48 y.o. yo female with   Encounter Diagnoses   Name Primary?    Swelling of left hand     Tendonitis of wrist, left       Plan:      We had a thorough discussion today.  We discussed the working diagnosis , de Quervain tenosynovitis, as well as several other potential alternative diagnoses.  Treatment options were discussed, both conservative and surgical.  Conservative treatment options would include things such as activity modifications, workplace modifications, a period of rest, oral vs topical OTC and prescription anti-inflammatory medications, occupational therapy, splinting/bracing, immobilization, corticosteroid injections, and others.  Surgical options were discussed as well.     At this time, the patient would like to proceed with a trial of bracing and OT.    The patient's pathophysiology was explained in detail with reference to x-rays, models, other visual aids as appropriate.  Treatment options were discussed in detail.  Questions were invited and answered to the patient's satisfaction. I reviewed Primary care , and other specialty's notes to better coordinate patient's care.    Please be aware that this note has been generated with the assistance of MModal voice-to-text.  Please excuse any spelling or grammatical errors.

## 2023-08-29 NOTE — PROGRESS NOTES
Pt has sanjuana's tenosynovitis  Discussed injection , brace, OT  Pt does not want injection - very nervous about injection. Wants to wait

## 2023-08-30 RX ORDER — DEXAMETHASONE SODIUM PHOSPHATE 4 MG/ML
4 INJECTION, SOLUTION INTRA-ARTICULAR; INTRALESIONAL; INTRAMUSCULAR; INTRAVENOUS; SOFT TISSUE
Status: DISCONTINUED | OUTPATIENT
Start: 2023-08-29 | End: 2023-08-30 | Stop reason: HOSPADM

## 2023-09-06 NOTE — PROGRESS NOTES
I called Fozia back to see about Sherman.      Fozia stated that she is calling because she was instructed to do so with weight gain.  She reported weight was 173 then down to 169 and back to 173.  However, weights have been at different times of day as family comes in to help Sherman with her weights at times that they are able to.  The most recent weight of 173 (9/6) was taken later in the day than her weight on Monday (9/4).   Fozia reported that Sherman's shortness of breath did not seem to be any worse than before.  However, family notes that she seems to be more tired, probably from increased trips to the bathroom with the increased Lasix dose.    Fozia was reminded of the importance of staying within 64 ounces a day of fluids and 2000 mg of sodium.  She reports that Sherman will have toast with peanut butter for breakfast, meals on wheels for lunch, and family will provide dinners.  Family has been trying to cook with less salt.      Given Sherman's current condition, Fozia was instructed to further reduce the sodium in Sherman's diet.  She was advised to read labels and avoid canned soups, deli meats and cheeses, etc.      She will call tomorrow if symptoms are not improving or worsening.  If Sherman's symptoms become more severe, she will need to go to the ED or urgent care.   Fozia stated that Sherman has refused to go to the ED in the past.      She expressed understanding of the above and had no other questions or concerns.  She was very grateful for the call back..     Juanita Salas is a 42 y.o. female A1 who presents for annual exam.  Patient's last menstrual period was 2018 (exact date).   She presents today for a second opinion on menopausal symptoms.  She has been seeing Dr. Maloney for GYN care.  She is currently on OCPs with regular monthly menses, no breakthrough bleeding.  Over the past several months, she has developed night sweats, mood swings, and decreased energy.  She stopped smoking 2017.  She has a history of hypertension with BP well controlled on medication.  Recent labs drawn while taking OCPs were approaching the menopausal range.  She has a family history of menopause occurring in the mid 40s.  She has been evaluated for microscopic hematuria by Dr. Lewis with CT and cystoscopy.  Reports having a annual exam with pap and mammogram 2017.  She states that her pap was abnormal, evaluated with colposcopy, and told to repeat pap in one year.    CT 2017:  The uterus and adnexa is grossly unremarkable.    17 TSH .838    17 [facility] labs:  E2 34,  FSH 24.3,  LH 13.3      Past Medical History:   Diagnosis Date    Adjustment disorder     Anxiety     Depression     Fatigue     Headache     Hx of psychiatric care     Hypertension     Low vitamin D level     Panic disorder     Psychiatric problem        Past Surgical History:   Procedure Laterality Date     SECTION      DILATION AND CURETTAGE OF UTERUS         OB History      Para Term  AB Living    3 2     1 2    SAB TAB Ectopic Multiple Live Births    1                      Diagnosis:  1. Menopausal symptoms    2. Perimenopausal    3. Oral contraceptive use          PLAN:    Orders Placed This Encounter    Follicle stimulating hormone    Luteinizing hormone    Estradiol       Patient was counseled today her menopausal symptoms.  Recent labs, drawn while taking active OCPs, were consistent with approaching the menopausal range.  Likewise, she has a history  of family members becoming menopausal in their 40s.  She is currently receive estrogen / progesterone with her OCPs.  She has been on pills for many years without any problems.  Recently, she stopped smoking.  She understands that cigarette smoking is not compatible with OCP usage.  We also reviewed the r/b of OCPs, including risks with hypertension.  With medication, her BP is well controlled.  She plans to continue OCPs for now and will return in 3 months to recheck hormone levels at the end of her placebo pill week and discuss progress.  To sign release of info form to obtain prior records.    Follow-up in 3 months.  Annual exam / pap / mammogram 7/2018

## 2023-10-04 ENCOUNTER — PATIENT OUTREACH (OUTPATIENT)
Dept: ADMINISTRATIVE | Facility: HOSPITAL | Age: 48
End: 2023-10-04
Payer: COMMERCIAL

## 2023-10-04 ENCOUNTER — OFFICE VISIT (OUTPATIENT)
Dept: INTERNAL MEDICINE | Facility: CLINIC | Age: 48
End: 2023-10-04
Payer: COMMERCIAL

## 2023-10-04 VITALS
DIASTOLIC BLOOD PRESSURE: 86 MMHG | HEART RATE: 93 BPM | HEIGHT: 63 IN | OXYGEN SATURATION: 98 % | SYSTOLIC BLOOD PRESSURE: 122 MMHG | BODY MASS INDEX: 28.71 KG/M2 | WEIGHT: 162.06 LBS

## 2023-10-04 DIAGNOSIS — Z12.11 SCREEN FOR COLON CANCER: ICD-10-CM

## 2023-10-04 DIAGNOSIS — E55.9 VITAMIN D DEFICIENCY: Chronic | ICD-10-CM

## 2023-10-04 DIAGNOSIS — R53.82 CHRONIC FATIGUE: ICD-10-CM

## 2023-10-04 DIAGNOSIS — F41.1 GAD (GENERALIZED ANXIETY DISORDER): Chronic | ICD-10-CM

## 2023-10-04 DIAGNOSIS — Z00.00 VISIT FOR ANNUAL HEALTH EXAMINATION: Primary | ICD-10-CM

## 2023-10-04 DIAGNOSIS — R03.0 ELEVATED BLOOD PRESSURE READING: ICD-10-CM

## 2023-10-04 DIAGNOSIS — E78.2 MIXED HYPERLIPIDEMIA: ICD-10-CM

## 2023-10-04 DIAGNOSIS — E53.8 B12 DEFICIENCY: ICD-10-CM

## 2023-10-04 DIAGNOSIS — Z13.1 SCREENING FOR DIABETES MELLITUS: ICD-10-CM

## 2023-10-04 DIAGNOSIS — R23.3 EASY BRUISING: ICD-10-CM

## 2023-10-04 DIAGNOSIS — N95.1 PERIMENOPAUSAL: ICD-10-CM

## 2023-10-04 PROCEDURE — 3008F PR BODY MASS INDEX (BMI) DOCUMENTED: ICD-10-PCS | Mod: CPTII,S$GLB,, | Performed by: INTERNAL MEDICINE

## 2023-10-04 PROCEDURE — 99999 PR PBB SHADOW E&M-EST. PATIENT-LVL IV: ICD-10-PCS | Mod: PBBFAC,,, | Performed by: INTERNAL MEDICINE

## 2023-10-04 PROCEDURE — 1160F PR REVIEW ALL MEDS BY PRESCRIBER/CLIN PHARMACIST DOCUMENTED: ICD-10-PCS | Mod: CPTII,S$GLB,, | Performed by: INTERNAL MEDICINE

## 2023-10-04 PROCEDURE — 1159F PR MEDICATION LIST DOCUMENTED IN MEDICAL RECORD: ICD-10-PCS | Mod: CPTII,S$GLB,, | Performed by: INTERNAL MEDICINE

## 2023-10-04 PROCEDURE — 1159F MED LIST DOCD IN RCRD: CPT | Mod: CPTII,S$GLB,, | Performed by: INTERNAL MEDICINE

## 2023-10-04 PROCEDURE — 3079F DIAST BP 80-89 MM HG: CPT | Mod: CPTII,S$GLB,, | Performed by: INTERNAL MEDICINE

## 2023-10-04 PROCEDURE — 3074F PR MOST RECENT SYSTOLIC BLOOD PRESSURE < 130 MM HG: ICD-10-PCS | Mod: CPTII,S$GLB,, | Performed by: INTERNAL MEDICINE

## 2023-10-04 PROCEDURE — 99999 PR PBB SHADOW E&M-EST. PATIENT-LVL IV: CPT | Mod: PBBFAC,,, | Performed by: INTERNAL MEDICINE

## 2023-10-04 PROCEDURE — 3074F SYST BP LT 130 MM HG: CPT | Mod: CPTII,S$GLB,, | Performed by: INTERNAL MEDICINE

## 2023-10-04 PROCEDURE — 99396 PREV VISIT EST AGE 40-64: CPT | Mod: S$GLB,,, | Performed by: INTERNAL MEDICINE

## 2023-10-04 PROCEDURE — 3079F PR MOST RECENT DIASTOLIC BLOOD PRESSURE 80-89 MM HG: ICD-10-PCS | Mod: CPTII,S$GLB,, | Performed by: INTERNAL MEDICINE

## 2023-10-04 PROCEDURE — 1160F RVW MEDS BY RX/DR IN RCRD: CPT | Mod: CPTII,S$GLB,, | Performed by: INTERNAL MEDICINE

## 2023-10-04 PROCEDURE — 99396 PR PREVENTIVE VISIT,EST,40-64: ICD-10-PCS | Mod: S$GLB,,, | Performed by: INTERNAL MEDICINE

## 2023-10-04 PROCEDURE — 3008F BODY MASS INDEX DOCD: CPT | Mod: CPTII,S$GLB,, | Performed by: INTERNAL MEDICINE

## 2023-10-04 NOTE — PROGRESS NOTES
"INTERNAL MEDICINE ESTABLISHED PATIENT VISIT NOTE    Subjective:     Chief Complaint: Annual Exam       Patient ID: Juanita Salas is a 48 y.o. female with anxiety and depression c panic d/o, sinus tachycardia (hx palpitations c overall neg Holter and normal echo, sees Dr. Ruelas on the [facility]), migraines, Vit D def, rebeca hematuria (see by Uro, had cysto which pt reports was normal), former smoker, hx night sweats over the past year with neg w/u (see last note for details), hemorrhoids, last seen by me 9/2022, here today for routine annual exam.    At last visit was having chronic sinus issues so was referred to the [facility] (of note had already seen ENT), was advised to cont Flonase and Astelin but stopped Singulair due to sleep issues.  Was rec for pvax which pt declined.    Also seen by gyn in March for issues c hot flashes.  Hormone levels at that time c/w perimenopause.  Still c cycles but irregular.    Also seen by Dejah Edwards in July for elevated BP, previously managed by Cardiology Dr. Ruelas.  States Cardiology told her to get a sleep study.  Patient does note that she snores and has issues with chronic fatigue.  Also states cardiology has her on Adderall for issues with "dysautonomia."    Also followed by [facility] for Dequervains tenosynovitis.  Was reluctant to get a steroid shot but thinks that she likely has to go back for follow-up.  Has tried taking over-the-counter anti-inflammatories in the past but had issues with bleeding so stopped taking it.    Is requesting labs to check "everything" in terms of easy bruising and fatigue.    Past Medical History:  Past Medical History:   Diagnosis Date    Adjustment disorder     Allergy     Anxiety     Depression     Fatigue     Headache     Hx of psychiatric care     Low vitamin D level     Panic disorder     Psychiatric problem        Home Medications:  Prior to Admission medications    Medication Sig Start Date End Date Taking? Authorizing " Provider   cholecalciferol, vitamin D3, (VITAMIN D3) 25 mcg (1,000 unit) capsule Take 1,000 Units by mouth 2 (two) times daily.   Yes Provider, Historical   clonazePAM (KLONOPIN) 0.5 MG tablet Take 0.5 mg by mouth as needed. 21  Yes Provider, Historical   dextroamphetamine-amphetamine (ADDERALL) 15 mg tablet 12.5 mg. 22  Yes Provider, Historical   ergocalciferol (ERGOCALCIFEROL) 50,000 unit Cap Take 50,000 Units by mouth every 7 days.  3/12/21  Yes Provider, Historical   hydrocortisone (PROCTO-MED HC) 2.5 % rectal cream Place rectally 2 (two) times daily. 20  Yes Teresa Mullins NP   ketoconazole (NIZORAL) 2 % shampoo Wash hair with medicated shampoo at least 2x/week - let sit on scalp at least 5 minutes prior to rinsing 21  Yes Purvi Cartagena MD   norethindrone-ethinyl estradiol (FEMHRT ) 1-5 mg-mcg Tab Take 1 tablet by mouth once daily. 22  Yes Provider, Historical   azelastine (ASTELIN) 137 mcg (0.1 %) nasal spray 1 spray (137 mcg total) by Nasal route 2 (two) times daily.  Patient not taking: Reported on 10/4/2023 9/14/22 10/4/23  Ema Gaston MD       Allergies:  Review of patient's allergies indicates:   Allergen Reactions    Azithromycin Nausea And Vomiting       Social History:  Social History     Tobacco Use    Smoking status: Former     Current packs/day: 0.00     Average packs/day: 0.5 packs/day for 2.0 years (1.0 ttl pk-yrs)     Types: Cigarettes     Start date: 2015     Quit date: 2017     Years since quittin.8    Smokeless tobacco: Never   Substance Use Topics    Alcohol use: No    Drug use: No        Review of Systems   Constitutional:  Positive for fatigue. Negative for appetite change, chills, fever and unexpected weight change.   HENT:  Negative for congestion, hearing loss and rhinorrhea.    Eyes:  Negative for pain and visual disturbance.   Respiratory:  Negative for cough, chest tightness, shortness of breath and wheezing.    Cardiovascular:   "Negative for chest pain, palpitations and leg swelling.   Gastrointestinal:  Negative for abdominal distention and abdominal pain.   Endocrine: Negative for polydipsia and polyuria.   Genitourinary:  Negative for decreased urine volume, difficulty urinating, dysuria, hematuria and vaginal discharge.   Musculoskeletal:  Positive for arthralgias.   Neurological:  Negative for weakness, numbness and headaches.   Psychiatric/Behavioral:  Negative for behavioral problems and confusion.          Health Maintenance:     Immunizations:   Influenza is not up to date, declined, Risks and benefits were discussed with patient.  TDap is not up to date, declined, Risks and benefits were discussed with patient.  COVID vaccine recommended but declined by pt.     Cancer Screening:  PAP: 6/2023 NILM via Dr. Maloney at at   Mammogram: 6/2022 benign at [facility], will schedule repeat.  Colonoscopy:  FIT kit 9/2022 neg, will repeat    Objective:   /86 (BP Location: Left arm, Patient Position: Sitting, BP Method: Medium (Manual))   Pulse 93   Ht 5' 3" (1.6 m)   Wt 73.5 kg (162 lb 0.6 oz)   LMP 09/26/2023   SpO2 98%   BMI 28.70 kg/m²        General: AAO x3, no apparent distress  HEENT: no cervical LAD, no thyroid masses appreciated, TM clear and gray  CV: RRR, no m/r/g  Pulm: Lungs CTAB, no crackles, no wheezes  Abd: s/NT/ND +BS  Extremities: no c/c/e    Labs:     Lab Results   Component Value Date    WBC 8.34 07/27/2023    HGB 13.3 07/27/2023    HCT 40.6 07/27/2023    MCV 91 07/27/2023     07/27/2023     Sodium   Date Value Ref Range Status   07/27/2023 138 136 - 145 mmol/L Final     Potassium   Date Value Ref Range Status   07/27/2023 4.0 3.5 - 5.1 mmol/L Final     Chloride   Date Value Ref Range Status   07/27/2023 103 95 - 110 mmol/L Final     CO2   Date Value Ref Range Status   07/27/2023 25 23 - 29 mmol/L Final     Glucose   Date Value Ref Range Status   07/27/2023 89 70 - 110 mg/dL Final     BUN "   Date Value Ref Range Status   07/27/2023 10 6 - 20 mg/dL Final     Creatinine   Date Value Ref Range Status   07/27/2023 0.6 0.5 - 1.4 mg/dL Final     Calcium   Date Value Ref Range Status   07/27/2023 9.0 8.7 - 10.5 mg/dL Final     Total Protein   Date Value Ref Range Status   04/11/2022 7.0 6.0 - 8.4 g/dL Final     Albumin   Date Value Ref Range Status   04/11/2022 4.0 3.5 - 5.2 g/dL Final     Total Bilirubin   Date Value Ref Range Status   04/11/2022 0.3 0.1 - 1.0 mg/dL Final     Comment:     For infants and newborns, interpretation of results should be based  on gestational age, weight and in agreement with clinical  observations.    Premature Infant recommended reference ranges:  Up to 24 hours.............<8.0 mg/dL  Up to 48 hours............<12.0 mg/dL  3-5 days..................<15.0 mg/dL  6-29 days.................<15.0 mg/dL       Alkaline Phosphatase   Date Value Ref Range Status   04/11/2022 45 (L) 55 - 135 U/L Final     AST   Date Value Ref Range Status   04/11/2022 16 10 - 40 U/L Final     ALT   Date Value Ref Range Status   04/11/2022 14 10 - 44 U/L Final     Anion Gap   Date Value Ref Range Status   07/27/2023 10 8 - 16 mmol/L Final     eGFR if    Date Value Ref Range Status   04/11/2022 >60.0 >60 mL/min/1.73 m^2 Final     eGFR if non    Date Value Ref Range Status   04/11/2022 >60.0 >60 mL/min/1.73 m^2 Final     Comment:     Calculation used to obtain the estimated glomerular filtration  rate (eGFR) is the CKD-EPI equation.        Lab Results   Component Value Date    HGBA1C 5.2 07/03/2019     Lab Results   Component Value Date    LDLCALC 129.2 04/11/2022     Lab Results   Component Value Date    TSH 2.538 03/03/2023         Assessment/Plan     Juanita was seen today for annual exam.    Diagnoses and all orders for this visit:    Visit for annual health examination  History and physical exam completed.  Health maintenance reviewed as above.    Mixed  hyperlipidemia  Lab Results   Component Value Date    LDLCALC 129.2 04/11/2022     Has been diet controlled   Cont low fat diet.  -     Lipid Panel; Future    B12 deficiency  On supplement, last check normal  -     CBC Auto Differential; Future    Elevated blood pressure reading  Borderline today  Caution c Adderall but will defer to Cardiology who is managing this  -     Comprehensive Metabolic Panel; Future    LUPE (generalized anxiety disorder)  Stable overall, no acute issues    Chronic fatigue  As per HPI  Will refer for sleep study and send for basic labs.  -     Ambulatory referral/consult to Sleep Disorders; Future  -     FOLLICLE STIMULATING HORMONE; Future  -     Estradiol; Future  -     CBC Auto Differential; Future    Vitamin D deficiency  On supplement, will repeat levels now  -     Vitamin D; Future    Easy bruising  -     PROTIME-INR; Future  -     APTT; Future    Perimenopausal  -     FOLLICLE STIMULATING HORMONE; Future  -     Estradiol; Future    Screening for diabetes mellitus  -     Hemoglobin A1C; Future    Screen for colon cancer  -     Fecal Immunochemical Test (iFOBT); Future      HM as above  RTC in 1 year, sooner if needed.  Fasting labs tomorrow.    Ema Gaston MD  Department of Internal Medicine - [facility]  10/04/2023

## 2023-10-04 NOTE — PROGRESS NOTES
Health Maintenance Due   Topic Date Due    COVID-19 Vaccine (1) Never done    TETANUS VACCINE  09/26/2015    Hemoglobin A1c (Diabetic Prevention Screening)  07/03/2022    Influenza Vaccine (1) Never done    Colorectal Cancer Screening  09/20/2023     Triggered LINKS and reconciled immunizations. Updated Care Everywhere. Checked DIS portal for mammogram results. Imported outside mammogram results found in [facility] portal into . Chart review completed.

## 2023-10-05 ENCOUNTER — LAB VISIT (OUTPATIENT)
Dept: LAB | Facility: HOSPITAL | Age: 48
End: 2023-10-05
Attending: INTERNAL MEDICINE
Payer: COMMERCIAL

## 2023-10-05 ENCOUNTER — OFFICE VISIT (OUTPATIENT)
Dept: UROLOGY | Facility: CLINIC | Age: 48
End: 2023-10-05
Payer: COMMERCIAL

## 2023-10-05 VITALS — BODY MASS INDEX: 28.98 KG/M2 | WEIGHT: 163.56 LBS | HEIGHT: 63 IN

## 2023-10-05 DIAGNOSIS — R03.0 ELEVATED BLOOD PRESSURE READING: ICD-10-CM

## 2023-10-05 DIAGNOSIS — R23.3 EASY BRUISING: ICD-10-CM

## 2023-10-05 DIAGNOSIS — E53.8 B12 DEFICIENCY: ICD-10-CM

## 2023-10-05 DIAGNOSIS — R31.29 HEMATURIA, MICROSCOPIC: Primary | ICD-10-CM

## 2023-10-05 DIAGNOSIS — E55.9 VITAMIN D DEFICIENCY: Chronic | ICD-10-CM

## 2023-10-05 DIAGNOSIS — E78.2 MIXED HYPERLIPIDEMIA: ICD-10-CM

## 2023-10-05 DIAGNOSIS — N95.1 PERIMENOPAUSAL: ICD-10-CM

## 2023-10-05 DIAGNOSIS — Z13.1 SCREENING FOR DIABETES MELLITUS: ICD-10-CM

## 2023-10-05 DIAGNOSIS — R53.82 CHRONIC FATIGUE: ICD-10-CM

## 2023-10-05 LAB
25(OH)D3+25(OH)D2 SERPL-MCNC: 31 NG/ML (ref 30–96)
ALBUMIN SERPL BCP-MCNC: 3.9 G/DL (ref 3.5–5.2)
ALP SERPL-CCNC: 53 U/L (ref 55–135)
ALT SERPL W/O P-5'-P-CCNC: 16 U/L (ref 10–44)
ANION GAP SERPL CALC-SCNC: 9 MMOL/L (ref 8–16)
APTT PPP: 27 SEC (ref 21–32)
AST SERPL-CCNC: 15 U/L (ref 10–40)
BASOPHILS # BLD AUTO: 0.04 K/UL (ref 0–0.2)
BASOPHILS NFR BLD: 0.5 % (ref 0–1.9)
BILIRUB SERPL-MCNC: 0.3 MG/DL (ref 0.1–1)
BUN SERPL-MCNC: 12 MG/DL (ref 6–20)
CALCIUM SERPL-MCNC: 8.8 MG/DL (ref 8.7–10.5)
CHLORIDE SERPL-SCNC: 106 MMOL/L (ref 95–110)
CHOLEST SERPL-MCNC: 206 MG/DL (ref 120–199)
CHOLEST/HDLC SERPL: 3.7 {RATIO} (ref 2–5)
CO2 SERPL-SCNC: 25 MMOL/L (ref 23–29)
CREAT SERPL-MCNC: 0.7 MG/DL (ref 0.5–1.4)
DIFFERENTIAL METHOD: NORMAL
EOSINOPHIL # BLD AUTO: 0.3 K/UL (ref 0–0.5)
EOSINOPHIL NFR BLD: 3.6 % (ref 0–8)
ERYTHROCYTE [DISTWIDTH] IN BLOOD BY AUTOMATED COUNT: 12.1 % (ref 11.5–14.5)
EST. GFR  (NO RACE VARIABLE): >60 ML/MIN/1.73 M^2
ESTIMATED AVG GLUCOSE: 105 MG/DL (ref 68–131)
ESTRADIOL SERPL-MCNC: 29 PG/ML
FSH SERPL-ACNC: 51.26 MIU/ML
GLUCOSE SERPL-MCNC: 101 MG/DL (ref 70–110)
HBA1C MFR BLD: 5.3 % (ref 4–5.6)
HCT VFR BLD AUTO: 40.8 % (ref 37–48.5)
HDLC SERPL-MCNC: 55 MG/DL (ref 40–75)
HDLC SERPL: 26.7 % (ref 20–50)
HGB BLD-MCNC: 13.2 G/DL (ref 12–16)
IMM GRANULOCYTES # BLD AUTO: 0.03 K/UL (ref 0–0.04)
IMM GRANULOCYTES NFR BLD AUTO: 0.4 % (ref 0–0.5)
INR PPP: 0.9 (ref 0.8–1.2)
LDLC SERPL CALC-MCNC: 132.6 MG/DL (ref 63–159)
LYMPHOCYTES # BLD AUTO: 1.9 K/UL (ref 1–4.8)
LYMPHOCYTES NFR BLD: 23.7 % (ref 18–48)
MCH RBC QN AUTO: 30 PG (ref 27–31)
MCHC RBC AUTO-ENTMCNC: 32.4 G/DL (ref 32–36)
MCV RBC AUTO: 93 FL (ref 82–98)
MONOCYTES # BLD AUTO: 0.5 K/UL (ref 0.3–1)
MONOCYTES NFR BLD: 6.2 % (ref 4–15)
NEUTROPHILS # BLD AUTO: 5.3 K/UL (ref 1.8–7.7)
NEUTROPHILS NFR BLD: 65.6 % (ref 38–73)
NONHDLC SERPL-MCNC: 151 MG/DL
NRBC BLD-RTO: 0 /100 WBC
PLATELET # BLD AUTO: 317 K/UL (ref 150–450)
PMV BLD AUTO: 10.3 FL (ref 9.2–12.9)
POTASSIUM SERPL-SCNC: 3.6 MMOL/L (ref 3.5–5.1)
PROT SERPL-MCNC: 7.2 G/DL (ref 6–8.4)
PROTHROMBIN TIME: 10.1 SEC (ref 9–12.5)
RBC # BLD AUTO: 4.4 M/UL (ref 4–5.4)
SODIUM SERPL-SCNC: 140 MMOL/L (ref 136–145)
TRIGL SERPL-MCNC: 92 MG/DL (ref 30–150)
WBC # BLD AUTO: 8.09 K/UL (ref 3.9–12.7)

## 2023-10-05 PROCEDURE — 85610 PROTHROMBIN TIME: CPT | Performed by: INTERNAL MEDICINE

## 2023-10-05 PROCEDURE — 80053 COMPREHEN METABOLIC PANEL: CPT | Performed by: INTERNAL MEDICINE

## 2023-10-05 PROCEDURE — 99999 PR PBB SHADOW E&M-EST. PATIENT-LVL III: CPT | Mod: PBBFAC,,, | Performed by: UROLOGY

## 2023-10-05 PROCEDURE — 1159F MED LIST DOCD IN RCRD: CPT | Mod: CPTII,S$GLB,, | Performed by: UROLOGY

## 2023-10-05 PROCEDURE — 3008F PR BODY MASS INDEX (BMI) DOCUMENTED: ICD-10-PCS | Mod: CPTII,S$GLB,, | Performed by: UROLOGY

## 2023-10-05 PROCEDURE — 99999 PR PBB SHADOW E&M-EST. PATIENT-LVL III: ICD-10-PCS | Mod: PBBFAC,,, | Performed by: UROLOGY

## 2023-10-05 PROCEDURE — 80061 LIPID PANEL: CPT | Performed by: INTERNAL MEDICINE

## 2023-10-05 PROCEDURE — 82306 VITAMIN D 25 HYDROXY: CPT | Performed by: INTERNAL MEDICINE

## 2023-10-05 PROCEDURE — 85025 COMPLETE CBC W/AUTO DIFF WBC: CPT | Performed by: INTERNAL MEDICINE

## 2023-10-05 PROCEDURE — 83036 HEMOGLOBIN GLYCOSYLATED A1C: CPT | Performed by: INTERNAL MEDICINE

## 2023-10-05 PROCEDURE — 83001 ASSAY OF GONADOTROPIN (FSH): CPT | Performed by: INTERNAL MEDICINE

## 2023-10-05 PROCEDURE — 82670 ASSAY OF TOTAL ESTRADIOL: CPT | Performed by: INTERNAL MEDICINE

## 2023-10-05 PROCEDURE — 1159F PR MEDICATION LIST DOCUMENTED IN MEDICAL RECORD: ICD-10-PCS | Mod: CPTII,S$GLB,, | Performed by: UROLOGY

## 2023-10-05 PROCEDURE — 99213 PR OFFICE/OUTPT VISIT, EST, LEVL III, 20-29 MIN: ICD-10-PCS | Mod: S$GLB,,, | Performed by: UROLOGY

## 2023-10-05 PROCEDURE — 85730 THROMBOPLASTIN TIME PARTIAL: CPT | Performed by: INTERNAL MEDICINE

## 2023-10-05 PROCEDURE — 3008F BODY MASS INDEX DOCD: CPT | Mod: CPTII,S$GLB,, | Performed by: UROLOGY

## 2023-10-05 PROCEDURE — 36415 COLL VENOUS BLD VENIPUNCTURE: CPT | Performed by: INTERNAL MEDICINE

## 2023-10-05 PROCEDURE — 99213 OFFICE O/P EST LOW 20 MIN: CPT | Mod: S$GLB,,, | Performed by: UROLOGY

## 2023-10-05 RX ORDER — PHENTERMINE HYDROCHLORIDE 37.5 MG/1
TABLET ORAL
COMMUNITY
Start: 2023-06-01 | End: 2024-02-01

## 2023-10-05 RX ORDER — EZETIMIBE 10 MG
TABLET ORAL
COMMUNITY
Start: 2023-08-07

## 2023-10-05 NOTE — PROGRESS NOTES
Subjective:       Patient ID: Juanita Salas is a 48 y.o. female.    Chief Complaint: Hematuria (Annual check up , patient state she have not seen any blood in the urine .)    HPI patient is here follow-up on microscopic hematuria.  She had a negative workup in the past.  Urine today does not show anything microscopically she is voiding well not having any complaints.  Patient quit smoking 4 years ago    Past Medical History:   Diagnosis Date    Adjustment disorder     Allergy     Anxiety     Depression     Fatigue     Headache     Hx of psychiatric care     Low vitamin D level     Panic disorder     Psychiatric problem        Past Surgical History:   Procedure Laterality Date     SECTION      DILATION AND CURETTAGE OF UTERUS         Family History   Problem Relation Age of Onset    Diabetes Mother     Pancreatic cancer Mother 47    Skin cancer Father         NMSC    Multiple sclerosis Sister     Cancer Sister 41        uterine origin suspected    Eczema Daughter     Hypertension Maternal Aunt     Hyperlipidemia Maternal Aunt     Hyperlipidemia Maternal Uncle     Hypertension Maternal Uncle     Hyperlipidemia Paternal Aunt     Hypertension Paternal Aunt     Hyperlipidemia Paternal Uncle     Hypertension Paternal Uncle     Stroke Maternal Grandmother     Psoriasis Maternal Grandmother     Heart disease Maternal Grandfather     Hyperlipidemia Maternal Grandfather     Hypertension Maternal Grandfather     Skin cancer Paternal Grandmother         NMSC    Other Paternal Grandfather         patient believes he had bladder cancer    Other Paternal Aunt         pheochromocytoma; Von-Hippel-Lindau syndrome    Other Paternal Cousin         brain tumor- benign vs malignant?    Colon cancer Neg Hx     Esophageal cancer Neg Hx     Stomach cancer Neg Hx     Rectal cancer Neg Hx     Ulcerative colitis Neg Hx     Irritable bowel syndrome Neg Hx     Crohn's disease Neg Hx     Celiac disease Neg Hx     Asthma Neg Hx      Allergic rhinitis Neg Hx     Allergies Neg Hx     Angioedema Neg Hx     Atopy Neg Hx     Immunodeficiency Neg Hx     Rhinitis Neg Hx     Urticaria Neg Hx        Social History     Socioeconomic History    Marital status:    Tobacco Use    Smoking status: Former     Current packs/day: 0.00     Average packs/day: 0.5 packs/day for 2.0 years (1.0 ttl pk-yrs)     Types: Cigarettes     Start date: 2015     Quit date: 2017     Years since quittin.8    Smokeless tobacco: Never   Substance and Sexual Activity    Alcohol use: No    Drug use: No    Sexual activity: Yes     Partners: Male     Birth control/protection: Condom   Social History Narrative     with son (23) and daugther (14)     Social Determinants of Health     Financial Resource Strain: Medium Risk (2023)    Overall Financial Resource Strain (CARDIA)     Difficulty of Paying Living Expenses: Somewhat hard   Food Insecurity: No Food Insecurity (2023)    Hunger Vital Sign     Worried About Running Out of Food in the Last Year: Never true     Ran Out of Food in the Last Year: Never true   Transportation Needs: No Transportation Needs (2023)    PRAPARE - Transportation     Lack of Transportation (Medical): No     Lack of Transportation (Non-Medical): No   Physical Activity: Insufficiently Active (2023)    Exercise Vital Sign     Days of Exercise per Week: 2 days     Minutes of Exercise per Session: 20 min   Stress: Stress Concern Present (2023)    [employer] - Occupational Stress Questionnaire     Feeling of Stress : Very much   Social Connections: Unknown (2023)    Social Connection and Isolation Panel [NHANES]     Frequency of Communication with Friends and Family: More than three times a week     Frequency of Social Gatherings with Friends and Family: More than three times a week     Active Member of Clubs or Organizations: No     Attends Club or Organization Meetings:  Never     Marital Status:    Housing Stability: Low Risk  (7/27/2023)    Housing Stability Vital Sign     Unable to Pay for Housing in the Last Year: No     Number of Places Lived in the Last Year: 1     Unstable Housing in the Last Year: No       Allergies:  Azithromycin    Medications:    Current Outpatient Medications:     cholecalciferol, vitamin D3, (VITAMIN D3) 25 mcg (1,000 unit) capsule, Take 1,000 Units by mouth 2 (two) times daily., Disp: , Rfl:     clonazePAM (KLONOPIN) 0.5 MG tablet, Take 0.5 mg by mouth as needed., Disp: , Rfl:     dextroamphetamine-amphetamine (ADDERALL) 15 mg tablet, 12.5 mg., Disp: , Rfl:     ergocalciferol (ERGOCALCIFEROL) 50,000 unit Cap, Take 50,000 Units by mouth every 7 days. , Disp: , Rfl:     hydrocortisone (PROCTO-MED HC) 2.5 % rectal cream, Place rectally 2 (two) times daily., Disp: 28 g, Rfl: 1    ketoconazole (NIZORAL) 2 % shampoo, Wash hair with medicated shampoo at least 2x/week - let sit on scalp at least 5 minutes prior to rinsing (Patient not taking: Reported on 10/5/2023), Disp: 120 mL, Rfl: 5    norethindrone-ethinyl estradiol (FEMHRT 1/5) 1-5 mg-mcg Tab, Take 1 tablet by mouth once daily., Disp: , Rfl:     phentermine (ADIPEX-P) 37.5 mg tablet, Take by mouth., Disp: , Rfl:     ZETIA 10 mg tablet, Take by mouth., Disp: , Rfl:     Review of Systems   Constitutional: Negative.    HENT: Negative.     Eyes: Negative.    Respiratory: Negative.     Endocrine: Negative.    Genitourinary: Negative.    Musculoskeletal: Negative.    Allergic/Immunologic: Negative.    Neurological: Negative.    Hematological: Negative.    Psychiatric/Behavioral: Negative.         Objective:      Physical Exam  Constitutional:       Appearance: She is well-developed.   HENT:      Head: Normocephalic.   Cardiovascular:      Rate and Rhythm: Normal rate.   Pulmonary:      Effort: Pulmonary effort is normal.   Abdominal:      Palpations: Abdomen is soft.   Musculoskeletal:         General:  Normal range of motion.   Skin:     General: Skin is warm.   Neurological:      Mental Status: She is alert.         Assessment:       1. Hematuria, microscopic        Plan:       Juanita was seen today for hematuria.    Diagnoses and all orders for this visit:    Hematuria, microscopic    Patient has resolved microscopic hematuria a she would like to get checked 1 more time next year.  She will return in a year

## 2023-11-21 ENCOUNTER — PATIENT MESSAGE (OUTPATIENT)
Dept: INTERNAL MEDICINE | Facility: CLINIC | Age: 48
End: 2023-11-21
Payer: COMMERCIAL

## 2023-11-21 ENCOUNTER — E-VISIT (OUTPATIENT)
Dept: INTERNAL MEDICINE | Facility: CLINIC | Age: 48
End: 2023-11-21
Payer: COMMERCIAL

## 2023-11-21 DIAGNOSIS — H00.015 HORDEOLUM EXTERNUM OF LEFT LOWER EYELID: Primary | ICD-10-CM

## 2023-11-21 PROCEDURE — 99421 PR E&M, ONLINE DIGIT, EST, < 7 DAYS, 5-10 MINS: ICD-10-PCS | Mod: ,,, | Performed by: INTERNAL MEDICINE

## 2023-11-21 PROCEDURE — 99421 OL DIG E/M SVC 5-10 MIN: CPT | Mod: ,,, | Performed by: INTERNAL MEDICINE

## 2023-11-22 RX ORDER — ERYTHROMYCIN 5 MG/G
OINTMENT OPHTHALMIC 3 TIMES DAILY
Qty: 3.5 G | Refills: 0 | Status: SHIPPED | OUTPATIENT
Start: 2023-11-22 | End: 2023-11-29

## 2023-11-22 NOTE — PROGRESS NOTES
Patient ID: Juanita Salas is a 48 y.o. female.    Chief Complaint: Conjunctivitis (Entered automatically based on patient selection in Patient Portal.)    The patient initiated a request through [employer] on 11/21/2023 for evaluation and management with a chief complaint of Conjunctivitis (Entered automatically based on patient selection in Patient Portal.)     I evaluated the questionnaire submission on 11/21/23.    [facility] Peq Evisit Red Eye    11/21/2023  3:46 PM CST - Filed by Patient   Do you agree to participate in an E-Visit? Yes   If you have any of the following symptoms, please present to your local ER or call 911:  I acknowledge   What is the main issue that you would like for your doctor to address today? Eye stye   Are you able to take your vital signs? Yes   Systolic Blood Pressure: 124   Diastolic Blood Pressure: 82   Weight: 156   Height: 63   Pulse: 78   Temperature: 98.2   Respiration rate:    Pulse Oxygen: 99   Are you currently pregnant, could you be pregnant, or are you breast feeding? None of the above   Which of the following have you been experiencing? One eye is red;  Eye pain   Have you had any of the following? None of the above    How long have you been having these symptoms? For a few days   Do you have a fever? No, I do not have a fever   Are your symptoms associated with swimming? No, I have not been swimming recently   Have your eyes been exposed to any chemicals, creams, or drops that may be causing irritation? No   Have you suffered any recent injury to your eyes? No   Have you been exposed to anyone with similar symptoms? No   Did or do you wear contact lenses? No   Have you had any of the following? None of the above   Have you had any of the following in the past? I have not had any past problems with my eyes.   What medications are you currently using for these symptoms? Eye drops from the shelf in the pharmacy   Please enter the medications you have been using: Stye cream   Provide  any information you feel is important to your history not asked above    At least one photo is required for treatment to be provided. You can upload a maximum of three photos of the affected area.           Recent Labs Obtained:  No visits with results within 7 Day(s) from this visit.   Latest known visit with results is:   Lab Visit on 10/05/2023   Component Date Value Ref Range Status    Sodium 10/05/2023 140  136 - 145 mmol/L Final    Potassium 10/05/2023 3.6  3.5 - 5.1 mmol/L Final    Chloride 10/05/2023 106  95 - 110 mmol/L Final    CO2 10/05/2023 25  23 - 29 mmol/L Final    Glucose 10/05/2023 101  70 - 110 mg/dL Final    BUN 10/05/2023 12  6 - 20 mg/dL Final    Creatinine 10/05/2023 0.7  0.5 - 1.4 mg/dL Final    Calcium 10/05/2023 8.8  8.7 - 10.5 mg/dL Final    Total Protein 10/05/2023 7.2  6.0 - 8.4 g/dL Final    Albumin 10/05/2023 3.9  3.5 - 5.2 g/dL Final    Total Bilirubin 10/05/2023 0.3  0.1 - 1.0 mg/dL Final    Comment: For infants and newborns, interpretation of results should be based  on gestational age, weight and in agreement with clinical  observations.    Premature Infant recommended reference ranges:  Up to 24 hours.............<8.0 mg/dL  Up to 48 hours............<12.0 mg/dL  3-5 days..................<15.0 mg/dL  6-29 days.................<15.0 mg/dL      Alkaline Phosphatase 10/05/2023 53 (L)  55 - 135 U/L Final    AST 10/05/2023 15  10 - 40 U/L Final    ALT 10/05/2023 16  10 - 44 U/L Final    eGFR 10/05/2023 >60.0  >60 mL/min/1.73 m^2 Final    Anion Gap 10/05/2023 9  8 - 16 mmol/L Final    Cholesterol 10/05/2023 206 (H)  120 - 199 mg/dL Final    Comment: The National Cholesterol Education Program (NCEP) has set the  following guidelines (reference ranges) for Cholesterol:  Optimal.....................<200 mg/dL  Borderline High.............200-239 mg/dL  High........................> or = 240 mg/dL      Triglycerides 10/05/2023 92  30 - 150 mg/dL Final    Comment: The National Cholesterol  Education Program (NCEP) has set the  following guidelines (reference values) for triglycerides:  Normal......................<150 mg/dL  Borderline High.............150-199 mg/dL  High........................200-499 mg/dL      HDL 10/05/2023 55  40 - 75 mg/dL Final    Comment: The National Cholesterol Education Program (NCEP) has set the  following guidelines (reference values) for HDL Cholesterol:  Low...............<40 mg/dL  Optimal...........>60 mg/dL      LDL Cholesterol 10/05/2023 132.6  63.0 - 159.0 mg/dL Final    Comment: The National Cholesterol Education Program (NCEP) has set the  following guidelines (reference values) for LDL Cholesterol:  Optimal.......................<130 mg/dL  Borderline High...............130-159 mg/dL  High..........................160-189 mg/dL  Very High.....................>190 mg/dL      HDL/Cholesterol Ratio 10/05/2023 26.7  20.0 - 50.0 % Final    Total Cholesterol/HDL Ratio 10/05/2023 3.7  2.0 - 5.0 Final    Non-HDL Cholesterol 10/05/2023 151  mg/dL Final    Comment: Risk category and Non-HDL cholesterol goals:  Coronary heart disease (CHD)or equivalent (10-year risk of CHD >20%):  Non-HDL cholesterol goal     <130 mg/dL  Two or more CHD risk factors and 10-year risk of CHD <= 20%:  Non-HDL cholesterol goal     <160 mg/dL  0 to 1 CHD risk factor:  Non-HDL cholesterol goal     <190 mg/dL      Hemoglobin A1C 10/05/2023 5.3  4.0 - 5.6 % Final    Comment: ADA Screening Guidelines:  5.7-6.4%  Consistent with prediabetes  >or=6.5%  Consistent with diabetes    High levels of fetal hemoglobin interfere with the HbA1C  assay. Heterozygous hemoglobin variants (HbS, HgC, etc)do  not significantly interfere with this assay.   However, presence of multiple variants may affect accuracy.      Estimated Avg Glucose 10/05/2023 105  68 - 131 mg/dL Final    Prothrombin Time 10/05/2023 10.1  9.0 - 12.5 sec Final    INR 10/05/2023 0.9  0.8 - 1.2 Final    Comment: Coumadin Therapy:  2.0 - 3.0  for INR for all indicators except mechanical heart valves  and antiphospholipid syndromes which should use 2.5 - 3.5.      aPTT 10/05/2023 27.0  21.0 - 32.0 sec Final    Comment: Refer to local heparin nomogram for intensity/dose specific   therapeutic   range.      Vit D, 25-Hydroxy 10/05/2023 31  30 - 96 ng/mL Final    Comment: Vitamin D deficiency.........<10 ng/mL                              Vitamin D insufficiency......10-29 ng/mL       Vitamin D sufficiency........> or equal to 30 ng/mL  Vitamin D toxicity............>100 ng/mL      Follicle Stimulating Hormone 10/05/2023 51.26  See Text mIU/mL Final    Comment: Female Reference Ranges:  Follicular Phase.................3.03-8.08 mIU/mL  Midcycle Peak....................2.55-16.69 mIU/mL  Luteal Phase.....................1.38-5.47 mIU/mL  Postmenopausal...................26..41 mIU/mL  Male Reference Range:............0.95-11.95 mIU/mL      Estradiol 10/05/2023 29  See Text pg/mL Final    Comment: Estradiol Reference Ranges (Female):  Follicular phase:  pg/mL  Midcycle:          pg/mL  Luteal phase:      pg/mL  Post-menopausal(Not on HRT): <10-28 pg/mL  Post-menopausal(On HRT): < pg/mL  Males: 11-44 pg/mL    The drug Fulvestrant (Faslodex) may interfere with the   assay leading to falsely elevated Estradiol results.    Patients treated with Mifepristone should not be tested with  the  or Alinity I Estradiol assay for up to two   weeks due to the interference of the drug in this assay.      WBC 10/05/2023 8.09  3.90 - 12.70 K/uL Final    RBC 10/05/2023 4.40  4.00 - 5.40 M/uL Final    Hemoglobin 10/05/2023 13.2  12.0 - 16.0 g/dL Final    Hematocrit 10/05/2023 40.8  37.0 - 48.5 % Final    MCV 10/05/2023 93  82 - 98 fL Final    MCH 10/05/2023 30.0  27.0 - 31.0 pg Final    MCHC 10/05/2023 32.4  32.0 - 36.0 g/dL Final    RDW 10/05/2023 12.1  11.5 - 14.5 % Final    Platelets 10/05/2023 317  150 - 450 K/uL Final    MPV  10/05/2023 10.3  9.2 - 12.9 fL Final    Immature Granulocytes 10/05/2023 0.4  0.0 - 0.5 % Final    Gran # (ANC) 10/05/2023 5.3  1.8 - 7.7 K/uL Final    Immature Grans (Abs) 10/05/2023 0.03  0.00 - 0.04 K/uL Final    Comment: Mild elevation in immature granulocytes is non specific and   can be seen in a variety of conditions including stress response,   acute inflammation, trauma and pregnancy. Correlation with other   laboratory and clinical findings is essential.      Lymph # 10/05/2023 1.9  1.0 - 4.8 K/uL Final    Mono # 10/05/2023 0.5  0.3 - 1.0 K/uL Final    Eos # 10/05/2023 0.3  0.0 - 0.5 K/uL Final    Baso # 10/05/2023 0.04  0.00 - 0.20 K/uL Final    nRBC 10/05/2023 0  0 /100 WBC Final    Gran % 10/05/2023 65.6  38.0 - 73.0 % Final    Lymph % 10/05/2023 23.7  18.0 - 48.0 % Final    Mono % 10/05/2023 6.2  4.0 - 15.0 % Final    Eosinophil % 10/05/2023 3.6  0.0 - 8.0 % Final    Basophil % 10/05/2023 0.5  0.0 - 1.9 % Final    Differential Method 10/05/2023 Automated   Final       Encounter Diagnosis   Name Primary?    Hordeolum externum of left lower eyelid Yes        Orders Placed This Encounter   Procedures    Ambulatory referral/consult to Ophthalmology     Standing Status:   Future     Standing Expiration Date:   12/22/2024     Referral Priority:   Routine     Referral Type:   Consultation     Referral Reason:   Specialty Services Required     Requested Specialty:   Ophthalmology     Number of Visits Requested:   1        Rec warm compresses 5 times daily  F/u c ophthalmology if sx fail to resolve over the next week.    Follow up if symptoms worsen or fail to improve.      E-Visit Time Tracking:    Day 1 Time (in minutes): 5     Total Time (in minutes): 5

## 2024-02-01 ENCOUNTER — OFFICE VISIT (OUTPATIENT)
Dept: SLEEP MEDICINE | Facility: CLINIC | Age: 49
End: 2024-02-01
Attending: PSYCHIATRY & NEUROLOGY
Payer: COMMERCIAL

## 2024-02-01 VITALS
HEART RATE: 89 BPM | WEIGHT: 163.81 LBS | DIASTOLIC BLOOD PRESSURE: 91 MMHG | HEIGHT: 63 IN | BODY MASS INDEX: 29.02 KG/M2 | SYSTOLIC BLOOD PRESSURE: 143 MMHG

## 2024-02-01 DIAGNOSIS — R53.82 CHRONIC FATIGUE: ICD-10-CM

## 2024-02-01 DIAGNOSIS — G47.30 SLEEP APNEA, UNSPECIFIED TYPE: ICD-10-CM

## 2024-02-01 DIAGNOSIS — F90.9 ATTENTION DEFICIT HYPERACTIVITY DISORDER (ADHD), UNSPECIFIED ADHD TYPE: Primary | ICD-10-CM

## 2024-02-01 PROCEDURE — 99204 OFFICE O/P NEW MOD 45 MIN: CPT | Mod: S$GLB,,, | Performed by: NURSE PRACTITIONER

## 2024-02-01 PROCEDURE — 99999 PR PBB SHADOW E&M-EST. PATIENT-LVL III: CPT | Mod: PBBFAC,,, | Performed by: NURSE PRACTITIONER

## 2024-02-01 PROCEDURE — 3080F DIAST BP >= 90 MM HG: CPT | Mod: CPTII,S$GLB,, | Performed by: NURSE PRACTITIONER

## 2024-02-01 PROCEDURE — 3077F SYST BP >= 140 MM HG: CPT | Mod: CPTII,S$GLB,, | Performed by: NURSE PRACTITIONER

## 2024-02-01 PROCEDURE — 3008F BODY MASS INDEX DOCD: CPT | Mod: CPTII,S$GLB,, | Performed by: NURSE PRACTITIONER

## 2024-02-01 RX ORDER — CLONIDINE HYDROCHLORIDE 0.1 MG/1
0.1 TABLET ORAL DAILY PRN
COMMUNITY
End: 2024-03-13

## 2024-02-01 NOTE — PROGRESS NOTES
"Referred by Dr. Gaston    CHIEF COMPLAINT: Sleep evaluation    HISTORY OF PRESENT ILLNESS: She has never had a sleep study. She has a lot going on. She feels exhausted/tired at times/hard to keep going especially after work. She has been taking adderall 12.5mg am and lunchtime x 1.5-2hyrs, if she didn't take it she would be falling/dozing at her desk. Sleep is disrupted 3a, may or may not return to sleep. Current vasomotor symptoms nighttime. +snores and fiance tells her she may stop breathing.   LUPE  Dysautonomia -clonidine prn  Denies symptoms of restless legs    On todays Eldridge Sleepiness Scale the patient scores a 10/24.       FAMILY HISTORY: No known sleep disorders. Dad very sleepy  SOCIAL HISTORY: engaged (destination wedding 6/8/24), credit [employer]    BP (!) 143/91 (BP Location: Left arm, Patient Position: Sitting, BP Method: Large (Automatic))   Pulse 89   Ht 5' 3" (1.6 m)   Wt 74.3 kg (163 lb 12.8 oz)   BMI 29.02 kg/m²         ASSESSMENT:   Unspecified Sleep Apnea, with symptoms of snoring, questionable apneic pauses, un-refreshing disrupted sleep and daytime sleepiness,  with medical comorbidities of LUPE, ADHD, hyperlipidemia, dysautonomia . Warrants further investigation for untreated sleep apnea.     PLAN:   1. Home Sleep Study, discussed plan of care (if + trial apap/nose)   2. Discussed etiology of WINIFRED and potential treatment options, which could include continuous positive airway pressure (CPAP-definitive)  3. Melatonin up to 10mg or Mag glycinate fine to take bedtime. Avoid bedroom except for sleep/intimacy and delay bedtime until 11p-11:30p.   See cards as advised/continue med      Thank you for allowing me the opportunity to participate in the care of your patient        "

## 2024-03-13 ENCOUNTER — NURSE TRIAGE (OUTPATIENT)
Dept: ADMINISTRATIVE | Facility: CLINIC | Age: 49
End: 2024-03-13
Payer: COMMERCIAL

## 2024-03-13 ENCOUNTER — OFFICE VISIT (OUTPATIENT)
Dept: CARDIOLOGY | Facility: CLINIC | Age: 49
End: 2024-03-13
Payer: COMMERCIAL

## 2024-03-13 VITALS
BODY MASS INDEX: 28.82 KG/M2 | SYSTOLIC BLOOD PRESSURE: 160 MMHG | DIASTOLIC BLOOD PRESSURE: 82 MMHG | WEIGHT: 162.69 LBS | OXYGEN SATURATION: 100 % | RESPIRATION RATE: 18 BRPM | HEART RATE: 100 BPM | HEIGHT: 63 IN

## 2024-03-13 DIAGNOSIS — I10 HYPERTENSION, UNSPECIFIED TYPE: ICD-10-CM

## 2024-03-13 DIAGNOSIS — I10 ESSENTIAL HYPERTENSION: Primary | ICD-10-CM

## 2024-03-13 DIAGNOSIS — R94.31 ABNORMAL EKG: ICD-10-CM

## 2024-03-13 DIAGNOSIS — G90.1 DYSAUTONOMIA: ICD-10-CM

## 2024-03-13 LAB
OHS QRS DURATION: 86 MS
OHS QTC CALCULATION: 454 MS

## 2024-03-13 PROCEDURE — 1159F MED LIST DOCD IN RCRD: CPT | Mod: CPTII,S$GLB,, | Performed by: INTERNAL MEDICINE

## 2024-03-13 PROCEDURE — 99999 PR PBB SHADOW E&M-EST. PATIENT-LVL IV: CPT | Mod: PBBFAC,,, | Performed by: INTERNAL MEDICINE

## 2024-03-13 PROCEDURE — 99214 OFFICE O/P EST MOD 30 MIN: CPT | Mod: S$GLB,,, | Performed by: INTERNAL MEDICINE

## 2024-03-13 PROCEDURE — 3077F SYST BP >= 140 MM HG: CPT | Mod: CPTII,S$GLB,, | Performed by: INTERNAL MEDICINE

## 2024-03-13 PROCEDURE — 93000 ELECTROCARDIOGRAM COMPLETE: CPT | Mod: S$GLB,,, | Performed by: INTERNAL MEDICINE

## 2024-03-13 PROCEDURE — 3008F BODY MASS INDEX DOCD: CPT | Mod: CPTII,S$GLB,, | Performed by: INTERNAL MEDICINE

## 2024-03-13 PROCEDURE — 1160F RVW MEDS BY RX/DR IN RCRD: CPT | Mod: CPTII,S$GLB,, | Performed by: INTERNAL MEDICINE

## 2024-03-13 PROCEDURE — 3079F DIAST BP 80-89 MM HG: CPT | Mod: CPTII,S$GLB,, | Performed by: INTERNAL MEDICINE

## 2024-03-13 RX ORDER — HYDROCHLOROTHIAZIDE 25 MG/1
25 TABLET ORAL DAILY
Qty: 90 TABLET | Refills: 3 | Status: SHIPPED | OUTPATIENT
Start: 2024-03-13

## 2024-03-13 NOTE — TELEPHONE ENCOUNTER
Spoke with pt who reports that she has been having fluctauting BP's today 138/83. States BP elevated yesterday, with noted HA. Pt requesting to be seen today in office. Appointment scheduled.      Reason for Disposition   Patient wants to be seen    Additional Information   Negative: Sounds like a life-threatening emergency to the triager   Negative: Systolic BP >= 160 OR Diastolic >= 100, and any cardiac (e.g., breathing difficulty, chest pain) or neurologic symptoms (e.g., new-onset blurred or double vision)   Negative: Pregnant 20 or more weeks (or postpartum < 6 weeks) with new hand or face swelling   Negative: Pregnant 20 or more weeks (or postpartum < 6 weeks) and Systolic BP >= 160 OR Diastolic >= 110   Negative: Patient sounds very sick or weak to the triager   Negative: Systolic BP >= 200 OR Diastolic >= 120 and having NO cardiac or neurologic symptoms   Negative: Pregnant 20 or more weeks (or postpartum < 6 weeks) with Systolic BP >= 140 OR Diastolic >= 90   Negative: Systolic BP >= 180 OR Diastolic >= 110, and missed most recent dose of blood pressure medication   Negative: Systolic BP >= 180 OR Diastolic >= 110    Protocols used: Blood Pressure - High-A-OH

## 2024-03-14 ENCOUNTER — HOSPITAL ENCOUNTER (OUTPATIENT)
Dept: CARDIOLOGY | Facility: HOSPITAL | Age: 49
Discharge: HOME | End: 2024-03-14
Attending: INTERNAL MEDICINE
Payer: COMMERCIAL

## 2024-03-14 DIAGNOSIS — R94.31 ABNORMAL EKG: ICD-10-CM

## 2024-03-14 DIAGNOSIS — I10 ESSENTIAL HYPERTENSION: ICD-10-CM

## 2024-03-14 LAB
ASCENDING AORTA: 2.36 CM
AV INDEX (PROSTH): 0.71
AV MEAN GRADIENT: 4 MMHG
AV PEAK GRADIENT: 8 MMHG
AV VALVE AREA BY VELOCITY RATIO: 1.9 CM²
AV VALVE AREA: 1.96 CM²
AV VELOCITY RATIO: 0.68
CV ECHO LV RWT: 0.28 CM
DOP CALC AO PEAK VEL: 1.42 M/S
DOP CALC AO VTI: 27 CM
DOP CALC LVOT AREA: 2.8 CM2
DOP CALC LVOT DIAMETER: 1.88 CM
DOP CALC LVOT PEAK VEL: 0.97 M/S
DOP CALC LVOT STROKE VOLUME: 52.99 CM3
DOP CALC MV VTI: 30.1 CM
DOP CALCLVOT PEAK VEL VTI: 19.1 CM
E WAVE DECELERATION TIME: 193.2 MSEC
E/A RATIO: 0.87
E/E' RATIO: 7.36 M/S
ECHO LV POSTERIOR WALL: 0.66 CM (ref 0.6–1.1)
FRACTIONAL SHORTENING: 33 % (ref 28–44)
INTERVENTRICULAR SEPTUM: 0.76 CM (ref 0.6–1.1)
IVC DIAMETER: 1.68 CM
IVRT: 88.49 MSEC
LA MAJOR: 5.05 CM
LA MINOR: 5.36 CM
LA WIDTH: 4.2 CM
LEFT ATRIUM SIZE: 4.04 CM
LEFT ATRIUM VOLUME MOD: 49.48 CM3
LEFT ATRIUM VOLUME: 75 CM3
LEFT INTERNAL DIMENSION IN SYSTOLE: 3.12 CM (ref 2.1–4)
LEFT VENTRICLE DIASTOLIC VOLUME: 100.96 ML
LEFT VENTRICLE SYSTOLIC VOLUME: 38.37 ML
LEFT VENTRICULAR INTERNAL DIMENSION IN DIASTOLE: 4.67 CM (ref 3.5–6)
LEFT VENTRICULAR MASS: 103.78 G
LV LATERAL E/E' RATIO: 6.23 M/S
LV SEPTAL E/E' RATIO: 9 M/S
LVOT MG: 2.11 MMHG
LVOT MV: 0.68 CM/S
MV MEAN GRADIENT: 2 MMHG
MV PEAK A VEL: 0.93 M/S
MV PEAK E VEL: 0.81 M/S
MV PEAK GRADIENT: 5 MMHG
MV STENOSIS PRESSURE HALF TIME: 56.03 MS
MV VALVE AREA BY CONTINUITY EQUATION: 1.76 CM2
MV VALVE AREA P 1/2 METHOD: 3.93 CM2
PISA TR MAX VEL: 2.19 M/S
PULM VEIN S/D RATIO: 1.82
PV PEAK D VEL: 0.33 M/S
PV PEAK GRADIENT: 9 MMHG
PV PEAK S VEL: 0.6 M/S
PV PEAK VELOCITY: 1.51 M/S
RA MAJOR: 4.39 CM
RA PRESSURE ESTIMATED: 3 MMHG
RA WIDTH: 3.5 CM
RIGHT VENTRICULAR END-DIASTOLIC DIMENSION: 3.44 CM
RV TB RVSP: 5 MMHG
RV TISSUE DOPPLER FREE WALL SYSTOLIC VELOCITY 1 (APICAL 4 CHAMBER VIEW): 14.03 CM/S
SINUS: 2.35 CM
STJ: 2.24 CM
TDI LATERAL: 0.13 M/S
TDI SEPTAL: 0.09 M/S
TDI: 0.11 M/S
TR MAX PG: 19 MMHG
TRICUSPID ANNULAR PLANE SYSTOLIC EXCURSION: 2.05 CM
TV REST PULMONARY ARTERY PRESSURE: 22 MMHG

## 2024-03-14 PROCEDURE — 93306 TTE W/DOPPLER COMPLETE: CPT | Mod: 26,,, | Performed by: INTERNAL MEDICINE

## 2024-03-14 PROCEDURE — 93306 TTE W/DOPPLER COMPLETE: CPT

## 2024-03-15 ENCOUNTER — TELEPHONE (OUTPATIENT)
Dept: CARDIOLOGY | Facility: CLINIC | Age: 49
End: 2024-03-15
Payer: COMMERCIAL

## 2024-03-15 DIAGNOSIS — I10 ESSENTIAL HYPERTENSION: Primary | ICD-10-CM

## 2024-03-15 NOTE — TELEPHONE ENCOUNTER
Spoke to patient regarding doing labs the day after her appointment instead of in 1 week like we instructed her to. Patient states that she misunderstood. I asked patient if she started taking the hydrochlorothiazide before doing lab work and she states that she has not, that she will  rx today and start taking today. Please advise

## 2024-05-29 ENCOUNTER — OFFICE VISIT (OUTPATIENT)
Dept: INTERNAL MEDICINE | Facility: CLINIC | Age: 49
End: 2024-05-29
Payer: COMMERCIAL

## 2024-05-29 ENCOUNTER — TELEPHONE (OUTPATIENT)
Dept: INTERNAL MEDICINE | Facility: CLINIC | Age: 49
End: 2024-05-29
Payer: COMMERCIAL

## 2024-05-29 VITALS
HEIGHT: 63 IN | DIASTOLIC BLOOD PRESSURE: 80 MMHG | OXYGEN SATURATION: 98 % | WEIGHT: 162.5 LBS | SYSTOLIC BLOOD PRESSURE: 140 MMHG | BODY MASS INDEX: 28.79 KG/M2 | HEART RATE: 111 BPM

## 2024-05-29 DIAGNOSIS — R09.82 POST-NASAL DRIP: ICD-10-CM

## 2024-05-29 DIAGNOSIS — H92.03 OTALGIA OF BOTH EARS: Primary | ICD-10-CM

## 2024-05-29 PROCEDURE — 99213 OFFICE O/P EST LOW 20 MIN: CPT | Mod: S$GLB,,,

## 2024-05-29 PROCEDURE — 1159F MED LIST DOCD IN RCRD: CPT | Mod: CPTII,S$GLB,,

## 2024-05-29 PROCEDURE — 3077F SYST BP >= 140 MM HG: CPT | Mod: CPTII,S$GLB,,

## 2024-05-29 PROCEDURE — 99999 PR PBB SHADOW E&M-EST. PATIENT-LVL III: CPT | Mod: PBBFAC,,,

## 2024-05-29 PROCEDURE — 3079F DIAST BP 80-89 MM HG: CPT | Mod: CPTII,S$GLB,,

## 2024-05-29 PROCEDURE — 3008F BODY MASS INDEX DOCD: CPT | Mod: CPTII,S$GLB,,

## 2024-05-29 PROCEDURE — 1160F RVW MEDS BY RX/DR IN RCRD: CPT | Mod: CPTII,S$GLB,,

## 2024-05-29 RX ORDER — CETIRIZINE HYDROCHLORIDE 10 MG/1
10 TABLET ORAL DAILY
Qty: 30 TABLET | Refills: 2 | Status: SHIPPED | OUTPATIENT
Start: 2024-05-29 | End: 2025-05-29

## 2024-05-29 NOTE — TELEPHONE ENCOUNTER
----- Message from Bev Stroud sent at 5/29/2024  8:21 AM CDT -----  Type:  Same Day Appointment Request    Caller is requesting a same day appointment.  Caller declined first available appointment listed below.    Name of Caller:pt portal  When is the first available appointment?9.18  Symptoms:Have clogged ears eye watering and congestion yellow green mucus and on last day of antibiotics  Best Call Back Number: [phone]  Additional Information:   With Provider: Ema Gaston [[facility]]    Preferred Date Range: 5/29/2024 - 5/30/2024

## 2024-05-29 NOTE — PROGRESS NOTES
INTERNAL MEDICINE PROGRESS/URGENT CARE NOTE    Patient: Juanita Salas  : 1975  MRN: [id number]  PCP: Ema Gaston MD    CHIEF COMPLAINT     Chief Complaint   Patient presents with    Otalgia       HPI     Juanita is a 48 y.o. female with migraines, LUPE, ADHD, HLD, former smoker, chronic fatigue who presents for an urgent visit today with c/o ears feelings clogged, ear pain, congestion, scratchy throat, yellow/green mucous, fatigue, and eye watering that started about 10 days ago. Had a virtual visit through her insurance company on day 2 of symptoms and that doctor prescribed 10-day course of amoxicillin, which she has completed. She states she doesn't feel this helped her symptoms. Started taking mucinex 2 days ago. Denies fevers, chills, sob, cough, n/v/d, body aches.  Was exposed to her grand-daughter who had strep about 2 weeks ago.            Past Medical History:  Past Medical History:   Diagnosis Date    Adjustment disorder     Allergy     Anxiety     Depression     Fatigue     Headache     Hx of psychiatric care     Low vitamin D level     Panic disorder     Psychiatric problem         Past Surgical History:  Past Surgical History:   Procedure Laterality Date     SECTION      DILATION AND CURETTAGE OF UTERUS          Allergies:  Review of patient's allergies indicates:   Allergen Reactions    Azithromycin Nausea And Vomiting       Home Medications:    Current Outpatient Medications:     cholecalciferol, vitamin D3, (VITAMIN D3) 25 mcg (1,000 unit) capsule, Take 1,000 Units by mouth 2 (two) times daily., Disp: , Rfl:     clonazePAM (KLONOPIN) 0.5 MG tablet, Take 0.5 mg by mouth as needed., Disp: , Rfl:     dextroamphetamine-amphetamine (ADDERALL) 15 mg tablet, 12.5 mg., Disp: , Rfl:     ergocalciferol (ERGOCALCIFEROL) 50,000 unit Cap, Take 50,000 Units by mouth every 7 days. , Disp: , Rfl:     hydroCHLOROthiazide (HYDRODIURIL) 25 MG tablet, Take 1 tablet (25 mg total) by mouth once daily., Disp: 90  "tablet, Rfl: 3    hydrocortisone (PROCTO-MED HC) 2.5 % rectal cream, Place rectally 2 (two) times daily., Disp: 28 g, Rfl: 1    ZETIA 10 mg tablet, Take by mouth., Disp: , Rfl:     cetirizine (ZYRTEC) 10 MG tablet, Take 1 tablet (10 mg total) by mouth once daily., Disp: 30 tablet, Rfl: 2     Review of Systems:  Review of Systems   Constitutional:  Positive for fatigue. Negative for fever.   HENT:  Positive for congestion, ear pain and postnasal drip.    Eyes:  Positive for discharge.   Respiratory:  Negative for chest tightness and shortness of breath.    Cardiovascular:  Negative for chest pain.   Gastrointestinal:  Negative for abdominal pain, blood in stool, diarrhea, nausea and vomiting.   Musculoskeletal:  Negative for myalgias.   Neurological:  Negative for dizziness, weakness and headaches.   Psychiatric/Behavioral:  Negative for suicidal ideas.          PHYSICAL EXAM     Vitals:    05/29/24 1309   BP: (!) 140/80   BP Location: Left arm   Patient Position: Sitting   BP Method: Medium (Manual)   Pulse: (!) 111   SpO2: 98%   Weight: 73.7 kg (162 lb 7.7 oz)   Height: 5' 3" (1.6 m)      Body mass index is 28.78 kg/m².     Physical Exam  Constitutional:       General: She is not in acute distress.     Appearance: Normal appearance. She is not ill-appearing, toxic-appearing or diaphoretic.      Comments: Well-appearing. Speaking in complete sentences   HENT:      Head: Normocephalic.      Comments: Frontal and maxillary sinuses nttp     Right Ear: Tympanic membrane and ear canal normal. There is no impacted cerumen.      Left Ear: Tympanic membrane and ear canal normal. There is no impacted cerumen.      Ears:      Comments: Bilat Tms translucent with good light reflex. No erythema, bulging, effusions, canal drainage. Bilat pinna manipulation does not illicit pain.     Nose: Congestion present.      Mouth/Throat:      Mouth: Mucous membranes are moist.      Pharynx: Oropharynx is clear. No oropharyngeal exudate or " posterior oropharyngeal erythema.   Eyes:      Extraocular Movements: Extraocular movements intact.      Pupils: Pupils are equal, round, and reactive to light.   Cardiovascular:      Rate and Rhythm: Normal rate and regular rhythm.      Pulses: Normal pulses.      Heart sounds: Normal heart sounds.   Pulmonary:      Effort: Pulmonary effort is normal. No respiratory distress.      Breath sounds: Normal breath sounds. No wheezing.   Abdominal:      General: Bowel sounds are normal. There is no distension.      Palpations: Abdomen is soft.      Tenderness: There is no abdominal tenderness.   Musculoskeletal:         General: Normal range of motion.      Cervical back: Normal range of motion and neck supple. No rigidity or tenderness.   Lymphadenopathy:      Cervical: No cervical adenopathy.   Skin:     General: Skin is warm and dry.   Neurological:      General: No focal deficit present.      Mental Status: She is alert and oriented to person, place, and time.         LABS     Lab Results   Component Value Date    HGBA1C 5.3 10/05/2023     CMP  Sodium   Date Value Ref Range Status   03/14/2024 142 136 - 145 mmol/L Final     Potassium   Date Value Ref Range Status   03/14/2024 3.6 3.5 - 5.1 mmol/L Final     Chloride   Date Value Ref Range Status   03/14/2024 108 95 - 110 mmol/L Final     CO2   Date Value Ref Range Status   03/14/2024 25 23 - 29 mmol/L Final     Glucose   Date Value Ref Range Status   03/14/2024 104 70 - 110 mg/dL Final     BUN   Date Value Ref Range Status   03/14/2024 8 6 - 20 mg/dL Final     Creatinine   Date Value Ref Range Status   03/14/2024 0.7 0.5 - 1.4 mg/dL Final     Calcium   Date Value Ref Range Status   03/14/2024 8.7 8.7 - 10.5 mg/dL Final     Total Protein   Date Value Ref Range Status   10/05/2023 7.2 6.0 - 8.4 g/dL Final     Albumin   Date Value Ref Range Status   10/05/2023 3.9 3.5 - 5.2 g/dL Final     Total Bilirubin   Date Value Ref Range Status   10/05/2023 0.3 0.1 - 1.0 mg/dL  Final     Comment:     For infants and newborns, interpretation of results should be based  on gestational age, weight and in agreement with clinical  observations.    Premature Infant recommended reference ranges:  Up to 24 hours.............<8.0 mg/dL  Up to 48 hours............<12.0 mg/dL  3-5 days..................<15.0 mg/dL  6-29 days.................<15.0 mg/dL       Alkaline Phosphatase   Date Value Ref Range Status   10/05/2023 53 (L) 55 - 135 U/L Final     AST   Date Value Ref Range Status   10/05/2023 15 10 - 40 U/L Final     ALT   Date Value Ref Range Status   10/05/2023 16 10 - 44 U/L Final     Anion Gap   Date Value Ref Range Status   03/14/2024 9 8 - 16 mmol/L Final     eGFR if    Date Value Ref Range Status   04/11/2022 >60.0 >60 mL/min/1.73 m^2 Final     eGFR if non    Date Value Ref Range Status   04/11/2022 >60.0 >60 mL/min/1.73 m^2 Final     Comment:     Calculation used to obtain the estimated glomerular filtration  rate (eGFR) is the CKD-EPI equation.        Lab Results   Component Value Date    WBC 8.09 10/05/2023    HGB 13.2 10/05/2023    HCT 40.8 10/05/2023    MCV 93 10/05/2023     10/05/2023     Lab Results   Component Value Date    CHOL 206 (H) 10/05/2023    CHOL 196 04/11/2022    CHOL 214 (H) 06/04/2021     Lab Results   Component Value Date    HDL 55 10/05/2023    HDL 52 04/11/2022    HDL 59 06/04/2021     Lab Results   Component Value Date    LDLCALC 132.6 10/05/2023    LDLCALC 129.2 04/11/2022    LDLCALC 129 (H) 06/04/2021     Lab Results   Component Value Date    TRIG 92 10/05/2023    TRIG 74 04/11/2022    TRIG 148 06/04/2021     Lab Results   Component Value Date    CHOLHDL 26.7 10/05/2023    CHOLHDL 26.5 04/11/2022    CHOLHDL 3.6 06/04/2021     Lab Results   Component Value Date    TSH 2.538 03/03/2023       ASSESSMENT & PLAN       1. Otalgia of both ears  -     cetirizine (ZYRTEC) 10 MG tablet; Take 1 tablet (10 mg total) by mouth once daily.   Dispense: 30 tablet; Refill: 2    2. Post-nasal drip  -     cetirizine (ZYRTEC) 10 MG tablet; Take 1 tablet (10 mg total) by mouth once daily.  Dispense: 30 tablet; Refill: 2     PE and VSS. Recommend initiation of conservative measures. Advised patient to follow-up next week or prn if no improvement.    Please proceed with the following supportive management measures:  Get plenty of rest and drink plenty of water.  Warm saltwater gargles twice daily and warm teas with honey and lemon can help sore throat and cough.  Take an OTC allergy medication (Xyzal, Zyrtec, Allegra, Claritin) once per day, in the evening, for at least the next two weeks. Avoid allergy medications with decongestants (denoted with a -D) if Hx hypertension).  Recommend use of Robitussin-DM (Or Tussin-DM) as needed for cough.  You may rotate between tylenol (acetaminophen) and advil (ibuprofen) every four hours as needed for pain or fever.  Use nasal steroid as directed. You may also use a humidifer or run a hot shower for congestion. Vicks rub also a good option.  May purchase over the counter nasal saline spray. Use up to four times per day. Wait at least 20 minutes after using nasal steroids before using saline rinses.          Follow up if symptoms worsen or fail to improve.    Patient was counseled on when to seek emergent care. Patient's plan/treatment was discussed including medications and possible side effects. Verbalized understanding of all instructions.            ARJUN Banks, FNP-C  Department of Internal Medicine  [facility]

## 2024-06-10 ENCOUNTER — PATIENT MESSAGE (OUTPATIENT)
Dept: INTERNAL MEDICINE | Facility: CLINIC | Age: 49
End: 2024-06-10
Payer: COMMERCIAL